# Patient Record
Sex: MALE | Race: WHITE | Employment: OTHER | URBAN - METROPOLITAN AREA
[De-identification: names, ages, dates, MRNs, and addresses within clinical notes are randomized per-mention and may not be internally consistent; named-entity substitution may affect disease eponyms.]

---

## 2019-08-07 ENCOUNTER — HOSPITAL ENCOUNTER (OUTPATIENT)
Dept: MRI IMAGING | Age: 64
Discharge: HOME | End: 2019-08-07
Attending: GENERAL PRACTICE
Payer: MEDICARE

## 2019-08-07 DIAGNOSIS — M47.26 OTHER SPONDYLOSIS WITH RADICULOPATHY, LUMBAR REGION: ICD-10-CM

## 2019-08-07 DIAGNOSIS — M41.26 IDIOPATHIC SCOLIOSIS OF LUMBAR SPINE: ICD-10-CM

## 2019-08-07 DIAGNOSIS — M99.63 OSSEOUS AND SUBLUXATION STENOSIS OF INTERVERTEBRAL FORAMINA OF LUMBAR REGION: ICD-10-CM

## 2019-08-07 PROCEDURE — 72148 MRI LUMBAR SPINE W/O DYE: CPT

## 2019-08-29 ENCOUNTER — HOSPITAL ENCOUNTER (OUTPATIENT)
Dept: PREADMISSION TESTING | Age: 64
Discharge: HOME | End: 2019-08-29
Payer: MEDICARE

## 2019-08-29 ENCOUNTER — ANESTHESIA EVENT (OUTPATIENT)
Dept: SURGERY | Age: 64
End: 2019-08-29
Payer: MEDICARE

## 2019-08-29 VITALS
TEMPERATURE: 97.9 F | BODY MASS INDEX: 34.52 KG/M2 | DIASTOLIC BLOOD PRESSURE: 80 MMHG | HEIGHT: 71 IN | SYSTOLIC BLOOD PRESSURE: 132 MMHG | HEART RATE: 54 BPM | RESPIRATION RATE: 18 BRPM | WEIGHT: 246.6 LBS

## 2019-08-29 LAB
ABO + RH BLD: NORMAL
ANION GAP SERPL CALC-SCNC: 7 MMOL/L (ref 5–15)
APPEARANCE UR: CLEAR
ATRIAL RATE: 52 BPM
BACTERIA URNS QL MICRO: NEGATIVE /HPF
BILIRUB UR QL: NEGATIVE
BLOOD GROUP ANTIBODIES SERPL: NORMAL
BUN SERPL-MCNC: 7 MG/DL (ref 6–20)
BUN/CREAT SERPL: 10 (ref 12–20)
CALCIUM SERPL-MCNC: 8.8 MG/DL (ref 8.5–10.1)
CALCULATED P AXIS, ECG09: 5 DEGREES
CALCULATED R AXIS, ECG10: -28 DEGREES
CALCULATED T AXIS, ECG11: 18 DEGREES
CHLORIDE SERPL-SCNC: 100 MMOL/L (ref 97–108)
CO2 SERPL-SCNC: 31 MMOL/L (ref 21–32)
COLOR UR: NORMAL
CREAT SERPL-MCNC: 0.7 MG/DL (ref 0.7–1.3)
DIAGNOSIS, 93000: NORMAL
EPITH CASTS URNS QL MICRO: NORMAL /LPF
ERYTHROCYTE [DISTWIDTH] IN BLOOD BY AUTOMATED COUNT: 14.1 % (ref 11.5–14.5)
EST. AVERAGE GLUCOSE BLD GHB EST-MCNC: 123 MG/DL
GLUCOSE SERPL-MCNC: 100 MG/DL (ref 65–100)
GLUCOSE UR STRIP.AUTO-MCNC: NEGATIVE MG/DL
HBA1C MFR BLD: 5.9 % (ref 4.2–6.3)
HCT VFR BLD AUTO: 44.7 % (ref 36.6–50.3)
HGB BLD-MCNC: 14.5 G/DL (ref 12.1–17)
HGB UR QL STRIP: NEGATIVE
HYALINE CASTS URNS QL MICRO: NORMAL /LPF (ref 0–5)
INR PPP: 1.1 (ref 0.9–1.1)
KETONES UR QL STRIP.AUTO: NEGATIVE MG/DL
LEUKOCYTE ESTERASE UR QL STRIP.AUTO: NEGATIVE
MCH RBC QN AUTO: 31.7 PG (ref 26–34)
MCHC RBC AUTO-ENTMCNC: 32.4 G/DL (ref 30–36.5)
MCV RBC AUTO: 97.8 FL (ref 80–99)
NITRITE UR QL STRIP.AUTO: NEGATIVE
NRBC # BLD: 0 K/UL (ref 0–0.01)
NRBC BLD-RTO: 0 PER 100 WBC
P-R INTERVAL, ECG05: 156 MS
PH UR STRIP: 7 [PH] (ref 5–8)
PLATELET # BLD AUTO: 153 K/UL (ref 150–400)
PMV BLD AUTO: 10.8 FL (ref 8.9–12.9)
POTASSIUM SERPL-SCNC: 4.4 MMOL/L (ref 3.5–5.1)
PROT UR STRIP-MCNC: NEGATIVE MG/DL
PROTHROMBIN TIME: 11.2 SEC (ref 9–11.1)
Q-T INTERVAL, ECG07: 460 MS
QRS DURATION, ECG06: 96 MS
QTC CALCULATION (BEZET), ECG08: 427 MS
RBC # BLD AUTO: 4.57 M/UL (ref 4.1–5.7)
RBC #/AREA URNS HPF: NORMAL /HPF (ref 0–5)
SODIUM SERPL-SCNC: 138 MMOL/L (ref 136–145)
SP GR UR REFRACTOMETRY: 1.01 (ref 1–1.03)
SPECIMEN EXP DATE BLD: NORMAL
UA: UC IF INDICATED,UAUC: NORMAL
UROBILINOGEN UR QL STRIP.AUTO: 0.2 EU/DL (ref 0.2–1)
VENTRICULAR RATE, ECG03: 52 BPM
WBC # BLD AUTO: 6 K/UL (ref 4.1–11.1)
WBC URNS QL MICRO: NORMAL /HPF (ref 0–4)

## 2019-08-29 PROCEDURE — 81001 URINALYSIS AUTO W/SCOPE: CPT

## 2019-08-29 PROCEDURE — 36415 COLL VENOUS BLD VENIPUNCTURE: CPT

## 2019-08-29 PROCEDURE — 93005 ELECTROCARDIOGRAM TRACING: CPT

## 2019-08-29 PROCEDURE — 80048 BASIC METABOLIC PNL TOTAL CA: CPT

## 2019-08-29 PROCEDURE — 85027 COMPLETE CBC AUTOMATED: CPT

## 2019-08-29 PROCEDURE — 83036 HEMOGLOBIN GLYCOSYLATED A1C: CPT

## 2019-08-29 PROCEDURE — 86900 BLOOD TYPING SEROLOGIC ABO: CPT

## 2019-08-29 PROCEDURE — 85610 PROTHROMBIN TIME: CPT

## 2019-08-29 RX ORDER — CETIRIZINE HCL 10 MG
10 TABLET ORAL DAILY
COMMUNITY
End: 2020-05-14

## 2019-08-29 RX ORDER — ROSUVASTATIN CALCIUM 40 MG/1
40 TABLET, COATED ORAL
COMMUNITY

## 2019-08-29 NOTE — PERIOP NOTES
DO NOT EAT OR DRINK ANYTHING AFTER MIDNIGHT, except as instructed THE NIGHT BEFORE SURGERY. PT GIVEN OPPORTUNITY TO ASK ADDITIONAL QUESTIONS. PRE-OPERATIVE INSTRUCTIONS REVIEWED WITH PATIENT. PATIENT GIVEN 2-BOTTLES OF CHG SOAP. INSTRUCTIONS REVIEWED ON USE OF CHG SOAP. PATIENT GIVEN SSI INFECTION FAQ SHEET. MRSA / MSSA TREATMENT INSTRUCTION SHEET GIVEN WITH AN EXPLANATION TO PATIENT THAT THEY WILL BE NOTIFIED IF TREATMENT INSTRUCTIONS NEED TO BE INITIATED. PATIENT WAS GIVEN THE OPPORTUNITY TO ASK QUESTIONS ON THE INFORMATION PROVIDED. [facility] CALLED TO OBTAIN MOST RECENT CARDIAC NOTES.

## 2019-08-29 NOTE — H&P
Sammie Salas  Location: [place] [address] [address]  Patient #: [id number]  : 1955   / Language: English / Race: White  Male      History of Present Illness  The patient is a 75year old male who presents with chronic lumbar back pain. Symptoms include pain and decreased range of motion. Symptoms are located in the low back and on the right side more than the left. The pain radiates to the right lower leg. The patient describes the pain as sharp. Onset was gradual. The symptoms occur constantly. The patient describes symptoms as severe. The patient was previously evaluated by a primary physician. Past evaluation has included MRI of the lumbar spine. Problem List/Past Medical   Essential Hypertension    Patient was referred to their primary care physician for Blood Pressure screening.    REVIEW OF SYSTEMS: Systems were reviewed by the provider.    FOLLOW-UP AFTER SURGERY (V67.00)    Foreign body in forearm (913.6)    Dietary counseling (V65.3)    RC TEAR - NON-TRAUMA (727.61)    Weight above 97th percentile (V49.89  Z78.9)    Closed chip fracture of left triquetrum with routine healing, subsequent encounter (V54.12  S62.112D)    HAND, ARTHRITIS (715.94)    Ganglion cyst (727.43)    Wrist pain, left (719.43  M25.532)      Allergies   Erythromycins    Allergies Reconciled    Percodan *ANALGESICS - OPIOID*   (Marked as Inactive)  Pollens   (Marked as Inactive)  Percocet *ANALGESICS - OPIOID*   (Marked as Inactive)  No Known Drug Allergies  [06/10/2013]: (Marked as Inactive)    Family History   Hypertension    Hypercholesterolemia    Arthritis      Social History   Exercise   : Does other exercise. Caffeine use   : Drinks caffeinated beverages 0 times a day. Sun Exposure  : rarely  HIV risk factors  : no  Alcohol use   : Drinks wine 2 times per week having 1-2 drinks per occasion, never having more than 5 drinks per occasion.   Tobacco / smoke exposure : No  Tobacco use  2: Former smoker: 0.5 packs per day, started smoking in 1974 at age 22, quit smoking in 2000 at age 50 (13 pack years), smokes 0 cigar(s) per week, uses 0 can(s) of smokeless tobacco per week. 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 It is worse at L4-5 due to right-sided disc protrusion with inferior migration. Lipomatosis creates severe stenosis at L5-S1. Other Problems   Skin Cancer    Gout    Hypercholesterolemia    Heart disease    Post-op pain (338.18)    Unspecified Diagnosis      Vitals   Weight: 246 lb   Height: 71 in   Body Surface Area: 2.3 m²   Body Mass Index: 34.31 kg/m²      Physical Exam   Neurologic  Sensory  Light Touch - Intact - Globally. Overall Assessment of Muscle Strength and Tone reveals  Lower Extremities - Right Iliopsoas - 5/5. Left Iliopsoas - 5/5. Right Tibialis Anterior - 3/5. Left Tibialis Anterior - 5/5. Right Gastroc-Soleus - 5/5. Left Gastroc-Soleus - 5/5. Right EHL - 5/5. Left EHL - 5/5. General Assessment of Reflexes  Right Ankle - Clonus is not present. Left Ankle - Clonus is not present. Reflexes (Dermatomes)  2/2 Normal - Left Achilles (L5-S2), Left Knee (L2-4), Right Achilles (L5-S2) and Right Knee (L2-4). Musculoskeletal  Global Assessment  Examination of related systems reveals - well-developed, well-nourished, in no acute distress, alert and oriented x 3. Gait and Station - normal gait and station and normal posture. Right Lower Extremity - normal strength and tone, normal range of motion without pain and no instability, subluxation or laxity.  Left Lower Extremity - normal strength and tone, normal range of motion without pain and no instability, subluxation or laxity. Spine/Ribs/Pelvis  Cervical Spine - Examination of the cervical spine reveals - no tenderness to palpation, no pain, no swelling, edema or erythema, normal cervical spine movements and normal sensation. Thoracic (Dorsal) Spine - Examination of the thoracic spine reveals - no tenderness over thoracic vertebrae, no pain, normal sensation and normal thoracic spine movements. Lumbosacral Spine - Examination of the lumbosacral spine reveals - no known fractures or deformities. Inspection and Palpation - Tenderness - moderate. Assessment of pain reveals the following findings - The pain is characterized as - moderate. Location - pain refers to hip on affected side and pain refers to lower back bilaterally. ROJM - Trunk Extension - 15 degrees. Lumbar Spine Flexion - . Lumbosacral Spine - Functional Testing - Babinski Test negative, Prone Knee Bending Test negative, Slump Test negative, Straight Leg Raising Test negative. Assessment & Plan   Lumbar stenosis with neurogenic claudication (724.03  M48.062)  Impression: Chronic through the years. Recently he is developed a right-sided foot drop that has come on acutely. He has on his scan lumbar stenosis due to congenital stenosis as well as facet hypertrophy from L2-S1. At L4-5 he has a right-sided disc protrusion with inferior migration causing impingement of the exiting L5 nerve root. He is a candidate for a lumbar laminectomy from L2-S1 with a discectomy at L4-5. Fortunately he does not need a fusion. The risks and benefits were discussed at length with the patient and the patient has elected to proceed. Indications for surgery include failed conservative treatment. Alternative treatments, risks and the perioperative course were discussed with the patient. All questions were answered. The risks and benefits of the procedure were explained.  Benefits include definitive diagnosis, relief of pain, elimination of deformity and improved function. Risks of surgery including bleeding, infection, weakness, numbness, CSF leak, failure to improve symptoms, exacerbation of medical co-morbidities and even death were discussed with the patient. Current Plans  X-RAY EXAM OF LUMBAR SPINE, 4 OR MORE VIEWS (51625) (AP, Lateral, Flexion and Extension views were taken today using Digital Radiography.)  Started Norco 5-325MG, 1-2 Tablet every 4-6 hours as needed for pain, #50, 08/14/2019, No Refill. Disc degeneration of lumbar region (722.52  M51.36)  Foot drop, right (736.79  M21.371)  Treatment options were discussed with the patient in full.(V65.49)  Current Plans  Pt Education - How to access health information online: discussed with patient and provided information. Pt Education - Educational materials were provided.: discussed with patient and provided information. Presurgical planning was preformed with the patient today  Surgery to be scheduled    Date of Surgery Update:  Mare Tran was seen and examined. History and physical has been reviewed. The patient has been examined.  There have been no significant clinical changes since the completion of the originally dated History and Physical.    Signed By: Lucy Peguero MD     August 30, 2019 7:19 AM             Signed by Lucy Peguero MD

## 2019-08-30 ENCOUNTER — HOSPITAL ENCOUNTER (OUTPATIENT)
Age: 64
Setting detail: OBSERVATION
Discharge: HOME HEALTH | End: 2019-09-04
Attending: ORTHOPAEDIC SURGERY | Admitting: ORTHOPAEDIC SURGERY
Payer: MEDICARE

## 2019-08-30 ENCOUNTER — APPOINTMENT (OUTPATIENT)
Dept: GENERAL RADIOLOGY | Age: 64
End: 2019-08-30
Attending: ORTHOPAEDIC SURGERY
Payer: MEDICARE

## 2019-08-30 ENCOUNTER — ANESTHESIA (OUTPATIENT)
Dept: SURGERY | Age: 64
End: 2019-08-30
Payer: MEDICARE

## 2019-08-30 DIAGNOSIS — M48.061 SPINAL STENOSIS OF LUMBAR REGION, UNSPECIFIED WHETHER NEUROGENIC CLAUDICATION PRESENT: Primary | ICD-10-CM

## 2019-08-30 LAB
BACTERIA SPEC CULT: NORMAL
BACTERIA SPEC CULT: NORMAL
SERVICE CMNT-IMP: NORMAL

## 2019-08-30 PROCEDURE — 77030012406 HC DRN WND PENRS BARD -A: Performed by: ORTHOPAEDIC SURGERY

## 2019-08-30 PROCEDURE — 76010000171 HC OR TIME 2 TO 2.5 HR INTENSV-TIER 1: Performed by: ORTHOPAEDIC SURGERY

## 2019-08-30 PROCEDURE — 77030029099 HC BN WAX SSPC -A: Performed by: ORTHOPAEDIC SURGERY

## 2019-08-30 PROCEDURE — 51798 US URINE CAPACITY MEASURE: CPT

## 2019-08-30 PROCEDURE — 74011000272 HC RX REV CODE- 272: Performed by: ORTHOPAEDIC SURGERY

## 2019-08-30 PROCEDURE — 74011000250 HC RX REV CODE- 250: Performed by: ORTHOPAEDIC SURGERY

## 2019-08-30 PROCEDURE — 76060000035 HC ANESTHESIA 2 TO 2.5 HR: Performed by: ORTHOPAEDIC SURGERY

## 2019-08-30 PROCEDURE — 74011000250 HC RX REV CODE- 250: Performed by: NURSE ANESTHETIST, CERTIFIED REGISTERED

## 2019-08-30 PROCEDURE — 74011250637 HC RX REV CODE- 250/637

## 2019-08-30 PROCEDURE — 99218 HC RM OBSERVATION: CPT

## 2019-08-30 PROCEDURE — 74011250636 HC RX REV CODE- 250/636: Performed by: NURSE ANESTHETIST, CERTIFIED REGISTERED

## 2019-08-30 PROCEDURE — 77030037713 HC CLOSR DEV INCIS ZIP STRY -B: Performed by: ORTHOPAEDIC SURGERY

## 2019-08-30 PROCEDURE — 77030013079 HC BLNKT BAIR HGGR 3M -A: Performed by: NURSE ANESTHETIST, CERTIFIED REGISTERED

## 2019-08-30 PROCEDURE — 77030008684 HC TU ET CUF COVD -B: Performed by: NURSE ANESTHETIST, CERTIFIED REGISTERED

## 2019-08-30 PROCEDURE — 77010033678 HC OXYGEN DAILY

## 2019-08-30 PROCEDURE — 74011250636 HC RX REV CODE- 250/636: Performed by: PHYSICIAN ASSISTANT

## 2019-08-30 PROCEDURE — 77030020782 HC GWN BAIR PAWS FLX 3M -B

## 2019-08-30 PROCEDURE — 77030031139 HC SUT VCRL2 J&J -A: Performed by: ORTHOPAEDIC SURGERY

## 2019-08-30 PROCEDURE — V2790 AMNIOTIC MEMBRANE: HCPCS | Performed by: ORTHOPAEDIC SURGERY

## 2019-08-30 PROCEDURE — 74011250636 HC RX REV CODE- 250/636: Performed by: ANESTHESIOLOGY

## 2019-08-30 PROCEDURE — 76210000063 HC OR PH I REC FIRST 0.5 HR: Performed by: ORTHOPAEDIC SURGERY

## 2019-08-30 PROCEDURE — 77030026438 HC STYL ET INTUB CARD -A: Performed by: NURSE ANESTHETIST, CERTIFIED REGISTERED

## 2019-08-30 PROCEDURE — 77030040361 HC SLV COMPR DVT MDII -B: Performed by: ORTHOPAEDIC SURGERY

## 2019-08-30 PROCEDURE — 77030038600 HC TU BPLR IRR DISP STRY -B: Performed by: ORTHOPAEDIC SURGERY

## 2019-08-30 PROCEDURE — 74011000250 HC RX REV CODE- 250: Performed by: PHYSICIAN ASSISTANT

## 2019-08-30 PROCEDURE — 94760 N-INVAS EAR/PLS OXIMETRY 1: CPT

## 2019-08-30 PROCEDURE — 74011250637 HC RX REV CODE- 250/637: Performed by: ANESTHESIOLOGY

## 2019-08-30 PROCEDURE — 74011250637 HC RX REV CODE- 250/637: Performed by: PHYSICIAN ASSISTANT

## 2019-08-30 PROCEDURE — 77030012893

## 2019-08-30 PROCEDURE — 77030014647 HC SEAL FBRN TISSL BAXT -D: Performed by: ORTHOPAEDIC SURGERY

## 2019-08-30 PROCEDURE — 77030019908 HC STETH ESOPH SIMS -A: Performed by: NURSE ANESTHETIST, CERTIFIED REGISTERED

## 2019-08-30 DEVICE — IMPLANT THN HYDRPHLC AMNIO MEMEBRANE 4 X 4 CM: Type: IMPLANTABLE DEVICE | Site: SPINE LUMBAR | Status: FUNCTIONAL

## 2019-08-30 RX ORDER — ONDANSETRON 2 MG/ML
4 INJECTION INTRAMUSCULAR; INTRAVENOUS AS NEEDED
Status: DISCONTINUED | OUTPATIENT
Start: 2019-08-30 | End: 2019-08-30 | Stop reason: HOSPADM

## 2019-08-30 RX ORDER — SODIUM CHLORIDE 0.9 % (FLUSH) 0.9 %
5-40 SYRINGE (ML) INJECTION AS NEEDED
Status: DISCONTINUED | OUTPATIENT
Start: 2019-08-30 | End: 2019-09-04 | Stop reason: HOSPADM

## 2019-08-30 RX ORDER — ACETAMINOPHEN 500 MG
1000 TABLET ORAL EVERY 6 HOURS
Status: DISCONTINUED | OUTPATIENT
Start: 2019-08-30 | End: 2019-09-04 | Stop reason: HOSPADM

## 2019-08-30 RX ORDER — SUCCINYLCHOLINE CHLORIDE 20 MG/ML
INJECTION INTRAMUSCULAR; INTRAVENOUS AS NEEDED
Status: DISCONTINUED | OUTPATIENT
Start: 2019-08-30 | End: 2019-08-30 | Stop reason: HOSPADM

## 2019-08-30 RX ORDER — OXYCODONE HYDROCHLORIDE 5 MG/1
5 TABLET ORAL
Status: DISCONTINUED | OUTPATIENT
Start: 2019-08-30 | End: 2019-09-04 | Stop reason: HOSPADM

## 2019-08-30 RX ORDER — SODIUM CHLORIDE 0.9 % (FLUSH) 0.9 %
5-40 SYRINGE (ML) INJECTION EVERY 8 HOURS
Status: DISCONTINUED | OUTPATIENT
Start: 2019-08-30 | End: 2019-08-30 | Stop reason: HOSPADM

## 2019-08-30 RX ORDER — SODIUM CHLORIDE 9 MG/ML
125 INJECTION, SOLUTION INTRAVENOUS CONTINUOUS
Status: DISPENSED | OUTPATIENT
Start: 2019-08-30 | End: 2019-08-31

## 2019-08-30 RX ORDER — FACIAL-BODY WIPES
10 EACH TOPICAL DAILY PRN
Status: DISCONTINUED | OUTPATIENT
Start: 2019-09-01 | End: 2019-09-04 | Stop reason: HOSPADM

## 2019-08-30 RX ORDER — SODIUM CHLORIDE 0.9 % (FLUSH) 0.9 %
5-40 SYRINGE (ML) INJECTION AS NEEDED
Status: DISCONTINUED | OUTPATIENT
Start: 2019-08-30 | End: 2019-08-30 | Stop reason: HOSPADM

## 2019-08-30 RX ORDER — CYCLOBENZAPRINE HCL 10 MG
TABLET ORAL
Status: COMPLETED
Start: 2019-08-30 | End: 2019-08-30

## 2019-08-30 RX ORDER — OXYCODONE AND ACETAMINOPHEN 5; 325 MG/1; MG/1
1 TABLET ORAL AS NEEDED
Status: DISCONTINUED | OUTPATIENT
Start: 2019-08-30 | End: 2019-08-30 | Stop reason: HOSPADM

## 2019-08-30 RX ORDER — OMEPRAZOLE 10 MG/1
10 CAPSULE, DELAYED RELEASE ORAL
Status: DISCONTINUED | OUTPATIENT
Start: 2019-08-30 | End: 2019-08-30 | Stop reason: CLARIF

## 2019-08-30 RX ORDER — PHENYLEPHRINE HCL IN 0.9% NACL 0.4MG/10ML
SYRINGE (ML) INTRAVENOUS AS NEEDED
Status: DISCONTINUED | OUTPATIENT
Start: 2019-08-30 | End: 2019-08-30 | Stop reason: HOSPADM

## 2019-08-30 RX ORDER — CETIRIZINE HCL 10 MG
10 TABLET ORAL DAILY
Status: DISCONTINUED | OUTPATIENT
Start: 2019-08-31 | End: 2019-09-04 | Stop reason: HOSPADM

## 2019-08-30 RX ORDER — HYDROMORPHONE HYDROCHLORIDE 1 MG/ML
0.2 INJECTION, SOLUTION INTRAMUSCULAR; INTRAVENOUS; SUBCUTANEOUS
Status: DISCONTINUED | OUTPATIENT
Start: 2019-08-30 | End: 2019-08-30 | Stop reason: HOSPADM

## 2019-08-30 RX ORDER — SODIUM CHLORIDE 0.9 % (FLUSH) 0.9 %
5-40 SYRINGE (ML) INJECTION EVERY 8 HOURS
Status: DISCONTINUED | OUTPATIENT
Start: 2019-08-30 | End: 2019-09-04 | Stop reason: HOSPADM

## 2019-08-30 RX ORDER — ASPIRIN 81 MG/1
81 TABLET ORAL DAILY
Status: DISCONTINUED | OUTPATIENT
Start: 2019-08-31 | End: 2019-09-04 | Stop reason: HOSPADM

## 2019-08-30 RX ORDER — FENTANYL CITRATE 50 UG/ML
INJECTION, SOLUTION INTRAMUSCULAR; INTRAVENOUS AS NEEDED
Status: DISCONTINUED | OUTPATIENT
Start: 2019-08-30 | End: 2019-08-30 | Stop reason: HOSPADM

## 2019-08-30 RX ORDER — OXYCODONE HYDROCHLORIDE 5 MG/1
5 TABLET ORAL
Qty: 60 TAB | Refills: 0 | Status: SHIPPED | OUTPATIENT
Start: 2019-08-30 | End: 2019-09-13

## 2019-08-30 RX ORDER — DEXAMETHASONE SODIUM PHOSPHATE 4 MG/ML
INJECTION, SOLUTION INTRA-ARTICULAR; INTRALESIONAL; INTRAMUSCULAR; INTRAVENOUS; SOFT TISSUE AS NEEDED
Status: DISCONTINUED | OUTPATIENT
Start: 2019-08-30 | End: 2019-08-30 | Stop reason: HOSPADM

## 2019-08-30 RX ORDER — FENTANYL CITRATE 50 UG/ML
25 INJECTION, SOLUTION INTRAMUSCULAR; INTRAVENOUS
Status: COMPLETED | OUTPATIENT
Start: 2019-08-30 | End: 2019-08-30

## 2019-08-30 RX ORDER — SODIUM CHLORIDE, SODIUM LACTATE, POTASSIUM CHLORIDE, CALCIUM CHLORIDE 600; 310; 30; 20 MG/100ML; MG/100ML; MG/100ML; MG/100ML
125 INJECTION, SOLUTION INTRAVENOUS CONTINUOUS
Status: DISCONTINUED | OUTPATIENT
Start: 2019-08-30 | End: 2019-08-30 | Stop reason: HOSPADM

## 2019-08-30 RX ORDER — ACETAMINOPHEN 325 MG/1
650 TABLET ORAL ONCE
Status: COMPLETED | OUTPATIENT
Start: 2019-08-30 | End: 2019-08-30

## 2019-08-30 RX ORDER — CEFAZOLIN SODIUM/WATER 2 G/20 ML
2 SYRINGE (ML) INTRAVENOUS ONCE
Status: COMPLETED | OUTPATIENT
Start: 2019-08-30 | End: 2019-08-30

## 2019-08-30 RX ORDER — ONDANSETRON 2 MG/ML
INJECTION INTRAMUSCULAR; INTRAVENOUS AS NEEDED
Status: DISCONTINUED | OUTPATIENT
Start: 2019-08-30 | End: 2019-08-30 | Stop reason: HOSPADM

## 2019-08-30 RX ORDER — DIPHENHYDRAMINE HYDROCHLORIDE 50 MG/ML
12.5 INJECTION, SOLUTION INTRAMUSCULAR; INTRAVENOUS AS NEEDED
Status: DISCONTINUED | OUTPATIENT
Start: 2019-08-30 | End: 2019-08-30 | Stop reason: HOSPADM

## 2019-08-30 RX ORDER — SODIUM CHLORIDE 9 MG/ML
25 INJECTION, SOLUTION INTRAVENOUS CONTINUOUS
Status: DISCONTINUED | OUTPATIENT
Start: 2019-08-30 | End: 2019-08-30 | Stop reason: HOSPADM

## 2019-08-30 RX ORDER — ATENOLOL 25 MG/1
25 TABLET ORAL DAILY
Status: DISCONTINUED | OUTPATIENT
Start: 2019-08-31 | End: 2019-09-04 | Stop reason: HOSPADM

## 2019-08-30 RX ORDER — KETOROLAC TROMETHAMINE 30 MG/ML
15 INJECTION, SOLUTION INTRAMUSCULAR; INTRAVENOUS EVERY 6 HOURS
Status: COMPLETED | OUTPATIENT
Start: 2019-08-30 | End: 2019-08-31

## 2019-08-30 RX ORDER — POLYETHYLENE GLYCOL 3350 17 G/17G
17 POWDER, FOR SOLUTION ORAL DAILY
Status: DISCONTINUED | OUTPATIENT
Start: 2019-08-31 | End: 2019-09-04 | Stop reason: HOSPADM

## 2019-08-30 RX ORDER — NALOXONE HYDROCHLORIDE 0.4 MG/ML
0.4 INJECTION, SOLUTION INTRAMUSCULAR; INTRAVENOUS; SUBCUTANEOUS AS NEEDED
Status: DISCONTINUED | OUTPATIENT
Start: 2019-08-30 | End: 2019-09-04 | Stop reason: HOSPADM

## 2019-08-30 RX ORDER — EPHEDRINE SULFATE/0.9% NACL/PF 50 MG/5 ML
SYRINGE (ML) INTRAVENOUS AS NEEDED
Status: DISCONTINUED | OUTPATIENT
Start: 2019-08-30 | End: 2019-08-30 | Stop reason: HOSPADM

## 2019-08-30 RX ORDER — CYCLOBENZAPRINE HCL 10 MG
10 TABLET ORAL
Status: DISCONTINUED | OUTPATIENT
Start: 2019-08-30 | End: 2019-09-04 | Stop reason: HOSPADM

## 2019-08-30 RX ORDER — CEFAZOLIN SODIUM/WATER 2 G/20 ML
2 SYRINGE (ML) INTRAVENOUS EVERY 8 HOURS
Status: COMPLETED | OUTPATIENT
Start: 2019-08-30 | End: 2019-08-31

## 2019-08-30 RX ORDER — ROSUVASTATIN CALCIUM 10 MG/1
40 TABLET, COATED ORAL
Status: DISCONTINUED | OUTPATIENT
Start: 2019-08-30 | End: 2019-09-04 | Stop reason: HOSPADM

## 2019-08-30 RX ORDER — PANTOPRAZOLE SODIUM 40 MG/1
40 TABLET, DELAYED RELEASE ORAL
Status: DISCONTINUED | OUTPATIENT
Start: 2019-08-30 | End: 2019-09-04 | Stop reason: HOSPADM

## 2019-08-30 RX ORDER — MIDAZOLAM HYDROCHLORIDE 1 MG/ML
INJECTION, SOLUTION INTRAMUSCULAR; INTRAVENOUS AS NEEDED
Status: DISCONTINUED | OUTPATIENT
Start: 2019-08-30 | End: 2019-08-30 | Stop reason: HOSPADM

## 2019-08-30 RX ORDER — OXYCODONE HYDROCHLORIDE 5 MG/1
10 TABLET ORAL
Status: DISCONTINUED | OUTPATIENT
Start: 2019-08-30 | End: 2019-09-02 | Stop reason: SDUPTHER

## 2019-08-30 RX ORDER — NEOSTIGMINE METHYLSULFATE 1 MG/ML
INJECTION INTRAVENOUS AS NEEDED
Status: DISCONTINUED | OUTPATIENT
Start: 2019-08-30 | End: 2019-08-30 | Stop reason: HOSPADM

## 2019-08-30 RX ORDER — DIPHENHYDRAMINE HYDROCHLORIDE 50 MG/ML
12.5 INJECTION, SOLUTION INTRAMUSCULAR; INTRAVENOUS
Status: ACTIVE | OUTPATIENT
Start: 2019-08-30 | End: 2019-08-31

## 2019-08-30 RX ORDER — LIDOCAINE HYDROCHLORIDE 20 MG/ML
INJECTION, SOLUTION EPIDURAL; INFILTRATION; INTRACAUDAL; PERINEURAL AS NEEDED
Status: DISCONTINUED | OUTPATIENT
Start: 2019-08-30 | End: 2019-08-30 | Stop reason: HOSPADM

## 2019-08-30 RX ORDER — LISINOPRIL 10 MG/1
10 TABLET ORAL EVERY EVENING
Status: DISCONTINUED | OUTPATIENT
Start: 2019-08-30 | End: 2019-09-04 | Stop reason: HOSPADM

## 2019-08-30 RX ORDER — TESTOSTERONE CYPIONATE 200 MG/ML
35 INJECTION INTRAMUSCULAR
Status: DISCONTINUED | OUTPATIENT
Start: 2019-09-03 | End: 2019-09-04 | Stop reason: HOSPADM

## 2019-08-30 RX ORDER — LIDOCAINE HYDROCHLORIDE 10 MG/ML
0.1 INJECTION, SOLUTION EPIDURAL; INFILTRATION; INTRACAUDAL; PERINEURAL AS NEEDED
Status: DISCONTINUED | OUTPATIENT
Start: 2019-08-30 | End: 2019-08-30 | Stop reason: HOSPADM

## 2019-08-30 RX ORDER — HYDROMORPHONE HYDROCHLORIDE 2 MG/ML
INJECTION, SOLUTION INTRAMUSCULAR; INTRAVENOUS; SUBCUTANEOUS AS NEEDED
Status: DISCONTINUED | OUTPATIENT
Start: 2019-08-30 | End: 2019-08-30 | Stop reason: HOSPADM

## 2019-08-30 RX ORDER — FENTANYL CITRATE 50 UG/ML
50 INJECTION, SOLUTION INTRAMUSCULAR; INTRAVENOUS AS NEEDED
Status: DISCONTINUED | OUTPATIENT
Start: 2019-08-30 | End: 2019-08-30 | Stop reason: HOSPADM

## 2019-08-30 RX ORDER — KETAMINE HYDROCHLORIDE 10 MG/ML
INJECTION, SOLUTION INTRAMUSCULAR; INTRAVENOUS AS NEEDED
Status: DISCONTINUED | OUTPATIENT
Start: 2019-08-30 | End: 2019-08-30 | Stop reason: HOSPADM

## 2019-08-30 RX ORDER — ONDANSETRON 2 MG/ML
4 INJECTION INTRAMUSCULAR; INTRAVENOUS
Status: ACTIVE | OUTPATIENT
Start: 2019-08-30 | End: 2019-08-31

## 2019-08-30 RX ORDER — MORPHINE SULFATE 10 MG/ML
2 INJECTION, SOLUTION INTRAMUSCULAR; INTRAVENOUS
Status: DISCONTINUED | OUTPATIENT
Start: 2019-08-30 | End: 2019-08-30 | Stop reason: HOSPADM

## 2019-08-30 RX ORDER — MIDAZOLAM HYDROCHLORIDE 1 MG/ML
1 INJECTION, SOLUTION INTRAMUSCULAR; INTRAVENOUS AS NEEDED
Status: DISCONTINUED | OUTPATIENT
Start: 2019-08-30 | End: 2019-08-30 | Stop reason: HOSPADM

## 2019-08-30 RX ORDER — PROPOFOL 10 MG/ML
INJECTION, EMULSION INTRAVENOUS AS NEEDED
Status: DISCONTINUED | OUTPATIENT
Start: 2019-08-30 | End: 2019-08-30 | Stop reason: HOSPADM

## 2019-08-30 RX ORDER — AMOXICILLIN 250 MG
1 CAPSULE ORAL 2 TIMES DAILY
Status: DISCONTINUED | OUTPATIENT
Start: 2019-08-30 | End: 2019-09-04 | Stop reason: HOSPADM

## 2019-08-30 RX ORDER — GLYCOPYRROLATE 0.2 MG/ML
INJECTION INTRAMUSCULAR; INTRAVENOUS AS NEEDED
Status: DISCONTINUED | OUTPATIENT
Start: 2019-08-30 | End: 2019-08-30 | Stop reason: HOSPADM

## 2019-08-30 RX ORDER — ALLOPURINOL 300 MG/1
450 TABLET ORAL DAILY
Status: DISCONTINUED | OUTPATIENT
Start: 2019-08-31 | End: 2019-09-04 | Stop reason: HOSPADM

## 2019-08-30 RX ORDER — MIDAZOLAM HYDROCHLORIDE 1 MG/ML
0.5 INJECTION, SOLUTION INTRAMUSCULAR; INTRAVENOUS
Status: DISCONTINUED | OUTPATIENT
Start: 2019-08-30 | End: 2019-08-30 | Stop reason: HOSPADM

## 2019-08-30 RX ORDER — ROCURONIUM BROMIDE 10 MG/ML
INJECTION, SOLUTION INTRAVENOUS AS NEEDED
Status: DISCONTINUED | OUTPATIENT
Start: 2019-08-30 | End: 2019-08-30 | Stop reason: HOSPADM

## 2019-08-30 RX ORDER — MORPHINE SULFATE 2 MG/ML
2 INJECTION, SOLUTION INTRAMUSCULAR; INTRAVENOUS
Status: ACTIVE | OUTPATIENT
Start: 2019-08-30 | End: 2019-08-31

## 2019-08-30 RX ADMIN — LIDOCAINE HYDROCHLORIDE 80 MG: 20 INJECTION, SOLUTION EPIDURAL; INFILTRATION; INTRACAUDAL; PERINEURAL at 09:34

## 2019-08-30 RX ADMIN — MIDAZOLAM 2 MG: 1 INJECTION INTRAMUSCULAR; INTRAVENOUS at 09:29

## 2019-08-30 RX ADMIN — Medication 2 G: at 17:57

## 2019-08-30 RX ADMIN — FENTANYL CITRATE 25 MCG: 50 INJECTION INTRAMUSCULAR; INTRAVENOUS at 13:50

## 2019-08-30 RX ADMIN — GLYCOPYRROLATE 0.4 MG: 0.2 INJECTION, SOLUTION INTRAMUSCULAR; INTRAVENOUS at 10:55

## 2019-08-30 RX ADMIN — GLYCOPYRROLATE 0.2 MG: 0.2 INJECTION, SOLUTION INTRAMUSCULAR; INTRAVENOUS at 09:49

## 2019-08-30 RX ADMIN — NEOSTIGMINE METHYLSULFATE 3 MG: 1 INJECTION, SOLUTION INTRAMUSCULAR; INTRAVENOUS; SUBCUTANEOUS at 10:55

## 2019-08-30 RX ADMIN — Medication 10 MG: at 10:22

## 2019-08-30 RX ADMIN — ROCURONIUM BROMIDE 5 MG: 10 SOLUTION INTRAVENOUS at 09:34

## 2019-08-30 RX ADMIN — Medication 80 MCG: at 09:34

## 2019-08-30 RX ADMIN — SODIUM CHLORIDE, POTASSIUM CHLORIDE, SODIUM LACTATE AND CALCIUM CHLORIDE: 600; 310; 30; 20 INJECTION, SOLUTION INTRAVENOUS at 09:24

## 2019-08-30 RX ADMIN — OXYCODONE HYDROCHLORIDE 10 MG: 5 TABLET ORAL at 20:21

## 2019-08-30 RX ADMIN — FENTANYL CITRATE 25 MCG: 50 INJECTION INTRAMUSCULAR; INTRAVENOUS at 15:00

## 2019-08-30 RX ADMIN — HYDROMORPHONE HYDROCHLORIDE 0.2 MG: 2 INJECTION, SOLUTION INTRAMUSCULAR; INTRAVENOUS; SUBCUTANEOUS at 11:25

## 2019-08-30 RX ADMIN — ACETAMINOPHEN 650 MG: 325 TABLET, FILM COATED ORAL at 09:05

## 2019-08-30 RX ADMIN — ROCURONIUM BROMIDE 30 MG: 10 SOLUTION INTRAVENOUS at 09:48

## 2019-08-30 RX ADMIN — KETAMINE HYDROCHLORIDE 30 MG: 10 INJECTION, SOLUTION INTRAMUSCULAR; INTRAVENOUS at 09:34

## 2019-08-30 RX ADMIN — Medication 80 MCG: at 10:07

## 2019-08-30 RX ADMIN — OXYCODONE HYDROCHLORIDE 10 MG: 5 TABLET ORAL at 16:56

## 2019-08-30 RX ADMIN — PROPOFOL 150 MG: 10 INJECTION, EMULSION INTRAVENOUS at 09:34

## 2019-08-30 RX ADMIN — OXYCODONE HYDROCHLORIDE 10 MG: 5 TABLET ORAL at 23:17

## 2019-08-30 RX ADMIN — SUCCINYLCHOLINE CHLORIDE 140 MG: 20 INJECTION, SOLUTION INTRAMUSCULAR; INTRAVENOUS at 09:34

## 2019-08-30 RX ADMIN — Medication 2 G: at 09:40

## 2019-08-30 RX ADMIN — SODIUM CHLORIDE, POTASSIUM CHLORIDE, SODIUM LACTATE AND CALCIUM CHLORIDE: 600; 310; 30; 20 INJECTION, SOLUTION INTRAVENOUS at 11:20

## 2019-08-30 RX ADMIN — Medication 10 MG: at 10:34

## 2019-08-30 RX ADMIN — DEXAMETHASONE SODIUM PHOSPHATE 8 MG: 4 INJECTION, SOLUTION INTRAMUSCULAR; INTRAVENOUS at 09:41

## 2019-08-30 RX ADMIN — KETOROLAC TROMETHAMINE 15 MG: 30 INJECTION, SOLUTION INTRAMUSCULAR at 23:16

## 2019-08-30 RX ADMIN — KETOROLAC TROMETHAMINE 15 MG: 30 INJECTION, SOLUTION INTRAMUSCULAR at 17:57

## 2019-08-30 RX ADMIN — HYDROMORPHONE HYDROCHLORIDE 0.5 MG: 2 INJECTION, SOLUTION INTRAMUSCULAR; INTRAVENOUS; SUBCUTANEOUS at 10:55

## 2019-08-30 RX ADMIN — CYCLOBENZAPRINE HYDROCHLORIDE 10 MG: 10 TABLET, FILM COATED ORAL at 13:53

## 2019-08-30 RX ADMIN — HYDROMORPHONE HYDROCHLORIDE 0.5 MG: 2 INJECTION, SOLUTION INTRAMUSCULAR; INTRAVENOUS; SUBCUTANEOUS at 11:02

## 2019-08-30 RX ADMIN — Medication 120 MCG: at 10:16

## 2019-08-30 RX ADMIN — Medication 80 MCG: at 09:48

## 2019-08-30 RX ADMIN — FENTANYL CITRATE 25 MCG: 50 INJECTION INTRAMUSCULAR; INTRAVENOUS at 14:43

## 2019-08-30 RX ADMIN — ROSUVASTATIN CALCIUM 40 MG: 10 TABLET, COATED ORAL at 22:40

## 2019-08-30 RX ADMIN — Medication 10 ML: at 23:16

## 2019-08-30 RX ADMIN — HYDROMORPHONE HYDROCHLORIDE 0.8 MG: 2 INJECTION, SOLUTION INTRAMUSCULAR; INTRAVENOUS; SUBCUTANEOUS at 11:40

## 2019-08-30 RX ADMIN — PROPOFOL 50 MG: 10 INJECTION, EMULSION INTRAVENOUS at 11:02

## 2019-08-30 RX ADMIN — ONDANSETRON 4 MG: 2 INJECTION INTRAMUSCULAR; INTRAVENOUS at 13:42

## 2019-08-30 RX ADMIN — FENTANYL CITRATE 50 MCG: 50 INJECTION, SOLUTION INTRAMUSCULAR; INTRAVENOUS at 09:16

## 2019-08-30 RX ADMIN — ACETAMINOPHEN 1000 MG: 500 TABLET ORAL at 23:17

## 2019-08-30 RX ADMIN — ACETAMINOPHEN 1000 MG: 500 TABLET ORAL at 17:57

## 2019-08-30 RX ADMIN — FENTANYL CITRATE 100 MCG: 50 INJECTION, SOLUTION INTRAMUSCULAR; INTRAVENOUS at 09:34

## 2019-08-30 RX ADMIN — SENNOSIDES, DOCUSATE SODIUM 1 TABLET: 50; 8.6 TABLET, FILM COATED ORAL at 17:57

## 2019-08-30 RX ADMIN — FENTANYL CITRATE 25 MCG: 50 INJECTION INTRAMUSCULAR; INTRAVENOUS at 13:42

## 2019-08-30 RX ADMIN — SODIUM CHLORIDE 125 ML/HR: 900 INJECTION, SOLUTION INTRAVENOUS at 12:00

## 2019-08-30 RX ADMIN — ONDANSETRON HYDROCHLORIDE 4 MG: 2 INJECTION, SOLUTION INTRAMUSCULAR; INTRAVENOUS at 11:01

## 2019-08-30 NOTE — ROUTINE PROCESS
TRANSFER - IN REPORT:    Verbal report received Nba(name) on Jo Suresh  being received from PACU(unit) for routine progression of care      Report consisted of patients Situation, Background, Assessment and   Recommendations(SBAR). Information from the following report(s) SBAR was reviewed with the receiving nurse. Opportunity for questions and clarification was provided. Assessment completed upon patients arrival to unit and care assumed.

## 2019-08-30 NOTE — BRIEF OP NOTE
BRIEF OPERATIVE NOTE    Date of Procedure: 8/30/2019   Preoperative Diagnosis: STENOSIS, FOOT DROP  Postoperative Diagnosis: STENOSIS, FOOT DROP    Procedure(s):  L2-S1 LUMBAR LAMINECTOMY, L4-5 DISCECTOMY  Surgeon(s) and Role: Sonal Glass MD - Primary         Surgical Assistant: Raudel Castillo PA-C    Surgical Staff:  Circ-1: Kelle Esquivel  Physician Assistant: LUKE Mcdaniel  Scrub Tech-1: [facility]  Surg Asst-1: Yaya Mini  Event Time In Time Out   Incision Start 2566    Incision Close 1129      Anesthesia: General   Estimated Blood Loss: 100cc  Specimens: * No specimens in log *   Findings: Lumbar stenosis   Complications: None  Implants:   Implant Name Type Inv.  Item Serial No.  Lot No. LRB No. Used Action   MEMBRANE AMNIOTIC WND 4X4CM [employer]  MEMBRANE AMNIOTIC WND 4X4CM -- [employer] [id number] [employer] NA N/A 1 Implanted   MEMBRANE AMNIOTIC WND 4X4CM -- [employer] - [id number]  MEMBRANE AMNIOTIC WND 4X4CM -- [employer] [id number] [employer] NA N/A 1 Implanted

## 2019-08-30 NOTE — ANESTHESIA PREPROCEDURE EVALUATION
Relevant Problems   No relevant active problems       Anesthetic History   No history of anesthetic complications            Review of Systems / Medical History  Patient summary reviewed, nursing notes reviewed and pertinent labs reviewed    Pulmonary  Within defined limits                 Neuro/Psych   Within defined limits           Cardiovascular  Within defined limits  Hypertension          CAD and cardiac stents    Exercise tolerance: >4 METS     GI/Hepatic/Renal  Within defined limits              Endo/Other  Within defined limits           Other Findings   Comments: hypogonadism           Physical Exam    Airway  Mallampati: III  TM Distance: > 6 cm  Neck ROM: normal range of motion   Mouth opening: Diminished (comment)     Cardiovascular  Regular rate and rhythm,  S1 and S2 normal,  no murmur, click, rub, or gallop             Dental    Dentition: Edentulous, Full lower dentures and Full upper dentures     Pulmonary  Breath sounds clear to auscultation               Abdominal  GI exam deferred       Other Findings            Anesthetic Plan    ASA: 3  Anesthesia type: general          Induction: Intravenous  Anesthetic plan and risks discussed with: Patient

## 2019-08-30 NOTE — PERIOP NOTES
Patient: Ingris Powers MRN: [id number]  SSN: [id number]   YOB: 1955  Age: 75 y.o. Sex: male     Patient is status post Procedure(s):  L2-S1 LUMBAR LAMINECTOMY, L4-5 DISCECTOMY. Surgeon(s) and Role: Sonal Glass MD - Primary    Local/Dose/Irrigation:                    Peripheral IV 08/30/19 Right Hand (Active)          Hemovac Posterior;Right Back (Active)      Airway - Endotracheal Tube 08/30/19 (Active)                   Dressing/Packing:  Wound Back-Dressing Type: ABD pad; Adhesive wound dressing (Mastisol)(Zip-16) (08/30/19 1053)    Splint/Cast:  ]    Other:

## 2019-08-30 NOTE — ANESTHESIA POSTPROCEDURE EVALUATION
Procedure(s):  L2-S1 LUMBAR LAMINECTOMY, L4-5 DISCECTOMY. general    Anesthesia Post Evaluation        Patient location during evaluation: PACU  Patient participation: complete - patient participated  Level of consciousness: awake and alert  Pain management: adequate  Airway patency: patent  Anesthetic complications: no  Cardiovascular status: acceptable  Respiratory status: acceptable  Hydration status: acceptable  Comments: I have seen and evaluated the patient and is ready for discharge. Sherman Moreno MD    Post anesthesia nausea and vomiting:  none      Vitals Value Taken Time   /44 8/30/2019  3:00 PM   Temp 37 °C (98.6 °F) 8/30/2019 12:00 PM   Pulse 53 8/30/2019  3:14 PM   Resp 12 8/30/2019  3:14 PM   SpO2 94 % 8/30/2019  3:14 PM   Vitals shown include unvalidated device data.

## 2019-08-30 NOTE — ROUTINE PROCESS
Primary Nurse Kwadwo Healy RN and Kezia Richey RN performed a dual skin assessment on this patient No impairment noted  Suleman score is 21

## 2019-08-31 LAB
ANION GAP SERPL CALC-SCNC: 7 MMOL/L (ref 5–15)
BUN SERPL-MCNC: 13 MG/DL (ref 6–20)
BUN/CREAT SERPL: 13 (ref 12–20)
CALCIUM SERPL-MCNC: 8 MG/DL (ref 8.5–10.1)
CHLORIDE SERPL-SCNC: 101 MMOL/L (ref 97–108)
CO2 SERPL-SCNC: 26 MMOL/L (ref 21–32)
CREAT SERPL-MCNC: 1.03 MG/DL (ref 0.7–1.3)
GLUCOSE SERPL-MCNC: 156 MG/DL (ref 65–100)
HGB BLD-MCNC: 11.2 G/DL (ref 12.1–17)
POTASSIUM SERPL-SCNC: 4.5 MMOL/L (ref 3.5–5.1)
SODIUM SERPL-SCNC: 134 MMOL/L (ref 136–145)

## 2019-08-31 PROCEDURE — 74011250637 HC RX REV CODE- 250/637: Performed by: PHYSICIAN ASSISTANT

## 2019-08-31 PROCEDURE — 80048 BASIC METABOLIC PNL TOTAL CA: CPT

## 2019-08-31 PROCEDURE — 97161 PT EVAL LOW COMPLEX 20 MIN: CPT

## 2019-08-31 PROCEDURE — 97535 SELF CARE MNGMENT TRAINING: CPT

## 2019-08-31 PROCEDURE — 74011250637 HC RX REV CODE- 250/637: Performed by: ORTHOPAEDIC SURGERY

## 2019-08-31 PROCEDURE — 97110 THERAPEUTIC EXERCISES: CPT

## 2019-08-31 PROCEDURE — 97165 OT EVAL LOW COMPLEX 30 MIN: CPT

## 2019-08-31 PROCEDURE — 99218 HC RM OBSERVATION: CPT

## 2019-08-31 PROCEDURE — 74011250636 HC RX REV CODE- 250/636: Performed by: PHYSICIAN ASSISTANT

## 2019-08-31 PROCEDURE — 97116 GAIT TRAINING THERAPY: CPT

## 2019-08-31 PROCEDURE — 85018 HEMOGLOBIN: CPT

## 2019-08-31 PROCEDURE — 36415 COLL VENOUS BLD VENIPUNCTURE: CPT

## 2019-08-31 RX ORDER — CALCIUM CARBONATE 200(500)MG
200 TABLET,CHEWABLE ORAL
Status: DISCONTINUED | OUTPATIENT
Start: 2019-08-31 | End: 2019-09-04 | Stop reason: HOSPADM

## 2019-08-31 RX ORDER — MAG HYDROX/ALUMINUM HYD/SIMETH 200-200-20
30 SUSPENSION, ORAL (FINAL DOSE FORM) ORAL
Status: DISCONTINUED | OUTPATIENT
Start: 2019-08-31 | End: 2019-09-04 | Stop reason: HOSPADM

## 2019-08-31 RX ORDER — CALCIUM CARBONATE 200(500)MG
200 TABLET,CHEWABLE ORAL
Status: DISCONTINUED | OUTPATIENT
Start: 2019-08-31 | End: 2019-08-31

## 2019-08-31 RX ADMIN — ALUMINUM HYDROXIDE, MAGNESIUM HYDROXIDE, AND SIMETHICONE 30 ML: 200; 200; 20 SUSPENSION ORAL at 18:02

## 2019-08-31 RX ADMIN — ALLOPURINOL 450 MG: 300 TABLET ORAL at 08:32

## 2019-08-31 RX ADMIN — CALCIUM CARBONATE (ANTACID) CHEW TAB 500 MG 200 MG: 500 CHEW TAB at 15:58

## 2019-08-31 RX ADMIN — KETOROLAC TROMETHAMINE 15 MG: 30 INJECTION, SOLUTION INTRAMUSCULAR at 05:28

## 2019-08-31 RX ADMIN — POLYETHYLENE GLYCOL 3350 17 G: 17 POWDER, FOR SOLUTION ORAL at 08:32

## 2019-08-31 RX ADMIN — SENNOSIDES, DOCUSATE SODIUM 1 TABLET: 50; 8.6 TABLET, FILM COATED ORAL at 18:02

## 2019-08-31 RX ADMIN — ASPIRIN 81 MG: 81 TABLET, COATED ORAL at 08:32

## 2019-08-31 RX ADMIN — Medication 10 ML: at 05:29

## 2019-08-31 RX ADMIN — CETIRIZINE HYDROCHLORIDE 10 MG: 10 TABLET, FILM COATED ORAL at 08:33

## 2019-08-31 RX ADMIN — SENNOSIDES, DOCUSATE SODIUM 1 TABLET: 50; 8.6 TABLET, FILM COATED ORAL at 08:33

## 2019-08-31 RX ADMIN — ROSUVASTATIN CALCIUM 40 MG: 10 TABLET, COATED ORAL at 21:13

## 2019-08-31 RX ADMIN — OXYCODONE HYDROCHLORIDE 5 MG: 5 TABLET ORAL at 08:33

## 2019-08-31 RX ADMIN — ACETAMINOPHEN 1000 MG: 500 TABLET ORAL at 18:02

## 2019-08-31 RX ADMIN — ACETAMINOPHEN 1000 MG: 500 TABLET ORAL at 11:31

## 2019-08-31 RX ADMIN — ACETAMINOPHEN 1000 MG: 500 TABLET ORAL at 05:28

## 2019-08-31 RX ADMIN — CYCLOBENZAPRINE HYDROCHLORIDE 10 MG: 10 TABLET, FILM COATED ORAL at 21:13

## 2019-08-31 RX ADMIN — KETOROLAC TROMETHAMINE 15 MG: 30 INJECTION, SOLUTION INTRAMUSCULAR at 11:31

## 2019-08-31 RX ADMIN — SODIUM CHLORIDE 125 ML/HR: 900 INJECTION, SOLUTION INTRAVENOUS at 00:17

## 2019-08-31 RX ADMIN — Medication 5 ML: at 14:00

## 2019-08-31 RX ADMIN — Medication 10 ML: at 21:13

## 2019-08-31 RX ADMIN — OXYCODONE HYDROCHLORIDE 10 MG: 5 TABLET ORAL at 15:58

## 2019-08-31 RX ADMIN — Medication 2 G: at 02:04

## 2019-08-31 RX ADMIN — CYCLOBENZAPRINE HYDROCHLORIDE 10 MG: 10 TABLET, FILM COATED ORAL at 02:35

## 2019-08-31 RX ADMIN — OXYCODONE HYDROCHLORIDE 10 MG: 5 TABLET ORAL at 11:31

## 2019-08-31 NOTE — PROGRESS NOTES
Patient received post PT eval. Recommend he purchase hip kit and bottom ilana or toilet tongs. He has knowledge of how to use kit from previous posterior hip surgeries. Will be purchasing online. Will follow up to review  AE training for [facility] ADLS and increase safety. Full note to follow.

## 2019-08-31 NOTE — PROGRESS NOTES
Problem: Mobility Impaired (Adult and Pediatric)  Goal: *Acute Goals and Plan of Care (Insert Text)  Description  FUNCTIONAL STATUS PRIOR TO ADMISSION: Patient was independent and active without use of DME.    HOME SUPPORT PRIOR TO ADMISSION: The patient lived with family but did not require assist.    Physical Therapy Goals  Initiated 8/31/2019    1. Patient will move from supine to sit and sit to supine , scoot up and down and roll side to side in bed with independence within 4 days. 2. Patient will perform sit to stand with independence within 4 days. 3. Patient will ambulate with independence for 150 feet with the least restrictive device within 4 days. 4. Patient will ascend/descend 4 stairs with 1 handrail(s) with modified independence within 4 days. 5. Patient will verbalize and demonstrate understanding of spinal precautions (No bending, lifting greater than 5 lbs, or twisting; log-roll technique; frequent repositioning as instructed) within 4 days. Outcome: Progressing Towards Goal   PHYSICAL THERAPY EVALUATION  Patient: Jo Suresh (1 y.o. male)  Date: 8/31/2019  Primary Diagnosis: Lumbar stenosis [M48.061]  Spinal stenosis, lumbar [M48.061]  Lumbar stenosis [M48.061]  Procedure(s) (LRB):  L2-S1 LUMBAR LAMINECTOMY, L4-5 DISCECTOMY (N/A) 1 Day Post-Op   Precautions:   Fall, Back      ASSESSMENT  Based on the objective data described below, the patient presents with decreased RLE sensation, strength, ROM, impaired balance, gait mechanics and high fall risk following L2-S1 laminectomy and L4-5 discectomy POD#1. Pt educated on back precautions and log roll with good adherence throughout session. Most limited by weakness and proprioception deficits in RLE requiring A x 2(per pt request) for safety with gait while utilizing RW. Pt displays good UE and LLE strength to compensate. Left up in chair following brief gait training into hallway.  Educated on R ankle and foot exercises to complete throughout the day. No cleared for discharge. Current Level of Function Impacting Discharge (mobility/balance): coordination/sensation to R calf and foot    Functional Outcome Measure: The patient scored 10 on the Tinetti outcome measure which is indicative of high fall risk. Other factors to consider for discharge: steps to enter home     Patient will benefit from skilled therapy intervention to address the above noted impairments. PLAN :  Recommendations and Planned Interventions: bed mobility training, transfer training, gait training, therapeutic exercises, neuromuscular re-education, patient and family training/education and therapeutic activities      Frequency/Duration: Patient will be followed by physical therapy:  twice daily to address goals. Recommendation for discharge: (in order for the patient to meet his/her long term goals)  Physical therapy at least 2 days/week in the home     This discharge recommendation:  Has been made in collaboration with the attending provider and/or case management    Equipment recommendations for successful discharge (if) home: TBD may require RW pending progress with therapy. Has not been ordered         SUBJECTIVE:   Patient stated I think you are right my foot is just still asleep.     OBJECTIVE DATA SUMMARY:   HISTORY:    Past Medical History:   Diagnosis Date    Arthritis     OSTEO    Asthma     AS A YOUNGER ADULT    CAD (coronary artery disease)     stent X1    Cancer ([facility] [address]     BCC, SCC    Chronic pain     GERD (gastroesophageal reflux disease)     HTN (hypertension)     Hyperlipidemia     Kidney stone 1993    Liver disease 03/2018    FATTY LIVER    Sleep apnea     WAS TOLD, BUT NEVER TESTED     Past Surgical History:   Procedure Laterality Date    CABG, ARTERY-VEIN, THREE  03/2018    CARDIAC SURG PROCEDURE UNLIST  1998 & 2018    CARDIAC CATH    HX COLONOSCOPY      HX ORTHOPAEDIC Left 2005    HIP REPLACEMENT    HX ORTHOPAEDIC Right 2009    HIP REPLACEMENT    NEUROLOGICAL PROCEDURE UNLISTED  1999    L1 or L3 LAMINECTOMY       Personal factors and/or comorbidities impacting plan of care: arthritis, cancer, CAD, HTN, bilateral hip replacements    Home Situation  Home Environment: Private residence  # Steps to Enter: 3  Rails to Enter: Yes  One/Two Story Residence: One story  Living Alone: No  Support Systems: Friends \ neighbors  Patient Expects to be Discharged to[de-identified] Private residence  Current DME Used/Available at Home: Cane, straight, Grab bars  Tub or Shower Type: Tub/Shower combination    EXAMINATION/PRESENTATION/DECISION MAKING:   Critical Behavior:  Neurologic State: Alert  Orientation Level: Oriented X4  Cognition: Appropriate decision making     Hearing:     Skin:    Edema:   Range Of Motion:  AROM: Generally decreased, functional                       Strength:    Strength: Generally decreased, functional(RLE)                    Tone & Sensation:   Tone: Normal              Sensation: Impaired(RLE)               Coordination:  Coordination: Generally decreased, functional  Vision:      Functional Mobility:  Bed Mobility:  Rolling: Minimum assistance  Supine to Sit: Minimum assistance        Transfers:  Sit to Stand: Minimum assistance  Stand to Sit: Contact guard assistance                       Balance:   Sitting: Intact  Standing: Impaired  Standing - Static: Good;Constant support  Standing - Dynamic : Fair  Ambulation/Gait Training:  Distance (ft): 40 Feet (ft)  Assistive Device: Gait belt;Walker, rolling  Ambulation - Level of Assistance: Contact guard assistance;Assist x2        Gait Abnormalities: Antalgic;Decreased step clearance; Step to gait(absent DF on the R)        Base of Support: Widened  Stance: Right decreased  Speed/Alethea: Pace decreased (<100 feet/min); Slow  Step Length: Right shortened;Left shortened  Swing Pattern: Right asymmetrical                  Stairs:               Therapeutic Exercises:       Functional Measure:  Tinetti test:    Sitting Balance: 1  Arises: 0  Attempts to Rise: 0  Immediate Standing Balance: 1  Standing Balance: 1  Nudged: 1  Eyes Closed: 0  Turn 360 Degrees - Continuous/Discontinuous: 0  Turn 360 Degrees - Steady/Unsteady: 0  Sitting Down: 1  Balance Score: 5 Balance total score  Indication of Gait: 1  R Step Length/Height: 1  L Step Length/Height: 0  R Foot Clearance: 1  L Foot Clearance: 1  Step Symmetry: 0  Step Continuity: 0  Path: 1  Trunk: 0  Walking Time: 0  Gait Score: 5 Gait total score  Total Score: 10/28 Overall total score         Tinetti Tool Score Risk of Falls  <19 = High Fall Risk  19-24 = Moderate Fall Risk  25-28 = Low Fall Risk  Tinetti ME. Performance-Oriented Assessment of Mobility Problems in Elderly Patients. [facility] 66; [id number]. (Scoring Description: PT Bulletin Feb. 10, 1993)    Older adults: Shayy Gibson et al, 2009; n = 1000 [facility] elderly evaluated with ABC, CARITO, ADL, and IADL)  · Mean CARITO score for males aged 69-68 years = 26.21(3.40)  · Mean CARITO score for females age 69-68 years = 25.16(4.30)  · Mean CARITO score for males over 80 years = 23.29(6.02)  · Mean CARITO score for females over 80 years = 17.20(8.32)            Physical Therapy Evaluation Charge Determination   History Examination Presentation Decision-Making   HIGH Complexity :3+ comorbidities / personal factors will impact the outcome/ POC  MEDIUM Complexity : 3 Standardized tests and measures addressing body structure, function, activity limitation and / or participation in recreation  LOW Complexity : Stable, uncomplicated  Other outcome measures Tinetti  LOW       Based on the above components, the patient evaluation is determined to be of the following complexity level: LOW     Pain Rating:      Activity Tolerance:   Good  Please refer to the flowsheet for vital signs taken during this treatment.     After treatment patient left in no apparent distress:   Sitting in chair, Call bell within reach and Caregiver / family present    COMMUNICATION/EDUCATION:   The patients plan of care was discussed with: Occupational Therapist and Registered Nurse. Fall prevention education was provided and the patient/caregiver indicated understanding., Patient/family have participated as able in goal setting and plan of care. and Patient/family agree to work toward stated goals and plan of care.     Thank you for this referral.  Kylah Moralez, PT   Time Calculation: 40 mins

## 2019-08-31 NOTE — PROGRESS NOTES
OBI Plan:    1) Disposition Home  2) At [address] [place] [address] received referral  3) F/U with Orthopedic surgeon      Reason for Admission:  lumbar spine surgery on 8/30/2019                      RRAT Score:   7                  [facility] for utilizing home [facility]                     Current Advanced Directive/Advance Care Plan: None on file                         Transition of Care Plan:                    Mr. Mary Kay Venegas is lumbar spine surgery on 8/30/2019. Therapy to mobilize today. Patient plans to return home at discharge. Home health therapy recommended and freedom of choice provided. Referral placed to [facility] per patient request.    Care Management Interventions  PCP Verified by CM:  Yes  Transition of Care Consult (CM Consult): Home Health(At [address] [place] [address])  [address] No  Reason Outside [place]: Patient already serviced by other home care/hospice agency  Discharge [address]: No  Physical Therapy Consult: Yes  Occupational Therapy Consult: Yes  Speech Therapy Consult: No  Current Support Network: Own Home  Plan discussed with Pt/Family/Caregiver: Yes  Freedom of Choice Offered: Yes  Discharge Location  Discharge Placement: Home with home health     Sheryl Peck 56

## 2019-08-31 NOTE — PROGRESS NOTES
Problem: Self Care Deficits Care Plan (Adult)  Goal: *Acute Goals and Plan of Care (Insert Text)  Description  FUNCTIONAL STATUS PRIOR TO ADMISSION: Patient was independent and active without use of DME.    HOME SUPPORT: The patient lived with family but did not require assist.    Occupational Therapy Goals  Initiated 8/31/2019    1. Patient will perform lower body dressing with modified independence using AE PRN within 4 days. 2.  Patient will perform toileting with modified independence using most appropriate DME within 4 days. 3.  Patient will perform bathing at modified independence within 4 days. 4.  Patient will don/doff back brace at supervision/set-up within 4 days. 5.  Patient will verbalize/demonstrate 3/3 back precautions during ADL tasks without cues within 4 days. Outcome: Progressing Towards Goal     OCCUPATIONAL THERAPY EVALUATION  Patient: Jame Crocker (38 y.o. male)  Date: 8/31/2019  Primary Diagnosis: Lumbar stenosis [M48.061]  Spinal stenosis, lumbar [M48.061]  Lumbar stenosis [M48.061]  Procedure(s) (LRB):  L2-S1 LUMBAR LAMINECTOMY, L4-5 DISCECTOMY (N/A) 1 Day Post-Op   Precautions: Fall, Back    ASSESSMENT  Based on the objective data described below, the patient presents with decreased ADL and mobility. Needs CGA-Min A and has new R foot drop, Able to shift up on heel but not bring up toes    Current Level of Function Impacting Discharge (ADLs/self-care): need Mod A due to back precautions and limited reach for bowel hygiene. Introduced to concept of toilet tongs and also using hip kit (which he has done in the past). Plans to purchase    Functional Outcome Measure: The patient scored 60/100 on the Barthel Index outcome measure which is indicative of 40% impairment with ADLS and mobility. Other factors to consider for discharge: supportive family     Patient will benefit from skilled therapy intervention to address the above noted impairments.        PLAN :  Recommendations and Planned Interventions: self care training, functional mobility training, therapeutic exercise, balance training, therapeutic activities, endurance activities, neuromuscular re-education, patient education, home safety training and family training/education    Frequency/Duration: Patient will be followed by occupational therapy 5 times a week to address goals. Recommendation for discharge: (in order for the patient to meet his/her long term goals)  Occupational therapy at least 2 days/week in the home AND ensure assist and/or supervision for safety with family     This discharge recommendation:  A follow-up discussion with the attending provider and/or case management is planned    Equipment recommendations for successful discharge (if) home: hip kit and toilet tongs, patient will purchase        SUBJECTIVE:   Patient stated I can't tailor sit because my hips and knees.     OBJECTIVE DATA SUMMARY:   HISTORY:   Past Medical History:   Diagnosis Date    Arthritis     OSTEO    Asthma     AS A YOUNGER ADULT    CAD (coronary artery disease)     stent X1    Cancer (HCC)     BCC, SCC    Chronic pain     GERD (gastroesophageal reflux disease)     HTN (hypertension)     Hyperlipidemia     Kidney stone 1993    Liver disease 03/2018    FATTY LIVER    Sleep apnea     WAS TOLD, BUT NEVER TESTED     Past Surgical History:   Procedure Laterality Date    CABG, ARTERY-VEIN, THREE  03/2018    CARDIAC SURG PROCEDURE UNLIST  1998 & 2018    CARDIAC CATH    HX COLONOSCOPY      HX ORTHOPAEDIC Left 2005    HIP REPLACEMENT    HX ORTHOPAEDIC Right 2009    HIP REPLACEMENT    NEUROLOGICAL PROCEDURE UNLISTED  1999    L1 or L3 LAMINECTOMY       Expanded or extensive additional review of patient history:     Home Situation  Home Environment: Private residence  # Steps to Enter: 3  Rails to Enter: Yes  One/Two Story Residence: One story  Living Alone: No  Support Systems: Friends \ neighbors  Patient Expects to be Discharged [facility] [employer] residence  Current DME Used/Available at Home: Cane, straight, Grab bars  Tub or Shower Type: Tub/Shower combination    Hand dominance: Right    EXAMINATION OF PERFORMANCE DEFICITS:  Cognitive/Behavioral Status:  Neurologic State: Alert  Orientation Level: Oriented X4  Cognition: Appropriate decision making             Skin: surgical incision    Edema: none    Hearing:   intact    Vision/Perceptual:                                 intact    Range of Motion:    AROM: Generally decreased, functional                         Strength:    Strength: Generally decreased, functional(RLE)                Coordination:  Coordination: Generally decreased, functional  Fine Motor Skills-Upper: Left Intact; Right Intact    Gross Motor Skills-Upper: Left Intact; Right Intact    Tone & Sensation:    Tone: Normal  Sensation: Impaired(RLE)                      Balance:  Sitting: Intact  Standing: Impaired  Standing - Static: Good;Constant support  Standing - Dynamic : Fair    Functional Mobility and Transfers for ADLs:  Bed Mobility:  Rolling: Minimum assistance  Supine to Sit: Minimum assistance    Transfers:  Sit to Stand: Minimum assistance  Stand to Sit: Contact guard assistance    ADL Assessment:  Feeding: Independent    Oral Facial Hygiene/Grooming: Independent    Bathing: Moderate assistance    Upper Body Dressing: Minimum assistance(brace)    Lower Body Dressing: Moderate assistance    Toileting: Moderate assistance(bowel hygiene)                ADL Intervention and task modifications:  Patient instructed and demonstrated 3/3 back precautions with verbal cues. Lower Body Dressing Assistance  Socks: Total assistance (dependent)(can not tailor sit)       Patient instructed and indicated understanding the benefits of maintaining activity tolerance, functional mobility, and independence with self care tasks during acute stay  to ensure safe return home and to baseline.  Encouraged patient to increase frequency and duration OOB, not sitting longer than 30-45 mins without marching/walking with staff, be out of bed for all meals, perform daily ADLs (as approved by RN/MD regarding bathing etc), and performing functional mobility to/from bathroom. Patient instruction and indicated understanding on body mechanics, ergonomics and gravitational force on the spine during different body positions to plan activities in prep for return home to complete basic ADLs, instrumental ADLs and back to work safely. Patient unable to perform hip ER stretch due to history of orthopedic replacements/issues of hips and knees. Bathing: Patient instructed and indicated understanding when bathing to not submerge wound in water, stand to shower or sponge bathe, cover wound with plastic and tape to ensure no water reaches bandage/wound without cues. Dressing brace: Patient instructed and demonstrated to don/doff velcro on brace using dominant side, keeping non-dominant side intact. Patient instructed and demonstrated in meantime of being able to stand with back against wall to don/doff brace, to don/doff seated using lap and bed/chair surface to support brace while manipulating. Dressing lower body: Patient instructed to don brace first and on the benefits to remain seated to don all clothing to increase independence with precautions and pain management. Patient instructed to use hip kit for LB dressing due to inability to tailor sit. Has awareness of these tools from previous orthopedic surgeries  Toileting: Patient instructed on the benefits of using flushable wet wipes and toilet tongs if decreased reach or pain for krystian care. Also, the benefits of a reacher to aid in clothing management. Introduced to concept of toilet tongs due to limited reach to allow for thorough bowel hygiene without twisting or bending.     Patient instruction and indicated understanding to not strain i.e. holding breath to bear down during a bowel movement, lifting/activity, and sexual activity. Home safety: Patient instructed and indicated understanding on home modifications and safety [raise height of ADL objects (i.e. clothing, sink items, fridge items, items to mouth when grooming), appropriate height of chair surfaces, recliner safety, change of floor surfaces, clear pathways] to increase independence and fall prevention. Standing: Patient instructed and indicated understanding to walk up to sink/counter top/surfaces, step into walker, square off while using objects, slide objects along surfaces, to increase adherence to back precautions and fall prevention. Patient instructed to increase amount of time standing in order to increase independence and tolerance with ADLs. During prolonged standing, can open cabinet door or place foot on stool to decrease spinal pressure/increase pain. Tub transfer: Patient instructed and indicated understanding regarding when it is safe to begin transfer into tub (complete stairs with PT, advance exercises with PT high enough to clear tub height, and while clothes donned practice with another person present). Functional Measure:  Barthel Index:    Bathin  Bladder: 5  Bowels: 10  Groomin  Dressin  Feeding: 10  Mobility: 5  Stairs: 5  Toilet Use: 5  Transfer (Bed to Chair and Back): 10  Total: 60/100        The Barthel ADL Index: Guidelines  1. The index should be used as a record of what a patient does, not as a record of what a patient could do. 2. The main aim is to establish degree of independence from any help, physical or verbal, however minor and for whatever reason. 3. The need for supervision renders the patient not independent. 4. A patient's performance should be established using the best available evidence. Asking the patient, friends/relatives and nurses are the usual sources, but direct observation and common sense are also important. However direct testing is not needed.   5. Usually the patient's performance over the preceding 24-48 hours is important, but occasionally longer periods will be relevant. 6. Middle categories imply that the patient supplies over 50 per cent of the effort. 7. Use of aids to be independent is allowed. Curtis Leal., Barthel, D.W. (8607). Functional evaluation: the Barthel Index. [address] [facility] (14)2. BRYSON Knight, Arnulfo Ribera.[facility], [address] [place] [address] Ave (1999). Measuring the change indisability after inpatient rehabilitation; comparison of the responsiveness of the Barthel Index and Functional [place] Measure. Journal of Neurology, Neurosurgery, and Psychiatry, 66(4), 033-664. Marquise Long, N.J.A, SATHYA Rojas, & Karen Ponce M.A. (2004.) Assessment of post-stroke quality of life in cost-effectiveness studies: The usefulness of the Barthel Index and the EuroQoL-5D. Quality of Life Research, 15, 781-66        Occupational Therapy Evaluation Charge Determination   History Examination Decision-Making   LOW Complexity : Brief history review  LOW Complexity : 1-3 performance deficits relating to physical, cognitive , or psychosocial skils that result in activity limitations and / or participation restrictions  LOW Complexity : No comorbidities that affect functional and no verbal or physical assistance needed to complete eval tasks       Based on the above components, the patient evaluation is determined to be of the following complexity level: LOW   Pain Rating:  Rated discomfort but no number    Activity Tolerance:   Fair  Please refer to the flowsheet for vital signs taken during this treatment. After treatment patient left in no apparent distress:    Sitting in chair and Caregiver / family present, call bell within reach      COMMUNICATION/EDUCATION:   The patients plan of care was discussed with: Physical Therapist and Registered Nurse.     Home safety education was provided and the patient/caregiver indicated understanding., Patient/family have participated as able in goal setting and plan of care. and Patient/family agree to work toward stated goals and plan of care. This patients plan of care is appropriate for delegation to ABDIFATAH.     Thank you for this referral.  Justino Engle  Time Calculation: 19 mins

## 2019-08-31 NOTE — PROGRESS NOTES
Bedside shift change report given to SANTO Mon (oncoming nurse) by Hunter Gonzalez RN (offgoing nurse). Report included the following information SBAR.

## 2019-08-31 NOTE — PROGRESS NOTES
Resting comfortably. GEN:  NAD.  AOx3   ABD:  S/NT/ND   Back:  Dressing C/D/I , grossly neurovascularly intact, Calf nttp (Bilat), 1+ dp/pt pulses, foot perfused    Patient Vitals for the past 24 hrs:   Temp Pulse Resp BP SpO2   08/31/19 0755 97.3 °F (36.3 °C) (!) 44 16 123/72 99 %   08/31/19 0423 97.5 °F (36.4 °C) (!) 49 16 120/67 98 %   08/31/19 0010 98.1 °F (36.7 °C) (!) 52 17 94/55 96 %   08/30/19 2147     95 %   08/30/19 2033 97.6 °F (36.4 °C) (!) 57 14 113/64 95 %   08/30/19 1859 97.2 °F (36.2 °C) (!) 56 14 110/55 95 %   08/30/19 1751 98.2 °F (36.8 °C) (!) 54 14 116/54 95 %   08/30/19 1656 98.4 °F (36.9 °C) (!) 56 14 94/44 94 %   08/30/19 1607     92 %   08/30/19 1549 98 °F (36.7 °C) (!) 54 14 114/56 91 %   08/30/19 1510  61      08/30/19 1505  63      08/30/19 1500  (!) 50 13 115/44 96 %   08/30/19 1440  (!) 56 12  95 %   08/30/19 1435  65      08/30/19 1430  (!) 53 13 134/57 95 %   08/30/19 1420  (!) 58 13 135/56 95 %   08/30/19 1405  60 16 140/57 95 %   08/30/19 1400  (!) 53 17 138/54 95 %   08/30/19 1355  61      08/30/19 1345  60 16 140/66 95 %   08/30/19 1330  (!) 56 16 130/59 93 %   08/30/19 1315  63  116/57 96 %   08/30/19 1300  (!) 54 11 126/58 93 %   08/30/19 1245  (!) 58 15 126/56 94 %   08/30/19 1230  (!) 52 12 117/53 92 %   08/30/19 1215  (!) 57 13 102/51 92 %   08/30/19 1200 98.6 °F (37 °C) (!) 57 12 112/52 97 %   08/30/19 1150  62 13 133/66 95 %   08/30/19 1145  65 13 117/59 91 %   08/30/19 1140  70 17 122/55 93 %   08/30/19 1139 97.9 °F (36.6 °C) 71 15 130/56 93 %   08/30/19 1138    130/56        Current Facility-Administered Medications   Medication Dose Route Frequency    allopurinol (ZYLOPRIM) tablet 450 mg  450 mg Oral DAILY    aspirin delayed-release tablet 81 mg  81 mg Oral DAILY    atenolol (TENORMIN) tablet 25 mg  25 mg Oral DAILY    cetirizine (ZYRTEC) tablet 10 mg  10 mg Oral DAILY    lisinopril (PRINIVIL, ZESTRIL) tablet 10 mg 10 mg Oral QPM    rosuvastatin (CRESTOR) tablet 40 mg  40 mg Oral QHS    [START ON 9/3/2019] testosterone cypionate (DEPOTESTOTERONE CYPIONATE) injection 36 mg  36 mg IntraMUSCular Q7D    0.9% sodium chloride infusion  125 mL/hr IntraVENous CONTINUOUS    sodium chloride (NS) flush 5-40 mL  5-40 mL IntraVENous Q8H    sodium chloride (NS) flush 5-40 mL  5-40 mL IntraVENous PRN    naloxone (NARCAN) injection 0.4 mg  0.4 mg IntraVENous PRN    senna-docusate (PERICOLACE) 8.6-50 mg per tablet 1 Tab  1 Tab Oral BID    polyethylene glycol (MIRALAX) packet 17 g  17 g Oral DAILY    [START ON 9/1/2019] bisacodyl (DULCOLAX) suppository 10 mg  10 mg Rectal DAILY PRN    acetaminophen (TYLENOL) tablet 1,000 mg  1,000 mg Oral Q6H    oxyCODONE IR (ROXICODONE) tablet 5 mg  5 mg Oral Q3H PRN    oxyCODONE IR (ROXICODONE) tablet 10 mg  10 mg Oral Q3H PRN    morphine injection 2 mg  2 mg IntraVENous Q3H PRN    ondansetron (ZOFRAN) injection 4 mg  4 mg IntraVENous Q4H PRN    ketorolac (TORADOL) injection 15 mg  15 mg IntraVENous Q6H    diphenhydrAMINE (BENADRYL) injection 12.5 mg  12.5 mg IntraVENous Q6H PRN    phenol throat spray (CHLORASEPTIC) 1 Spray  1 Spray Oral PRN    benzocaine-menthol (CEPACOL) lozenge 1 Lozenge  1 Lozenge Oral PRN    cyclobenzaprine (FLEXERIL) tablet 10 mg  10 mg Oral BID PRN    pantoprazole (PROTONIX) tablet 40 mg  40 mg Oral DAILY PRN       Lab Results   Component Value Date/Time    HGB 14.5 08/29/2019 12:10 PM    INR 1.1 08/29/2019 12:10 PM       Lab Results   Component Value Date/Time    Sodium 134 (L) 08/31/2019 02:15 AM    Potassium 4.5 08/31/2019 02:15 AM    Chloride 101 08/31/2019 02:15 AM    CO2 26 08/31/2019 02:15 AM    BUN 13 08/31/2019 02:15 AM    Creatinine 1.03 08/31/2019 02:15 AM    Calcium 8.0 (L) 08/31/2019 02:15 AM    Magnesium 1.6 11/05/2014 05:09 PM            75 y.o. male s/p lumbar spine surgery on 8/30/2019  . Doing well.      PT/OT  Pain control  SCD's    If drain present, can dc when <80/shift  DC planning- possibly today

## 2019-08-31 NOTE — OP NOTES
[address]   OPERATIVE REPORT    Name:  Radha Castillo  MR#:  [id number]  :  1955  ACCOUNT #:  [de-identified]  DATE OF SERVICE:  2019      PREOPERATIVE DIAGNOSIS:  Lumbar stenosis, L2-L3, L3-L4, L4-L5, L5-S1. POSTOPERATIVE DIAGNOSIS:  Lumbar stenosis, L2-L3, L3-L4, L4-L5, L5-S1. PROCEDURES PERFORMED:  Revision laminectomy, L2-L3, L3-L4, and L4-L5 with discectomy at L4-L5. SURGEON:  Ken Kevin MD    ASSISTANT:  Chiara Trujillo PA-C    ANESTHESIA:  General.    COMPLICATIONS:  None. SPECIMENS REMOVED:  None. IMPLANTS:  None. ESTIMATED BLOOD LOSS:  Minimal.    DRAINS:  One Mini Hemovac. INDICATIONS:  This is a 68-year-old man with neurogenic claudication, lower back pain, and bilateral leg pain with footdrop. He has failed conservative treatment. He understood the risks and benefits and elected to proceed with surgical intervention. PROCEDURE:  The patient was identified, brought to the operative suite, and underwent general anesthesia without difficulty. Ancef 2 g was given to the patient preoperatively. The patient was placed in the prone position on the Terence frame with all bony prominences well padded. Back was prepped and draped sterilely and the time-out was reconfirmed. We did a midline incision opening his previous surgical incision, we could expose his spinous processes. We exposed the inferior half of L2, all of L3, L4, and L5 left-sided via the previous L4-L5 laminotomy and down, we exposed the superior portion of the sacrum. Confirmed this on fluoroscopy. Then, we did a laminectomy with removal of the spinous process at L5, L4, L3, and inferior half of L2. Significant lipomatosis, especially at the L4-L5 and L5-S1 region causing severe central stenosis. The fatty tissue was removed without difficulty. Decompression of the central canal, we did bilateral wide decompression.   At which time, we continued with decompression bilaterally with facet hypertrophy. Partial facet resection for decompression at the exiting nerve roots, we undercut the facets as well as the superior articular process at each level for decompression and did this on L2-L3 down all the way to L5-S1 bilaterally, careful to take down the previous scar tissue and dissection at L4-L5. Disk herniation noted at L4-L5 on the left-hand side. At which time, this was removed en bloc. Similarly at L4-L5 on the right, no free fragment was noted, no impingement was noted, and after decompression bilaterally, we were able to pass the Nasir Gallo ball probe bilaterally into the foramens at L2-L3, L3-L4, and L5-S1. At which time, wounds were thoroughly irrigated, FloSeal for hemostasis. Mini Hemovac was placed. Interrupted 0 Vicryl in the fascial layer, 2-0 Vicryl in the subcutaneous layer, and 3-0 Vicryl in the skin. The physician assistant was present during the entire operative procedure and assisted in all critical elements of the surgery. No surgical assist or resident was available.      MD BART [email or web address]  D:  08/30/2019 11:23  T:  08/30/2019 16:55  JOB #:  [id number]

## 2019-09-01 LAB — HGB BLD-MCNC: 13.5 G/DL (ref 12.1–17)

## 2019-09-01 PROCEDURE — 99218 HC RM OBSERVATION: CPT

## 2019-09-01 PROCEDURE — 97535 SELF CARE MNGMENT TRAINING: CPT

## 2019-09-01 PROCEDURE — 85018 HEMOGLOBIN: CPT

## 2019-09-01 PROCEDURE — 36415 COLL VENOUS BLD VENIPUNCTURE: CPT

## 2019-09-01 PROCEDURE — 74011250637 HC RX REV CODE- 250/637: Performed by: PHYSICIAN ASSISTANT

## 2019-09-01 PROCEDURE — 74011250637 HC RX REV CODE- 250/637: Performed by: ORTHOPAEDIC SURGERY

## 2019-09-01 PROCEDURE — 97116 GAIT TRAINING THERAPY: CPT

## 2019-09-01 RX ADMIN — ACETAMINOPHEN 1000 MG: 500 TABLET ORAL at 05:54

## 2019-09-01 RX ADMIN — CALCIUM CARBONATE (ANTACID) CHEW TAB 500 MG 200 MG: 500 CHEW TAB at 16:03

## 2019-09-01 RX ADMIN — Medication 10 ML: at 05:54

## 2019-09-01 RX ADMIN — ACETAMINOPHEN 1000 MG: 500 TABLET ORAL at 19:19

## 2019-09-01 RX ADMIN — OXYCODONE HYDROCHLORIDE 10 MG: 5 TABLET ORAL at 19:19

## 2019-09-01 RX ADMIN — SENNOSIDES, DOCUSATE SODIUM 1 TABLET: 50; 8.6 TABLET, FILM COATED ORAL at 09:02

## 2019-09-01 RX ADMIN — ACETAMINOPHEN 1000 MG: 500 TABLET ORAL at 00:15

## 2019-09-01 RX ADMIN — Medication 10 ML: at 21:18

## 2019-09-01 RX ADMIN — CALCIUM CARBONATE (ANTACID) CHEW TAB 500 MG 200 MG: 500 CHEW TAB at 09:02

## 2019-09-01 RX ADMIN — ALLOPURINOL 450 MG: 300 TABLET ORAL at 09:03

## 2019-09-01 RX ADMIN — OXYCODONE HYDROCHLORIDE 10 MG: 5 TABLET ORAL at 16:03

## 2019-09-01 RX ADMIN — ASPIRIN 81 MG: 81 TABLET, COATED ORAL at 09:02

## 2019-09-01 RX ADMIN — CALCIUM CARBONATE (ANTACID) CHEW TAB 500 MG 200 MG: 500 CHEW TAB at 12:02

## 2019-09-01 RX ADMIN — ROSUVASTATIN CALCIUM 40 MG: 10 TABLET, COATED ORAL at 21:18

## 2019-09-01 RX ADMIN — CYCLOBENZAPRINE HYDROCHLORIDE 10 MG: 10 TABLET, FILM COATED ORAL at 20:39

## 2019-09-01 RX ADMIN — CETIRIZINE HYDROCHLORIDE 10 MG: 10 TABLET, FILM COATED ORAL at 09:02

## 2019-09-01 RX ADMIN — ACETAMINOPHEN 1000 MG: 500 TABLET ORAL at 12:03

## 2019-09-01 RX ADMIN — OXYCODONE HYDROCHLORIDE 10 MG: 5 TABLET ORAL at 09:03

## 2019-09-01 RX ADMIN — POLYETHYLENE GLYCOL 3350 17 G: 17 POWDER, FOR SOLUTION ORAL at 09:02

## 2019-09-01 RX ADMIN — ALUMINUM HYDROXIDE, MAGNESIUM HYDROXIDE, AND SIMETHICONE 30 ML: 200; 200; 20 SUSPENSION ORAL at 23:34

## 2019-09-01 RX ADMIN — SENNOSIDES, DOCUSATE SODIUM 1 TABLET: 50; 8.6 TABLET, FILM COATED ORAL at 19:19

## 2019-09-01 RX ADMIN — OXYCODONE HYDROCHLORIDE 10 MG: 5 TABLET ORAL at 12:03

## 2019-09-01 RX ADMIN — Medication 5 ML: at 14:00

## 2019-09-01 NOTE — PROGRESS NOTES
Problem: Self Care Deficits Care Plan (Adult)  Goal: *Acute Goals and Plan of Care (Insert Text)  Description  FUNCTIONAL STATUS PRIOR TO ADMISSION: Patient was independent and active without use of DME.    HOME SUPPORT: The patient lived with family but did not require assist.    Occupational Therapy Goals  Initiated 8/31/2019    1. Patient will perform lower body dressing with modified independence using AE PRN within 4 days. 2.  Patient will perform toileting with modified independence using most appropriate DME within 4 days. 3.  Patient will perform bathing at modified independence within 4 days. 4.  Patient will don/doff back brace at supervision/set-up within 4 days. 5.  Patient will verbalize/demonstrate 3/3 back precautions during ADL tasks without cues within 4 days. Outcome: Progressing Towards Goal   OCCUPATIONAL THERAPY TREATMENT/DISCHARGE  Patient: Ingris Powers (34 y.o. male)  Date: 9/1/2019  Diagnosis: Lumbar stenosis [M48.061]  Spinal stenosis, lumbar [M48.061]  Lumbar stenosis [M48.061] <principal problem not specified>  Procedure(s) (LRB):  L2-S1 LUMBAR LAMINECTOMY, L4-5 DISCECTOMY (N/A) 2 Days Post-Op  Precautions: Fall, Back  Chart, occupational therapy assessment, plan of care, and goals were reviewed. ASSESSMENT  Based on the objective data described below, pt progressing well. Verbalized 3/3 back precautions. Overall, CGA to supervision level with bed mobility, bathroom mobility, and toilet transfer. Pt still c/o of R foot not working as well, but demonstrating safe ambulation with RW and accessing bathroom. Daughter present in the room. She reported ordering all AE for LB self-care. Pt demonstrated and verbalized understanding with donning shoes with reacher and LH shoe horn. Overall, feel pt is safe for discharge from OT standpoint. Pt to continue PT to address stairs and activity tolerance.     Current Level of Function (ADLs/self-care): CGA to mod I     Other factors to consider for discharge: none noted         PLAN :  Rationale for discharge: Goals achieved  Recommend with staff: OOB for meals and ambulate to bathroom with assistance  Recommendation for discharge: (in order for the patient to meet his/her long term goals)  Occupational therapy at least 2 days/week in the home     This discharge recommendation:  A follow-up discussion with the attending provider and/or case management is planned    Equipment recommendations for successful discharge: RW       SUBJECTIVE:   Patient stated It feels different.     OBJECTIVE DATA SUMMARY:   Cognitive/Behavioral Status:  Neurologic State: Alert  Orientation Level: Oriented X4  Cognition: Appropriate decision making        Safety/Judgement: Awareness of environment    Functional Mobility and Transfers for ADLs:  Bed Mobility:  Rolling: Contact guard assistance  Supine to Sit: Contact guard assistance    Transfers:     Functional Transfers  Bathroom Mobility: Contact guard assistance  Toilet Transfer : Contact guard assistance  Bed to Chair: Contact guard assistance    Balance:  Sitting: Intact  Standing: Intact; With support  Standing - Static: Good    ADL Intervention:                           Lower Body Dressing Assistance  Shoes with Cloth Laces: Minimum assistance  Position Performed: Seated edge of bed  Adaptive Equipment Used: Reacher;Long handled shoe horn    Toileting  Toileting Assistance: Supervision  Bladder Hygiene: Supervision  Adaptive Equipment: Walker;Grab bars    Cognitive Retraining  Safety/Judgement: Awareness of environment    Therapeutic Exercises:       Pain:  soreness    Activity Tolerance:   Good  Please refer to the flowsheet for vital signs taken during this treatment.     After treatment patient left in no apparent distress:   Sitting in chair and Caregiver / family present    COMMUNICATION/COLLABORATION:   The patients plan of care was discussed with: Physical Therapy Assistant and Registered Nurse    Effie Graham, OTR/L  Time Calculation: 25 mins

## 2019-09-01 NOTE — PROGRESS NOTES
Problem: Mobility Impaired (Adult and Pediatric)  Goal: *Acute Goals and Plan of Care (Insert Text)  Description  FUNCTIONAL STATUS PRIOR TO ADMISSION: Patient was independent and active without use of DME.    HOME SUPPORT PRIOR TO ADMISSION: The patient lived with family but did not require assist.    Physical Therapy Goals  Initiated 8/31/2019    1. Patient will move from supine to sit and sit to supine , scoot up and down and roll side to side in bed with independence within 4 days. 2. Patient will perform sit to stand with independence within 4 days. 3. Patient will ambulate with independence for 150 feet with the least restrictive device within 4 days. 4. Patient will ascend/descend 4 stairs with 1 handrail(s) with modified independence within 4 days. 5. Patient will verbalize and demonstrate understanding of spinal precautions (No bending, lifting greater than 5 lbs, or twisting; log-roll technique; frequent repositioning as instructed) within 4 days. Outcome: Progressing Towards Goal    PHYSICAL THERAPY TREATMENT  Patient: Jean Paul Borges (10 y.o. male)  Date: 9/1/2019  Diagnosis: Lumbar stenosis [M48.061]  Spinal stenosis, lumbar [M48.061]  Lumbar stenosis [M48.061] <principal problem not specified>  Procedure(s) (LRB):  L2-S1 LUMBAR LAMINECTOMY, L4-5 DISCECTOMY (N/A) 2 Days Post-Op  Precautions: Fall, Back No bending, no lifting greater than 5 lbs, no twisting, log-roll technique, repositioning every 20-30 min except when sleeping, brace when OOB (if ordered)  Chart, physical therapy assessment, plan of care and goals were reviewed. ASSESSMENT  Based on the objective data described below, pt presents with decrease gait tolerance with right foot drop. Pt reports slightly better today. Pt is ambulating with rolling walker but requesting assistance of two to mobilize. Current Level of Function Impacting Discharge (mobility/balance):  CGA for bed mobility, CGAx2 for gait with rolling walker    Other factors to consider for discharge: right foot drop back precautions. PLAN :  Patient continues to benefit from skilled intervention to address the above impairments. Continue treatment per established plan of care. to address goals. Recommendation for discharge: (in order for the patient to meet his/her long term goals)  Physical therapy at least 2 days/week in the home AND ensure assist and/or supervision for safety      This discharge recommendation:      Equipment recommendations for successful discharge (if) home: rolling walker-will need to be ordered        SUBJECTIVE:   Patient stated It is a little better today .     OBJECTIVE DATA SUMMARY:   Critical Behavior:  Neurologic State: Alert  Orientation Level: Oriented X4  Cognition: Appropriate decision making  Safety/Judgement: Awareness of environment    Spinal diagnosis intervention:  The patient stated 3/3 back precautions when prompted. Reviewed all 3 back precautions, log roll technique, and sitting for 30 minutes at a time. Functional Mobility Training:    Bed Mobility:  Log Rolling: Contact guard assistance  Supine to Sit: Contact guard assistance              Transfers:              Bed to Chair: Contact guard assistance                    Balance:  Sitting: Intact  Standing: Intact; With support  Standing - Static: Good  Ambulation/Gait Training:  Distance (ft): 40 Feet (ft)  Assistive Device: Gait belt;Walker, rolling  Ambulation - Level of Assistance: Contact guard assistance;Assist x2(per pt request )        Gait Abnormalities: Antalgic;Decreased step clearance; Foot drop        Base of Support: Widened     Speed/Alethea: Pace decreased (<100 feet/min)  Step Length: Left shortened;Right shortened                    Stairs: Therapeutic Exercises:     Pain Rating:  Back pain did not rank    Activity Tolerance:   Limited   Please refer to the flowsheet for vital signs taken during this treatment.     After treatment patient left in no apparent distress:   Sitting in chair, Call bell within reach and Caregiver / family present    COMMUNICATION/COLLABORATION:   The patients plan of care was discussed with: Registered Nurse    Colton Delacruz PTA   Time Calculation: 17 mins

## 2019-09-01 NOTE — PROGRESS NOTES
Resting comfortably. GEN:  NAD.  AOx3   ABD:  S/NT/ND   Back:  Dressing C/D/I , foot drop on right, Calf nttp (Bilat), 1+ dp/pt pulses, foot perfused    Patient Vitals for the past 24 hrs:   Temp Pulse Resp BP SpO2   09/01/19 0802 97.7 °F (36.5 °C) (!) 50 16 112/69 98 %   09/01/19 0351 98 °F (36.7 °C) (!) 49 16 121/66 95 %   08/31/19 2106 97.6 °F (36.4 °C) (!) 50 16 114/47 95 %   08/31/19 1427 97.6 °F (36.4 °C) (!) 49 16 101/47 95 %       Current Facility-Administered Medications   Medication Dose Route Frequency    calcium carbonate (TUMS) chewable tablet 200 mg [elemental]  200 mg Oral TID WITH MEALS    alum-mag hydroxide-simeth (MYLANTA) oral suspension 30 mL  30 mL Oral Q4H PRN    allopurinol (ZYLOPRIM) tablet 450 mg  450 mg Oral DAILY    aspirin delayed-release tablet 81 mg  81 mg Oral DAILY    atenolol (TENORMIN) tablet 25 mg  25 mg Oral DAILY    cetirizine (ZYRTEC) tablet 10 mg  10 mg Oral DAILY    lisinopril (PRINIVIL, ZESTRIL) tablet 10 mg  10 mg Oral QPM    rosuvastatin (CRESTOR) tablet 40 mg  40 mg Oral QHS    [START ON 9/3/2019] testosterone cypionate (DEPOTESTOTERONE CYPIONATE) injection 36 mg  36 mg IntraMUSCular Q7D    sodium chloride (NS) flush 5-40 mL  5-40 mL IntraVENous Q8H    sodium chloride (NS) flush 5-40 mL  5-40 mL IntraVENous PRN    naloxone (NARCAN) injection 0.4 mg  0.4 mg IntraVENous PRN    senna-docusate (PERICOLACE) 8.6-50 mg per tablet 1 Tab  1 Tab Oral BID    polyethylene glycol (MIRALAX) packet 17 g  17 g Oral DAILY    bisacodyl (DULCOLAX) suppository 10 mg  10 mg Rectal DAILY PRN    acetaminophen (TYLENOL) tablet 1,000 mg  1,000 mg Oral Q6H    oxyCODONE IR (ROXICODONE) tablet 5 mg  5 mg Oral Q3H PRN    oxyCODONE IR (ROXICODONE) tablet 10 mg  10 mg Oral Q3H PRN    phenol throat spray (CHLORASEPTIC) 1 Spray  1 Spray Oral PRN    benzocaine-menthol (CEPACOL) lozenge 1 Lozenge  1 Lozenge Oral PRN    cyclobenzaprine (FLEXERIL) tablet 10 mg  10 mg Oral BID PRN    pantoprazole (PROTONIX) tablet 40 mg  40 mg Oral DAILY PRN       Lab Results   Component Value Date/Time    HGB 13.5 09/01/2019 05:58 AM    INR 1.1 08/29/2019 12:10 PM       Lab Results   Component Value Date/Time    Sodium 134 (L) 08/31/2019 02:15 AM    Potassium 4.5 08/31/2019 02:15 AM    Chloride 101 08/31/2019 02:15 AM    CO2 26 08/31/2019 02:15 AM    BUN 13 08/31/2019 02:15 AM    Creatinine 1.03 08/31/2019 02:15 AM    Calcium 8.0 (L) 08/31/2019 02:15 AM    Magnesium 1.6 11/05/2014 05:09 PM            75 y.o. male s/p lumbar spine surgery on 8/30/2019  .  Weakness right ankle dorsiflexion    PT/OT  Pain control  SCD's    If drain present, can dc when <80/shift  DC planning- slow with PT

## 2019-09-02 PROCEDURE — 74011250637 HC RX REV CODE- 250/637: Performed by: ORTHOPAEDIC SURGERY

## 2019-09-02 PROCEDURE — 97116 GAIT TRAINING THERAPY: CPT

## 2019-09-02 PROCEDURE — 74011250637 HC RX REV CODE- 250/637: Performed by: PHYSICIAN ASSISTANT

## 2019-09-02 PROCEDURE — 99218 HC RM OBSERVATION: CPT

## 2019-09-02 RX ORDER — OXYCODONE HYDROCHLORIDE 5 MG/1
10 TABLET ORAL
Status: DISCONTINUED | OUTPATIENT
Start: 2019-09-02 | End: 2019-09-04 | Stop reason: HOSPADM

## 2019-09-02 RX ADMIN — ROSUVASTATIN CALCIUM 40 MG: 10 TABLET, COATED ORAL at 21:22

## 2019-09-02 RX ADMIN — CALCIUM CARBONATE (ANTACID) CHEW TAB 500 MG 200 MG: 500 CHEW TAB at 08:38

## 2019-09-02 RX ADMIN — ACETAMINOPHEN 1000 MG: 500 TABLET ORAL at 19:52

## 2019-09-02 RX ADMIN — CYCLOBENZAPRINE HYDROCHLORIDE 10 MG: 10 TABLET, FILM COATED ORAL at 21:22

## 2019-09-02 RX ADMIN — OXYCODONE HYDROCHLORIDE 5 MG: 5 TABLET ORAL at 07:14

## 2019-09-02 RX ADMIN — ASPIRIN 81 MG: 81 TABLET, COATED ORAL at 08:38

## 2019-09-02 RX ADMIN — CALCIUM CARBONATE (ANTACID) CHEW TAB 500 MG 200 MG: 500 CHEW TAB at 16:48

## 2019-09-02 RX ADMIN — SENNOSIDES, DOCUSATE SODIUM 1 TABLET: 50; 8.6 TABLET, FILM COATED ORAL at 08:38

## 2019-09-02 RX ADMIN — ATENOLOL 25 MG: 25 TABLET ORAL at 08:38

## 2019-09-02 RX ADMIN — ALLOPURINOL 450 MG: 300 TABLET ORAL at 08:38

## 2019-09-02 RX ADMIN — Medication 5 ML: at 14:00

## 2019-09-02 RX ADMIN — ACETAMINOPHEN 1000 MG: 500 TABLET ORAL at 13:45

## 2019-09-02 RX ADMIN — OXYCODONE HYDROCHLORIDE 10 MG: 5 TABLET ORAL at 16:48

## 2019-09-02 RX ADMIN — OXYCODONE HYDROCHLORIDE 10 MG: 5 TABLET ORAL at 23:06

## 2019-09-02 RX ADMIN — POLYETHYLENE GLYCOL 3350 17 G: 17 POWDER, FOR SOLUTION ORAL at 08:38

## 2019-09-02 RX ADMIN — CETIRIZINE HYDROCHLORIDE 10 MG: 10 TABLET, FILM COATED ORAL at 08:38

## 2019-09-02 RX ADMIN — ACETAMINOPHEN 1000 MG: 500 TABLET ORAL at 07:13

## 2019-09-02 RX ADMIN — ACETAMINOPHEN 1000 MG: 500 TABLET ORAL at 03:11

## 2019-09-02 RX ADMIN — OXYCODONE HYDROCHLORIDE 10 MG: 5 TABLET ORAL at 13:45

## 2019-09-02 RX ADMIN — OXYCODONE HYDROCHLORIDE 10 MG: 5 TABLET ORAL at 10:06

## 2019-09-02 RX ADMIN — CALCIUM CARBONATE (ANTACID) CHEW TAB 500 MG 200 MG: 500 CHEW TAB at 13:45

## 2019-09-02 RX ADMIN — Medication 10 ML: at 23:06

## 2019-09-02 RX ADMIN — Medication 10 ML: at 07:14

## 2019-09-02 RX ADMIN — OXYCODONE HYDROCHLORIDE 10 MG: 5 TABLET ORAL at 19:52

## 2019-09-02 RX ADMIN — SENNOSIDES, DOCUSATE SODIUM 1 TABLET: 50; 8.6 TABLET, FILM COATED ORAL at 16:49

## 2019-09-02 RX ADMIN — OXYCODONE HYDROCHLORIDE 5 MG: 5 TABLET ORAL at 03:11

## 2019-09-02 NOTE — PROGRESS NOTES
Ortho Daily Progress Note    9/2/2019  9:40 AM    POD:  3 Days Post-Op  S/P:  Procedure(s):  L2-S1 LUMBAR LAMINECTOMY, L4-5 DISCECTOMY    Resting comfortably  Afebrile/VSS  Doing well without complaints of nausea  Pain well controlled  Calves soft/NTTP Bilaterally  Lab Results   Component Value Date/Time    HGB 13.5 09/01/2019 05:58 AM    INR 1.1 08/29/2019 12:10 PM       Dressings clean and dry  Moving LE well  Able to move right lower extremity, diminished right ankle dorsiflexion which is not new as reported by patient    PLAN:  Tolerating regular diet  SCDs  PT/OT  Pain Control  Plan to D/C to home tomorrow if able to have BM  If drain present, can d/c when <80/shift      LUKE Lebron

## 2019-09-02 NOTE — PROGRESS NOTES
Bedside and Verbal shift change report given to SANTO Alvarez (oncoming nurse) by Viki Remy RN  (offgoing nurse). Report included the following information SBAR, Kardex, ED Summary, [facility] and Recent Results.

## 2019-09-02 NOTE — PROGRESS NOTES
Problem: Mobility Impaired (Adult and Pediatric)  Goal: *Acute Goals and Plan of Care (Insert Text)  Description  FUNCTIONAL STATUS PRIOR TO ADMISSION: Patient was independent and active without use of DME.    HOME SUPPORT PRIOR TO ADMISSION: The patient lived with family but did not require assist.    Physical Therapy Goals  Initiated 8/31/2019    1. Patient will move from supine to sit and sit to supine , scoot up and down and roll side to side in bed with independence within 4 days. 2. Patient will perform sit to stand with independence within 4 days. 3. Patient will ambulate with independence for 150 feet with the least restrictive device within 4 days. 4. Patient will ascend/descend 4 stairs with 1 handrail(s) with modified independence within 4 days. 5. Patient will verbalize and demonstrate understanding of spinal precautions (No bending, lifting greater than 5 lbs, or twisting; log-roll technique; frequent repositioning as instructed) within 4 days. Outcome: Progressing Towards Goal    PHYSICAL THERAPY TREATMENT  Patient: Micheal Villasenor (33 y.o. male)  Date: 9/2/2019  Diagnosis: Lumbar stenosis [M48.061]  Spinal stenosis, lumbar [M48.061]  Lumbar stenosis [M48.061] <principal problem not specified>  Procedure(s) (LRB):  L2-S1 LUMBAR LAMINECTOMY, L4-5 DISCECTOMY (N/A) 3 Days Post-Op  Precautions: Fall, Back No bending, no lifting greater than 5 lbs, no twisting, log-roll technique, repositioning every 20-30 min except when sleeping, brace when OOB (if ordered)  Chart, physical therapy assessment, plan of care and goals were reviewed. ASSESSMENT  Based on the objective data described below, pt demos improvement in mobility and distance with increased weight bearing through legs and compliance with precautions with cues. Pt demos increased pain with movement but states that its tolerable. Pt ambualtes 100ft but at very slow pace, overpressure on Rw.  Will follow, progress to stairs to DC home..    Current Level of Function Impacting Discharge (mobility/balance): CGA and increased cues for walking    Other factors to consider for discharge:          PLAN :  Patient continues to benefit from skilled intervention to address the above impairments. Continue treatment per established plan of care. to address goals. Recommendation for discharge: (in order for the patient to meet his/her long term goals)  Physical therapy at least 2 days/week in the home AND ensure assist and/or supervision for safety with wife     This discharge recommendation:  Has been made in collaboration with the attending provider and/or case management    Equipment recommendations for successful discharge (if) home: brace/splint and rolling walker       SUBJECTIVE:   Patient stated it hurts, but ill manage.     OBJECTIVE DATA SUMMARY:   Critical Behavior:  Neurologic State: Appropriate for age, Alert  Orientation Level: Oriented X4  Cognition: Appropriate for age attention/concentration, Follows commands  Safety/Judgement: Awareness of environment    Spinal diagnosis intervention:  The patient stated 3/3 back precautions when prompted. Reviewed all 3 back precautions, log roll technique, and sitting for 30 minutes at a time. The patient required minimal cues to maintain back precautions during functional activity. Functional Mobility Training:    Bed Mobility:  Log Rolling: Stand-by assistance  Supine to Sit: Stand-by assistance     Scooting: Stand-by assistance        Transfers:  Sit to Stand: Stand-by assistance;Contact guard assistance  Stand to Sit: Stand-by assistance;Contact guard assistance        Bed to Chair: Stand-by assistance;Contact guard assistance                    Balance:  Sitting: Intact  Standing: Intact; With support  Standing - Static: Good;Constant support  Standing - Dynamic : Good;Constant support  Ambulation/Gait Training:  Distance (ft): 100 Feet (ft)  Assistive Device: Gait belt;Walker, rolling  Ambulation - Level of Assistance: Contact guard assistance;Assist x2        Gait Abnormalities: Antalgic;Decreased step clearance; Foot drop        Base of Support: Widened  Stance: Right decreased  Speed/Alethea: Pace decreased (<100 feet/min); Shuffled  Step Length: Left shortened;Right shortened  Swing Pattern: Left symmetrical;Right symmetrical      Pt requires cues for proper RW height, pushing RW consistently instead of overpressure onloading and then picking RW up to advance, able to perform reciprocal constant gait with cues. Pain Ratin-5/10 with walking    Activity Tolerance:   Fair, SpO2 stable on RA and requires rest breaks  Please refer to the flowsheet for vital signs taken during this treatment.     After treatment patient left in no apparent distress:   Sitting in chair, Call bell within reach and Caregiver / family present    COMMUNICATION/COLLABORATION:   The patients plan of care was discussed with: Registered Nurse    Emanuel Sen, PT   Time Calculation: 15 mins

## 2019-09-03 PROCEDURE — 94760 N-INVAS EAR/PLS OXIMETRY 1: CPT

## 2019-09-03 PROCEDURE — 96376 TX/PRO/DX INJ SAME DRUG ADON: CPT

## 2019-09-03 PROCEDURE — 99218 HC RM OBSERVATION: CPT

## 2019-09-03 PROCEDURE — 74011250637 HC RX REV CODE- 250/637: Performed by: ORTHOPAEDIC SURGERY

## 2019-09-03 PROCEDURE — 96374 THER/PROPH/DIAG INJ IV PUSH: CPT

## 2019-09-03 PROCEDURE — 74011250637 HC RX REV CODE- 250/637: Performed by: PHYSICIAN ASSISTANT

## 2019-09-03 PROCEDURE — 97116 GAIT TRAINING THERAPY: CPT

## 2019-09-03 PROCEDURE — 74011250636 HC RX REV CODE- 250/636: Performed by: PHYSICIAN ASSISTANT

## 2019-09-03 RX ORDER — DEXAMETHASONE SODIUM PHOSPHATE 4 MG/ML
10 INJECTION, SOLUTION INTRA-ARTICULAR; INTRALESIONAL; INTRAMUSCULAR; INTRAVENOUS; SOFT TISSUE EVERY 6 HOURS
Status: DISCONTINUED | OUTPATIENT
Start: 2019-09-03 | End: 2019-09-04 | Stop reason: HOSPADM

## 2019-09-03 RX ADMIN — ACETAMINOPHEN 1000 MG: 500 TABLET ORAL at 12:09

## 2019-09-03 RX ADMIN — CALCIUM CARBONATE (ANTACID) CHEW TAB 500 MG 200 MG: 500 CHEW TAB at 09:07

## 2019-09-03 RX ADMIN — CYCLOBENZAPRINE HYDROCHLORIDE 10 MG: 10 TABLET, FILM COATED ORAL at 21:30

## 2019-09-03 RX ADMIN — POLYETHYLENE GLYCOL 3350 17 G: 17 POWDER, FOR SOLUTION ORAL at 09:06

## 2019-09-03 RX ADMIN — Medication 10 ML: at 15:17

## 2019-09-03 RX ADMIN — ACETAMINOPHEN 1000 MG: 500 TABLET ORAL at 18:56

## 2019-09-03 RX ADMIN — OXYCODONE HYDROCHLORIDE 10 MG: 5 TABLET ORAL at 07:15

## 2019-09-03 RX ADMIN — ALUMINUM HYDROXIDE, MAGNESIUM HYDROXIDE, AND SIMETHICONE 30 ML: 200; 200; 20 SUSPENSION ORAL at 15:16

## 2019-09-03 RX ADMIN — Medication 10 ML: at 07:14

## 2019-09-03 RX ADMIN — ATENOLOL 25 MG: 25 TABLET ORAL at 09:07

## 2019-09-03 RX ADMIN — Medication 10 ML: at 21:28

## 2019-09-03 RX ADMIN — CALCIUM CARBONATE (ANTACID) CHEW TAB 500 MG 200 MG: 500 CHEW TAB at 15:17

## 2019-09-03 RX ADMIN — DEXAMETHASONE SODIUM PHOSPHATE 10 MG: 4 INJECTION, SOLUTION INTRAMUSCULAR; INTRAVENOUS at 09:36

## 2019-09-03 RX ADMIN — LISINOPRIL 10 MG: 10 TABLET ORAL at 18:55

## 2019-09-03 RX ADMIN — ROSUVASTATIN CALCIUM 40 MG: 10 TABLET, COATED ORAL at 21:27

## 2019-09-03 RX ADMIN — CETIRIZINE HYDROCHLORIDE 10 MG: 10 TABLET, FILM COATED ORAL at 09:07

## 2019-09-03 RX ADMIN — ACETAMINOPHEN 1000 MG: 500 TABLET ORAL at 07:09

## 2019-09-03 RX ADMIN — ASPIRIN 81 MG: 81 TABLET, COATED ORAL at 09:07

## 2019-09-03 RX ADMIN — DEXAMETHASONE SODIUM PHOSPHATE 10 MG: 4 INJECTION, SOLUTION INTRAMUSCULAR; INTRAVENOUS at 15:17

## 2019-09-03 RX ADMIN — SENNOSIDES, DOCUSATE SODIUM 1 TABLET: 50; 8.6 TABLET, FILM COATED ORAL at 18:56

## 2019-09-03 RX ADMIN — ALLOPURINOL 450 MG: 300 TABLET ORAL at 09:06

## 2019-09-03 RX ADMIN — DEXAMETHASONE SODIUM PHOSPHATE 10 MG: 4 INJECTION, SOLUTION INTRAMUSCULAR; INTRAVENOUS at 21:27

## 2019-09-03 RX ADMIN — SENNOSIDES, DOCUSATE SODIUM 1 TABLET: 50; 8.6 TABLET, FILM COATED ORAL at 09:07

## 2019-09-03 NOTE — PROGRESS NOTES
Problem: Mobility Impaired (Adult and Pediatric)  Goal: *Acute Goals and Plan of Care (Insert Text)  Description  FUNCTIONAL STATUS PRIOR TO ADMISSION: Patient was independent and active without use of DME.    HOME SUPPORT PRIOR TO ADMISSION: The patient lived with family but did not require assist.    Physical Therapy Goals  Initiated 8/31/2019    1. Patient will move from supine to sit and sit to supine , scoot up and down and roll side to side in bed with independence within 4 days. 2. Patient will perform sit to stand with independence within 4 days. 3. Patient will ambulate with independence for 150 feet with the least restrictive device within 4 days. 4. Patient will ascend/descend 4 stairs with 1 handrail(s) with modified independence within 4 days. 5. Patient will verbalize and demonstrate understanding of spinal precautions (No bending, lifting greater than 5 lbs, or twisting; log-roll technique; frequent repositioning as instructed) within 4 days. Outcome: Progressing Towards Goal   PHYSICAL THERAPY TREATMENT  Patient: Brisa Malik (84 y.o. male)  Date: 9/3/2019  Diagnosis: Lumbar stenosis [M48.061]  Spinal stenosis, lumbar [M48.061]  Lumbar stenosis [M48.061] <principal problem not specified>  Procedure(s) (LRB):  L2-S1 LUMBAR LAMINECTOMY, L4-5 DISCECTOMY (N/A) 4 Days Post-Op  Precautions: Fall, Back, No bending, no lifting greater than 5 lbs, no twisting, log-roll technique, repositioning every 20-30 min except when sleeping    Chart, physical therapy assessment, plan of care and goals were reviewed. ASSESSMENT  Based on the objective data described below, pt presents with newly acquired back precautions, decreased strength, chronic right foot drop, and minimally impaired functional mobility below his baseline level. Pt recalls 3 of 3 back precautions and aware of using log roll technique for bed mobility.   Pt tolerated ambulation with a rolling walker and cleared on 3 steps with left rail and minimal assistance due to some weakness of LLE. Pt made aware that he should have assistance on stairs for safety at this time. He may benefit from use of an AFO pending progress. Pt reports he has had right foot drop since 2018 but has not heard of an AFO. Pt is cleared for discharge home from a PT standpoint. Current Level of Function Impacting Discharge (mobility/balance):supervision bed mobility and transfers, ambulation with rolling walker with CGA, stairs with minimal assistance    Other factors to consider for discharge: right foot drop, back precautions          PLAN :  Patient continues to benefit from skilled intervention to address the above impairments. Continue treatment per established plan of care. to address goals. Recommendation for discharge: (in order for the patient to meet his/her long term goals)  Physical therapy at least 2 days/week in the home AND ensure assist and/or supervision for safety with stairs. This discharge recommendation:  Has been made in collaboration with the attending provider and/or case management    Equipment recommendations for successful discharge (if) home: rolling walker and reacher        SUBJECTIVE:   Patient stated It(right foot drop) started in 2018.     OBJECTIVE DATA SUMMARY:   Critical Behavior:  Neurologic State: Alert  Orientation Level: Oriented X4  Cognition: Appropriate decision making  Safety/Judgement: Awareness of environment  Functional Mobility Training:  Bed Mobility:  Rolling: Supervision  Supine to Sit: Supervision;Assist x1;Adaptive equipment; Additional time     Scooting: Independent        Transfers:  Sit to Stand: Supervision  Stand to Sit: Supervision                             Balance:  Sitting: Intact  Standing: Intact; With support  Standing - Static: Good  Standing - Dynamic : Good  Ambulation/Gait Training:  Distance (ft): 100 Feet (ft)  Assistive Device: Gait belt;Walker, rolling  Ambulation - Level of Assistance: Contact guard assistance        Gait Abnormalities: Antalgic;Decreased step clearance; Foot drop; Steppage gait        Base of Support: Widened  Stance: Right decreased  Speed/Alethea: Slow  Step Length: Right shortened;Left shortened             Stairs:  Number of Stairs Trained: 3  Stairs - Level of Assistance: Minimum assistance   Rail Use: Left         Activity Tolerance:   Good  Please refer to the flowsheet for vital signs taken during this treatment.     After treatment patient left in no apparent distress:   Sitting in chair and Call bell within reach    COMMUNICATION/COLLABORATION:   The patients plan of care was discussed with: Registered Nurse    Jaylin Salmeron   Time Calculation: 17 mins

## 2019-09-03 NOTE — PROGRESS NOTES
Orthopedic Spine Progress Note  Post Op day: 4 Days Post-Op    September 3, 2019 8:02 AM   Admit Date: 2019  Procedure: Procedure(s):  L2-S1 LUMBAR LAMINECTOMY, L4-5 DISCECTOMY    Subjective:     Kelly Vines appears well. He has been progressing with physical therapy. He continues to have right foot drop. He feels to symptoms have worsened. Tolerating diet  No N/V  Voiding    Pain Control:   Pain Assessment  Pain Scale 1: Numeric (0 - 10)  Pain Intensity 1: 4  Pain Onset 1: postop  Pain Location 1: Back  Pain Orientation 1: Lower  Pain Description 1: Aching  Pain Intervention(s) 1: Medication (see MAR)    Objective:          Physical Exam:  General:  Alert and oriented. No acute distress. Heart:  Respirations unlabored. Abdomen:   Extremities: Soft, non-tender. No evidence of cyanosis. Pulses palpable in both upper and lower extremities. Neurologic:  Musculoskeletal:  No new motor deficits. Neurovascular exam within normal limits. Sensation stable. Motor: unchanged C5-T1 and L2-S1. Ant's sign negative in bilateral lower extremities. Calves soft, nontender upon palpation and with passive twitch. Moves both upper and lower extremities. Incision: clean, dry, and intact. No significant erythema or swelling. No active drainage noted. Vital Signs:    Blood pressure 118/68, pulse (!) 58, temperature 98.6 °F (37 °C), resp. rate 16, height 5' 11\" (1.803 m), weight 111.6 kg (246 lb), SpO2 91 %.   Temp (24hrs), Av.3 °F (36.8 °C), Min:97.8 °F (36.6 °C), Max:98.7 °F (37.1 °C)      LAB:    Recent Labs     19  0558   HGB 13.5     Lab Results   Component Value Date/Time    Sodium 134 (L) 2019 02:15 AM    Potassium 4.5 2019 02:15 AM    Chloride 101 2019 02:15 AM    CO2 26 2019 02:15 AM    Glucose 156 (H) 2019 02:15 AM    BUN 13 2019 02:15 AM    Creatinine 1.03 2019 02:15 AM    Calcium 8.0 (L) 2019 02:15 AM       Intake/Output:No intake/output data recorded. 09/01 1901 - 09/03 0700  In: 240 [P.O.:240]  Out: 350 [Urine:350]    PT/OT:   Gait:  Gait  Base of Support: Widened  Speed/Alethea: Pace decreased (<100 feet/min), Shuffled  Step Length: Left shortened, Right shortened  Swing Pattern: Left symmetrical, Right symmetrical  Stance: Right decreased  Gait Abnormalities: Antalgic, Decreased step clearance, Foot drop  Ambulation - Level of Assistance: Contact guard assistance, Assist x2  Distance (ft): 100 Feet (ft)  Assistive Device: Gait belt, Walker, rolling                 Assessment:   Patient is 4 Days Post-Op s/p Procedure(s):  L2-S1 LUMBAR LAMINECTOMY, L4-5 DISCECTOMY    Plan:     1. Continue PT/OT  2. Continue established methods of pain control  3. VTE Prophylaxes - TEDS & SCDs   4. Encouraged use of ICS  5. Add decadron   6.   Discharge to home with home health pending    Signed By: Kasey Jacobs PA-C

## 2019-09-03 NOTE — PROGRESS NOTES
Physical Therapy  Came to see pt however he declined BID session due to just getting \"settled\" following earlier session and wanting to rest at this time, pt made aware that a rolling walker has been ordered and will be delivered to the hospital, will follow up tomorrow  Jaylin Hudson PT

## 2019-09-03 NOTE — PROGRESS NOTES
Spiritual Care Partner Volunteer visited patient in room 548/01 on 9.03.19. Documented by: : Rev. Mandie Ko.  Ashley Altman; [facility], to contact [phone] [address] call: [phone]

## 2019-09-03 NOTE — PROGRESS NOTES
CM noted patient will have At [address] [place] [address] for home health, and received observation letter. CM available if any other needs arise.     Junior Diego [facility]

## 2019-09-04 VITALS
HEIGHT: 71 IN | WEIGHT: 246 LBS | HEART RATE: 60 BPM | RESPIRATION RATE: 16 BRPM | SYSTOLIC BLOOD PRESSURE: 120 MMHG | DIASTOLIC BLOOD PRESSURE: 65 MMHG | TEMPERATURE: 97.7 F | OXYGEN SATURATION: 94 % | BODY MASS INDEX: 34.44 KG/M2

## 2019-09-04 PROCEDURE — 99218 HC RM OBSERVATION: CPT

## 2019-09-04 PROCEDURE — 74011250636 HC RX REV CODE- 250/636: Performed by: PHYSICIAN ASSISTANT

## 2019-09-04 PROCEDURE — 97116 GAIT TRAINING THERAPY: CPT

## 2019-09-04 PROCEDURE — 96376 TX/PRO/DX INJ SAME DRUG ADON: CPT

## 2019-09-04 PROCEDURE — 74011250637 HC RX REV CODE- 250/637: Performed by: PHYSICIAN ASSISTANT

## 2019-09-04 PROCEDURE — 97530 THERAPEUTIC ACTIVITIES: CPT

## 2019-09-04 RX ORDER — CYCLOBENZAPRINE HCL 10 MG
10 TABLET ORAL
Qty: 60 TAB | Refills: 0 | Status: SHIPPED | OUTPATIENT
Start: 2019-09-04 | End: 2020-05-07

## 2019-09-04 RX ADMIN — DEXAMETHASONE SODIUM PHOSPHATE 10 MG: 4 INJECTION, SOLUTION INTRAMUSCULAR; INTRAVENOUS at 02:56

## 2019-09-04 RX ADMIN — CETIRIZINE HYDROCHLORIDE 10 MG: 10 TABLET, FILM COATED ORAL at 10:15

## 2019-09-04 RX ADMIN — ATENOLOL 25 MG: 25 TABLET ORAL at 10:15

## 2019-09-04 RX ADMIN — ASPIRIN 81 MG: 81 TABLET, COATED ORAL at 10:15

## 2019-09-04 RX ADMIN — Medication 10 ML: at 07:03

## 2019-09-04 RX ADMIN — DEXAMETHASONE SODIUM PHOSPHATE 10 MG: 4 INJECTION, SOLUTION INTRAMUSCULAR; INTRAVENOUS at 10:15

## 2019-09-04 RX ADMIN — POLYETHYLENE GLYCOL 3350 17 G: 17 POWDER, FOR SOLUTION ORAL at 10:15

## 2019-09-04 RX ADMIN — SENNOSIDES, DOCUSATE SODIUM 1 TABLET: 50; 8.6 TABLET, FILM COATED ORAL at 10:15

## 2019-09-04 RX ADMIN — ALLOPURINOL 450 MG: 300 TABLET ORAL at 10:15

## 2019-09-04 RX ADMIN — ACETAMINOPHEN 1000 MG: 500 TABLET ORAL at 10:15

## 2019-09-04 RX ADMIN — ACETAMINOPHEN 1000 MG: 500 TABLET ORAL at 02:57

## 2019-09-04 NOTE — PROGRESS NOTES
Problem: Falls - Risk of  Goal: *Absence of Falls  Description  Document Sonal Charlie Fall Risk and appropriate interventions in the flowsheet.   Outcome: Progressing Towards Goal  Note:   Fall Risk Interventions:  Mobility Interventions: Patient to call before getting OOB, OT consult for ADLs, PT Consult for assist device competence, PT Consult for mobility concerns    Medication Interventions: Patient to call before getting OOB, Teach patient to arise slowly    Elimination Interventions: Call light in reach, Patient to call for help with toileting needs, Stay With Me (per policy), Urinal in reach      Problem: Simple Spine Procedure:  Post Op Day 1/Day of Discharge  Goal: Activity/Safety  Outcome: Progressing Towards Goal  Goal: Nutrition/Diet  Outcome: Progressing Towards Goal  Goal: Discharge Planning  Outcome: Progressing Towards Goal  Goal: Medications  Outcome: Progressing Towards Goal  Goal: Respiratory  Outcome: Progressing Towards Goal  Goal: Treatments/Interventions/Procedures  Outcome: Progressing Towards Goal  Goal: Psychosocial  Outcome: Progressing Towards Goal

## 2019-09-04 NOTE — PROGRESS NOTES
Problem: Mobility Impaired (Adult and Pediatric)  Goal: *Acute Goals and Plan of Care (Insert Text)  Description  FUNCTIONAL STATUS PRIOR TO ADMISSION: Patient was independent and active without use of DME.    HOME SUPPORT PRIOR TO ADMISSION: The patient lived with family but did not require assist.    Physical Therapy Goals  Initiated 8/31/2019    1. Patient will move from supine to sit and sit to supine , scoot up and down and roll side to side in bed with independence within 4 days. 2. Patient will perform sit to stand with independence within 4 days. 3. Patient will ambulate with independence for 150 feet with the least restrictive device within 4 days. 4. Patient will ascend/descend 4 stairs with 1 handrail(s) with modified independence within 4 days. 5. Patient will verbalize and demonstrate understanding of spinal precautions (No bending, lifting greater than 5 lbs, or twisting; log-roll technique; frequent repositioning as instructed) within 4 days. Outcome: Progressing Towards Goal   PHYSICAL THERAPY TREATMENT  Patient: Luana Massey (19 y.o. male)  Date: 9/4/2019  Diagnosis: Lumbar stenosis [M48.061]  Spinal stenosis, lumbar [M48.061]  Lumbar stenosis [M48.061] <principal problem not specified>  Procedure(s) (LRB):  L2-S1 LUMBAR LAMINECTOMY, L4-5 DISCECTOMY (N/A) 5 Days Post-Op  Precautions: Fall, Back No bending, no lifting greater than 5 lbs, no twisting, log-roll technique, repositioning every 20-30 min except when sleeping  Chart, physical therapy assessment, plan of care and goals were reviewed. ASSESSMENT  Based on the objective data described below, pt presents with minimal back discomfort, 2.5/10, back precautions, modified independence with mobility and ambulation with rolling walker with a steppage gait due to right foot drop. Pt cleared on stairs using 1 hand rail and CGA. Pt recalls 3 of 3 back precautions and sitting restrictions.   These were reviewed and answered all questions. Pt is cleared for discharge from a PT standpoint. Current Level of Function Impacting Discharge (mobility/balance): bed mobility and sit <> stand transfers with modified independence, ambulation with rolling walker with modified independence, ascends/descends 3 steps with 1 hand rail with CGA    Other factors to consider for discharge: right foot drop, recommend trial with pre fabricated AFO with home health, pt may benefit from a custom AFO long term pending progress         PLAN :  Patient continues to benefit from skilled intervention to address the above impairments. Continue treatment per established plan of care. to address goals. Recommendation for discharge: (in order for the patient to meet his/her long term goals)  Physical therapy at least 2 days/week in the home AND ensure assist and/or supervision for safety with stairs     This discharge recommendation:  Has been made in collaboration with the attending provider and/or case management    Equipment recommendations for successful discharge (if) home: A rolling walker has been delivered for discharge. Pt reports he ordered a hip kit and bottom ilana. Recommended purchasing a bed rail for his bed at home as he continues to use bed rail for bed mobility. SUBJECTIVE:   Patient agreeable to participate with PT.     OBJECTIVE DATA SUMMARY:   Critical Behavior:  Neurologic State: Alert  Orientation Level: Oriented X4  Cognition: Follows commands, Appropriate decision making, Appropriate for age attention/concentration, Appropriate safety awareness  Safety/Judgement: Awareness of environment    Spinal diagnosis intervention:  The patient stated 3/3 back precautions when prompted. Reviewed all 3 back precautions, log roll technique, and sitting for 30 minutes at a time. Functional Mobility Training:    Bed Mobility:  Log Rolling: Modified independent  Supine to Sit: Modified independent; Adaptive equipment   uses bed rail for bed mobility, suggested that he could purchase a bed rail to use at home     Scooting: Independent        Transfers:  Sit to Stand: Modified independent  Stand to Sit: Modified independent                             Balance:  Sitting: Intact  Standing: Intact; With support  Standing - Static: Good  Standing - Dynamic : Good  Ambulation/Gait Training:  Distance (ft): 160 Feet (ft)  Assistive Device: Gait belt;Walker, rolling  Ambulation - Level of Assistance: Modified independent        Gait Abnormalities: Decreased step clearance; Foot drop; Steppage gait        Base of Support: Widened     Speed/Alethea: Slow  Step Length: Right shortened;Left shortened           Stairs:  Number of Stairs Trained: 3  Stairs - Level of Assistance: Contact guard assistance   Rail Use: Left     Therapeutic Exercises:   discussed importance of walking for exercise   Pain Rating:  Verbal: 2.5  Location: back, incisional     Activity Tolerance:   Good    After treatment patient left in no apparent distress:   Sitting in chair and Call bell within reach    COMMUNICATION/COLLABORATION:   The patients plan of care was discussed with: Registered Nurse    Jaylin Valdez   Time Calculation: 23 mins

## 2019-09-04 NOTE — DISCHARGE SUMMARY
[address] [place] [address]   [address]  [phone]     Discharge Summary       PATIENT ID: Radha Man  MRN: [id number]   YOB: 1955    DATE OF ADMISSION: 8/30/2019  7:31 AM    DATE OF DISCHARGE: 9/4/2019   PRIMARY CARE PROVIDER: Bright Edward MD     CONSULTATIONS: None    PROCEDURES/SURGERIES: Procedure(s):  L2-S1 LUMBAR LAMINECTOMY, L4-5 DISCECTOMY    History of Present Illness:  Radha Man is a 75 y.o. male with a history of neurogenic claudication, bilateral leg pain, and foot drop. 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 At L4-5 he has a right-sided disc protrusion with inferior migration causing impingement of the exiting L5 nerve root. After failing conservative therapy and a discussion of the risks, benefits, alternatives, perioperative course, and potential complications of surgery, he consented to undergo a Procedure(s):  L2-S1 LUMBAR LAMINECTOMY, L4-5 DISCECTOMY. Hospital Course:  Radha Man tolerated the procedure well. He was transferred  to the recovery room in stable condition. After a brief stay the patient was then transferred to the Spinal Surgery Unit at [address] [place] [address].  On postoperative day #1, the dressing was clean and dry, he was neurovascularly intact. The patient was afebrile and vital signs were stable. Calves were soft and non-tender bilaterally. The patient was tolerating a regular diet and making slow progress with physical therapy. His hemovac drain was removed on postoperative day #1. He was voiding without difficulty and had return of bowel function postoperatively.     Hemoglobin prior to discharge were   Lab Results   Component Value Date/Time    HGB 13.5 09/01/2019 05:58 AM        Radha Man made satisfactory progress with physical therapy and was discharged to Home in stable condition on postoperative day 5. He was provided with routine postoperative instructions and advised to follow up with Heena Chairez MD in 2 weeks following discharge from the hospital.        FOLLOW UP APPOINTMENTS:    Follow-up Information     Follow up With Specialties Details Why Contact Info    AT [address] [facility]    Mac Duran MD Internal Medicine   [facility]  [address]              DISCHARGE MEDICATIONS:  Discharge Medication List as of 9/4/2019 12:09 PM      START taking these medications    Details   cyclobenzaprine (FLEXERIL) 10 mg tablet Take 1 Tab by mouth three (3) times daily as needed for Muscle Spasm(s). , Print, Disp-60 Tab, R-0      oxyCODONE IR (ROXICODONE) 5 mg immediate release tablet Take 1 Tab by mouth every four (4) hours as needed for Pain for up to 14 days. Max Daily Amount: 30 mg., Print, Disp-60 Tab, R-0         CONTINUE these medications which have NOT CHANGED    Details   rosuvastatin (CRESTOR) 40 mg tablet Take 40 mg by mouth nightly., Historical Med      omeprazole (PRILOSEC) 10 mg capsule Take 10 mg by mouth daily as needed., Historical Med      atenolol (TENORMIN) 25 mg tablet Take 25 mg by mouth daily. Historical Med, 25 mg      allopurinol (ZYLOPRIM) 300 mg tablet Take 450 mg by mouth daily. , Historical Med      lisinopril (PRINIVIL, ZESTRIL) 10 mg tablet Take  by mouth every evening., Historical Med      cetirizine (ZYRTEC) 10 mg tablet Take 10 mg by mouth daily. , Historical Med      SAFETY-HIEN 3CC SYR 23GX1\" 3 mL 23 gauge x 1\" syrg INJECT 0.4ML INTRAMUSCULARLY ONCE EVERY 7 DAYS, Normal, Disp-100 Pen Needle, R-0      testosterone cypionate (DEPOTESTOTERONE CYPIONATE) 200 mg/mL injection 0.35 mL by IntraMUSCular route every seven (7) days. , Print, Disp-10 mL, R-1      Needle, Disp, 21 G 21 x 1 \" Ndle 1 mL by Does Not Apply route every seven (7) days.Normal, 1 mL, Disp-12 Each, R-6For use with testosterone      Syringe, Disposable, 1 mL Syrg 0.4 mL by Does Not Apply route every seven (7) days. Normal, 0.4 mL, Disp-10 Each, R-3      aspirin delayed-release 81 mg tablet Take  by mouth daily. Historical Med               CHRONIC MEDICAL DIAGNOSES:  Problem List as of 9/4/2019 Date Reviewed: 9/4/2019          Codes Class Noted - Resolved    Lumbar stenosis ICD-10-CM: M48.061  ICD-9-CM: 724.02  8/30/2019 - Present        Spinal stenosis, lumbar ICD-10-CM: M48.061  ICD-9-CM: 724.02  8/30/2019 - Present        Hypogonadism male ICD-10-CM: E29.1  ICD-9-CM: 257.2  1/20/2012 - Present              Signed:   Ruperto Garcia NP  9/4/2019  3:45 PM

## 2019-09-04 NOTE — DISCHARGE INSTRUCTIONS
[facility] Dr. Lj Barrett  [phone]    After [address]:  Discharge Instructions Lumbar Laminectomy Surgery     Patient Name: Jose Castillo    Date of procedure: 8/30/2019  Date of discharge:     Procedure: Procedure(s):  L2-S1 LUMBAR LAMINECTOMY, L4-5 DISCECTOMY  PCP: Charlee Milner MD    Follow up appointments  -Follow up with Dr. Lj Barrett in 2 weeks. Call [phone] to make an appointment as soon as you get home from the hospital.    [facility] _________________________   phone: ___________________  The agency will contact you to arrange dates/times for visits. Please call them if you do not hear from them within 24 hours after you are discharged  Physical therapy 3 times a week for 3 weeks  Nursing-initial assessment and as needed    When to call your Orthopaedic Surgeon:  -Signs of infection-if your incision is red; continues to have drainage; drainage has a foul odor or if you have a persistent fever over 101 degrees for 24 hours  -Nausea or vomiting, severe headache  -Loss of bowel or bladder function, inability to urinate  -Changes in sensation in your arms or legs (numbness, tingling, loss of color)  -Increased weakness-greater than before your surgery  -Severe pain or pain not relieved by medications  -Signs of a blood clot in your leg-calf pain, tenderness, redness, swelling of lower leg    When to call your Primary Care Physician:  -Concerns about medical conditions such as diabetes, high blood pressure, asthma, congestive heart failure  -Call if blood sugars are elevated, persistent headache or dizziness, coughing or congestion, constipation or diarrhea, burning with urination, abnormal heart rate    When to call 911 and go to the nearest emergency room:  -Acute onset of chest pain, shortness of breath, difficulty breathing    Activity  - You are going home a well person, be as active as possible. Your only exercise should be walking.   Start with short frequent walks and increase your walking distance each day.  -Limit the amount of time you sit to 20-30 minute intervals. Sitting for prolonged periods of time will be uncomfortable for you following surgery.  -Do NOT lift anything over 5 pounds  -Do NOT do any straining, twisting or bending  -When you are in bed, you may lay on your back or on either side. Do NOT lie on your stomach    Brace  -If you have a back brace, you should wear your brace at all times when you are out of bed. Do not wear the brace while in bed or showering.  -Remember to always wear a cotton t-shirt underneath your brace.  -Do not bend or twist when your brace is off    Diet  -Resume usual diet; drink plenty of fluids; eat foods high in fiber  -It is important to have regular bowel movements. Pain medications may cause constipation. You may want to take a stool softener (such as Senokot-S or Colace) to prevent constipation.  -If constipation occurs, take a laxative (such as Dulcolax tablets, Milk of Magnesia, or a suppository). Laxatives should only be used if the above preventable measures have failed and you still have not had a bowel movement after three days. Driving  -You may not drive or return to work until instructed by your physician. However, you may ride in the car for short periods of time. Incision Care  Your incision has been closed with absorbable sutures and the Zipline skin closure system. This will assist with healing. The Tiarra  is to remain on your incision for 2 weeks. A dry dressing (ABD and tape) will be placed over it and should be changed daily, for at least the first several days after your surgery. If you have no incisional drainage, you may leave the incision open to air if you wish, still leaving the Zipline in place. Please make sure to wash your hands prior to touching your dressing. You may take brief showers but do not run the water directly onto the wound.  After your shower, blot your incision dry with a soft towel and replace the dry dressing. Do not allow the tape to come in contact with the Zipline. Do not rub or apply any lotions or ointments to your incision site. Do not soak or scrub your wound. The Zipline dressing will be removed during your two week follow-up appointment. If you experience drainage leaking from underneath the Zipline or if it peels off before 2 weeks, please contact your orthopedic surgeons office. Showering  -You may shower in approximately 4 days after your surgery.    -Leave the dressing on during your shower. Do NOT allow the water to run directly onto your dressing. Once you get out of the shower, gently pat the dressing dry. -Reminder- your brace can be removed while showering. Remember to not bend or twist while your brace is off.    -Do not take a tub bath. Preventing blood clots  -You have been given T.E.D. stockings to wear. Continue to wear these for 7 days after your discharge. Put them on in the morning and take them off at night.    -They are used to increase your circulation and prevent blood clots from forming in your legs  -T. E.D. stockings can be machine washed, temperature not to exceed 160° F (71°C) and machine dried for 15 to 20 minutes, temperature not to exceed 250° F (121°C). Pain management  -Take pain medication as prescribed; decrease the amount you use as your pain lessens  -Do not wait until you are in extreme pain to take your medication.  -Avoid alcoholic beverages while taking pain medication    Pain Medication Safety  DO:  -Read the Medication Guide   -Take your medicine exactly as prescribed   -Store your medicine away from children and in a safe place   -Flush unused medicine down the toilet   -Call your healthcare provider for medical advice about side effects. You may report side effects to FDA at 6-598-FDA-9806.   -Please be aware that many medications contain Tylenol.   We do not want you to over medicate so please read the information below as a guide. Do not take more than 4 Grams of Tylenol in a 24 hour period. (There are 1000 milligrams in one Gram)                                                                                                                                                                                                                           Percocet contains 325 mg of Tylenol per tablet (do not take more than 12 tablets in 24 hours)  Lortab contains 500 mg of Tylenol per tablet (do not take more than 8 tablets in 24 hours)  Norco contains 325 mg of Tylenol per tablet (do not take more than 12 tablets in 24 hours). DO NOT:  -Do not give your medicine to others   -Do not take medicine unless it was prescribed for you   -Do not stop taking your medicine without talking to your healthcare provider   -Do not break, chew, crush, dissolve, or inject your medicine. If you cannot  swallow your medicine whole, talk to your healthcare provider.  -Do not drink alcohol while taking this medicine  -Do not take anti-inflammatory medications or aspirin unless instructed by your physician.

## 2019-09-04 NOTE — PROGRESS NOTES
Patient's discharge instructions were reviewed, signed, copy given. All questions answered at this time. Dressing change demonstrated to  at bedside. IV removed. TEDs, dressings, and prescriptions given to patient. Patient getting dressed to be discharged.

## 2019-09-04 NOTE — PROGRESS NOTES
Orthopedic Spine Progress Note  Post Op day: 5 Days Post-Op    2019 8:06 AM   Admit Date: 2019  Procedure: Procedure(s):  L2-S1 LUMBAR LAMINECTOMY, L4-5 DISCECTOMY    Subjective:     Null Muse appears well. Pain seems to be managed. Tolerating diet  No N/V  Voiding    Pain Control:   Pain Assessment  Pain Scale 1: Numeric (0 - 10)  Pain Intensity 1: 0  Pain Onset 1: postop  Pain Location 1: Back  Pain Orientation 1: Lower  Pain Description 1: Aching  Pain Intervention(s) 1: Cold pack    Objective:          Physical Exam:  General:  Alert and oriented. No acute distress. Heart:  Respirations unlabored. Abdomen:   Extremities: Soft, non-tender. No evidence of cyanosis. Pulses palpable in both upper and lower extremities. Neurologic:  Musculoskeletal:  No new motor deficits. Neurovascular exam within normal limits. Sensation stable. Motor: unchanged C5-T1 and L2-S1. Ant's sign negative in bilateral lower extremities. Calves soft, nontender upon palpation and with passive twitch. Moves both upper and lower extremities. Incision: clean, dry, and intact. No significant erythema or swelling. No active drainage noted. Vital Signs:    Blood pressure 120/65, pulse 60, temperature 97.7 °F (36.5 °C), resp. rate 16, height 5' 11\" (1.803 m), weight 111.6 kg (246 lb), SpO2 94 %. Temp (24hrs), Av °F (36.7 °C), Min:97.5 °F (36.4 °C), Max:98.8 °F (37.1 °C)      LAB:    No results for input(s): HCT, HGB, PLT, HCTEXT, HGBEXT, PLTEXT in the last 72 hours. Lab Results   Component Value Date/Time    Sodium 134 (L) 2019 02:15 AM    Potassium 4.5 2019 02:15 AM    Chloride 101 2019 02:15 AM    CO2 26 2019 02:15 AM    Glucose 156 (H) 2019 02:15 AM    BUN 13 2019 02:15 AM    Creatinine 1.03 2019 02:15 AM    Calcium 8.0 (L) 2019 02:15 AM       Intake/Output:No intake/output data recorded.    190 -  0700  In: -   Out: 700 [Urine:700]    PT/OT:   Gait:  Gait  Base of Support: Widened  Speed/Alethea: Slow  Step Length: Right shortened, Left shortened  Swing Pattern: Left symmetrical, Right symmetrical  Stance: Right decreased  Gait Abnormalities: Antalgic, Decreased step clearance, Foot drop, Steppage gait  Ambulation - Level of Assistance: Contact guard assistance  Distance (ft): 100 Feet (ft)  Assistive Device: Gait belt, Walker, rolling  Rail Use: Left   Stairs - Level of Assistance: Minimum assistance  Number of Stairs Trained: 3                 Assessment:   Patient is 5 Days Post-Op s/p Procedure(s):  L2-S1 LUMBAR LAMINECTOMY, L4-5 DISCECTOMY    Plan:     1. Continue PT/OT  2. Continue established methods of pain control  3. VTE Prophylaxes - TEDS & SCDs   4. Encouraged use of ICS  5.   Discharge to home with home health today pending PT clearance    Signed By: Kortney Parada PA-C

## 2019-12-01 ENCOUNTER — HOSPITAL ENCOUNTER (OUTPATIENT)
Dept: MRI IMAGING | Age: 64
Discharge: HOME | End: 2019-12-01
Attending: ORTHOPAEDIC SURGERY
Payer: MEDICARE

## 2019-12-01 DIAGNOSIS — M21.371 RIGHT FOOT DROP: ICD-10-CM

## 2019-12-01 DIAGNOSIS — Z98.890 S/P LUMBAR LAMINECTOMY: ICD-10-CM

## 2019-12-01 DIAGNOSIS — M54.50 LOW BACK PAIN: ICD-10-CM

## 2019-12-01 PROCEDURE — A9575 INJ GADOTERATE MEGLUMI 0.1ML: HCPCS | Performed by: ORTHOPAEDIC SURGERY

## 2019-12-01 PROCEDURE — 72158 MRI LUMBAR SPINE W/O & W/DYE: CPT

## 2019-12-01 PROCEDURE — 74011250636 HC RX REV CODE- 250/636: Performed by: ORTHOPAEDIC SURGERY

## 2019-12-01 RX ORDER — GADOTERATE MEGLUMINE 376.9 MG/ML
20 INJECTION INTRAVENOUS
Status: COMPLETED | OUTPATIENT
Start: 2019-12-01 | End: 2019-12-01

## 2019-12-01 RX ADMIN — GADOTERATE MEGLUMINE 20 ML: 376.9 INJECTION INTRAVENOUS at 12:00

## 2020-01-03 ENCOUNTER — HOSPITAL ENCOUNTER (OUTPATIENT)
Dept: INTERVENTIONAL RADIOLOGY/VASCULAR | Age: 65
Discharge: HOME | End: 2020-01-03
Attending: RADIOLOGY | Admitting: RADIOLOGY
Payer: MEDICARE

## 2020-01-03 VITALS
SYSTOLIC BLOOD PRESSURE: 134 MMHG | OXYGEN SATURATION: 96 % | DIASTOLIC BLOOD PRESSURE: 81 MMHG | HEART RATE: 61 BPM | TEMPERATURE: 97.8 F

## 2020-01-03 DIAGNOSIS — Z98.890 S/P LUMBAR LAMINECTOMY: ICD-10-CM

## 2020-01-03 DIAGNOSIS — M21.371 RIGHT FOOT DROP: ICD-10-CM

## 2020-01-03 DIAGNOSIS — M54.50 LOW BACK PAIN: ICD-10-CM

## 2020-01-03 PROCEDURE — 74011250636 HC RX REV CODE- 250/636: Performed by: RADIOLOGY

## 2020-01-03 PROCEDURE — 64483 NJX AA&/STRD TFRM EPI L/S 1: CPT

## 2020-01-03 PROCEDURE — 74011636320 HC RX REV CODE- 636/320: Performed by: RADIOLOGY

## 2020-01-03 PROCEDURE — 74011000250 HC RX REV CODE- 250: Performed by: RADIOLOGY

## 2020-01-03 RX ORDER — LIDOCAINE HYDROCHLORIDE 10 MG/ML
10 INJECTION, SOLUTION EPIDURAL; INFILTRATION; INTRACAUDAL; PERINEURAL ONCE
Status: COMPLETED | OUTPATIENT
Start: 2020-01-03 | End: 2020-01-03

## 2020-01-03 RX ORDER — DEXAMETHASONE SODIUM PHOSPHATE 10 MG/ML
10 INJECTION INTRAMUSCULAR; INTRAVENOUS ONCE
Status: COMPLETED | OUTPATIENT
Start: 2020-01-03 | End: 2020-01-03

## 2020-01-03 RX ORDER — HYDROCODONE BITARTRATE AND ACETAMINOPHEN 5; 325 MG/1; MG/1
1 TABLET ORAL
COMMUNITY
End: 2020-05-07

## 2020-01-03 RX ADMIN — IOHEXOL 10 ML: 180 INJECTION INTRAVENOUS at 09:12

## 2020-01-03 RX ADMIN — DEXAMETHASONE SODIUM PHOSPHATE 10 MG: 10 INJECTION INTRAMUSCULAR; INTRAVENOUS at 09:12

## 2020-01-03 RX ADMIN — LIDOCAINE HYDROCHLORIDE 10 ML: 10 INJECTION, SOLUTION EPIDURAL; INFILTRATION; INTRACAUDAL; PERINEURAL at 09:12

## 2020-01-03 NOTE — PROGRESS NOTES
0815 am- Patient ambulated with cane to [facility] for steroid injection. Dr. Pate Coad in to talk with patient about procedure. Consent signed.

## 2020-01-25 ENCOUNTER — APPOINTMENT (OUTPATIENT)
Dept: VASCULAR SURGERY | Age: 65
End: 2020-01-25
Attending: EMERGENCY MEDICINE
Payer: MEDICARE

## 2020-01-25 ENCOUNTER — HOSPITAL ENCOUNTER (EMERGENCY)
Age: 65
Discharge: HOME | End: 2020-01-26
Attending: EMERGENCY MEDICINE | Admitting: EMERGENCY MEDICINE
Payer: MEDICARE

## 2020-01-25 ENCOUNTER — APPOINTMENT (OUTPATIENT)
Dept: GENERAL RADIOLOGY | Age: 65
End: 2020-01-25
Attending: EMERGENCY MEDICINE
Payer: MEDICARE

## 2020-01-25 DIAGNOSIS — L03.115 CELLULITIS OF RIGHT LOWER EXTREMITY: Primary | ICD-10-CM

## 2020-01-25 LAB
ALBUMIN SERPL-MCNC: 3.6 G/DL (ref 3.5–5)
ALBUMIN/GLOB SERPL: 1.1 {RATIO} (ref 1.1–2.2)
ALP SERPL-CCNC: 73 U/L (ref 45–117)
ALT SERPL-CCNC: 36 U/L (ref 12–78)
ANION GAP SERPL CALC-SCNC: 7 MMOL/L (ref 5–15)
AST SERPL-CCNC: 35 U/L (ref 15–37)
BASOPHILS # BLD: 0.1 K/UL (ref 0–0.1)
BASOPHILS NFR BLD: 1 % (ref 0–1)
BILIRUB SERPL-MCNC: 0.4 MG/DL (ref 0.2–1)
BUN SERPL-MCNC: 5 MG/DL (ref 6–20)
BUN/CREAT SERPL: 7 (ref 12–20)
CALCIUM SERPL-MCNC: 9 MG/DL (ref 8.5–10.1)
CHLORIDE SERPL-SCNC: 102 MMOL/L (ref 97–108)
CO2 SERPL-SCNC: 26 MMOL/L (ref 21–32)
COMMENT, HOLDF: NORMAL
CREAT SERPL-MCNC: 0.76 MG/DL (ref 0.7–1.3)
CRP SERPL-MCNC: 0.82 MG/DL (ref 0–0.6)
DIFFERENTIAL METHOD BLD: ABNORMAL
EOSINOPHIL # BLD: 0.4 K/UL (ref 0–0.4)
EOSINOPHIL NFR BLD: 4 % (ref 0–7)
ERYTHROCYTE [DISTWIDTH] IN BLOOD BY AUTOMATED COUNT: 14.9 % (ref 11.5–14.5)
ERYTHROCYTE [SEDIMENTATION RATE] IN BLOOD: 7 MM/HR (ref 0–20)
ETHANOL SERPL-MCNC: 296 MG/DL
GLOBULIN SER CALC-MCNC: 3.2 G/DL (ref 2–4)
GLUCOSE SERPL-MCNC: 133 MG/DL (ref 65–100)
HCT VFR BLD AUTO: 49.3 % (ref 36.6–50.3)
HGB BLD-MCNC: 16.2 G/DL (ref 12.1–17)
IMM GRANULOCYTES # BLD AUTO: 0 K/UL (ref 0–0.04)
IMM GRANULOCYTES NFR BLD AUTO: 0 % (ref 0–0.5)
LYMPHOCYTES # BLD: 4.4 K/UL (ref 0.8–3.5)
LYMPHOCYTES NFR BLD: 49 % (ref 12–49)
MCH RBC QN AUTO: 30.6 PG (ref 26–34)
MCHC RBC AUTO-ENTMCNC: 32.9 G/DL (ref 30–36.5)
MCV RBC AUTO: 93.2 FL (ref 80–99)
MONOCYTES # BLD: 0.5 K/UL (ref 0–1)
MONOCYTES NFR BLD: 6 % (ref 5–13)
NEUTS SEG # BLD: 3.6 K/UL (ref 1.8–8)
NEUTS SEG NFR BLD: 40 % (ref 32–75)
NRBC # BLD: 0 K/UL (ref 0–0.01)
NRBC BLD-RTO: 0 PER 100 WBC
PLATELET # BLD AUTO: 197 K/UL (ref 150–400)
PMV BLD AUTO: 10.5 FL (ref 8.9–12.9)
POTASSIUM SERPL-SCNC: 3.8 MMOL/L (ref 3.5–5.1)
PROT SERPL-MCNC: 6.8 G/DL (ref 6.4–8.2)
RBC # BLD AUTO: 5.29 M/UL (ref 4.1–5.7)
SAMPLES BEING HELD,HOLD: NORMAL
SODIUM SERPL-SCNC: 135 MMOL/L (ref 136–145)
TROPONIN I SERPL-MCNC: <0.05 NG/ML
WBC # BLD AUTO: 9.1 K/UL (ref 4.1–11.1)

## 2020-01-25 PROCEDURE — 85025 COMPLETE CBC W/AUTO DIFF WBC: CPT

## 2020-01-25 PROCEDURE — 36415 COLL VENOUS BLD VENIPUNCTURE: CPT

## 2020-01-25 PROCEDURE — 83605 ASSAY OF LACTIC ACID: CPT

## 2020-01-25 PROCEDURE — 99283 EMERGENCY DEPT VISIT LOW MDM: CPT

## 2020-01-25 PROCEDURE — 86140 C-REACTIVE PROTEIN: CPT

## 2020-01-25 PROCEDURE — 84484 ASSAY OF TROPONIN QUANT: CPT

## 2020-01-25 PROCEDURE — 85652 RBC SED RATE AUTOMATED: CPT

## 2020-01-25 PROCEDURE — 87040 BLOOD CULTURE FOR BACTERIA: CPT

## 2020-01-25 PROCEDURE — 73610 X-RAY EXAM OF ANKLE: CPT

## 2020-01-25 PROCEDURE — 93971 EXTREMITY STUDY: CPT

## 2020-01-25 PROCEDURE — 80053 COMPREHEN METABOLIC PANEL: CPT

## 2020-01-25 PROCEDURE — 80307 DRUG TEST PRSMV CHEM ANLYZR: CPT

## 2020-01-26 VITALS
RESPIRATION RATE: 16 BRPM | BODY MASS INDEX: 34.3 KG/M2 | HEIGHT: 71 IN | TEMPERATURE: 98.2 F | OXYGEN SATURATION: 98 % | HEART RATE: 62 BPM | SYSTOLIC BLOOD PRESSURE: 126 MMHG | WEIGHT: 245 LBS | DIASTOLIC BLOOD PRESSURE: 52 MMHG

## 2020-01-26 LAB
AMPHET UR QL SCN: NEGATIVE
BARBITURATES UR QL SCN: NEGATIVE
BENZODIAZ UR QL: NEGATIVE
CANNABINOIDS UR QL SCN: NEGATIVE
COCAINE UR QL SCN: NEGATIVE
DRUG SCRN COMMENT,DRGCM: ABNORMAL
LACTATE SERPL-SCNC: 2.4 MMOL/L (ref 0.4–2)
METHADONE UR QL: NEGATIVE
OPIATES UR QL: POSITIVE
PCP UR QL: NEGATIVE

## 2020-01-26 NOTE — DISCHARGE INSTRUCTIONS

## 2020-01-26 NOTE — ED NOTES
MD reviewed discharge instructions and options with patient and patient verbalized understanding. RN reviewed discharge instructions using teachback method. Pt ambulated to exit without difficulty and in no signs of acute distress escorted by family, and they  will drive home. No complaints or needs expressed at this time. Patient was counseled on medications prescribed at discharge. VSS, verbalized relief from most intense pain. Patient to call PCP in the morning for appointment.

## 2020-01-26 NOTE — ED TRIAGE NOTES
Patient arrives via EMS from home with c/o redness and swelling to RIGHT leg/calf. Patient went to [address] [place] [address] ER Monday, was discharged with oral antibiotics, has worsened since and spread almost to knee and down to foot. Denies fever/chills, compliant with antibiotics.  Wound started one month ago when door hit back of leg, clear drainage to wound

## 2020-01-26 NOTE — ED PROVIDER NOTES
Please note that this dictation was completed with [employer], the computer voice recognition software.  Quite often unanticipated grammatical, syntax, homophones, and other interpretive errors are inadvertently transcribed by the computer software.  Please disregard these errors.  Please excuse any errors that have escaped final proofreading. 76-year-old morbidly obese white male past medical history osteoarthritis, asthma as a young adult, coronary artery disease with 1 stent, basal cell carcinoma squamous cell carcinoma chronic pain, GERD, hypertension, hyperlipidemia, Sidhu, and sleep apnea per the patient presents complaining of \"1 month ago some doors were stacked up and fell over and nicked my right heel. Did not think much of it had intermittent leg pain since then. Developed increased pain and swelling this past week went to an regular doctors elijah, alirio started on doxycycline but not sure its helping. Patient states today he decided to have a beer took a nap and got up and his leg was more swollen and more red than it had been previously. Patient is tearful because he is not sure he can take it much longer.      pt denies HA, vison changes, diff swallowing, CP, SOB, Abd pain, F/Ch, N/V, D/Cons or other current systemic complaints    Social/ PSH reviewed in EMR    EMR Chart Reviewed           Past Medical History:   Diagnosis Date    Arthritis     OSTEO    Asthma     AS A YOUNGER ADULT    CAD (coronary artery disease)     stent X1    Cancer ([facility] [address]     BCC, SCC    Chronic pain     GERD (gastroesophageal reflux disease)     HTN (hypertension)     Hyperlipidemia     Kidney stone 1993    Liver disease 03/2018    FATTY LIVER    Sleep apnea     WAS TOLD, BUT NEVER TESTED       Past Surgical History:   Procedure Laterality Date    CABG, ARTERY-VEIN, THREE  03/2018    CARDIAC SURG PROCEDURE UNLIST  1998 & 2018    CARDIAC CATH    HX COLONOSCOPY      HX ORTHOPAEDIC Left 2005    HIP REPLACEMENT  HX ORTHOPAEDIC Right 2009    HIP REPLACEMENT    IR INJ FORAMIN EPID LUMB ANES/STER SNGL  1/3/2020    NEUROLOGICAL PROCEDURE UNLISTED      L1 or L3 LAMINECTOMY         Family History:   Problem Relation Age of Onset    Cancer Brother         prostate     Heart Disease Brother         CABG x4     Heart Disease Father 45    High Cholesterol Father     Heart Disease Brother 48        CABG    Other Mother         DIVERTICULITIS    Heart Attack Son         AGE 44    No Known Problems Daughter     Anesth Problems Neg Hx        Social History     Socioeconomic History    Marital status: LEGALLY      Spouse name: Not on file    Number of children: Not on file    Years of education: Not on file    Highest education level: Not on file   Occupational History    Not on file   Social Needs    Financial resource strain: Not on file    Food insecurity:     Worry: Not on file     Inability: Not on file    Transportation needs:     Medical: Not on file     Non-medical: Not on file   Tobacco Use    Smoking status: Former Smoker     Packs/day: 0.50     Last attempt to quit: 1998     Years since quittin.2    Smokeless tobacco: Never Used   Substance and Sexual Activity    Alcohol use:  Yes     Alcohol/week: 3.0 - 4.0 standard drinks     Types: 3 - 4 Cans of beer per week     Comment: 2-3 daily    Drug use: Not Currently    Sexual activity: Not on file   Lifestyle    Physical activity:     Days per week: Not on file     Minutes per session: Not on file    Stress: Not on file   Relationships    Social connections:     Talks on phone: Not on file     Gets together: Not on file     Attends Anabaptist service: Not on file     Active member of club or organization: Not on file     Attends meetings of clubs or organizations: Not on file     Relationship status: Not on file    Intimate partner violence:     Fear of current or ex partner: Not on file     Emotionally abused: Not on file Physically abused: Not on file     Forced sexual activity: Not on file   Other Topics Concern    Not on file   Social History Narrative    Not on file         ALLERGIES: Erythromycin; Percocet [oxycodone-acetaminophen]; and Percodan [oxycodone hcl-oxycodone-asa]    Review of Systems   Constitutional: Positive for appetite change. Negative for activity change, chills and fever. HENT: Negative for trouble swallowing and voice change. Eyes: Negative for photophobia and visual disturbance. Respiratory: Negative for cough, chest tightness and shortness of breath. Cardiovascular: Positive for leg swelling. Negative for palpitations. Gastrointestinal: Negative for abdominal pain, constipation, diarrhea, nausea and vomiting. Genitourinary: Negative for dysuria. Skin: Positive for color change and rash. Neurological: Negative for dizziness, speech difficulty and headaches. All other systems reviewed and are negative. Vitals:    01/25/20 2040   BP: 121/69   Pulse: (!) 59   Resp: 18   Temp: 97.4 °F (36.3 °C)   SpO2: 98%   Weight: 111.1 kg (245 lb)   Height: 5' 11\" (1.803 m)            Physical Exam  Vitals signs and nursing note reviewed. Constitutional:       General: He is not in acute distress. Appearance: Normal appearance. He is well-developed. He is obese. He is not ill-appearing, toxic-appearing or diaphoretic. Comments: NAD, AxOx4, speaking in complete sentences  Tearful/ crying   HENT:      Head: Normocephalic and atraumatic. Right Ear: External ear normal.      Left Ear: External ear normal.      Nose: Nose normal.      Mouth/Throat:      Mouth: Mucous membranes are moist.      Pharynx: No oropharyngeal exudate. Eyes:      General:         Right eye: No discharge. Left eye: No discharge. Extraocular Movements: Extraocular movements intact. Conjunctiva/sclera: Conjunctivae normal.      Pupils: Pupils are equal, round, and reactive to light.    Neck: Musculoskeletal: Normal range of motion and neck supple. No neck rigidity. Cardiovascular:      Rate and Rhythm: Normal rate and regular rhythm. Pulses: Normal pulses. Heart sounds: Normal heart sounds. No murmur. No friction rub. No gallop. Pulmonary:      Effort: Pulmonary effort is normal. No respiratory distress. Breath sounds: Normal breath sounds. No wheezing or rales. Chest:      Chest wall: No tenderness. Abdominal:      General: Bowel sounds are normal. There is no distension. Palpations: Abdomen is soft. There is no mass. Tenderness: There is no tenderness. There is no guarding or rebound. Comments: nttp     Genitourinary:     Comments: Pt denies urinary/ Testicular/ scrotal or penile  complaints  Musculoskeletal: Normal range of motion. General: Swelling and signs of injury present. No tenderness or deformity. Right lower leg: Edema present. Left lower leg: No edema. Lymphadenopathy:      Cervical: No cervical adenopathy. Skin:     General: Skin is warm and dry. Capillary Refill: Capillary refill takes less than 2 seconds. Coloration: Skin is not jaundiced or pale. Findings: No bruising, erythema, lesion or rash. Neurological:      General: No focal deficit present. Mental Status: He is alert and oriented to person, place, and time. Cranial Nerves: No cranial nerve deficit. Sensory: No sensory deficit. Motor: No weakness. Coordination: Coordination normal.      Gait: Gait normal.      Deep Tendon Reflexes: Reflexes normal.          MDM  Number of Diagnoses or Management Options  Cellulitis of right lower extremity:          Procedures        11:50 PM  Pt told of elevated etoh/ negative US;     12:22 AM Pt told to stop abusing etoh/ 'had soakek mt R lower lerg/ foot in hot epsoms salt bath tonight'; told alvino use RICE/ they agree w/ lynette/  Valeria Gaffney  results have been reviewed with him.   He has been counseled regarding his diagnosis. He verbally conveys understanding and agreement of the signs, symptoms, diagnosis, treatment and prognosis and additionally agrees to Call/ Arrange follow up as recommended with Dr. Dmitriy Fernando MD in 24 - 48 hours. He also agrees with the care-plan and conveys that all of his questions have been answered. I have also put together some discharge instructions for him that include: 1) educational information regarding their diagnosis, 2) how to care for their diagnosis at home, as well a 3) list of reasons why they would want to return to the ED prior to their follow-up appointment, should their condition change or for concerns.

## 2020-01-30 LAB
BACTERIA SPEC CULT: NORMAL
SERVICE CMNT-IMP: NORMAL

## 2020-05-07 ENCOUNTER — HOSPITAL ENCOUNTER (OUTPATIENT)
Dept: WOUND CARE | Age: 65
Discharge: HOME | End: 2020-05-07
Payer: MEDICARE

## 2020-05-07 VITALS
TEMPERATURE: 97 F | HEART RATE: 50 BPM | SYSTOLIC BLOOD PRESSURE: 129 MMHG | RESPIRATION RATE: 16 BRPM | DIASTOLIC BLOOD PRESSURE: 58 MMHG

## 2020-05-07 PROBLEM — L97.312 NON-PRESSURE CHRONIC ULCER OF RIGHT ANKLE WITH FAT LAYER EXPOSED (HCC): Status: ACTIVE | Noted: 2020-05-07

## 2020-05-07 PROBLEM — I73.9 PAD (PERIPHERAL ARTERY DISEASE) (HCC): Status: ACTIVE | Noted: 2020-05-07

## 2020-05-07 PROBLEM — L97.912 NON-PRESSURE CHRONIC ULCER OF RIGHT LOWER LEG, WITH FAT LAYER EXPOSED (HCC): Status: ACTIVE | Noted: 2020-05-07

## 2020-05-07 PROCEDURE — 99213 OFFICE O/P EST LOW 20 MIN: CPT

## 2020-05-07 NOTE — DISCHARGE INSTRUCTIONS
3Discharge Instructions for  [place] [id number] [address], [address] [facility]  Telephone: [phone]    NAME:  Edith Willson  YOB: 1955  MEDICAL RECORD NUMBER:  [id number]  DATE:  5/7/2020      WOUND CARE ORDERS:  Right lower leg and ankle wounds - Cleanse with saline, right anterior lower apply xeroform, cover with ABD or exudry, secure with roll gauze, change every other day. Right lateral ankle cleanse with saline, apply Santyl nickel thick, cover with NS moistened gauze, then dry gauze, secure with roll gauze, change daily. Return to clinic in one week for MD follow-up. TREATMENT ORDERS:    Elevate leg(s) above the level of the heart when sitting. Avoid prolonged standing in one place. Do no get dressing/wrap wet. Follow Diet as prescribed:   [x] Diet as tolerated: [] Calorie Diabetic Diet: [] No Added Salt:  [] Increase Protein: [] Other:               Return Appointment:  [x] Return Appointment: With DR Chay Gautam  in  [facility])  [] Nurse Visit :   [] Ordered tests:     [facility]  [address], [address]     [phone]    Appt  05/12/20  8:00 arrive 7:45     Electronically signed Mathew Verde RN on 5/7/2020 at 9:27 AM     [address] [place] [address] Road Information: Should you experience any significant changes in your wound(s) or have questions about your wound care, please contact the [address] [place] [address] at [address] [place] [address] 8:00 am - 4:30. If you need help with your wound outside these hours and cannot wait until we are again available, contact your PCP or go to the hospital emergency room. PLEASE NOTE: IF YOU ARE UNABLE TO OBTAIN WOUND SUPPLIES, CONTINUE TO USE THE SUPPLIES YOU HAVE AVAILABLE UNTIL YOU ARE ABLE TO REACH US. IT IS MOST IMPORTANT TO KEEP THE WOUND COVERED AT ALL TIMES.      Physician Signature:_______________________    Date: ___________ Time:  ____________

## 2020-05-07 NOTE — WOUND CARE
05/07/20 0848 Wound Leg lower Right # 1 Date First Assessed/Time First Assessed: 05/07/20 0835   Present on Hospital Admission: Yes  Wound Approximate Age at First Assessment (Weeks): 4 weeks  Primary Wound Type: Vascular  Location: Leg lower  Wound Location Orientation: Right  Wound Descri. .. Dressing Status Removed Dressing Type Silver products;Foam;ABD pad;Gauze wrap (ant); Special tape (comment) Wound Length (cm) 19.3 cm Wound Width (cm) 10.4 cm Wound Depth (cm) 0.2 cm Wound Surface Area (cm^2) 200.72 cm^2 Wound Volume (cm^3) 40.14 cm^3 Condition of Base Eschar;Purple;Slough Condition of Edges Open Drainage Amount Moderate Drainage Color Serosanguinous Wound Odor None Cleansing and Cleansing Agents  Normal saline Wound Ankle Right;Posterior # 2 Date First Assessed/Time First Assessed: 05/07/20 0851   Present on Hospital Admission: Yes  Wound Approximate Age at First Assessment (Weeks): 17 weeks  Primary Wound Type: Vascular  Location: Ankle  Wound Location Orientation: Right;Posterior  Wound. .. Dressing Status Removed Dressing Type Packing;Gauze (Border foam) Wound Length (cm) 0.7 cm Wound Width (cm) 1.1 cm Wound Depth (cm) 0.1 cm Wound Surface Area (cm^2) 0.77 cm^2 Wound Volume (cm^3) 0.08 cm^3 Condition of Base Pink;Slough Condition of Edges Open Drainage Amount Moderate Drainage Color Serosanguinous Wound Odor None Cleansing and Cleansing Agents  Normal saline Visit Vitals /58 Pulse (!) 50 Temp 97 °F (36.1 °C) Resp 16 LLE Peripheral Vascular Capillary Refill: Less than/equal to 3 seconds (05/07/20 0831) Color: Appropriate for race (05/07/20 0831) Temperature: Cool (05/07/20 0831) Sensation: Present (05/07/20 0831) Pedal Pulse: Palpable (05/07/20 0831) Circumference of Calf (cm): 37.5 cm (05/07/20 0831) Location of Measurement (Calf): Mid  (05/07/20 0831) Circumference of Ankle (cm): 21 cm (05/07/20 0831) Location of Measurement (Ankle): Upper  (05/07/20 0831) RLE Peripheral Vascular Capillary Refill: Less than/equal to 3 seconds (05/07/20 0831) Color: Pink (05/07/20 0831) Temperature: Warm (05/07/20 0831) Sensation: Decreased (05/07/20 0831) Pedal Pulse: Doppler (05/07/20 0831) Circumference of Calf (cm): 37.5 cm (05/07/20 0831) Location of Measurement (Calf): Mid  (05/07/20 0831) Circumference of Ankle (cm): 21 cm (05/07/20 0831) Location of Measurement (Ankle): Upper  (05/07/20 0831)

## 2020-05-07 NOTE — PROGRESS NOTES
Addendum:  The patient reports chronic numbness of both feet. Weight in January 2020 245 pounds.     Lisandra Caraballo MD

## 2020-05-07 NOTE — H&P
[address]  HISTORY AND PHYSICAL    Name:  Merlinda Distad  MR#:  [id number]  :  1955  ACCOUNT #:  [de-identified]  ADMIT DATE:  2020      HISTORY OF PRESENT ILLNESS:  The patient is a 70-year-old man who is referred to the wound care center regarding nonhealing wound right lower leg and right posterior ankle. The patient reports that he had struck his right ankle about 5 months ago and developed a wound, which has been nonhealing. He more recently developed swelling and redness in the right lower leg approximately 6 weeks ago, which then was followed by blister formation and a wound formation. He eventually was hospitalized at [facility] and says he was in the hospital for about 18 days. He saw an Infectious Disease doctor while in the hospital.  He was given intravenous antibiotics and then eventually discharged recently on oral Keflex. He anticipates completing the oral Keflex in about 4 days. He has been getting pain medication prescribed by his primary physician who is Dr. Anita Ro. The patient has history of lumbar laminectomy and diskectomy on 2019. He reports related to his disk disease, he has right side footdrop. He reported history of neurogenic claudication. The patient reports that he walks with a walker because of pain in the right leg. He denies shortness of breath at rest or with exertion. He has no anginal chest pain. The patient has a history of coronary artery disease and has had angioplasty and stent. He has also had coronary artery bypass grafting. He does not have any history of diabetes. Past medical history does include asthma, skin cancer, gastroesophageal reflux disease, hypertension, hyperlipidemia, fatty liver disease, potential sleep apnea, but never tested. The patient was scheduled to have further back surgery within the month, but that has been canceled related to COVID-19.     SOCIAL HISTORY:  The patient smoked until 1998 when he quit. He does use alcohol. The patient had at the time of an ER visit for leg symptoms on the right in 01/2020, a venous duplex scan which did not find any evidence of deep venous thrombosis. Vein valves were not tested. The patient's medications do not include a diuretic. PHYSICAL EXAMINATION:  GENERAL:  The patient is an alert man in no acute distress. VITAL SIGNS:  Blood pressure 129/58, pulse 50, respirations 16, temperature 97 degrees. EXTREMITIES:  The patient's lower extremities were examined. On the left side, there is no edema. There were no wounds present. I did not palpate left dorsalis pedis or posterior tibial pulses. On the right lower extremity, I did not palpate dorsalis pedis or posterior tibial pulses. No Doppler signals heard in dorsalis pedis position. A very weak signal was heard in the right posterior tibial artery position. There is trace edema in the right lower leg. There is a wound on the posterior aspect of the right ankle overlying the Achilles. This wound is 0.7 x 1.1 x 0.1 cm in size and has fairly clean granulation on its surface. On the anterior aspect of the mid lower leg on the right, there is a cluster of ulcers with total dimension 19.3 x 10.4 x 0.2 cm. There are extensive areas of sloughed skin with some separation of those areas of sloughing at the periphery with granulation tissue seen. The infarcted skin/eschar is densely adherent in most places. There is minimal erythema in the surrounding viable skin. Calf is soft. ASSESSMENT AND PLAN:  The patient appears to have very significant peripheral artery disease. I have ordered bilateral ankle brachial index and consultation with the Vascular Surgery group. I explained to the patient that his significant peripheral artery disease may well be a factor in difficulty in healing his wound.   He will likely require arterial intervention to optimize arterial flow.    Wound management will consist of continuing Santyl dressings on the heel wound daily and using Xeroform on the larger wound and then dry gauze and roll gauze changed every other day. The patient will follow up in wound care center again in 1 week. The patient asked regarding continuing antibiotics. I would not recommend continuing antibiotics beyond treatment of the initial episode of cellulitis. FINAL DIAGNOSES:  Nonhealing wounds, right lower leg and right ankle over the Achilles, peripheral artery disease.         Sean Avina MD      [id number]  D:  05/07/2020 9:43  T:  05/07/2020 10:43  JOB #:  [id number]

## 2020-05-11 ENCOUNTER — HOSPITAL ENCOUNTER (EMERGENCY)
Age: 65
Discharge: HOME | End: 2020-05-11
Attending: EMERGENCY MEDICINE
Payer: MEDICARE

## 2020-05-11 VITALS
HEART RATE: 51 BPM | TEMPERATURE: 98.2 F | DIASTOLIC BLOOD PRESSURE: 57 MMHG | SYSTOLIC BLOOD PRESSURE: 108 MMHG | WEIGHT: 220 LBS | RESPIRATION RATE: 16 BRPM | HEIGHT: 71 IN | OXYGEN SATURATION: 99 % | BODY MASS INDEX: 30.8 KG/M2

## 2020-05-11 DIAGNOSIS — L03.115 CELLULITIS OF RIGHT LOWER LEG: Primary | ICD-10-CM

## 2020-05-11 DIAGNOSIS — N17.9 AKI (ACUTE KIDNEY INJURY) (HCC): ICD-10-CM

## 2020-05-11 LAB
ALBUMIN SERPL-MCNC: 3.5 G/DL (ref 3.5–5)
ALBUMIN/GLOB SERPL: 0.8 {RATIO} (ref 1.1–2.2)
ALP SERPL-CCNC: 226 U/L (ref 45–117)
ALT SERPL-CCNC: 34 U/L (ref 12–78)
ANION GAP SERPL CALC-SCNC: 7 MMOL/L (ref 5–15)
AST SERPL-CCNC: 38 U/L (ref 15–37)
BASOPHILS # BLD: 0.1 K/UL (ref 0–0.1)
BASOPHILS NFR BLD: 1 % (ref 0–1)
BILIRUB SERPL-MCNC: 0.9 MG/DL (ref 0.2–1)
BUN SERPL-MCNC: 24 MG/DL (ref 6–20)
BUN/CREAT SERPL: 15 (ref 12–20)
CALCIUM SERPL-MCNC: 9.6 MG/DL (ref 8.5–10.1)
CHLORIDE SERPL-SCNC: 98 MMOL/L (ref 97–108)
CO2 SERPL-SCNC: 28 MMOL/L (ref 21–32)
COMMENT, HOLDF: NORMAL
CREAT SERPL-MCNC: 1.6 MG/DL (ref 0.7–1.3)
DIFFERENTIAL METHOD BLD: ABNORMAL
EOSINOPHIL # BLD: 0.5 K/UL (ref 0–0.4)
EOSINOPHIL NFR BLD: 4 % (ref 0–7)
ERYTHROCYTE [DISTWIDTH] IN BLOOD BY AUTOMATED COUNT: 13.9 % (ref 11.5–14.5)
GLOBULIN SER CALC-MCNC: 4.2 G/DL (ref 2–4)
GLUCOSE SERPL-MCNC: 114 MG/DL (ref 65–100)
HCT VFR BLD AUTO: 38.3 % (ref 36.6–50.3)
HGB BLD-MCNC: 12.6 G/DL (ref 12.1–17)
IMM GRANULOCYTES # BLD AUTO: 0 K/UL (ref 0–0.04)
IMM GRANULOCYTES NFR BLD AUTO: 0 % (ref 0–0.5)
LACTATE SERPL-SCNC: 1.3 MMOL/L (ref 0.4–2)
LYMPHOCYTES # BLD: 2.6 K/UL (ref 0.8–3.5)
LYMPHOCYTES NFR BLD: 21 % (ref 12–49)
MCH RBC QN AUTO: 29.2 PG (ref 26–34)
MCHC RBC AUTO-ENTMCNC: 32.9 G/DL (ref 30–36.5)
MCV RBC AUTO: 88.7 FL (ref 80–99)
MONOCYTES # BLD: 0.8 K/UL (ref 0–1)
MONOCYTES NFR BLD: 7 % (ref 5–13)
NEUTS SEG # BLD: 8.3 K/UL (ref 1.8–8)
NEUTS SEG NFR BLD: 67 % (ref 32–75)
NRBC # BLD: 0 K/UL (ref 0–0.01)
NRBC BLD-RTO: 0 PER 100 WBC
PLATELET # BLD AUTO: 330 K/UL (ref 150–400)
PMV BLD AUTO: 10.7 FL (ref 8.9–12.9)
POTASSIUM SERPL-SCNC: 4.6 MMOL/L (ref 3.5–5.1)
PROT SERPL-MCNC: 7.7 G/DL (ref 6.4–8.2)
RBC # BLD AUTO: 4.32 M/UL (ref 4.1–5.7)
SAMPLES BEING HELD,HOLD: NORMAL
SODIUM SERPL-SCNC: 133 MMOL/L (ref 136–145)
WBC # BLD AUTO: 12.3 K/UL (ref 4.1–11.1)

## 2020-05-11 PROCEDURE — 74011250636 HC RX REV CODE- 250/636: Performed by: PHYSICIAN ASSISTANT

## 2020-05-11 PROCEDURE — 36415 COLL VENOUS BLD VENIPUNCTURE: CPT

## 2020-05-11 PROCEDURE — 96361 HYDRATE IV INFUSION ADD-ON: CPT

## 2020-05-11 PROCEDURE — 80053 COMPREHEN METABOLIC PANEL: CPT

## 2020-05-11 PROCEDURE — 87040 BLOOD CULTURE FOR BACTERIA: CPT

## 2020-05-11 PROCEDURE — 96365 THER/PROPH/DIAG IV INF INIT: CPT

## 2020-05-11 PROCEDURE — 83605 ASSAY OF LACTIC ACID: CPT

## 2020-05-11 PROCEDURE — 85025 COMPLETE CBC W/AUTO DIFF WBC: CPT

## 2020-05-11 PROCEDURE — 74011000258 HC RX REV CODE- 258: Performed by: PHYSICIAN ASSISTANT

## 2020-05-11 PROCEDURE — 99285 EMERGENCY DEPT VISIT HI MDM: CPT

## 2020-05-11 RX ORDER — DOXYCYCLINE HYCLATE 100 MG
100 TABLET ORAL 2 TIMES DAILY
Qty: 20 TAB | Refills: 0 | Status: SHIPPED | OUTPATIENT
Start: 2020-05-11 | End: 2020-05-21

## 2020-05-11 RX ORDER — OXYCODONE AND ACETAMINOPHEN 5; 325 MG/1; MG/1
1 TABLET ORAL
Qty: 20 TAB | Refills: 0 | Status: SHIPPED | OUTPATIENT
Start: 2020-05-11 | End: 2020-05-16

## 2020-05-11 RX ADMIN — SODIUM CHLORIDE 500 ML: 900 INJECTION, SOLUTION INTRAVENOUS at 17:26

## 2020-05-11 RX ADMIN — SODIUM CHLORIDE 500 ML: 900 INJECTION, SOLUTION INTRAVENOUS at 16:07

## 2020-05-11 RX ADMIN — CEFTRIAXONE 1 G: 1 INJECTION, POWDER, FOR SOLUTION INTRAMUSCULAR; INTRAVENOUS at 17:26

## 2020-05-11 NOTE — ED PROVIDER NOTES
EMERGENCY DEPARTMENT HISTORY AND PHYSICAL EXAM      Date: 5/11/2020  Patient Name: Edwin Arora    History of Presenting Illness     Chief Complaint   Patient presents with    Leg Injury     Wound to right leg, green drainage reported. sent by home health nurse. YAHAIRA from [facility] after 18 day stay       History Provided By: Patient    HPI: Edwin Arora, 77 y.o. male history of CAD, HTN and others presents via EMS to the ED with cc of weeks to months of 6 out of 10 constant, aching right lower leg infection with pain for which he was hospitalized at [facility] for an 18-day stay in April. He is currently taking Keflex and is followed by the wound center by Dr. Jhony Yu. He attended the wound clinic last Thursday and is expected to follow-up this week. The wound specialist is concerned that his peripheral vascular disease is interfering with wound healing and expects to refer the patient to vascular surgery. He does receive home health and tells me that is attended by a nurse that was not familiar with his case. It was during his home health appointment that the nurse became concerned and encouraged the patient to go to the emergency department for further evaluation. There has been no fever. He is compliant with his Keflex. He did have an appointment with vascular surgery tomorrow, however because he was coming to the emergency department his housemate called and canceled his appointment. He tells me he has been taking Percocet for pain which is effective, however he is concerned that he only has a couple of tablets left and is unsure when he will be able to follow-up for more pain medication. There are no other complaints, changes, or physical findings at this time. PCP: Garrett Eisenberg MD    Current Outpatient Medications   Medication Sig Dispense Refill    doxycycline (VIBRA-TABS) 100 mg tablet Take 1 Tab by mouth two (2) times a day for 10 days.  20 Tab 0    oxyCODONE-acetaminophen (Percocet) 5-325 mg per tablet Take 1 Tab by mouth every four (4) hours as needed for Pain for up to 5 days. Max Daily Amount: 6 Tabs. 20 Tab 0    rosuvastatin (CRESTOR) 40 mg tablet Take 40 mg by mouth nightly.  cetirizine (ZYRTEC) 10 mg tablet Take 10 mg by mouth daily.  SAFETY-HIEN 3CC SYR 23GX1\" 3 mL 23 gauge x 1\" syrg INJECT 0.4ML INTRAMUSCULARLY ONCE EVERY 7 DAYS 100 Pen Needle 0    testosterone cypionate (DEPOTESTOTERONE CYPIONATE) 200 mg/mL injection 0.35 mL by IntraMUSCular route every seven (7) days. (Patient taking differently: 35 mg by IntraMUSCular route every seven (7) days. PT LAST TOOK THIS MEDICATION ON Tuesday, 8/27/19.) 10 mL 1    Needle, Disp, 21 G 21 x 1 \" Ndle 1 mL by Does Not Apply route every seven (7) days. 12 Each 6    Syringe, Disposable, 1 mL Syrg 0.4 mL by Does Not Apply route every seven (7) days. 10 Each 3    aspirin delayed-release 81 mg tablet Take  by mouth daily.  atenolol (TENORMIN) 25 mg tablet Take 25 mg by mouth daily.  allopurinol (ZYLOPRIM) 300 mg tablet Take 450 mg by mouth daily.  lisinopril (PRINIVIL, ZESTRIL) 10 mg tablet Take  by mouth every evening.        Past History     Past Medical History:  Past Medical History:   Diagnosis Date    Arthritis     OSTEO    Asthma     AS A YOUNGER ADULT    CAD (coronary artery disease)     stent X1    Cancer ([facility] [address]     BCC, SCC    Chronic pain     GERD (gastroesophageal reflux disease)     HTN (hypertension)     Hyperlipidemia     Kidney stone 1993    Liver disease 03/2018    FATTY LIVER    Sleep apnea     WAS TOLD, BUT NEVER TESTED       Past Surgical History:  Past Surgical History:   Procedure Laterality Date    CABG, ARTERY-VEIN, THREE  03/2018    CARDIAC SURG PROCEDURE UNLIST  1998 & 2018    CARDIAC CATH    HX COLONOSCOPY      HX ORTHOPAEDIC Left 2005    HIP REPLACEMENT    HX ORTHOPAEDIC Right 2009    HIP REPLACEMENT    IR INJ FORAMIN EPID LUMB ANES/STER SNGL  1/3/2020    NEUROLOGICAL PROCEDURE UNLISTED      L1 or L3 LAMINECTOMY       Family History:  Family History   Problem Relation Age of Onset    Cancer Brother         prostate     Heart Disease Brother         CABG x4     Heart Disease Father 45    High Cholesterol Father     Heart Disease Brother 48        CABG    Other Mother         DIVERTICULITIS    Heart Attack Son         AGE 44    No Known Problems Daughter     Anesth Problems Neg Hx        Social History:  Social History     Tobacco Use    Smoking status: Former Smoker     Packs/day: 0.50     Last attempt to quit: 1998     Years since quittin.5    Smokeless tobacco: Never Used   Substance Use Topics    Alcohol use: Yes     Alcohol/week: 3.0 - 4.0 standard drinks     Types: 3 - 4 Cans of beer per week     Comment: 2-3 daily    Drug use: Not Currently       Allergies: Allergies   Allergen Reactions    Erythromycin Diarrhea    Percocet [Oxycodone-Acetaminophen] Itching    Percodan [Oxycodone Hcl-Oxycodone-Asa] Itching     Review of Systems   Review of Systems   Constitutional: Negative for fatigue and fever. HENT: Negative for congestion, ear pain and rhinorrhea. Eyes: Negative for pain and redness. Respiratory: Negative for cough and wheezing. Cardiovascular: Negative for chest pain and palpitations. Gastrointestinal: Negative for abdominal pain, nausea and vomiting. Genitourinary: Negative for dysuria, frequency and urgency. Musculoskeletal: Negative for back pain, neck pain and neck stiffness. Skin: Positive for wound (Right lower leg). Negative for rash. Neurological: Negative for weakness, light-headedness, numbness and headaches. Physical Exam   Physical Exam  Vitals signs and nursing note reviewed. Constitutional:       General: He is not in acute distress. Appearance: He is well-developed. He is not toxic-appearing. HENT:      Head: Normocephalic and atraumatic.  No right periorbital erythema or left periorbital erythema. Jaw: No trismus. Right Ear: External ear normal.      Left Ear: External ear normal.      Nose: Nose normal.      Mouth/Throat:      Pharynx: Uvula midline. Eyes:      General: No scleral icterus. Conjunctiva/sclera: Conjunctivae normal.      Pupils: Pupils are equal, round, and reactive to light. Neck:      Musculoskeletal: Full passive range of motion without pain and normal range of motion. Cardiovascular:      Rate and Rhythm: Normal rate and regular rhythm. Heart sounds: Normal heart sounds. Pulmonary:      Effort: Pulmonary effort is normal. No tachypnea, accessory muscle usage or respiratory distress. Breath sounds: Normal breath sounds. No decreased breath sounds or wheezing. Abdominal:      Palpations: Abdomen is soft. Abdomen is not rigid. Tenderness: There is no abdominal tenderness. There is no guarding. Musculoskeletal: Normal range of motion. Legs:       Comments: RIGHT LOWER LEG:  Large area of ulceration with some granulation tissue of the lateral aspect of the right leg there is a large eschar. There is some surrounding erythema which is not circumferential.  There is no significant swelling of the lower extremity. Skin:     Findings: No rash. Neurological:      Mental Status: He is alert and oriented to person, place, and time. He is not disoriented. GCS: GCS eye subscore is 4. GCS verbal subscore is 5. GCS motor subscore is 6. Cranial Nerves: No cranial nerve deficit. Sensory: No sensory deficit.    Psychiatric:         Speech: Speech normal.       Diagnostic Study Results     Labs -     Recent Results (from the past 12 hour(s))   CBC WITH AUTOMATED DIFF    Collection Time: 05/11/20  2:49 PM   Result Value Ref Range    WBC 12.3 (H) 4.1 - 11.1 K/uL    RBC 4.32 4.10 - 5.70 M/uL    HGB 12.6 12.1 - 17.0 g/dL    HCT 38.3 36.6 - 50.3 %    MCV 88.7 80.0 - 99.0 FL    MCH 29.2 26.0 - 34.0 PG    MCHC 32.9 30.0 - 36.5 g/dL    RDW 13.9 11.5 - 14.5 %    PLATELET 220 339 - 155 K/uL    MPV 10.7 8.9 - 12.9 FL    NRBC 0.0 0  WBC    ABSOLUTE NRBC 0.00 0.00 - 0.01 K/uL    NEUTROPHILS 67 32 - 75 %    LYMPHOCYTES 21 12 - 49 %    MONOCYTES 7 5 - 13 %    EOSINOPHILS 4 0 - 7 %    BASOPHILS 1 0 - 1 %    IMMATURE GRANULOCYTES 0 0.0 - 0.5 %    ABS. NEUTROPHILS 8.3 (H) 1.8 - 8.0 K/UL    ABS. LYMPHOCYTES 2.6 0.8 - 3.5 K/UL    ABS. MONOCYTES 0.8 0.0 - 1.0 K/UL    ABS. EOSINOPHILS 0.5 (H) 0.0 - 0.4 K/UL    ABS. BASOPHILS 0.1 0.0 - 0.1 K/UL    ABS. IMM. GRANS. 0.0 0.00 - 0.04 K/UL    DF AUTOMATED     METABOLIC PANEL, COMPREHENSIVE    Collection Time: 05/11/20  2:49 PM   Result Value Ref Range    Sodium 133 (L) 136 - 145 mmol/L    Potassium 4.6 3.5 - 5.1 mmol/L    Chloride 98 97 - 108 mmol/L    CO2 28 21 - 32 mmol/L    Anion gap 7 5 - 15 mmol/L    Glucose 114 (H) 65 - 100 mg/dL    BUN 24 (H) 6 - 20 MG/DL    Creatinine 1.60 (H) 0.70 - 1.30 MG/DL    BUN/Creatinine ratio 15 12 - 20      GFR est AA 53 (L) >60 ml/min/1.73m2    GFR est non-AA 43 (L) >60 ml/min/1.73m2    Calcium 9.6 8.5 - 10.1 MG/DL    Bilirubin, total 0.9 0.2 - 1.0 MG/DL    ALT (SGPT) 34 12 - 78 U/L    AST (SGOT) 38 (H) 15 - 37 U/L    Alk. phosphatase 226 (H) 45 - 117 U/L    Protein, total 7.7 6.4 - 8.2 g/dL    Albumin 3.5 3.5 - 5.0 g/dL    Globulin 4.2 (H) 2.0 - 4.0 g/dL    A-G Ratio 0.8 (L) 1.1 - 2.2     LACTIC ACID    Collection Time: 05/11/20  4:01 PM   Result Value Ref Range    Lactic acid 1.3 0.4 - 2.0 MMOL/L   SAMPLES BEING HELD    Collection Time: 05/11/20  4:01 PM   Result Value Ref Range    SAMPLES BEING HELD  1 SET BLDCS     COMMENT        Add-on orders for these samples will be processed based on acceptable specimen integrity and analyte stability, which may vary by analyte.        Radiologic Studies -   No orders to display     CT Results  (Last 48 hours)    None        CXR Results  (Last 48 hours)    None        Medical Decision Making   I am the first provider for this patient. I reviewed the vital signs, available nursing notes, past medical history, past surgical history, family history and social history. Vital Signs-Reviewed the patient's vital signs. Patient Vitals for the past 12 hrs:   Temp Pulse Resp BP SpO2   05/11/20 1900    108/57 99 %   05/11/20 1830    117/63 98 %   05/11/20 1800    114/56 97 %   05/11/20 1700    109/59 98 %   05/11/20 1340    97/53    05/11/20 1338 98.2 °F (36.8 °C) (!) 51 16 91/51 100 %       Pulse Oximetry Analysis - 100% on RA    Records Reviewed: Nursing Notes, Old Medical Records, Previous Radiology Studies and Previous Laboratory Studies    Provider Notes (Medical Decision Making):   DDx: Cellulitis, peripheral vascular disease, nonhealing wound    5:10 PM  Case discussed with Dr. Chuy Canales. Patient is afebrile and in no distress. There is a slight elevation of his WBC. There is also evidence of an RICHARD with a creatinine of 1.6. We will give 1 L of normal saline in the emergency department. Regarding the chronic nonhealing wound of the right lower extremity and cellulitis of the right lower extremity the wound is well dressed with a large eschar. The redness is limited to the wound and is not circumferential.  There is no significant lower extremity edema. He is followed by the wound clinic is expected to be seen this week. Patient tells me that it was a new wound care nurse who is not familiar with his case who referred him to the emergency department. I am not certain that his presentation reflects an acute worsening of this chronic problem. We will give him a dose of IV antibiotics now and augment his oral treatment by adding doxycycline to the Keflex. He is instructed to call the wound clinic tomorrow to schedule a follow-up appointment. Return precautions specifically for fever, worsening and spreading redness, worsening pain or any problems. ED Course:   Initial assessment performed.  The patients presenting problems have been discussed, and they are in agreement with the care plan formulated and outlined with them. I have encouraged them to ask questions as they arise throughout their visit. Disposition:  Discharge    PLAN:  1. Discharge Medication List as of 5/11/2020  6:59 PM      START taking these medications    Details   doxycycline (VIBRA-TABS) 100 mg tablet Take 1 Tab by mouth two (2) times a day for 10 days. , Normal, Disp-20 Tab, R-0      oxyCODONE-acetaminophen (Percocet) 5-325 mg per tablet Take 1 Tab by mouth every four (4) hours as needed for Pain for up to 5 days. Max Daily Amount: 6 Tabs., Normal, Disp-20 Tab, R-0         CONTINUE these medications which have NOT CHANGED    Details   rosuvastatin (CRESTOR) 40 mg tablet Take 40 mg by mouth nightly., Historical Med      aspirin delayed-release 81 mg tablet Take  by mouth daily. Historical Med      atenolol (TENORMIN) 25 mg tablet Take 25 mg by mouth daily. Historical Med, 25 mg      allopurinol (ZYLOPRIM) 300 mg tablet Take 450 mg by mouth daily. , Historical Med      lisinopril (PRINIVIL, ZESTRIL) 10 mg tablet Take  by mouth every evening., Historical Med      cetirizine (ZYRTEC) 10 mg tablet Take 10 mg by mouth daily. , Historical Med      SAFETY-HIEN 3CC SYR 23GX1\" 3 mL 23 gauge x 1\" syrg INJECT 0.4ML INTRAMUSCULARLY ONCE EVERY 7 DAYS, Normal, Disp-100 Pen Needle, R-0      testosterone cypionate (DEPOTESTOTERONE CYPIONATE) 200 mg/mL injection 0.35 mL by IntraMUSCular route every seven (7) days. , Print, Disp-10 mL, R-1      Needle, Disp, 21 G 21 x 1 \" Ndle 1 mL by Does Not Apply route every seven (7) days. Normal, 1 mL, Disp-12 Each, R-6For use with testosterone      Syringe, Disposable, 1 mL Syrg 0.4 mL by Does Not Apply route every seven (7) days. Normal, 0.4 mL, Disp-10 Each, R-3           2.    Follow-up Information     Follow up With Specialties Details Why [address]  Schedule an appointment as soon as possible for a visit WOUND CLINIC: call to schedule follow up this week [address] Jason Duran MD Internal Medicine Schedule an appointment as soon as possible for a visit PRIMARY CARE: regarding your elevated creatinine [id number]  [place] [address]  [phone]          Return to ED if worse     Diagnosis     Clinical Impression:   1. Cellulitis of right lower leg    2.  RICHARD (acute kidney injury) ([facility] [address]

## 2020-05-11 NOTE — ED NOTES
I have reviewed discharge instructions with the patient. The patient verbalized understanding.  Pt wheeled to lobby, no distress noted, no needs at time

## 2020-05-14 ENCOUNTER — HOSPITAL ENCOUNTER (OUTPATIENT)
Dept: WOUND CARE | Age: 65
Discharge: HOME | End: 2020-05-14
Payer: MEDICARE

## 2020-05-14 VITALS
HEART RATE: 55 BPM | SYSTOLIC BLOOD PRESSURE: 128 MMHG | TEMPERATURE: 97.3 F | RESPIRATION RATE: 16 BRPM | DIASTOLIC BLOOD PRESSURE: 56 MMHG

## 2020-05-14 PROCEDURE — 99213 OFFICE O/P EST LOW 20 MIN: CPT

## 2020-05-14 NOTE — PROGRESS NOTES
HISTORY OF PRESENT ILLNESS:  The patient is a 69-year-old man who is referred to the wound care center regarding nonhealing wound right lower leg and right posterior ankle. The patient was first seen at the [address] [place] [address] on 5/7/2020. The patient reports that he had struck his right ankle about 5 months ago and developed a wound, which has been nonhealing. He more recently developed swelling and redness in the right lower leg approximately 6 weeks ago, which then was followed by blister formation and a wound formation. He eventually was hospitalized at [facility] and says he was in the hospital for about 18 days. He saw an Infectious Disease doctor while in the hospital.  He was given intravenous antibiotics and then eventually discharged recently on oral Keflex. He anticipates completing the oral Keflex in about 4 days. He has been getting pain medication prescribed by his primary physician who is Dr. Alex Gutierrez.     The patient has history of lumbar laminectomy and diskectomy on 09/04/2019. He reports related to his disk disease, he has right side footdrop. He reported history of neurogenic claudication.     The patient reports that he walks with a walker because of pain in the right leg. He denies shortness of breath at rest or with exertion. He has no anginal chest pain.     The patient has a history of coronary artery disease and has had angioplasty and stent. He has also had coronary artery bypass grafting. He does not have any history of diabetes. Past medical history does include asthma, skin cancer, gastroesophageal reflux disease, hypertension, hyperlipidemia, fatty liver disease, potential sleep apnea, but never tested.     The patient was scheduled to have further back surgery within the month, but that has been canceled related to COVID-19.     The patient went to the ER on 5/11/2020 and Doxycycline was added .     SOCIAL HISTORY:  The patient smoked until 1998 when he quit. He does use alcohol.     The patient had at the time of an ER visit for leg symptoms on the right in 01/2020, a venous duplex scan which did not find any evidence of deep venous thrombosis. Vein valves were not tested.     The patient's medications do not include a diuretic.     PHYSICAL EXAMINATION:  GENERAL:  The patient is an alert man in no acute distress. EXTREMITIES:  The patient's lower extremities were examined. On the left side, there is no edema. There were no wounds present. I did not palpate left dorsalis pedis or posterior tibial pulses.     On the right lower extremity, I did not palpate dorsalis pedis or posterior tibial pulses. Popliteal pulse is not palpable. No Doppler signals heard in dorsalis pedis position. A very weak signal was heard in the right posterior tibial artery position. There is trace edema in the right lower leg. There is a wound on the posterior aspect of the right ankle overlying the Achilles. This wound is 0.7 x 1.3 x 0.1 cm in size and has granulation and small eschar on its surface.     On the anterior aspect of the mid lower leg on the right, there is a cluster of ulcers with total dimension 19 x 10.1 x 0.2 cm. There are extensive areas of sloughed skin with some separation of those areas of sloughing at the periphery with granulation tissue seen. The infarcted skin/eschar is densely adherent in most places. There is minimal erythema in the surrounding viable skin. Calf is soft.     ASSESSMENT AND PLAN:  The patient appears to have very significant peripheral artery disease. He missed an appointment with [facility]. The patient is to go today to be seen by Dr Elvia Villegas.     I explained to the patient that his significant peripheral artery disease may well be a factor in difficulty in healing his wound.   He will likely require arterial intervention to optimize arterial flow.     Wound management will consist of continuing Santyl dressings on the heel wound daily and using Xeroform on the larger wound and then dry gauze and roll gauze changed every other day.   He has no insurance for [facility] on the larger wound.     The patient will follow up in wound care center again in 1 week.        FINAL DIAGNOSES:  Nonhealing wounds, right lower leg and right ankle over the Achilles, peripheral artery disease.           Eliza Holguin MD

## 2020-05-14 NOTE — WOUND CARE
05/14/20 4806 Wound Leg lower Right # 1 Date First Assessed/Time First Assessed: 05/07/20 0835   Present on Hospital Admission: Yes  Wound Approximate Age at First Assessment (Weeks): 4 weeks  Primary Wound Type: Vascular  Location: Leg lower  Wound Location Orientation: Right  Wound Descri. .. Dressing Status Removed Dressing Type Xeroform;Gauze;Gauze wrap (ant); Special tape (comment) Wound Length (cm) 19 cm Wound Width (cm) 10.1 cm Wound Depth (cm) 0.2 cm Wound Surface Area (cm^2) 191.9 cm^2 Wound Volume (cm^3) 38.38 cm^3 Change in Wound Size % 4.39 Condition of Base Eschar;Slough Condition of Edges Open Drainage Amount Moderate Drainage Color Serosanguinous Wound Odor None Chana-wound Assessment Blanchable erythema Cleansing and Cleansing Agents  Normal saline Wound Ankle Right;Posterior # 2 Date First Assessed/Time First Assessed: 05/07/20 0851   Present on Hospital Admission: Yes  Wound Approximate Age at First Assessment (Weeks): 17 weeks  Primary Wound Type: Vascular  Location: Ankle  Wound Location Orientation: Right;Posterior  Wound. .. Dressing Status Removed Dressing Type Xeroform;Gauze;Gauze wrap (ant); Special tape (comment) Wound Length (cm) 0.7 cm Wound Width (cm) 1.3 cm Wound Depth (cm) 0.1 cm Wound Surface Area (cm^2) 0.91 cm^2 Wound Volume (cm^3) 0.09 cm^3 Change in Wound Size % -18.18 Condition of Base [place];Slough Drainage Amount Moderate Drainage Color Serous Wound Odor None Chana-wound Assessment Intact Cleansing and Cleansing Agents  Normal saline Visit Vitals /56 Pulse (!) 55 Comment: manual  
Temp 97.3 °F (36.3 °C) Resp 16 LLE Peripheral Vascular Capillary Refill: Less than/equal to 3 seconds (05/14/20 0949) Color: Appropriate for race (05/14/20 0949) Temperature: Cool (05/14/20 0949) Sensation: Present (05/14/20 0949) Pedal Pulse: Palpable (05/14/20 0949) Circumference of Calf (cm): 37 cm (05/14/20 0949) Location of Measurement (Calf): Mid  (05/14/20 0949) Circumference of Ankle (cm): 20.5 cm (05/14/20 0949) Location of Measurement (Ankle): Upper  (05/14/20 0949) RLE Peripheral Vascular Capillary Refill: Less than/equal to 3 seconds (05/14/20 0949) Color: Pink (05/14/20 0949) Temperature: Warm (05/14/20 0949) Sensation: Decreased (05/14/20 0949) Pedal Pulse: Doppler (05/14/20 0949) Circumference of Calf (cm): 37.2 cm (05/14/20 0949) Location of Measurement (Calf): Mid  (05/14/20 0949) Circumference of Ankle (cm): 21.4 cm (05/14/20 0949) Location of Measurement (Ankle): Upper  (05/14/20 0949)

## 2020-05-14 NOTE — DISCHARGE INSTRUCTIONS
Discharge Instructions for  [facility]  [address], [address] [facility]  Telephone: [phone]    NAME:  Kathy June  YOB: 1955  MEDICAL RECORD NUMBER:  [id number]  DATE:  5/14/2020      WOUND CARE ORDERS:  Right lower leg and heel - apply exudry, secure with roll gauze and tape on dressing only. Patient is to go to [facility] immediately for consult with Dr. Ethan Mack. Follow-up in Clinic in one week with Dr. Breanne Sarmiento. Home Health to continue with Santyl to right heel, xeroform to right lower leg, cover with NS gauze, ABDs and secure with roll and tape on dressing only. Change dressing daily. TREATMENT ORDERS:    Elevate leg(s) above the level of the heart when sitting. Avoid prolonged standing in one place. Do no get dressing/wrap wet. Follow Diet as prescribed:   [x] Diet as tolerated: [] Calorie Diabetic Diet: [x] No Added Salt:  [x] Increase Protein: [] Other:               Return Appointment:  [] Return Appointment: With DR Babin Living  in  [address] [place] [address])  [] Nurse Visit :   [] Ordered tests:      Electronically signed John Rodríguez RN on 5/14/2020 at 10:16 AM     [address] [facility] Information: Should you experience any significant changes in your wound(s) or have questions about your wound care, please contact the [address] [place] [address] at [address] [place] [address] Street 8:00 am - 4:30. If you need help with your wound outside these hours and cannot wait until we are again available, contact your PCP or go to the hospital emergency room. PLEASE NOTE: IF YOU ARE UNABLE TO OBTAIN WOUND SUPPLIES, CONTINUE TO USE THE SUPPLIES YOU HAVE AVAILABLE UNTIL YOU ARE ABLE TO REACH US. IT IS MOST IMPORTANT TO KEEP THE WOUND COVERED AT ALL TIMES.      Physician Signature:_______________________    Date: ___________ Time:  ____________

## 2020-05-17 LAB
BACTERIA SPEC CULT: NORMAL
SERVICE CMNT-IMP: NORMAL

## 2020-05-21 ENCOUNTER — HOSPITAL ENCOUNTER (OUTPATIENT)
Dept: WOUND CARE | Age: 65
Discharge: HOME | End: 2020-05-21
Payer: MEDICARE

## 2020-05-21 VITALS
DIASTOLIC BLOOD PRESSURE: 53 MMHG | RESPIRATION RATE: 16 BRPM | HEART RATE: 51 BPM | SYSTOLIC BLOOD PRESSURE: 108 MMHG | TEMPERATURE: 97.3 F

## 2020-05-21 PROCEDURE — 99213 OFFICE O/P EST LOW 20 MIN: CPT

## 2020-05-21 NOTE — DISCHARGE INSTRUCTIONS
Discharge Instructions for  [facility], [address] [address]  Telephone: [phone]    NAME:  Sahwn Lobo  YOB: 1955  MEDICAL RECORD NUMBER:  [id number]  DATE:  5/21/2020      WOUND CARE ORDERS: Right lower leg - Xeroform to right lower leg wound, followed by gauze, Exudry, Roll gauze. Dressing to be changed daily, and as needed for compromise of dressing. Right Heel wounds - Santyl ointment to wounds, cover with Normal Saline moist gauze (sqeeze out excess saline), dry gauze, ABD pad and secure with roll gauze. Change dressing daily. Note to [facility] [place] [employer]- For patient's comfort, Patient may purchase Lidocaine gel (such as Alocaine 4% topical lidocaine gel), to be applied prior to wound care. Allow lidocaine to sit on wound approx 8-10 minutes, prior to cleansing wounds with Saline and gauze and applying dressings. Patient to follow up with [facility]/Dr. Yesica Mcconnell, regarding vascular procedure to improve blood flow. Wound care follow up with Cr Kumar in 2 weeks. TREATMENT ORDERS:   Avoid prolonged standing in one place. Do no get dressing/wrap wet. Return Appointment:  [x] Return Appointment: With DR Nohemi Lima  in [facility])  [] Nurse Visit :   [] Ordered tests:      Electronically signed Dylan Goncalves RN on 5/21/2020 at 10:48 AM     [address] Information: Should you experience any significant changes in your wound(s) or have questions about your wound care, please contact the [address] [place] [address] at [address] [place] [address] 8:00 am - 4:30. If you need help with your wound outside these hours and cannot wait until we are again available, contact your PCP or go to the hospital emergency room. PLEASE NOTE: IF YOU ARE UNABLE TO OBTAIN WOUND SUPPLIES, CONTINUE TO USE THE SUPPLIES YOU HAVE AVAILABLE UNTIL YOU ARE ABLE TO REACH US.  IT IS MOST IMPORTANT TO KEEP THE WOUND COVERED AT ALL TIMES.      Physician Signature:_______________________    Date: ___________ Time:  ____________

## 2020-05-21 NOTE — PROGRESS NOTES
HISTORY OF PRESENT ILLNESS:  The patient is a 57-year-old man who is referred to the wound care center regarding nonhealing wound right lower leg and right posterior ankle.  The patient was first seen at the [address] [place] [address] on 5/7/2020.     The patient reports that he had struck his right ankle about 5 months ago and developed a wound, which has been nonhealing.     He more recently developed swelling and redness in the right lower leg earlier this year, which then was followed by blister formation and a wound formation.  He eventually was hospitalized at [facility] and says he was in the hospital for about 18 days. Shabana Watson saw an Infectious Disease doctor while in the hospital. Shabana Walter was given intravenous antibiotics and then eventually discharged recently on oral Keflex.  He anticipates completing the oral Keflex in about 4 days. Shabana Walter has been getting pain medication prescribed by his primary physician who is Dr. Ivelisse Grey. The patient went to the ER on 5/11/2020 and Doxycycline was added . The patient reports chronic numbness of both feet.     Weight in January 2020 was 245 pounds. The patient had at the time of an ER visit for leg symptoms on the right in 01/2020, a venous duplex scan which did not find any evidence of deep venous thrombosis.  Vein valves were not tested.     The patient has history of lumbar laminectomy and diskectomy on 09/04/2019. Shabana Watson reports related to his disk disease, he has right side footdrop.  He reported history of neurogenic claudication. The patient was scheduled to have further back surgery within the month, but that has been canceled related to COVID-19.     The patient reports that he walks with a walker because of pain in the right leg.  He denies shortness of breath at rest or with exertion.  He has no anginal chest pain. The patient had angiography by Dr Kristen Maradiaga on 5/17/2020 with finding of severe multilevel arterial occlusive disease in legs. Plans are for arterial intervention and debridement of wounds right leg.     The patient has a history of coronary artery disease and has had angioplasty and stent. Salena Garcia has also had coronary artery bypass grafting.  He does not have any history of diabetes.  Past medical history does include asthma, skin cancer, gastroesophageal reflux disease, hypertension, hyperlipidemia, fatty liver disease, potential sleep apnea, but never tested.     SOCIAL HISTORY:  The patient smoked until 1998 when he quit. Salena Garcia does use alcohol.     The patient's medications do not include a diuretic.     PHYSICAL EXAMINATION:  GENERAL:  The patient is an alert man in no acute distress.     EXTREMITIES:  The patient's lower extremities were examined.  On the left side, there is no edema.  There were no wounds present.  I did not palpate left dorsalis pedis or posterior tibial pulses.     On the right lower extremity, I did not palpate dorsalis pedis or posterior tibial pulses. Popliteal pulse is not palpable.   No Doppler signals heard in dorsalis pedis position.  A very weak signal was heard in the right posterior tibial artery position.  There is trace edema in the right lower leg.  There is a wound on the posterior aspect of the right ankle overlying the Achilles.  This wound is 0.4 x 0.6 x 0.1 cm in size and has granulation and minimal eschar on its surface.     On the anterior aspect of the mid lower leg on the right, there is a cluster of ulcers with total dimension 17 x 10.2 x 0.2 cm. There are extensive areas of sloughed skin with some separation of those areas of sloughing at the periphery with granulation tissue seen.  The infarcted skin/eschar is densely adherent in most places. Desire Seller is minimal erythema in the surrounding viable skin.  Calf is soft.     ASSESSMENT AND PLAN:       I explained to the patient that his significant peripheral artery disease may well be a factor in difficulty in healing his wound.  I would be hopeful that improved arterial flow will increase likelihood of healing.     Wound management will consist of continuing Santyl dressings on the heel wound daily and using Xeroform on the larger wound and then dry gauze and roll gauze changed every other day. He has no insurance for [facility] on the larger wound. The patient has found that the topical lidocaine used in the [facility] helps for several hours with pain. He can use topical over the counter lidocaine gel at dressing changes, to be washed off prior to new dressing.     The patient will follow up in wound care center again in 2 weeks.     He will check with Dr Kasandra Brooke office about arterial intervention plans.        FINAL DIAGNOSES:  Nonhealing wounds, right lower leg and right ankle over the Achilles, peripheral artery disease.     L97.912,  C83.173, I73.9     Francisca Agee MD

## 2020-05-21 NOTE — WOUND CARE
05/21/20 1113 Wound Leg lower Right # 1 Date First Assessed/Time First Assessed: 05/07/20 0835   Present on Hospital Admission: Yes  Wound Approximate Age at First Assessment (Weeks): 4 weeks  Primary Wound Type: Vascular  Location: Leg lower  Wound Location Orientation: Right  Wound Descri. .. Dressing Type Applied Xeroform; Absorptive;Gauze wrap (ant); Special tape (comment) Wound Ankle Right;Posterior # 2 Date First Assessed/Time First Assessed: 05/07/20 0851   Present on Hospital Admission: Yes  Wound Approximate Age at First Assessment (Weeks): 17 weeks  Primary Wound Type: Vascular  Location: Ankle  Wound Location Orientation: Right;Posterior  Wound. .. Dressing Type Applied Moist to dry;Gauze;Gauze wrap (ant) (Santyl nickel thick, saline moist)

## 2020-05-22 ENCOUNTER — HOSPITAL ENCOUNTER (OUTPATIENT)
Dept: PREADMISSION TESTING | Age: 65
Discharge: HOME | End: 2020-05-22
Payer: MEDICARE

## 2020-05-22 VITALS
RESPIRATION RATE: 16 BRPM | SYSTOLIC BLOOD PRESSURE: 99 MMHG | TEMPERATURE: 98.3 F | HEIGHT: 71 IN | DIASTOLIC BLOOD PRESSURE: 43 MMHG | WEIGHT: 211.2 LBS | OXYGEN SATURATION: 98 % | BODY MASS INDEX: 29.57 KG/M2 | HEART RATE: 48 BPM

## 2020-05-22 LAB
ABO + RH BLD: NORMAL
ALBUMIN SERPL-MCNC: 3.4 G/DL (ref 3.5–5)
ALBUMIN/GLOB SERPL: 0.9 {RATIO} (ref 1.1–2.2)
ALP SERPL-CCNC: 201 U/L (ref 45–117)
ALT SERPL-CCNC: 25 U/L (ref 12–78)
ANION GAP SERPL CALC-SCNC: 7 MMOL/L (ref 5–15)
APPEARANCE UR: CLEAR
APTT PPP: 29.1 SEC (ref 22.1–32)
AST SERPL-CCNC: 23 U/L (ref 15–37)
BACTERIA URNS QL MICRO: NEGATIVE /HPF
BILIRUB SERPL-MCNC: 0.7 MG/DL (ref 0.2–1)
BILIRUB UR QL: NEGATIVE
BLOOD GROUP ANTIBODIES SERPL: NORMAL
BUN SERPL-MCNC: 15 MG/DL (ref 6–20)
BUN/CREAT SERPL: 15 (ref 12–20)
CALCIUM SERPL-MCNC: 9.5 MG/DL (ref 8.5–10.1)
CHLORIDE SERPL-SCNC: 103 MMOL/L (ref 97–108)
CO2 SERPL-SCNC: 25 MMOL/L (ref 21–32)
COLOR UR: NORMAL
CREAT SERPL-MCNC: 0.98 MG/DL (ref 0.7–1.3)
EPITH CASTS URNS QL MICRO: NORMAL /LPF
ERYTHROCYTE [DISTWIDTH] IN BLOOD BY AUTOMATED COUNT: 14.3 % (ref 11.5–14.5)
GLOBULIN SER CALC-MCNC: 3.6 G/DL (ref 2–4)
GLUCOSE SERPL-MCNC: 116 MG/DL (ref 65–100)
GLUCOSE UR STRIP.AUTO-MCNC: NEGATIVE MG/DL
HCT VFR BLD AUTO: 35 % (ref 36.6–50.3)
HGB BLD-MCNC: 11.5 G/DL (ref 12.1–17)
HGB UR QL STRIP: NEGATIVE
HYALINE CASTS URNS QL MICRO: NORMAL /LPF (ref 0–5)
INR PPP: 1.2 (ref 0.9–1.1)
KETONES UR QL STRIP.AUTO: NEGATIVE MG/DL
LEUKOCYTE ESTERASE UR QL STRIP.AUTO: NEGATIVE
MCH RBC QN AUTO: 28.8 PG (ref 26–34)
MCHC RBC AUTO-ENTMCNC: 32.9 G/DL (ref 30–36.5)
MCV RBC AUTO: 87.7 FL (ref 80–99)
NITRITE UR QL STRIP.AUTO: NEGATIVE
NRBC # BLD: 0 K/UL (ref 0–0.01)
NRBC BLD-RTO: 0 PER 100 WBC
PH UR STRIP: 6.5 [PH] (ref 5–8)
PLATELET # BLD AUTO: 217 K/UL (ref 150–400)
PMV BLD AUTO: 11.2 FL (ref 8.9–12.9)
POTASSIUM SERPL-SCNC: 4.1 MMOL/L (ref 3.5–5.1)
PROT SERPL-MCNC: 7 G/DL (ref 6.4–8.2)
PROT UR STRIP-MCNC: NEGATIVE MG/DL
PROTHROMBIN TIME: 11.9 SEC (ref 9–11.1)
RBC # BLD AUTO: 3.99 M/UL (ref 4.1–5.7)
RBC #/AREA URNS HPF: NORMAL /HPF (ref 0–5)
SODIUM SERPL-SCNC: 135 MMOL/L (ref 136–145)
SP GR UR REFRACTOMETRY: 1.02 (ref 1–1.03)
SPECIMEN EXP DATE BLD: NORMAL
THERAPEUTIC RANGE,PTTT: NORMAL SECS (ref 58–77)
UA: UC IF INDICATED,UAUC: NORMAL
UROBILINOGEN UR QL STRIP.AUTO: 1 EU/DL (ref 0.2–1)
WBC # BLD AUTO: 8.1 K/UL (ref 4.1–11.1)
WBC URNS QL MICRO: NORMAL /HPF (ref 0–4)

## 2020-05-22 PROCEDURE — 85027 COMPLETE CBC AUTOMATED: CPT

## 2020-05-22 PROCEDURE — 86900 BLOOD TYPING SEROLOGIC ABO: CPT

## 2020-05-22 PROCEDURE — 81001 URINALYSIS AUTO W/SCOPE: CPT

## 2020-05-22 PROCEDURE — 85730 THROMBOPLASTIN TIME PARTIAL: CPT

## 2020-05-22 PROCEDURE — 80053 COMPREHEN METABOLIC PANEL: CPT

## 2020-05-22 PROCEDURE — 85610 PROTHROMBIN TIME: CPT

## 2020-05-22 PROCEDURE — 36415 COLL VENOUS BLD VENIPUNCTURE: CPT

## 2020-05-22 NOTE — PERIOP NOTES
Incentive Spirometer        Using the incentive spirometer helps expand the small air sacs of your lungs, helps you breathe deeply, and helps improve your lung function. Use your incentive spirometer twice a day (10 breaths each time) prior to surgery. How to Use Your Incentive Spirometer:  1. Hold the incentive spirometer in an upright position. 2. Breathe out as usual.   3. Place the mouthpiece in your mouth and seal your lips tightly around it. 4. Take a deep breath. Breathe in slowly and as deeply as possible. Keep the blue flow rate guide between the arrows. 5. Hold your breath as long as possible. Then exhale slowly and allow the piston to fall to the bottom of the column. 6. Rest for a few seconds and repeat steps one through five at least 10 times. PAT Tidal Volume__2400________________  x_2_______________  Date_05/22/20______________________    Glendell Hidalgo THE INCENTIVE SPIROMETER WITH YOU TO THE HOSPITAL ON THE DAY OF YOUR SURGERY. Opportunity given to ask and answer questions as well as to observe return demonstration.     Patient signature_____________________________    Witness____________________________

## 2020-05-22 NOTE — PERIOP NOTES
[facility]  Preoperative Instructions        Surgery Date 05/29/20          Time of Arrival 0900 am Contact # [phone]  Covid Test Scheduled for 05/25/20 Monday at 9 am     1. On the day of your surgery, please report to the Surgical Services Registration Desk and sign in at your designated time. The Surgery Center is located to the right of the Emergency Room. 2. You must have someone with you to drive you home. You should not drive a car for 24 hours following surgery. Please make arrangements for a friend or family member to stay with you for the first 24 hours after your surgery. 3. Do not have anything to eat or drink (including water, gum, mints, coffee, juice) after midnight ?? .? This may not apply to medications prescribed by your physician. ?(Please note below the special instructions with medications to take the morning of your procedure.)    4. We recommend you do not drink any alcoholic beverages for 24 hours before and after your surgery. 5. Contact your surgeons office for instructions on the following medications: non-steroidal anti-inflammatory drugs (i.e. Advil, Aleve), vitamins, and supplements. (Some surgeons will want you to stop these medications prior to surgery and others may allow you to take them)  **If you are currently taking Plavix, Coumadin, Aspirin and/or other blood-thinning agents, contact your surgeon for instructions. ** Your surgeon will partner with the physician prescribing these medications to determine if it is safe to stop or if you need to continue taking. Please do not stop taking these medications without instructions from your surgeon    6. Wear comfortable clothes. Wear glasses instead of contacts. Do not bring any money or jewelry. Please bring picture ID, insurance card, and any prearranged co-payment or hospital payment. Do not wear make-up, particularly mascara the morning of your surgery.   Do not wear nail polish, particularly if you are having foot /hand surgery. Wear your hair loose or down, no ponytails, buns, juan pins or clips. All body piercings must be removed. Please shower with antibacterial soap for three consecutive days before and on the morning of surgery, but do not apply any lotions, powders or deodorants after the shower on the day of surgery. Please use a fresh towels after each shower. Please sleep in clean clothes and change bed linens the night before surgery. Please do not shave for 48 hours prior to surgery. Shaving of the face is acceptable. 7. You should understand that if you do not follow these instructions your surgery may be cancelled. If your physical condition changes (I.e. fever, cold or flu) please contact your surgeon as soon as possible. 8. It is important that you be on time. If a situation occurs where you may be late, please call [phone] (OR Holding Area). 9. If you have any questions and or problems, please call [phone] (Pre-admission Testing). 10. Your surgery time may be subject to change. You will receive a phone call the evening prior if your time changes. 11.  If having outpatient surgery, you must have someone to drive you here, stay with you during the duration of your stay, and to drive you home at time of discharge. 12.   In an effort to improve the efficiency, privacy, and safety for all of our Pre-op patients visitors are not allowed in the Holding area. Once you arrive and are registered your family/visitors will be asked to remain in your vehicle. The Pre-op staff will call you when they are ready for you to enter the building and will explain to you and your family/visitors that the Pre-op phase is beginning. At this time, if your procedure is outpatient, your family member will be asked to stay in their vehicle until the surgery is complete and you are ready for discharge.  If you are going to be admitted after your surgery, once you are called to come inside the building, your family will be able to leave the parking lot. At this time the staff will also ask for your designated spokesperson information in the event that the physician or staff need to provide an update or obtain any pertinent information. The designated spokesperson will be notified if the physician needs to speak to family during the pre-operative phase.and/or in the post op phase. Special Instructions: ASPIRIN DO NOT STOP PRIOR TO SURGERY    TAKE ALL MEDICATIONS DAY OF SURGERY EXCEPT:NONE      I understand a pre-operative phone call will be made to verify my surgery time. In the event that I am not available, I give permission for a message to be left on my answering service and/or with another person?   yes         ___________________      __________   _________    (Signature of Patient)             (Witness)                (Date and Time)

## 2020-05-25 ENCOUNTER — HOSPITAL ENCOUNTER (OUTPATIENT)
Dept: PREADMISSION TESTING | Age: 65
Discharge: HOME | End: 2020-05-25
Payer: MEDICARE

## 2020-05-25 PROCEDURE — 87635 SARS-COV-2 COVID-19 AMP PRB: CPT

## 2020-05-26 LAB
SARS-COV-2, COV2NT: NOT DETECTED
SPECIMEN SOURCE, FCOV2M: NORMAL

## 2020-05-29 ENCOUNTER — ANESTHESIA (OUTPATIENT)
Dept: SURGERY | Age: 65
DRG: 253 | End: 2020-05-29
Payer: MEDICARE

## 2020-05-29 ENCOUNTER — APPOINTMENT (OUTPATIENT)
Dept: GENERAL RADIOLOGY | Age: 65
DRG: 253 | End: 2020-05-29
Attending: SURGERY
Payer: MEDICARE

## 2020-05-29 ENCOUNTER — HOSPITAL ENCOUNTER (INPATIENT)
Age: 65
LOS: 3 days | Discharge: HOME HEALTH | DRG: 253 | End: 2020-06-01
Attending: SURGERY | Admitting: SURGERY
Payer: MEDICARE

## 2020-05-29 ENCOUNTER — ANESTHESIA EVENT (OUTPATIENT)
Dept: SURGERY | Age: 65
DRG: 253 | End: 2020-05-29
Payer: MEDICARE

## 2020-05-29 DIAGNOSIS — I70.229 CRITICAL LOWER LIMB ISCHEMIA (HCC): Primary | ICD-10-CM

## 2020-05-29 PROCEDURE — 74011636320 HC RX REV CODE- 636/320: Performed by: SURGERY

## 2020-05-29 PROCEDURE — 77030026438 HC STYL ET INTUB CARD -A: Performed by: ANESTHESIOLOGY

## 2020-05-29 PROCEDURE — 88311 DECALCIFY TISSUE: CPT

## 2020-05-29 PROCEDURE — 74011000250 HC RX REV CODE- 250: Performed by: NURSE ANESTHETIST, CERTIFIED REGISTERED

## 2020-05-29 PROCEDURE — 74011250637 HC RX REV CODE- 250/637: Performed by: HOSPITALIST

## 2020-05-29 PROCEDURE — 77030004561 HC CATH ANGI DX COBRA ANGI -B: Performed by: SURGERY

## 2020-05-29 PROCEDURE — 047K3ZZ DILATION OF RIGHT FEMORAL ARTERY, PERCUTANEOUS APPROACH: ICD-10-PCS | Performed by: SURGERY

## 2020-05-29 PROCEDURE — C1769 GUIDE WIRE: HCPCS | Performed by: SURGERY

## 2020-05-29 PROCEDURE — 77030031139 HC SUT VCRL2 J&J -A: Performed by: SURGERY

## 2020-05-29 PROCEDURE — 74011000250 HC RX REV CODE- 250: Performed by: SURGERY

## 2020-05-29 PROCEDURE — C1725 CATH, TRANSLUMIN NON-LASER: HCPCS | Performed by: SURGERY

## 2020-05-29 PROCEDURE — C1768 GRAFT, VASCULAR: HCPCS | Performed by: SURGERY

## 2020-05-29 PROCEDURE — 77010033678 HC OXYGEN DAILY

## 2020-05-29 PROCEDURE — 77030018673: Performed by: SURGERY

## 2020-05-29 PROCEDURE — 74011250636 HC RX REV CODE- 250/636: Performed by: ANESTHESIOLOGY

## 2020-05-29 PROCEDURE — 77030040922 HC BLNKT HYPOTHRM STRY -A

## 2020-05-29 PROCEDURE — 77030002986 HC SUT PROL J&J -A: Performed by: SURGERY

## 2020-05-29 PROCEDURE — 76000 FLUOROSCOPY <1 HR PHYS/QHP: CPT

## 2020-05-29 PROCEDURE — 76010000171 HC OR TIME 2 TO 2.5 HR INTENSV-TIER 1: Performed by: SURGERY

## 2020-05-29 PROCEDURE — 0HBMXZZ EXCISION OF RIGHT FOOT SKIN, EXTERNAL APPROACH: ICD-10-PCS | Performed by: SURGERY

## 2020-05-29 PROCEDURE — 77030002987 HC SUT PROL J&J -B: Performed by: SURGERY

## 2020-05-29 PROCEDURE — 77030013058 HC DEV INFL ANGIO BSC -B: Performed by: SURGERY

## 2020-05-29 PROCEDURE — 04CK0ZZ EXTIRPATION OF MATTER FROM RIGHT FEMORAL ARTERY, OPEN APPROACH: ICD-10-PCS | Performed by: SURGERY

## 2020-05-29 PROCEDURE — 51798 US URINE CAPACITY MEASURE: CPT

## 2020-05-29 PROCEDURE — 73552 X-RAY EXAM OF FEMUR 2/>: CPT

## 2020-05-29 PROCEDURE — C1887 CATHETER, GUIDING: HCPCS | Performed by: SURGERY

## 2020-05-29 PROCEDURE — 76060000035 HC ANESTHESIA 2 TO 2.5 HR: Performed by: SURGERY

## 2020-05-29 PROCEDURE — 04UK0KZ SUPPLEMENT RIGHT FEMORAL ARTERY WITH NONAUTOLOGOUS TISSUE SUBSTITUTE, OPEN APPROACH: ICD-10-PCS | Performed by: SURGERY

## 2020-05-29 PROCEDURE — 74011250636 HC RX REV CODE- 250/636: Performed by: SURGERY

## 2020-05-29 PROCEDURE — C1894 INTRO/SHEATH, NON-LASER: HCPCS | Performed by: SURGERY

## 2020-05-29 PROCEDURE — 77030013079 HC BLNKT BAIR HGGR 3M -A: Performed by: ANESTHESIOLOGY

## 2020-05-29 PROCEDURE — 77030008463 HC STPLR SKN PROX J&J -B: Performed by: SURGERY

## 2020-05-29 PROCEDURE — 77030038692 HC WND DEB SYS IRMX -B: Performed by: SURGERY

## 2020-05-29 PROCEDURE — 74011250636 HC RX REV CODE- 250/636: Performed by: NURSE ANESTHETIST, CERTIFIED REGISTERED

## 2020-05-29 PROCEDURE — 74011250636 HC RX REV CODE- 250/636

## 2020-05-29 PROCEDURE — B41F1ZZ FLUOROSCOPY OF RIGHT LOWER EXTREMITY ARTERIES USING LOW OSMOLAR CONTRAST: ICD-10-PCS | Performed by: SURGERY

## 2020-05-29 PROCEDURE — 77030011640 HC PAD GRND REM COVD -A: Performed by: SURGERY

## 2020-05-29 PROCEDURE — 77030019908 HC STETH ESOPH SIMS -A: Performed by: ANESTHESIOLOGY

## 2020-05-29 PROCEDURE — 77030014008 HC SPNG HEMSTAT J&J -C: Performed by: SURGERY

## 2020-05-29 PROCEDURE — 76210000017 HC OR PH I REC 1.5 TO 2 HR: Performed by: SURGERY

## 2020-05-29 PROCEDURE — 74011250637 HC RX REV CODE- 250/637: Performed by: SURGERY

## 2020-05-29 PROCEDURE — 77030020256 HC SOL INJ NACL 0.9%  500ML: Performed by: SURGERY

## 2020-05-29 PROCEDURE — 65270000029 HC RM PRIVATE

## 2020-05-29 PROCEDURE — 77030002916 HC SUT ETHLN J&J -A: Performed by: SURGERY

## 2020-05-29 PROCEDURE — 77030002996 HC SUT SLK J&J -A: Performed by: SURGERY

## 2020-05-29 PROCEDURE — 88304 TISSUE EXAM BY PATHOLOGIST: CPT

## 2020-05-29 DEVICE — IMPLANTABLE DEVICE: Type: IMPLANTABLE DEVICE | Site: GROIN | Status: FUNCTIONAL

## 2020-05-29 RX ORDER — ROSUVASTATIN CALCIUM 10 MG/1
40 TABLET, COATED ORAL
Status: DISCONTINUED | OUTPATIENT
Start: 2020-05-29 | End: 2020-06-01 | Stop reason: HOSPADM

## 2020-05-29 RX ORDER — KETOROLAC TROMETHAMINE 30 MG/ML
INJECTION, SOLUTION INTRAMUSCULAR; INTRAVENOUS
Status: COMPLETED
Start: 2020-05-29 | End: 2020-05-29

## 2020-05-29 RX ORDER — CLOPIDOGREL BISULFATE 75 MG/1
75 TABLET ORAL DAILY
Status: DISCONTINUED | OUTPATIENT
Start: 2020-05-29 | End: 2020-06-01 | Stop reason: HOSPADM

## 2020-05-29 RX ORDER — SUCCINYLCHOLINE CHLORIDE 20 MG/ML
INJECTION INTRAMUSCULAR; INTRAVENOUS AS NEEDED
Status: DISCONTINUED | OUTPATIENT
Start: 2020-05-29 | End: 2020-05-29 | Stop reason: HOSPADM

## 2020-05-29 RX ORDER — ROCURONIUM BROMIDE 10 MG/ML
INJECTION, SOLUTION INTRAVENOUS AS NEEDED
Status: DISCONTINUED | OUTPATIENT
Start: 2020-05-29 | End: 2020-05-29 | Stop reason: HOSPADM

## 2020-05-29 RX ORDER — SODIUM CHLORIDE 0.9 % (FLUSH) 0.9 %
5-40 SYRINGE (ML) INJECTION EVERY 8 HOURS
Status: DISCONTINUED | OUTPATIENT
Start: 2020-05-29 | End: 2020-05-29 | Stop reason: HOSPADM

## 2020-05-29 RX ORDER — MORPHINE SULFATE 2 MG/ML
2 INJECTION, SOLUTION INTRAMUSCULAR; INTRAVENOUS
Status: DISCONTINUED | OUTPATIENT
Start: 2020-05-29 | End: 2020-06-01 | Stop reason: HOSPADM

## 2020-05-29 RX ORDER — HYDROMORPHONE HYDROCHLORIDE 1 MG/ML
.2-.5 INJECTION, SOLUTION INTRAMUSCULAR; INTRAVENOUS; SUBCUTANEOUS
Status: DISCONTINUED | OUTPATIENT
Start: 2020-05-29 | End: 2020-05-29 | Stop reason: HOSPADM

## 2020-05-29 RX ORDER — MIDAZOLAM HYDROCHLORIDE 1 MG/ML
0.5 INJECTION, SOLUTION INTRAMUSCULAR; INTRAVENOUS
Status: DISCONTINUED | OUTPATIENT
Start: 2020-05-29 | End: 2020-05-29 | Stop reason: HOSPADM

## 2020-05-29 RX ORDER — DOCUSATE SODIUM 100 MG/1
100 CAPSULE, LIQUID FILLED ORAL 2 TIMES DAILY
Status: DISCONTINUED | OUTPATIENT
Start: 2020-05-29 | End: 2020-06-01 | Stop reason: HOSPADM

## 2020-05-29 RX ORDER — DOCUSATE SODIUM 100 MG/1
100 CAPSULE, LIQUID FILLED ORAL 2 TIMES DAILY
COMMUNITY
End: 2020-12-21

## 2020-05-29 RX ORDER — FENTANYL CITRATE 50 UG/ML
25 INJECTION, SOLUTION INTRAMUSCULAR; INTRAVENOUS
Status: DISCONTINUED | OUTPATIENT
Start: 2020-05-29 | End: 2020-05-29 | Stop reason: HOSPADM

## 2020-05-29 RX ORDER — PANTOPRAZOLE SODIUM 40 MG/1
40 TABLET, DELAYED RELEASE ORAL
Status: DISCONTINUED | OUTPATIENT
Start: 2020-05-30 | End: 2020-06-01 | Stop reason: HOSPADM

## 2020-05-29 RX ORDER — OMEPRAZOLE 40 MG/1
40 CAPSULE, DELAYED RELEASE ORAL DAILY
COMMUNITY

## 2020-05-29 RX ORDER — PROPOFOL 10 MG/ML
INJECTION, EMULSION INTRAVENOUS AS NEEDED
Status: DISCONTINUED | OUTPATIENT
Start: 2020-05-29 | End: 2020-05-29 | Stop reason: HOSPADM

## 2020-05-29 RX ORDER — MORPHINE SULFATE 10 MG/ML
2 INJECTION, SOLUTION INTRAMUSCULAR; INTRAVENOUS
Status: DISCONTINUED | OUTPATIENT
Start: 2020-05-29 | End: 2020-05-29 | Stop reason: HOSPADM

## 2020-05-29 RX ORDER — MIDAZOLAM HYDROCHLORIDE 1 MG/ML
INJECTION, SOLUTION INTRAMUSCULAR; INTRAVENOUS AS NEEDED
Status: DISCONTINUED | OUTPATIENT
Start: 2020-05-29 | End: 2020-05-29 | Stop reason: HOSPADM

## 2020-05-29 RX ORDER — KETOROLAC TROMETHAMINE 30 MG/ML
30 INJECTION, SOLUTION INTRAMUSCULAR; INTRAVENOUS
Status: COMPLETED | OUTPATIENT
Start: 2020-05-29 | End: 2020-05-29

## 2020-05-29 RX ORDER — FENTANYL CITRATE 50 UG/ML
INJECTION, SOLUTION INTRAMUSCULAR; INTRAVENOUS AS NEEDED
Status: DISCONTINUED | OUTPATIENT
Start: 2020-05-29 | End: 2020-05-29 | Stop reason: HOSPADM

## 2020-05-29 RX ORDER — ALLOPURINOL 300 MG/1
450 TABLET ORAL DAILY
Status: DISCONTINUED | OUTPATIENT
Start: 2020-05-30 | End: 2020-06-01 | Stop reason: HOSPADM

## 2020-05-29 RX ORDER — MORPHINE SULFATE 15 MG/1
15 TABLET, FILM COATED, EXTENDED RELEASE ORAL ONCE
Status: COMPLETED | OUTPATIENT
Start: 2020-05-30 | End: 2020-05-29

## 2020-05-29 RX ORDER — ASPIRIN 81 MG/1
81 TABLET ORAL DAILY
Status: DISCONTINUED | OUTPATIENT
Start: 2020-05-30 | End: 2020-06-01 | Stop reason: HOSPADM

## 2020-05-29 RX ORDER — FENTANYL CITRATE 50 UG/ML
50 INJECTION, SOLUTION INTRAMUSCULAR; INTRAVENOUS AS NEEDED
Status: DISCONTINUED | OUTPATIENT
Start: 2020-05-29 | End: 2020-05-29 | Stop reason: HOSPADM

## 2020-05-29 RX ORDER — GLYCOPYRROLATE 0.2 MG/ML
INJECTION INTRAMUSCULAR; INTRAVENOUS AS NEEDED
Status: DISCONTINUED | OUTPATIENT
Start: 2020-05-29 | End: 2020-05-29 | Stop reason: HOSPADM

## 2020-05-29 RX ORDER — NEOSTIGMINE METHYLSULFATE 1 MG/ML
INJECTION, SOLUTION INTRAVENOUS AS NEEDED
Status: DISCONTINUED | OUTPATIENT
Start: 2020-05-29 | End: 2020-05-29 | Stop reason: HOSPADM

## 2020-05-29 RX ORDER — OXYCODONE AND ACETAMINOPHEN 5; 325 MG/1; MG/1
1 TABLET ORAL
Status: DISCONTINUED | OUTPATIENT
Start: 2020-05-29 | End: 2020-06-01 | Stop reason: HOSPADM

## 2020-05-29 RX ORDER — ATENOLOL 25 MG/1
25 TABLET ORAL DAILY
Status: DISCONTINUED | OUTPATIENT
Start: 2020-05-30 | End: 2020-06-01 | Stop reason: HOSPADM

## 2020-05-29 RX ORDER — SODIUM CHLORIDE 0.9 % (FLUSH) 0.9 %
5-40 SYRINGE (ML) INJECTION AS NEEDED
Status: DISCONTINUED | OUTPATIENT
Start: 2020-05-29 | End: 2020-05-29 | Stop reason: HOSPADM

## 2020-05-29 RX ORDER — LISINOPRIL 5 MG/1
5 TABLET ORAL EVERY EVENING
Status: DISCONTINUED | OUTPATIENT
Start: 2020-05-29 | End: 2020-06-01 | Stop reason: HOSPADM

## 2020-05-29 RX ORDER — ONDANSETRON 2 MG/ML
4 INJECTION INTRAMUSCULAR; INTRAVENOUS AS NEEDED
Status: DISCONTINUED | OUTPATIENT
Start: 2020-05-29 | End: 2020-05-29 | Stop reason: HOSPADM

## 2020-05-29 RX ORDER — LIDOCAINE HYDROCHLORIDE 10 MG/ML
0.1 INJECTION, SOLUTION EPIDURAL; INFILTRATION; INTRACAUDAL; PERINEURAL AS NEEDED
Status: DISCONTINUED | OUTPATIENT
Start: 2020-05-29 | End: 2020-05-29 | Stop reason: HOSPADM

## 2020-05-29 RX ORDER — DIPHENHYDRAMINE HYDROCHLORIDE 50 MG/ML
12.5 INJECTION, SOLUTION INTRAMUSCULAR; INTRAVENOUS AS NEEDED
Status: DISCONTINUED | OUTPATIENT
Start: 2020-05-29 | End: 2020-05-29 | Stop reason: HOSPADM

## 2020-05-29 RX ORDER — SODIUM CHLORIDE 9 MG/ML
75 INJECTION, SOLUTION INTRAVENOUS CONTINUOUS
Status: DISCONTINUED | OUTPATIENT
Start: 2020-05-29 | End: 2020-06-01 | Stop reason: HOSPADM

## 2020-05-29 RX ORDER — HEPARIN SODIUM 1000 [USP'U]/ML
INJECTION, SOLUTION INTRAVENOUS; SUBCUTANEOUS AS NEEDED
Status: DISCONTINUED | OUTPATIENT
Start: 2020-05-29 | End: 2020-05-29 | Stop reason: HOSPADM

## 2020-05-29 RX ORDER — LIDOCAINE HYDROCHLORIDE 20 MG/ML
INJECTION, SOLUTION EPIDURAL; INFILTRATION; INTRACAUDAL; PERINEURAL AS NEEDED
Status: DISCONTINUED | OUTPATIENT
Start: 2020-05-29 | End: 2020-05-29 | Stop reason: HOSPADM

## 2020-05-29 RX ORDER — KETOROLAC TROMETHAMINE 30 MG/ML
15 INJECTION, SOLUTION INTRAMUSCULAR; INTRAVENOUS
Status: DISCONTINUED | OUTPATIENT
Start: 2020-05-29 | End: 2020-06-01 | Stop reason: HOSPADM

## 2020-05-29 RX ORDER — SODIUM CHLORIDE, SODIUM LACTATE, POTASSIUM CHLORIDE, CALCIUM CHLORIDE 600; 310; 30; 20 MG/100ML; MG/100ML; MG/100ML; MG/100ML
25 INJECTION, SOLUTION INTRAVENOUS CONTINUOUS
Status: DISCONTINUED | OUTPATIENT
Start: 2020-05-29 | End: 2020-05-29 | Stop reason: HOSPADM

## 2020-05-29 RX ORDER — ONDANSETRON 2 MG/ML
INJECTION INTRAMUSCULAR; INTRAVENOUS AS NEEDED
Status: DISCONTINUED | OUTPATIENT
Start: 2020-05-29 | End: 2020-05-29 | Stop reason: HOSPADM

## 2020-05-29 RX ORDER — OXYCODONE AND ACETAMINOPHEN 5; 325 MG/1; MG/1
1 TABLET ORAL
COMMUNITY
End: 2020-10-29

## 2020-05-29 RX ADMIN — PROPOFOL 150 MG: 10 INJECTION, EMULSION INTRAVENOUS at 12:36

## 2020-05-29 RX ADMIN — MORPHINE SULFATE 15 MG: 15 TABLET, FILM COATED, EXTENDED RELEASE ORAL at 23:52

## 2020-05-29 RX ADMIN — WATER 2 G: 1 INJECTION INTRAMUSCULAR; INTRAVENOUS; SUBCUTANEOUS at 20:57

## 2020-05-29 RX ADMIN — HYDROMORPHONE HYDROCHLORIDE 0.3 MG: 1 INJECTION, SOLUTION INTRAMUSCULAR; INTRAVENOUS; SUBCUTANEOUS at 15:40

## 2020-05-29 RX ADMIN — FENTANYL CITRATE 50 MCG: 50 INJECTION INTRAMUSCULAR; INTRAVENOUS at 12:31

## 2020-05-29 RX ADMIN — SODIUM CHLORIDE 75 ML/HR: 900 INJECTION, SOLUTION INTRAVENOUS at 17:51

## 2020-05-29 RX ADMIN — KETOROLAC TROMETHAMINE 30 MG: 30 INJECTION, SOLUTION INTRAMUSCULAR; INTRAVENOUS at 15:38

## 2020-05-29 RX ADMIN — GLYCOPYRROLATE 0.4 MG: 0.2 INJECTION, SOLUTION INTRAMUSCULAR; INTRAVENOUS at 14:43

## 2020-05-29 RX ADMIN — MIDAZOLAM HYDROCHLORIDE 1 MG: 1 INJECTION, SOLUTION INTRAMUSCULAR; INTRAVENOUS at 12:31

## 2020-05-29 RX ADMIN — ROCURONIUM BROMIDE 20 MG: 10 INJECTION INTRAVENOUS at 12:46

## 2020-05-29 RX ADMIN — ROSUVASTATIN CALCIUM 40 MG: 10 TABLET, COATED ORAL at 21:01

## 2020-05-29 RX ADMIN — FENTANYL CITRATE 25 MCG: 0.05 INJECTION, SOLUTION INTRAMUSCULAR; INTRAVENOUS at 15:10

## 2020-05-29 RX ADMIN — PROPOFOL 50 MG: 10 INJECTION, EMULSION INTRAVENOUS at 14:37

## 2020-05-29 RX ADMIN — FENTANYL CITRATE 25 MCG: 0.05 INJECTION, SOLUTION INTRAMUSCULAR; INTRAVENOUS at 15:15

## 2020-05-29 RX ADMIN — ONDANSETRON HYDROCHLORIDE 4 MG: 2 INJECTION, SOLUTION INTRAMUSCULAR; INTRAVENOUS at 14:00

## 2020-05-29 RX ADMIN — Medication 3 AMPULE: at 10:06

## 2020-05-29 RX ADMIN — HYDROMORPHONE HYDROCHLORIDE 0.5 MG: 1 INJECTION, SOLUTION INTRAMUSCULAR; INTRAVENOUS; SUBCUTANEOUS at 15:59

## 2020-05-29 RX ADMIN — FENTANYL CITRATE 50 MCG: 50 INJECTION INTRAMUSCULAR; INTRAVENOUS at 12:36

## 2020-05-29 RX ADMIN — FENTANYL CITRATE 50 MCG: 50 INJECTION INTRAMUSCULAR; INTRAVENOUS at 14:37

## 2020-05-29 RX ADMIN — LIDOCAINE HYDROCHLORIDE 80 MG: 20 INJECTION, SOLUTION EPIDURAL; INFILTRATION; INTRACAUDAL; PERINEURAL at 12:36

## 2020-05-29 RX ADMIN — HEPARIN SODIUM 5000 UNITS: 1000 INJECTION, SOLUTION INTRAVENOUS; SUBCUTANEOUS at 13:19

## 2020-05-29 RX ADMIN — SUCCINYLCHOLINE CHLORIDE 140 MG: 20 INJECTION, SOLUTION INTRAMUSCULAR; INTRAVENOUS at 12:36

## 2020-05-29 RX ADMIN — PHENYLEPHRINE HYDROCHLORIDE 40 MCG/MIN: 10 INJECTION INTRAVENOUS at 12:45

## 2020-05-29 RX ADMIN — WATER 2 G: 1 INJECTION INTRAMUSCULAR; INTRAVENOUS; SUBCUTANEOUS at 12:50

## 2020-05-29 RX ADMIN — ROCURONIUM BROMIDE 10 MG: 10 INJECTION INTRAVENOUS at 12:36

## 2020-05-29 RX ADMIN — Medication 10 ML: at 15:30

## 2020-05-29 RX ADMIN — FENTANYL CITRATE 50 MCG: 50 INJECTION INTRAMUSCULAR; INTRAVENOUS at 13:08

## 2020-05-29 RX ADMIN — FENTANYL CITRATE 50 MCG: 50 INJECTION INTRAMUSCULAR; INTRAVENOUS at 14:02

## 2020-05-29 RX ADMIN — Medication 3 MG: at 14:43

## 2020-05-29 RX ADMIN — OXYCODONE HYDROCHLORIDE AND ACETAMINOPHEN 1 TABLET: 5; 325 TABLET ORAL at 19:24

## 2020-05-29 RX ADMIN — HYDROMORPHONE HYDROCHLORIDE 0.2 MG: 1 INJECTION, SOLUTION INTRAMUSCULAR; INTRAVENOUS; SUBCUTANEOUS at 15:26

## 2020-05-29 RX ADMIN — FENTANYL CITRATE 25 MCG: 0.05 INJECTION, SOLUTION INTRAMUSCULAR; INTRAVENOUS at 15:23

## 2020-05-29 RX ADMIN — SODIUM CHLORIDE, SODIUM LACTATE, POTASSIUM CHLORIDE, AND CALCIUM CHLORIDE 25 ML/HR: 600; 310; 30; 20 INJECTION, SOLUTION INTRAVENOUS at 10:06

## 2020-05-29 RX ADMIN — KETOROLAC TROMETHAMINE 30 MG: 30 INJECTION, SOLUTION INTRAMUSCULAR at 15:38

## 2020-05-29 NOTE — ROUTINE PROCESS
Patient: Ana Hall MRN: [id number]  SSN: [id number]   YOB: 1955  Age: 77 y.o. Sex: male     Patient is status post Procedure(s):  RIGHT FEMORAL ENDARTERECTOMY, RIGHT LEG ANGIOGRAM  RIGHT LEG DEBRIDEMENT. Surgeon(s) and Role:     * Lonnie Dodson MD - Primary    Local/Dose/Irrigation:  Hep Saline and Irrisept                  Peripheral IV 05/29/20 Right Forearm (Active)   Site Assessment Clean, dry, & intact 5/29/2020 10:00 AM   Phlebitis Assessment 0 5/29/2020 10:00 AM   Infiltration Assessment 0 5/29/2020 10:00 AM   Dressing Status Clean, dry, & intact 5/29/2020 10:00 AM   Dressing Type Transparent;Tape 5/29/2020 10:00 AM   Hub Color/Line Status Pink; Infusing 5/29/2020 10:00 AM       Peripheral IV 05/29/20 Left Wrist (Active)                           Dressing/Packing:  Wound Groin Right-Dressing Type: Sutures; Staples;4 x 4 (05/29/20 1434)  Wound Leg Right-Dressing Type: 4 x 4;Other (Comment); Oil emulsion(kerlix) (05/29/20 1434)    Splint/Cast:  ]

## 2020-05-29 NOTE — ANESTHESIA PREPROCEDURE EVALUATION
Relevant Problems   No relevant active problems       Anesthetic History   No history of anesthetic complications            Review of Systems / Medical History  Patient summary reviewed, nursing notes reviewed and pertinent labs reviewed    Pulmonary        Sleep apnea  Smoker  Asthma     Comments: Former Smoker   Neuro/Psych             Comments: Spinal stenosis, lumbar Cardiovascular    Hypertension          CAD, PAD, cardiac stents and hyperlipidemia    Exercise tolerance: >4 METS  Comments: Coronary stent x 1     GI/Hepatic/Renal     GERD      Liver disease    Comments: FATTY LIVER Endo/Other        Obesity, arthritis and cancer     Other Findings   Comments: Hypogonadism  Hx Basal Cell Ca  Hx Squamous Cell Ca  RLE gangrene           Physical Exam    Airway  Mallampati: III  TM Distance: > 6 cm  Neck ROM: normal range of motion   Mouth opening: Diminished (comment)     Cardiovascular  Regular rate and rhythm,  S1 and S2 normal,  no murmur, click, rub, or gallop             Dental    Dentition: Edentulous, Full lower dentures and Full upper dentures     Pulmonary  Breath sounds clear to auscultation               Abdominal  GI exam deferred       Other Findings            Anesthetic Plan    ASA: 3  Anesthesia type: general    Monitoring Plan: BIS      Induction: Intravenous  Anesthetic plan and risks discussed with: Patient

## 2020-05-29 NOTE — BRIEF OP NOTE
Brief Postoperative Note    Patient: Edith Willson  YOB: 1955  MRN: [id number]    Date of Procedure: 5/29/2020     Pre-Op Diagnosis: RLE CLI w/tissue loss    Post-Op Diagnosis: same      Procedure(s): RIGHT FEMORAL ENDARTERECTOMY W/BOVINE PATCH,                        RIGHT LEG ANGIOGRAM/SFA ANGIOPLASTY                        RIGHT LEG EXCISIONAL DEBRIDEMENT    Surgeon(s):  Lise Hernández MD    Anesthesia: General     Estimated Blood Loss (mL): 804YJ    Complications: none    Specimens:   ID Type Source Tests Collected by Time Destination   1 : Right femoral endarterectomy Preservative Femoral, right  Lise Hernández MD 5/29/2020 1337 Pathology        Implants:   Implant Name Type Inv.  Item Serial No.  Lot No. LRB No. Used Action   Vascu-Guard Peripheral Vascular Patch   N/A  [id number] Right 1 Implanted       Findings: Completely occluded R CFA, unable to cross R popliteal     Electronically Signed by Kalie Johnston MD on 5/29/2020 at 3:26 PM

## 2020-05-29 NOTE — PERIOP NOTES
Handoff Report from Operating Room to PACU    Report received from TRAY Botello and MADDY Mosley regarding Ioana Murrieta. Surgeon(s):  Poppy Carrion MD  And Procedure(s) (LRB):  RIGHT FEMORAL ENDARTERECTOMY, RIGHT LEG ANGIOGRAM (Right)  RIGHT LEG DEBRIDEMENT (Right)  confirmed   with allergies and dressings discussed. Anesthesia type, drugs, patient history, complications, estimated blood loss, vital signs, intake and output, and last pain medication, lines, reversal medications and temperature were reviewed. TRANSFER - OUT REPORT:    Verbal report given to Elizabeth Gutierrez RN (name) on Ioana Murrieta  being transferred to Gen/Surg(unit) for routine post - op       Report consisted of patients Situation, Background, Assessment and   Recommendations(SBAR). Information from the following report(s) SBAR, Kardex, OR Summary, Procedure Summary, Intake/Output, MAR, Recent Results and Cardiac Rhythm SB was reviewed with the receiving nurse. Lines:   Peripheral IV 05/29/20 Left Wrist (Active)   Site Assessment Clean, dry, & intact 5/29/2020  5:54 PM   Phlebitis Assessment 0 5/29/2020  5:54 PM   Infiltration Assessment 0 5/29/2020  5:54 PM   Dressing Status Clean, dry, & intact 5/29/2020  5:54 PM   Dressing Type Tape;Transparent 5/29/2020  4:45 PM   Hub Color/Line Status Green;Capped 5/29/2020  4:45 PM        Opportunity for questions and clarification was provided. Patient transported with:   O2 @ 2 liters  Registered Nurse, patient belongings including cell phone cord, glasses, and dentures.

## 2020-05-29 NOTE — PERIOP NOTES
Patient stated he was tested for COVID on Wednesday. Patients results show COVID not detected.   Patient denies any signs or symptoms and has quarantined at home since his test.

## 2020-05-30 LAB
ANION GAP SERPL CALC-SCNC: 3 MMOL/L (ref 5–15)
BUN SERPL-MCNC: 14 MG/DL (ref 6–20)
BUN/CREAT SERPL: 13 (ref 12–20)
CALCIUM SERPL-MCNC: 8.4 MG/DL (ref 8.5–10.1)
CHLORIDE SERPL-SCNC: 104 MMOL/L (ref 97–108)
CO2 SERPL-SCNC: 29 MMOL/L (ref 21–32)
CREAT SERPL-MCNC: 1.08 MG/DL (ref 0.7–1.3)
ERYTHROCYTE [DISTWIDTH] IN BLOOD BY AUTOMATED COUNT: 14.7 % (ref 11.5–14.5)
GLUCOSE SERPL-MCNC: 99 MG/DL (ref 65–100)
HCT VFR BLD AUTO: 30.1 % (ref 36.6–50.3)
HGB BLD-MCNC: 9.9 G/DL (ref 12.1–17)
MCH RBC QN AUTO: 28.9 PG (ref 26–34)
MCHC RBC AUTO-ENTMCNC: 32.9 G/DL (ref 30–36.5)
MCV RBC AUTO: 87.8 FL (ref 80–99)
NRBC # BLD: 0 K/UL (ref 0–0.01)
NRBC BLD-RTO: 0 PER 100 WBC
PLATELET # BLD AUTO: 183 K/UL (ref 150–400)
PMV BLD AUTO: 11.2 FL (ref 8.9–12.9)
POTASSIUM SERPL-SCNC: 4.1 MMOL/L (ref 3.5–5.1)
RBC # BLD AUTO: 3.43 M/UL (ref 4.1–5.7)
SODIUM SERPL-SCNC: 136 MMOL/L (ref 136–145)
WBC # BLD AUTO: 7.8 K/UL (ref 4.1–11.1)

## 2020-05-30 PROCEDURE — 77030019905 HC CATH URETH INTMIT MDII -A

## 2020-05-30 PROCEDURE — 85027 COMPLETE CBC AUTOMATED: CPT

## 2020-05-30 PROCEDURE — 94760 N-INVAS EAR/PLS OXIMETRY 1: CPT

## 2020-05-30 PROCEDURE — 65270000029 HC RM PRIVATE

## 2020-05-30 PROCEDURE — 74011000250 HC RX REV CODE- 250: Performed by: SURGERY

## 2020-05-30 PROCEDURE — 80048 BASIC METABOLIC PNL TOTAL CA: CPT

## 2020-05-30 PROCEDURE — 74011250636 HC RX REV CODE- 250/636: Performed by: SURGERY

## 2020-05-30 PROCEDURE — 74011250637 HC RX REV CODE- 250/637: Performed by: SURGERY

## 2020-05-30 PROCEDURE — 36415 COLL VENOUS BLD VENIPUNCTURE: CPT

## 2020-05-30 RX ADMIN — DOCUSATE SODIUM 100 MG: 100 CAPSULE, LIQUID FILLED ORAL at 08:33

## 2020-05-30 RX ADMIN — KETOROLAC TROMETHAMINE 15 MG: 30 INJECTION, SOLUTION INTRAMUSCULAR at 10:18

## 2020-05-30 RX ADMIN — SODIUM CHLORIDE 75 ML/HR: 900 INJECTION, SOLUTION INTRAVENOUS at 04:46

## 2020-05-30 RX ADMIN — WATER 2 G: 1 INJECTION INTRAMUSCULAR; INTRAVENOUS; SUBCUTANEOUS at 04:50

## 2020-05-30 RX ADMIN — ASPIRIN 81 MG: 81 TABLET ORAL at 08:33

## 2020-05-30 RX ADMIN — OXYCODONE HYDROCHLORIDE AND ACETAMINOPHEN 1 TABLET: 5; 325 TABLET ORAL at 14:42

## 2020-05-30 RX ADMIN — ALLOPURINOL 450 MG: 300 TABLET ORAL at 08:33

## 2020-05-30 RX ADMIN — CLOPIDOGREL BISULFATE 75 MG: 75 TABLET ORAL at 08:32

## 2020-05-30 RX ADMIN — DOCUSATE SODIUM 100 MG: 100 CAPSULE, LIQUID FILLED ORAL at 17:11

## 2020-05-30 RX ADMIN — ROSUVASTATIN CALCIUM 40 MG: 10 TABLET, COATED ORAL at 21:18

## 2020-05-30 RX ADMIN — OXYCODONE HYDROCHLORIDE AND ACETAMINOPHEN 1 TABLET: 5; 325 TABLET ORAL at 19:22

## 2020-05-30 RX ADMIN — KETOROLAC TROMETHAMINE 15 MG: 30 INJECTION, SOLUTION INTRAMUSCULAR at 03:19

## 2020-05-30 RX ADMIN — PANTOPRAZOLE SODIUM 40 MG: 40 TABLET, DELAYED RELEASE ORAL at 06:40

## 2020-05-30 RX ADMIN — KETOROLAC TROMETHAMINE 15 MG: 30 INJECTION, SOLUTION INTRAMUSCULAR at 21:31

## 2020-05-30 NOTE — OP NOTES
[address]  OPERATIVE REPORT    Name:  Henna Hammonds  MR#:  [id number]  :  1955  ACCOUNT #:  [de-identified]  DATE OF SERVICE:  2020    PREOPERATIVE DIAGNOSES:  Chronic limb ischemia with tissue loss on the right. POSTOPERATIVE DIAGNOSES:  Chronic limb ischemia with tissue loss on the right. PROCEDURE PERFORMED:  1. Right common femoral artery endarterectomy with bovine patch angioplasty. 2.  On table right leg arteriogram using C-arm fluoroscopy. 3.  Balloon angioplasty of the right superficial femoral artery (6 x 100). 4.  Excisional debridement, right lower leg wounds. SURGEON:  Zee Mancera MD    ANESTHESIA:  General.. SPECIMENS REMOVED:  plaque    IMPLANTS:  As above. ESTIMATED BLOOD LOSS:  100 mL. INDICATIONS:  The patient is a 47-year-old gentleman with chronic limb ischemia on the right and extensive nonhealing wounds on the anterior aspect of his right leg. He was found on angiography to have an occluded right common femoral artery multiple sequential lesions in the right superficial femoral artery and a short popliteal occlusion on the right. He is admitted for hybrid revascularization of his right leg as well as wound debridement. PROCEDURE:  After obtaining informed consent, the patient was placed supine on the operating table. After adequate induction of general anesthesia, site and patient confirmation, and administration of prophylactic antibiotics, the right leg was prepped and draped in sterile fashion. A vertical incision was made in the right groin, distal external iliac, common femoral, profunda femoral, superficial femoral arteries were all dissected free and controlled. The patient was systemically heparinized and a longitudinal arteriotomy was made and very dense occluding calcific plaque was encountered.   Endarterectomy was begun on the distal common femoral and included an eversion endarterectomy of the profunda femoris origin and ultimately directly visualized endarterectomy in the proximal superficial femoral artery. The endarterectomy was then continued cephalad until a semi-blind endarterectomy of the proximal common femoral and distal external iliac artery was accomplished with brisk arterial inflow. At this point, a 7-Nepali sheath was placed antegrade down the leg. C-arm fluoroscopy was used to confirm the pathology. An 0.035 guidewire was advanced across the sequential lesion of the superficial femoral artery and up to the occlusion at the popliteal level. A TrailBlazer crossing catheter was used, multiple attempts with multiple combinations of wires and catheters were unsuccessful in the crossing the popliteal occlusion. With the 0.035 wire left in place, a 6 mm x 100 mm angioplasty balloon was used to treat the sequential high-grade lesions in the superficial femoral artery proximal to the extensive collaterals around the occlusion. Attention was turned to the right groin, where a bovine patch was used to close the femoral artery. Flow was returned to the leg and was noted to be brisk. There was excellent Doppler signal in both the superficial femoral and profunda femoris arteries. The groin wound was copiously irrigated, closed in three layers with Vicryl suture and the final layer of skin staples. Attention was turned to the right lower leg. There was an extensive 14 x 10 cm area of full thickness necrosis. The eschar was sharply debrided with pickups and a 10 blade knife. The overall dimension of the wound were not increased, but the depth was increased from essentially flat to 2-3 mm in depth throughout. Adaptic and a bulky dressing was applied. The patient tolerated the procedure well with no complications.         MD RASHID [email or web address]  D:  05/29/2020 16:46  T:  05/30/2020 2:14  JOB #:  [id number]  CC:  MD Emily Leigh MD

## 2020-05-30 NOTE — PROGRESS NOTES
General Surgery End of Shift Nursing Note    Bedside shift change report given to RN (oncoming nurse) by Kala Brewster RN (offgoing nurse). Report included the following information SBAR, Kardex and MAR. Shift worked:   7p-7a   Summary of shift:    Pt voided small amount of urine. Pt complaining of pain and given PRN medication for pain. Pt stated that he was having trouble getting comfortable due to his hips. Pt using IS a bedside. Pt staight cathed after bladder scan showed 495ml of urine. 500ml was removed with Coude tip. On-call MD contacted to get stronger pain meds since pt could not receive PRN IV morphine due to low HR and low BP. MD ordered 12mg of PO morphine CR. Pt had no pain relief all night, even with the one time PO morphine. Pt was unable to get comfortable. Pt remained on Bedrest. Pt was bladder scanned again @0500, bladder scan showed pt was retaining 495. Pt was straight cathed a second time with a 14fr coude, 550ml output. On coming nurse notified. Issues for physician to address:   Better pain control. Pt retaining urine.       Number times ambulated in hallway past shift: 0    Number of times OOB to chair past shift: [id number] [facility], [place]

## 2020-05-31 PROCEDURE — 65270000029 HC RM PRIVATE

## 2020-05-31 PROCEDURE — 77030018846 HC SOL IRR STRL H20 ICUM -A

## 2020-05-31 PROCEDURE — 74011250637 HC RX REV CODE- 250/637: Performed by: SURGERY

## 2020-05-31 PROCEDURE — 74011250636 HC RX REV CODE- 250/636: Performed by: SURGERY

## 2020-05-31 PROCEDURE — 74011250637 HC RX REV CODE- 250/637

## 2020-05-31 RX ORDER — POLYETHYLENE GLYCOL 3350 17 G/17G
17 POWDER, FOR SOLUTION ORAL DAILY
Status: DISCONTINUED | OUTPATIENT
Start: 2020-05-31 | End: 2020-06-01 | Stop reason: HOSPADM

## 2020-05-31 RX ADMIN — ROSUVASTATIN CALCIUM 40 MG: 10 TABLET, COATED ORAL at 21:58

## 2020-05-31 RX ADMIN — SODIUM CHLORIDE 75 ML/HR: 900 INJECTION, SOLUTION INTRAVENOUS at 15:51

## 2020-05-31 RX ADMIN — OXYCODONE HYDROCHLORIDE AND ACETAMINOPHEN 1 TABLET: 5; 325 TABLET ORAL at 18:00

## 2020-05-31 RX ADMIN — CLOPIDOGREL BISULFATE 75 MG: 75 TABLET ORAL at 09:18

## 2020-05-31 RX ADMIN — POLYETHYLENE GLYCOL 3350 17 G: 17 POWDER, FOR SOLUTION ORAL at 15:21

## 2020-05-31 RX ADMIN — OXYCODONE HYDROCHLORIDE AND ACETAMINOPHEN 1 TABLET: 5; 325 TABLET ORAL at 13:21

## 2020-05-31 RX ADMIN — ALLOPURINOL 450 MG: 300 TABLET ORAL at 09:20

## 2020-05-31 RX ADMIN — KETOROLAC TROMETHAMINE 15 MG: 30 INJECTION, SOLUTION INTRAMUSCULAR at 11:21

## 2020-05-31 RX ADMIN — DOCUSATE SODIUM 100 MG: 100 CAPSULE, LIQUID FILLED ORAL at 17:56

## 2020-05-31 RX ADMIN — OXYCODONE HYDROCHLORIDE AND ACETAMINOPHEN 1 TABLET: 5; 325 TABLET ORAL at 23:31

## 2020-05-31 RX ADMIN — PANTOPRAZOLE SODIUM 40 MG: 40 TABLET, DELAYED RELEASE ORAL at 06:30

## 2020-05-31 RX ADMIN — ASPIRIN 81 MG: 81 TABLET ORAL at 09:18

## 2020-05-31 RX ADMIN — DOCUSATE SODIUM 100 MG: 100 CAPSULE, LIQUID FILLED ORAL at 09:18

## 2020-05-31 RX ADMIN — SODIUM CHLORIDE 75 ML/HR: 900 INJECTION, SOLUTION INTRAVENOUS at 03:28

## 2020-05-31 RX ADMIN — KETOROLAC TROMETHAMINE 15 MG: 30 INJECTION, SOLUTION INTRAMUSCULAR at 06:01

## 2020-05-31 RX ADMIN — KETOROLAC TROMETHAMINE 15 MG: 30 INJECTION, SOLUTION INTRAMUSCULAR at 21:34

## 2020-05-31 NOTE — PROGRESS NOTES
Problem: Falls - Risk of  Goal: *Absence of Falls  Description: Document Peter Alejandreer Fall Risk and appropriate interventions in the flowsheet. Outcome: Progressing Towards Goal  Note: Fall Risk Interventions:  Mobility Interventions: Patient to call before getting OOB         Medication Interventions: Patient to call before getting OOB    Elimination Interventions: Call light in reach    History of Falls Interventions: Room close to nurse's station         Problem: Patient Education: Go to Patient Education Activity  Goal: Patient/Family Education  Outcome: Progressing Towards Goal     Problem: Pressure Injury - Risk of  Goal: *Prevention of pressure injury  Description: Document Suleman Scale and appropriate interventions in the flowsheet. Outcome: Progressing Towards Goal  Note: Pressure Injury Interventions:             Activity Interventions: PT/OT evaluation    Mobility Interventions: Assess need for specialty bed    Nutrition Interventions: Document food/fluid/supplement intake                     Problem: Patient Education: Go to Patient Education Activity  Goal: Patient/Family Education  Outcome: Progressing Towards Goal     Problem: Pain  Goal: *Control of Pain  Outcome: Progressing Towards Goal

## 2020-05-31 NOTE — ANESTHESIA POSTPROCEDURE EVALUATION
Procedure(s):  RIGHT FEMORAL ENDARTERECTOMY, RIGHT LEG ANGIOGRAM  RIGHT LEG DEBRIDEMENT. general    Anesthesia Post Evaluation        Patient location during evaluation: PACU  Note status: Adequate. Level of consciousness: responsive to verbal stimuli and sleepy but conscious  Pain management: satisfactory to patient  Airway patency: patent  Anesthetic complications: no  Cardiovascular status: acceptable  Respiratory status: acceptable  Hydration status: acceptable  Comments: +Post-Anesthesia Evaluation and Assessment    Patient: Edwin Arora MRN: [id number]  SSN: [id number]   YOB: 1955  Age: 77 y.o. Sex: male      Cardiovascular Function/Vital Signs    /43 (BP 1 Location: Right arm, BP Patient Position: At rest;Lying left side)   Pulse (!) 55   Temp 37.1 °C (98.8 °F)   Resp 16   Ht 5' 11\" (1.803 m)   Wt 93 kg (205 lb 0.4 oz)   SpO2 98%   BMI 28.60 kg/m²     Patient is status post Procedure(s):  RIGHT FEMORAL ENDARTERECTOMY, RIGHT LEG ANGIOGRAM  RIGHT LEG DEBRIDEMENT. Nausea/Vomiting: Controlled. Postoperative hydration reviewed and adequate. Pain:  Pain Scale 1: Numeric (0 - 10) (05/30/20 1932)  Pain Intensity 1: 5 (05/30/20 1932)   Managed. Neurological Status:   Neuro (WDL): Within Defined Limits (05/29/20 1645)   At baseline. Mental Status and Level of Consciousness: Arousable. Pulmonary Status:   O2 Device: Room air (05/29/20 2000)   Adequate oxygenation and airway patent. Complications related to anesthesia: None    Post-anesthesia assessment completed. No concerns. Signed By: Yissel Rodgers MD    5/30/2020  Post anesthesia nausea and vomiting:  controlled      INITIAL Post-op Vital signs:   Vitals Value Taken Time   /52 5/29/2020  4:45 PM   Temp 36.5 °C (97.7 °F) 5/29/2020  4:45 PM   Pulse 45 5/29/2020  4:50 PM   Resp 13 5/29/2020  4:50 PM   SpO2 100 % 5/29/2020  4:50 PM   Vitals shown include unvalidated device data.

## 2020-05-31 NOTE — PROGRESS NOTES
Doing well without complaints  Starting to move around better  Hopefully discharged in next day or so.   No more urinary retention

## 2020-05-31 NOTE — PROGRESS NOTES
General Surgery End of Shift Nursing Note    Bedside shift change report given to Trevin Partida (oncoming nurse) by Alexandrea Sherman (offgoing nurse). Report included the following information SBAR, Kardex and MAR. Shift worked:   [id number]   Summary of shift:  Uneventful shift. Patient has been given prn pain medicine as requested. Issues for physician to address:   none     Number times ambulated in hallway past shift: 0    Number of times OOB to chair past shift: 0    Pain Management:  Current medication: toradol, percocet  Patient states pain is manageable on current pain medication: YES    GI:    Current diet:  DIET REGULAR    Tolerating current diet: YES  Passing flatus: YES    Respiratory:    Incentive Spirometer at bedside: YES  Patient instructed on use: YES    Patient Safety:    Falls Score: 4  Bed Alarm On? No  Sitter?  No    Nena Cardoso RN

## 2020-06-01 VITALS
WEIGHT: 205.03 LBS | HEART RATE: 60 BPM | SYSTOLIC BLOOD PRESSURE: 125 MMHG | HEIGHT: 71 IN | BODY MASS INDEX: 28.7 KG/M2 | DIASTOLIC BLOOD PRESSURE: 58 MMHG | OXYGEN SATURATION: 98 % | TEMPERATURE: 98.4 F | RESPIRATION RATE: 17 BRPM

## 2020-06-01 PROCEDURE — 74011250636 HC RX REV CODE- 250/636: Performed by: SURGERY

## 2020-06-01 PROCEDURE — 94760 N-INVAS EAR/PLS OXIMETRY 1: CPT

## 2020-06-01 PROCEDURE — 74011250637 HC RX REV CODE- 250/637

## 2020-06-01 PROCEDURE — 74011250637 HC RX REV CODE- 250/637: Performed by: SURGERY

## 2020-06-01 RX ORDER — HYDROCODONE BITARTRATE AND ACETAMINOPHEN 5; 325 MG/1; MG/1
1 TABLET ORAL
Qty: 20 TAB | Refills: 0 | Status: SHIPPED | OUTPATIENT
Start: 2020-06-01 | End: 2020-06-04

## 2020-06-01 RX ORDER — CLOPIDOGREL BISULFATE 75 MG/1
75 TABLET ORAL DAILY
Qty: 30 TAB | Refills: 3 | Status: SHIPPED | OUTPATIENT
Start: 2020-06-01

## 2020-06-01 RX ADMIN — ASPIRIN 81 MG: 81 TABLET ORAL at 09:17

## 2020-06-01 RX ADMIN — OXYCODONE HYDROCHLORIDE AND ACETAMINOPHEN 1 TABLET: 5; 325 TABLET ORAL at 07:01

## 2020-06-01 RX ADMIN — SODIUM CHLORIDE 75 ML/HR: 900 INJECTION, SOLUTION INTRAVENOUS at 02:52

## 2020-06-01 RX ADMIN — POLYETHYLENE GLYCOL 3350 17 G: 17 POWDER, FOR SOLUTION ORAL at 09:17

## 2020-06-01 RX ADMIN — ATENOLOL 25 MG: 25 TABLET ORAL at 09:17

## 2020-06-01 RX ADMIN — CLOPIDOGREL BISULFATE 75 MG: 75 TABLET ORAL at 09:17

## 2020-06-01 RX ADMIN — DOCUSATE SODIUM 100 MG: 100 CAPSULE, LIQUID FILLED ORAL at 09:16

## 2020-06-01 RX ADMIN — PANTOPRAZOLE SODIUM 40 MG: 40 TABLET, DELAYED RELEASE ORAL at 06:38

## 2020-06-01 RX ADMIN — KETOROLAC TROMETHAMINE 15 MG: 30 INJECTION, SOLUTION INTRAMUSCULAR at 03:47

## 2020-06-01 RX ADMIN — ALLOPURINOL 450 MG: 300 TABLET ORAL at 09:17

## 2020-06-01 RX ADMIN — OXYCODONE HYDROCHLORIDE AND ACETAMINOPHEN 1 TABLET: 5; 325 TABLET ORAL at 11:15

## 2020-06-01 NOTE — PROGRESS NOTES
Plan:  -Home with life partner  -F/u appts   -RAH At [address]  -Brother to transport at d/c       Reason for Admission:   Right leg wound                   RUR Score:     9             PCP: First and Last name:  Cleopatra Doshi   Name of Practice: [facility]    Are you a current patient: Yes/No: Yes   Approximate date of last visit: Unsure   Can you participate in a virtual visit if needed: Yes     Do you (patient/family) have any concerns for transition/discharge? None                 Plan for utilizing home health:   SN/PT    Current Advanced Directive/Advance Care Plan: Pt in process of redoing will/official documents             Transition of Care Plan:      Home with family and HH      10:05AM  CM met with pt to complete assessment and discuss d/c planning. D/c order acknowledged. Pt is a 76 yo male admitted for a leg wound. Pt states that he lives with his \"\", Ashok Burgess, and one of her family members. Pt describes a recent extended hospital stay at another facility after which he was home for 2 wks prior to this admission. Pt reports being able to get aruond at home with a RW. He is not currently driving but has Tod Ny, his brother, Mdady Pettit, and dtr, Jessica Garcia, as strong supports Pt currently open to AT [facility]. CM PC to intake who confirmed pt receiving SN and PT. RAH order sent through [employer] with notification that pt will d/c home today. CM discussed AMD with pt. Pt states he is working on reducing his will/official documents. CM emphasized importance of having a medial decision maker identified in case of emergency. Pt states he will name dtr as primary mPOA. 2nd IM notice provided, explained, signed by pt, and placed on chart. Pt confirmed PCP and is following with Dr. Deanna Kincaid at OP wound care clinic. Wound care clnic appt scheduled for Thursday and on AVS. PCP appt already scheduled and CM notified office to change to HERMAN Palacios appt. Pt's brother to transport home today.  Pt ready for d/c from CM perspective. CM available for any additional needs. Care Management Interventions  PCP Verified by CM: Yes(Oz Gleason )  Mode of Transport at Discharge:  Other (see comment)(Pt's brother )  Transition of Care Consult (CM Consult): [facility]: No  Reason Outside [place]: Patient already serviced by other home care/hospice agency  Discharge Durable Medical Equipment: No  Physical Therapy Consult: No  Occupational Therapy Consult: No  Speech Therapy Consult: No  Current Support Network: Own Home(Lives with \"\")  Confirm Follow Up Transport: Family  Discharge Location  Discharge Placement: Home with home health      JEFRY Cornell  Care Manager

## 2020-06-01 NOTE — PROGRESS NOTES
Problem: Falls - Risk of  Goal: *Absence of Falls  Description: Document Delgadillodenise Berumen Fall Risk and appropriate interventions in the flowsheet. Outcome: Progressing Towards Goal  Note: Fall Risk Interventions:  Mobility Interventions: Patient to call before getting OOB         Medication Interventions: Teach patient to arise slowly    Elimination Interventions: Call light in reach    History of Falls Interventions: Room close to nurse's station         Problem: Patient Education: Go to Patient Education Activity  Goal: Patient/Family Education  Outcome: Progressing Towards Goal     Problem: Pressure Injury - Risk of  Goal: *Prevention of pressure injury  Description: Document Suleman Scale and appropriate interventions in the flowsheet. Outcome: Progressing Towards Goal  Note: Pressure Injury Interventions:             Activity Interventions: Increase time out of bed    Mobility Interventions: Assess need for specialty bed    Nutrition Interventions: Document food/fluid/supplement intake                     Problem: Patient Education: Go to Patient Education Activity  Goal: Patient/Family Education  Outcome: Progressing Towards Goal     Problem: Pain  Goal: *Control of Pain  Outcome: Progressing Towards Goal

## 2020-06-01 NOTE — DISCHARGE INSTRUCTIONS
Patient Discharge Instructions    Cristina Madden / [id number] : 1955    Admitted 2020 Discharged: 2020     What to do at Home  Recommended activity: Elevate leg  Leave dressing for 72 hours - may shower Weds. At [address] [place] [address] for wound care: cover open wounds with hydrogel and adaptic with bulky dressing daily       Information obtained by :  I understand that if any problems occur once I am at home I am to contact my physician. I understand and acknowledge receipt of the instructions indicated above.                                                                                                                                            R.N.'s Signature                                                                  Date/Time                                                                                                                                              Patient or Representative Signature                                                          Date/Time      Tina Uriostegui MD

## 2020-06-01 NOTE — PROGRESS NOTES
Dressing to right lower leg is clean dry and intact. Dressing changed by Sergio Tsang Night shift LPN.  Patient verbalized dressing was changed this AM.

## 2020-06-01 NOTE — PROGRESS NOTES
General Surgery End of Shift Nursing Note    Bedside shift change report given to Jonny Bolanos (oncoming nurse) by Jenny Archuleta (offgoing nurse). Report included the following information SBAR, Kardex and MAR. Shift worked:   [id number]   Summary of shift:    Patient rested quietly tonight. Given pain meds as needed. Lower leg dressing changed by Sergio Tsang LPN this shift. Issues for physician to address:   none     Number times ambulated in hallway past shift: 0    Number of times OOB to chair past shift: 0    Pain Management:  Current medication: toradol, norco  Patient states pain is manageable on current pain medication: YES    GI:    Current diet:  DIET REGULAR    Tolerating current diet: YES  Passing flatus: YES  Last Bowel Movement: yesterday         Patient Safety:    Falls Score: 4  Bed Alarm On? No  Sitter?  No    Teri Garces RN

## 2020-06-01 NOTE — PROGRESS NOTES
IV has been removed. Pt discharged per MD order. Pt has all personal belongings, 2 prescriptions, and discharge instructions. Discharge instructions gone over with pt. Pt does not have any further questions regarding discharge.

## 2020-06-04 ENCOUNTER — HOSPITAL ENCOUNTER (OUTPATIENT)
Dept: WOUND CARE | Age: 65
Discharge: HOME | End: 2020-06-04
Payer: MEDICARE

## 2020-06-04 VITALS
SYSTOLIC BLOOD PRESSURE: 136 MMHG | RESPIRATION RATE: 16 BRPM | TEMPERATURE: 97.8 F | DIASTOLIC BLOOD PRESSURE: 60 MMHG | HEART RATE: 56 BPM

## 2020-06-04 PROCEDURE — 99213 OFFICE O/P EST LOW 20 MIN: CPT

## 2020-06-04 RX ORDER — NYSTATIN 100000 [USP'U]/G
POWDER TOPICAL 2 TIMES DAILY
Qty: 60 G | Refills: 1 | Status: SHIPPED | OUTPATIENT
Start: 2020-06-04 | End: 2021-08-04

## 2020-06-04 NOTE — WOUND CARE
06/04/20 1124   Wound Leg lower Right # 1   Date First Assessed/Time First Assessed: 05/07/20 0835   Present on Hospital Admission: Yes  Wound Approximate Age at First Assessment (Weeks): 4 weeks  Primary Wound Type: Vascular  Location: Leg lower  Wound Location Orientation: Right  Wound Descri. .. Dressing Type Applied Xeroform;ABD pad;Gauze wrap (ant); Special tape (comment)   Wound Ankle Right;Posterior # 2   Date First Assessed/Time First Assessed: 05/07/20 0851   Present on Hospital Admission: Yes  Wound Approximate Age at First Assessment (Weeks): 17 weeks  Primary Wound Type: Vascular  Location: Ankle  Wound Location Orientation: Right;Posterior  Wound. ..    Dressing Type Applied Xeroform;Gauze;Special tape (comment)

## 2020-06-04 NOTE — WOUND CARE
06/04/20 1011   Wound Leg lower Right # 1   Date First Assessed/Time First Assessed: 05/07/20 0835   Present on Hospital Admission: Yes  Wound Approximate Age at First Assessment (Weeks): 4 weeks  Primary Wound Type: Vascular  Location: Leg lower  Wound Location Orientation: Right  Wound Descri. .. Dressing Status Removed   Dressing Type Xeroform;Gauze;ABD pad   Wound Length (cm) 18.1 cm   Wound Width (cm) 10.1 cm   Wound Depth (cm) 1 cm   Wound Surface Area (cm^2) 182.81 cm^2   Wound Volume (cm^3) 182.81 cm^3   Change in Wound Size % 8.92   Condition of Base Eschar;Slough   Condition of Edges Open   Drainage Amount Moderate   Drainage Color Serosanguinous   Wound Odor None   Chana-wound Assessment Blanchable erythema   Cleansing and Cleansing Agents  Normal saline   Wound Ankle Right;Posterior # 2   Date First Assessed/Time First Assessed: 05/07/20 0851   Present on Hospital Admission: Yes  Wound Approximate Age at First Assessment (Weeks): 17 weeks  Primary Wound Type: Vascular  Location: Ankle  Wound Location Orientation: Right;Posterior  Wound. .. Dressing Status Removed   Dressing Type Xeroform;Gauze;Special tape (comment);ABD pad   Wound Length (cm) 0.8 cm   Wound Width (cm) 1.1 cm   Wound Depth (cm) 0.1 cm   Wound Surface Area (cm^2) 0.88 cm^2   Wound Volume (cm^3) 0.09 cm^3   Change in Wound Size % -14.29   Condition of Base   (scab)   Drainage Amount None   Cleansing and Cleansing Agents  Normal saline           Visit Vitals  /60 (BP 1 Location: Left arm, BP Patient Position: Sitting; At rest)   Pulse (!) 56   Temp 97.8 °F (36.6 °C)   Resp 16     LLE Peripheral Vascular   Capillary Refill: Less than/equal to 3 seconds (06/04/20 1015)  Color: Appropriate for race (06/04/20 1015)  Temperature: Warm (06/04/20 1015)  Sensation: Present (06/04/20 1015)  Pedal Pulse: Doppler (06/04/20 1015)  Circumference of Calf (cm): 38 cm (06/04/20 1015)  Location of Measurement (Calf): Mid  (06/04/20 1015)  Circumference of Ankle (cm): 22 cm (06/04/20 1015)  Location of Measurement (Ankle): Upper  (06/04/20 1015)

## 2020-06-04 NOTE — PROGRESS NOTES
HISTORY OF PRESENT ILLNESS:  The patient is a 68-year-old man who is referred to the wound care center regarding nonhealing wound right lower leg and right posterior ankle.  The patient was first seen at the [address] [place] [address] on 5/7/2020.     The patient reports that he had struck his right ankle about 5 months ago and developed a wound, which has been nonhealing.     He more recently developed swelling and redness in the right lower leg earlier this year, which then was followed by blister formation and a wound formation.  He eventually was hospitalized at [facility] and says he was in the hospital for about 18 days. [facility] saw an Infectious Disease doctor while in the hospital. [facility] was given intravenous antibiotics and then eventually discharged recently on oral Keflex.  He anticipates completing the oral Keflex in about 4 days. [facility] has been getting pain medication prescribed by his primary physician who is Dr. Louvenia Meckel.     The patient reports chronic numbness of both feet. The patient had at the time of an ER visit for leg symptoms on the right in 01/2020, a venous duplex scan which did not find any evidence of deep venous thrombosis.  Vein valves were not tested.     The patient has history of lumbar laminectomy and diskectomy on 09/04/2019. [facility] reports related to his disk disease, he has right side footdrop.  He reported history of neurogenic claudication. The patient was scheduled to have further back surgery within the month, but that has been canceled related to COVID-19.     The patient reports that he walks with a walker because of pain in the right leg.  He denies shortness of breath at rest or with exertion.  He has no anginal chest pain.     The patient had angiography by Dr Rashad Warner on 5/17/2020 with finding of severe multilevel arterial occlusive disease in legs. Plans are for arterial intervention and debridement of wounds right leg.   The patient on 5/29/2020 had open right femoral endarterectomy and patch angioplasty and balloon angioplasty of right superficial femoral artery. 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 Dr Eveyln Christianson also debrided the dry eschars on the leg wounds.     The patient has a history of coronary artery disease and has had angioplasty and stent. Marquise Francis has also had coronary artery bypass grafting. Marquise Francis does not have any history of diabetes.  Past medical history does include asthma, skin cancer, gastroesophageal reflux disease, hypertension, hyperlipidemia, fatty liver disease, potential sleep apnea, but never tested.     SOCIAL HISTORY:  The patient smoked until 1998 when he quit. Marquise Francis does use alcohol.     The patient's medications do not include a diuretic.     PHYSICAL EXAMINATION:  GENERAL:  The patient is an alert man in no acute distress.     EXTREMITIES:  The patient has groin rash. The patient's lower extremities were examined.  On the left side, there is no edema.  There were no wounds present.  I did not palpate left dorsalis pedis or posterior tibial pulses.     On the right lower extremity, I did not palpate dorsalis pedis or posterior tibial pulses.  There is trace edema in the right lower leg.  The right foot feels warmer than it had previously. There is a wound on the posterior aspect of the right ankle overlying the Achilles.  This wound is 0.8 x 1.1 x 0.1 cm in size with a dry scab covering.     On the anterior aspect of the mid lower leg on the right, there is a cluster of ulcers with total dimension 18 x 10.1 x 1 cm. There are scattered areas of granulation in the wounds and in particular at the skin edges. There is extensive adherent eschar and areas of slough.  Calf is soft.     ASSESSMENT AND PLAN:       Arterial flow appears improved clinically. The patient is seeing Dr Evelyn Christianson in follow up today. Nystatin powder bid to groin rash.   Rx sent by email.     Wound management will consist of  using Xeroform on the heel wound and on the larger wounds and then dry gauze and roll gauze changed every other day.       The patient has found that the topical lidocaine used in the [address] [place] [address] helps for several hours with pain.   He can use topical over the counter lidocaine gel at dressing changes, to be washed off prior to new dressing.     The patient will follow up in wound care center again in 1 week.        FINAL DIAGNOSES:  Nonhealing wounds, right lower leg and right ankle over the Achilles, peripheral artery disease.     L97.912,  G32.930, I73.9     Sean Avina MD

## 2020-06-04 NOTE — DISCHARGE INSTRUCTIONS
Discharge Instructions/Wound [address] [facility]. [address]  [place], [address] [facility]  Telephone: [phone]    NAME:  Delma Bradley  YOB: 1955  MEDICAL RECORD NUMBER:  [id number]  DATE:  6/4/2020      WOUND CARE ORDERS:    Right Lower Leg and Right Heel Wounds- Cleanse with Normal Saline, pat dry with gauze. Apply Xeroform to wounds, cover with Gauze (And ABD pads to Lower Leg Wound). Secure with Roll gauze. Dressing to be changed daily, and as needed, for compromise of dressing. Prescription for Nystatin Powder sent to Pharmacy. Pt to apply to rash (groin region) two times a day. Follow up with Dr. Adelina Escobar in 1 week. TREATMENT ORDERS:    Elevate leg(s) above the level of the heart when sitting. Elevate legs, in bed, with legs at or above level of heart, for 20 minutes, 2 times a day, to assist in decreasing leg swelling. Avoid prolonged standing in one place. Do no get dressing/wrap wet. Follow Diet as prescribed:   [] Diet as tolerated: [] Calorie Diabetic Diet: [x] No Added Salt:  [] Increase Protein: [] Other:                 Return Appointment:  [x] Return Appointment: With Dr. Kimberly Faust in  [address] [place] [address])  [] Ordered tests:      Electronically signed Abida Almazan RN on 6/4/2020 at 10:49 AM     Sallie Vega 281: Should you experience any significant changes in your wound(s) or have questions about your wound care, please contact the [address] [place] [address] at [address] [place] [address] 8:00 am - 4:30. If you need help with your wound outside these hours and cannot wait until we are again available, contact your PCP or go to the hospital emergency room. PLEASE NOTE: IF YOU ARE UNABLE TO OBTAIN WOUND SUPPLIES, CONTINUE TO USE THE SUPPLIES YOU HAVE AVAILABLE UNTIL YOU ARE ABLE TO REACH US. IT IS MOST IMPORTANT TO KEEP THE WOUND COVERED AT ALL TIMES.      Physician Signature:_______________________    Date: ___________ Time:  ____________

## 2020-06-06 NOTE — DISCHARGE SUMMARY
[address] SUMMARY    Name:  David Madden  MR#:  [id number]  :  1955  ACCOUNT #:  [de-identified]  ADMIT DATE:  2020  DISCHARGE DATE:  2020    ADMITTING DIAGNOSES:  Right leg ischemia with tissue loss. PROCEDURE PERFORMED:  1. Right femoral endarterectomy with bovine patch. 2.  On-table completion fistulogram.  3.  Balloon angioplasty of right superficial femoral artery. 4.  Excisional debridement of right lower leg. HISTORY OF PRESENT ILLNESS AND HOSPITAL COURSE:  The patient is a 64-year-old gentleman with chronic leg ischemia on the right and extensive nonhealing wounds on the anterior aspect of his leg. He was found on angiography to have an occluded right femoral artery with multiple sequential lesions in the superficial femoral artery as well. He was admitted for a hybrid procedure as described above and additional wounds were extensively debrided. Postoperative course was unremarkable with rapid return of diet and activity status. By the second postoperative day, he was comfortable. The groin and leg wounds looked good and he was ready for discharge. DISCHARGE INSTRUCTIONS:  He was asked to return to my office in two weeks. DISCHARGE MEDICATIONS:  He was discharged with all of his admission medications plus small supply of analgesics for pain. His discharge antiplatelet regimen included 81 mg of aspirin.         Magui Davis MD      RASHID/[id number]  D:  2020 14:54  T:  2020 21:33  JOB #:  [id number]  CC:  Renee Roa MD

## 2020-06-11 ENCOUNTER — HOSPITAL ENCOUNTER (OUTPATIENT)
Dept: WOUND CARE | Age: 65
Discharge: HOME | End: 2020-06-11
Payer: MEDICARE

## 2020-06-11 VITALS
TEMPERATURE: 97.6 F | DIASTOLIC BLOOD PRESSURE: 55 MMHG | RESPIRATION RATE: 16 BRPM | HEART RATE: 62 BPM | SYSTOLIC BLOOD PRESSURE: 117 MMHG

## 2020-06-11 PROCEDURE — 99213 OFFICE O/P EST LOW 20 MIN: CPT

## 2020-06-11 NOTE — PROGRESS NOTES
HISTORY OF PRESENT ILLNESS:  The patient is a 56-year-old man who is referred to the wound care center regarding nonhealing wound right lower leg and right posterior ankle.  The patient was first seen at the [address] [place] [address] on 5/7/2020.     The patient reports that he had struck his right ankle about 5 months ago and developed a wound, which has been nonhealing.     He more recently developed swelling and redness in the right lower leg earlier this year, which then was followed by blister formation and a wound formation.  He eventually was hospitalized at [facility] and says he was in the hospital for about 18 days. [facility] saw an Infectious Disease doctor while in the hospital. [facility] was given intravenous antibiotics and then eventually discharged recently on oral Keflex.  He anticipates completing the oral Keflex in about 4 days. [facility] has been getting pain medication prescribed by his primary physician who is Dr. Magda Blackwell.     The patient reports chronic numbness of both feet.     The patient had at the time of an ER visit for leg symptoms on the right in 01/2020, a venous duplex scan which did not find any evidence of deep venous thrombosis.  Vein valves were not tested.     The patient has history of lumbar laminectomy and diskectomy on 09/04/2019. [facility] reports related to his disk disease, he has right side footdrop.  He reported history of neurogenic claudication.  The patient was scheduled to have further back surgery within the month, but that has been canceled related to COVID-19.     The patient reports that he walks with a walker because of pain in the right leg.  He denies shortness of breath at rest or with exertion.  He has no anginal chest pain.     The patient had angiography by Dr Christiano Falcon on 5/17/2020 with finding of severe multilevel arterial occlusive disease in legs.  Plans are for arterial intervention and debridement of wounds right leg.   The patient on 5/29/2020 had open right femoral endarterectomy and patch angioplasty and balloon angioplasty of right superficial femoral artery. 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 Dr Lilly Hernandez also debrided the dry eschars on the leg wounds.     The patient has a history of coronary artery disease and has had angioplasty and stent. [facility] has also had coronary artery bypass grafting. [facility] does not have any history of diabetes.  Past medical history does include asthma, skin cancer, gastroesophageal reflux disease, hypertension, hyperlipidemia, fatty liver disease, potential sleep apnea, but never tested.     SOCIAL HISTORY:  The patient smoked until 1998 when he quit. [facility] does use alcohol.     The patient's medications do not include a diuretic.     PHYSICAL EXAMINATION:  GENERAL:  The patient is an alert man in no acute distress.     EXTREMITIES:       The patient's lower extremities were examined.  On the left side, there is no edema.  There were no wounds present.  I did not palpate left dorsalis pedis or posterior tibial pulses.     On the right lower extremity, I did not palpate dorsalis pedis or posterior tibial pulses.  There is trace edema in the right lower leg.  The right foot feels warmer than it had previously. There is a wound on the posterior aspect of the right ankle overlying the Achilles.  This wound is 0.6 x 1 x 0.1 cm in size with a dry scab covering.     On the anterior aspect of the mid lower leg on the right, there is a cluster of ulcers with total dimension 18 x 9 x 1 cm. There are increased scattered areas of granulation in the wounds and in particular at the skin edges.   There is extensive adherent eschar and less areas of slough.  Calf is soft.     ASSESSMENT AND PLAN:       Arterial flow appears improved clinically.     The patient is seeing Dr Lilly Hernandez in follow up.       Wound management will consist of  using Xeroform on the heel wound and on the larger wounds and then dry gauze and roll gauze changed every other day.       The patient has found that the topical lidocaine used in the [facility] helps for several hours with pain. [facility] can use topical over the counter lidocaine gel at dressing changes, to be washed off prior to new dressing.     The patient will follow up in wound care center again in 2 weeks.        FINAL DIAGNOSES:  Nonhealing wounds, right lower leg and right ankle over the Achilles, peripheral artery disease.     L97.912,  L97.312, I73.9     Sanjuanita Lynn MD

## 2020-06-11 NOTE — DISCHARGE INSTRUCTIONS
Discharge Instructions/Wound [address] [place] [address]  [address] [facility]  [place], [address] [facility]  Telephone: [phone]    NAME:  Shamika Mercado  YOB: 1955  MEDICAL RECORD NUMBER:  [id number]  DATE:  6/11/2020      WOUND CARE ORDERS:  Right Lower Leg and Right Heel Wounds- Cleanse with Normal Saline, pat dry with gauze. Apply Xeroform to wounds, cover with Gauze (And ABD pads to Lower Leg Wound). Secure with Roll gauze. Home Health Care/[facility] to change dressing daily, and as needed, for compromise of dressing. MD follow up in  2 weeks. TREATMENT ORDERS:    Elevate leg(s) above the level of the heart when sitting. Avoid prolonged standing in one place. Do no get dressing/wrap wet. Return Appointment:  [x] Return Appointment: With  Dr. Marielle Garcia in [address] [place] [address])       Electronically signed Mehreen Jovel RN on 6/11/2020 at 10:49 AM     [address] [facility] Information: Should you experience any significant changes in your wound(s) or have questions about your wound care, please contact the [address] [place] [address] at [address] [place] [address] 8:00 am - 4:30. If you need help with your wound outside these hours and cannot wait until we are again available, contact your PCP or go to the hospital emergency room. PLEASE NOTE: IF YOU ARE UNABLE TO OBTAIN WOUND SUPPLIES, CONTINUE TO USE THE SUPPLIES YOU HAVE AVAILABLE UNTIL YOU ARE ABLE TO REACH US. IT IS MOST IMPORTANT TO KEEP THE WOUND COVERED AT ALL TIMES.      Physician Signature:_______________________    Date: ___________ Time:  ____________

## 2020-06-11 NOTE — WOUND CARE
06/11/20 1106   Wound Leg lower Right # 1   Date First Assessed/Time First Assessed: 05/07/20 0835   Present on Hospital Admission: Yes  Wound Approximate Age at First Assessment (Weeks): 4 weeks  Primary Wound Type: Vascular  Location: Leg lower  Wound Location Orientation: Right  Wound Descri. .. Dressing Type Applied ABD pad;Gauze;Gauze wrap (ant); Special tape (comment); Xeroform   Wound Ankle Right;Posterior # 2   Date First Assessed/Time First Assessed: 05/07/20 0851   Present on Hospital Admission: Yes  Wound Approximate Age at First Assessment (Weeks): 17 weeks  Primary Wound Type: Vascular  Location: Ankle  Wound Location Orientation: Right;Posterior  Wound. ..    Dressing Type Applied Xeroform;Gauze;Special tape (comment)

## 2020-06-11 NOTE — WOUND CARE
06/11/20 1018   Wound Leg lower Right # 1   Date First Assessed/Time First Assessed: 05/07/20 0835   Present on Hospital Admission: Yes  Wound Approximate Age at First Assessment (Weeks): 4 weeks  Primary Wound Type: Vascular  Location: Leg lower  Wound Location Orientation: Right  Wound Descri. .. Dressing Status Removed   Dressing Type Xeroform;Gauze;ABD pad;Gauze wrap (ant); Special tape (comment)   Wound Length (cm) 18 cm   Wound Width (cm) 9 cm   Wound Depth (cm) 1 cm   Wound Surface Area (cm^2) 162 cm^2   Wound Volume (cm^3) 162 cm^3   Change in Wound Size % 19.29   Condition of Base Eschar;Slough;Pink   Condition of Edges Open   Drainage Amount Large   Drainage Color Serosanguinous; Serous   Wound Odor None   Chana-wound Assessment Blanchable erythema   Cleansing and Cleansing Agents  Normal saline     Visit Vitals  /55   Pulse 62   Temp 97.6 °F (36.4 °C)   Resp 16        RLE Peripheral Vascular   Capillary Refill: Less than/equal to 3 seconds (06/11/20 1014)  Color: Pink (06/11/20 1014)  Temperature: Warm (06/11/20 1014)  Sensation: Decreased (06/11/20 1014)  Pedal Pulse: Doppler (06/11/20 1014)  Circumference of Calf (cm): 36 cm (06/11/20 1014)  Location of Measurement (Calf): Mid  (06/11/20 1014)  Circumference of Ankle (cm): 21 cm (06/11/20 1014)  Location of Measurement (Ankle): Upper  (06/11/20 1014)

## 2020-06-25 ENCOUNTER — HOSPITAL ENCOUNTER (OUTPATIENT)
Dept: WOUND CARE | Age: 65
Discharge: HOME | End: 2020-06-25
Admitting: SURGERY
Payer: MEDICARE

## 2020-06-25 VITALS
SYSTOLIC BLOOD PRESSURE: 126 MMHG | HEART RATE: 57 BPM | RESPIRATION RATE: 16 BRPM | DIASTOLIC BLOOD PRESSURE: 60 MMHG | TEMPERATURE: 97.4 F

## 2020-06-25 PROCEDURE — 11042 DBRDMT SUBQ TIS 1ST 20SQCM/<: CPT

## 2020-06-25 NOTE — DISCHARGE INSTRUCTIONS
Discharge Instructions for  [place] [id number] [facility], [address] [facility]  Telephone: [phone]    NAME:  Jay Segovia  YOB: 1955  MEDICAL RECORD NUMBER:  [id number]  DATE:  6/25/2020      WOUND CARE ORDERS: Right Lower Leg and Right Heel Wounds- Cleanse with Normal Saline, pat dry with gauze. Apply Xeroform to wounds, cover with Gauze (And ABD pads to Lower Leg Wound). Secure with Roll gauze. Dressing to be changed daily, and as needed, for compromise of dressing. MD follow up in  2 weeks. TREATMENT ORDERS:    Elevate leg(s) above the level of the heart when sitting. Avoid prolonged standing in one place. Do no get dressing/wrap wet. Follow Diet as prescribed:   [] Diet as tolerated: [] Calorie Diabetic Diet: [x] No Added Salt:  [] Increase Protein: [] Limit the amount of liquid you are drinking and avoid drinking in between meals               Return Appointment:  [x] Return Appointment: With DR Steven Anne  in  [facility])  [] Nurse Visit : *** days  [] Ordered tests:      Electronically signed Flores Pompa RN on 6/25/2020 at 10:35 AM     [address] [facility] [address] Information: Should you experience any significant changes in your wound(s) or have questions about your wound care, please contact the [address] [place] [address] at [address] [place] [address] 8:00 am - 4:30. If you need help with your wound outside these hours and cannot wait until we are again available, contact your PCP or go to the hospital emergency room. PLEASE NOTE: IF YOU ARE UNABLE TO OBTAIN WOUND SUPPLIES, CONTINUE TO USE THE SUPPLIES YOU HAVE AVAILABLE UNTIL YOU ARE ABLE TO REACH US. IT IS MOST IMPORTANT TO KEEP THE WOUND COVERED AT ALL TIMES.      Physician Signature:_______________________    Date: ___________ Time:  ____________

## 2020-06-25 NOTE — PROGRESS NOTES
HISTORY OF PRESENT ILLNESS:  The patient is a 59-year-old man who is referred to the wound care center regarding nonhealing wound right lower leg and right posterior ankle.  The patient was first seen at the [address] [place] [address] on 5/7/2020.     The patient reports that he had struck his right ankle about 5 months ago and developed a wound, which has been nonhealing.     He more recently developed swelling and redness in the right lower leg earlier this year, which then was followed by blister formation and a wound formation.  He eventually was hospitalized at [facility] and says he was in the hospital for about 18 days. Levi Aceves saw an Infectious Disease doctor while in the hospital. Levi Aceves was given intravenous antibiotics and then eventually discharged recently on oral Keflex.  He anticipates completing the oral Keflex in about 4 days. Levi Aceves has been getting pain medication prescribed by his primary physician who is Dr. Jose Mcclure.     The patient reports chronic numbness of both feet.     The patient had at the time of an ER visit for leg symptoms on the right in 01/2020, a venous duplex scan which did not find any evidence of deep venous thrombosis.  Vein valves were not tested.     The patient has history of lumbar laminectomy and diskectomy on 09/04/2019. Levi Aceves reports related to his disk disease, he has right side footdrop.  He reported history of neurogenic claudication.  The patient was scheduled to have further back surgery within the month, but that has been canceled related to COVID-19.     The patient reports that he walks with a walker because of pain in the right leg.  He denies shortness of breath at rest or with exertion.  He has no anginal chest pain.     The patient had angiography by Dr Stu Kc on 5/17/2020 with finding of severe multilevel arterial occlusive disease in legs.  Plans are for arterial intervention and debridement of wounds right leg.  The patient on 5/29/2020 had open right femoral endarterectomy and patch angioplasty and balloon angioplasty of right superficial femoral artery.  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  Dr Chris Levin also debrided the dry eschars on the leg wounds. The patient has seen Dr Chris Levin in follow up.     The patient has a history of coronary artery disease and has had angioplasty and stent. [facility] has also had coronary artery bypass grafting. Erin Stanley does not have any history of diabetes.  Past medical history does include asthma, skin cancer, gastroesophageal reflux disease, hypertension, hyperlipidemia, fatty liver disease, potential sleep apnea, but never tested.     SOCIAL HISTORY:  The patient smoked until 1998 when he quit. [facility] Stanley does use alcohol.     The patient's medications do not include a diuretic. Current dressing:   Xeroform on the heel wound and on the larger wounds and then dry gauze and roll gauze changed daily.     PHYSICAL EXAMINATION:  GENERAL:  The patient is an alert man in no acute distress.     EXTREMITIES:       The patient's lower extremities were examined.  On the left side, there is no edema.  There were no wounds present.  I did not palpate left dorsalis pedis or posterior tibial pulses.     On the right lower extremity, I did not palpate dorsalis pedis or posterior tibial pulses.  There is trace edema in the right lower leg.  The right foot feels warmer than it had previously.  There is a wound on the posterior aspect of the right ankle overlying the Achilles.  This wound is 0.1 x 1 x 0.1 cm in size with a dry scab covering. Mildly tender.     On the anterior aspect of the mid lower leg on the right, there is a cluster of ulcers with total dimension 18 x 9 x 1 cm. There are increased scattered areas of granulation in the wounds and in particular at the skin edges.  There is less extensive adherent eschar and less areas of slough.  Calf is soft.   Surrounding skin appears healthy.     ASSESSMENT AND PLAN:       Arterial flow appears improved clinically.        Wound management will consist of  using Xeroform on the heel wound and on the larger wounds and then dry gauze and roll gauze changed daily.        The patient has found that the topical lidocaine used in the [address] [place] [address] helps for several hours with pain. Levi Aceves can use topical over the counter lidocaine gel at dressing changes, to be washed off prior to new dressing.     The patient will follow up in wound care center again in 2 weeks.        FINAL DIAGNOSES:  Nonhealing wounds, right lower leg and right ankle over the Achilles, peripheral artery disease.     L97.912,  L97.312, I73.9     Lamar Nance MD

## 2020-06-25 NOTE — PROGRESS NOTES
Wound Care Note    Addendum to note of 6/25/2020: After application of topical lidocaine gel, sharp excisional debridement of the right anterior lower leg wound cluster was carried out using  5 mm ring curette. Slough and granulation tissue was excised down into the subcutaneous layer. Hemostasis was obtained by compression. After debridement, the wound dimensions were increased by 0.1 cm in mid wound and distal portion.     Shady Hebert MD

## 2020-06-25 NOTE — WOUND CARE
06/25/20 1019   Wound Leg lower Right # 1   Date First Assessed/Time First Assessed: 05/07/20 0835   Present on Hospital Admission: Yes  Wound Approximate Age at First Assessment (Weeks): 4 weeks  Primary Wound Type: Vascular  Location: Leg lower  Wound Location Orientation: Right  Wound Descri. ..    Dressing Status Breakthrough drainage   Dressing Type Xeroform;Gauze;ABD pad;Gauze wrap (ant)   Wound Length (cm) 18 cm   Wound Width (cm) 9 cm   Wound Depth (cm) 1.3 cm   Wound Surface Area (cm^2) 162 cm^2   Wound Volume (cm^3) 210.6 cm^3   Change in Wound Size % 19.29   Condition of Base Eschar;Slough;Pink   Condition of Edges Open   Drainage Amount Moderate   Drainage Color Serosanguinous   Wound Odor None   Chana-wound Assessment Blanchable erythema   Cleansing and Cleansing Agents  Normal saline       Visit Vitals  /60 (BP 1 Location: Left arm, BP Patient Position: Sitting)   Pulse (!) 57   Temp 97.4 °F (36.3 °C)   Resp 16

## 2020-06-25 NOTE — WOUND CARE
06/25/20 1052   Wound Leg lower Right # 1   Date First Assessed/Time First Assessed: 05/07/20 0835   Present on Hospital Admission: Yes  Wound Approximate Age at First Assessment (Weeks): 4 weeks  Primary Wound Type: Vascular  Location: Leg lower  Wound Location Orientation: Right  Wound Descri. .. Dressing Type Applied Xeroform;Gauze;ABD pad;Gauze wrap (ant)       Discharge Condition: Stable     Pain: 0    Ambulatory Status: Walker     Discharge Destination: Home     Transportation: Car    Accompanied by: Self     Discharge instructions reviewed with Patient and copy or written instructions have been provided. All questions/concerns have been addressed at this time.

## 2020-07-06 ENCOUNTER — TELEPHONE (OUTPATIENT)
Dept: WOUND CARE | Age: 65
End: 2020-07-06

## 2020-07-06 NOTE — TELEPHONE ENCOUNTER
Home health nurse called to report odor during dressing change, no other symptom of infection and wanted to know if she needed to culture. DR Rafael Person in clinic and aware. Patient to see Dr Tanvir Hilario this Thursday. DR Rafael Person do not want to wound culture at this time and [facility] nurse is aware.

## 2020-07-09 ENCOUNTER — HOSPITAL ENCOUNTER (OUTPATIENT)
Dept: WOUND CARE | Age: 65
Discharge: HOME | End: 2020-07-09
Admitting: SURGERY
Payer: MEDICARE

## 2020-07-09 ENCOUNTER — TELEPHONE (OUTPATIENT)
Dept: WOUND CARE | Age: 65
End: 2020-07-09

## 2020-07-09 VITALS
SYSTOLIC BLOOD PRESSURE: 140 MMHG | HEART RATE: 59 BPM | TEMPERATURE: 97.9 F | RESPIRATION RATE: 16 BRPM | DIASTOLIC BLOOD PRESSURE: 62 MMHG

## 2020-07-09 PROCEDURE — 11042 DBRDMT SUBQ TIS 1ST 20SQCM/<: CPT

## 2020-07-09 NOTE — DISCHARGE INSTRUCTIONS
Discharge Instructions/Wound [address], [address] [facility]  Telephone: [phone]    NAME:  Kena Smith  YOB: 1955  MEDICAL RECORD NUMBER:  [id number]  DATE:  7/9/2020      WOUND CARE ORDERS:Right Lower Leg Wound- Cleanse with Normal Saline, pat dry with gauze. Apply Xeroform, cover with Gauze & ABD pad, secure with Roll gauze. Dressing to be changed daily, and as needed, for compromise of dressing. MD follow up in 1 weeks. TREATMENT ORDERS:    Elevate leg(s) above the level of the heart when sitting. Avoid prolonged standing in one place. Do no get dressing/wrap wet. Follow Diet as prescribed:   [] Diet as tolerated: [] Calorie Diabetic Diet: [x] No Added Salt:  [] Increase Protein: [] Other:                 Return Appointment:  [x] Return Appointment: With Dr. Melanie Dowell)  [] Ordered tests:      Electronically signed Tabatha Nunez RN on 7/9/2020 at 10:11 AM     [facility]: Should you experience any significant changes in your wound(s) or have questions about your wound care, please contact the [address] [place] [address] at [address] [place] [address] 8:00 am - 4:30. If you need help with your wound outside these hours and cannot wait until we are again available, contact your PCP or go to the hospital emergency room. PLEASE NOTE: IF YOU ARE UNABLE TO OBTAIN WOUND SUPPLIES, CONTINUE TO USE THE SUPPLIES YOU HAVE AVAILABLE UNTIL YOU ARE ABLE TO REACH US. IT IS MOST IMPORTANT TO KEEP THE WOUND COVERED AT ALL TIMES.      Physician Signature:_______________________    Date: ___________ Time:  ____________

## 2020-07-09 NOTE — TELEPHONE ENCOUNTER
Received call from [address] [facility], Aide [place]ano, with [facility]. States that they are unable to continue daily wound care changes, and there has been no family members available to teach wound care/dressing changes. States they will need to reduce frequency of dressing changes from daily, to every other day. Dr. Nathaniel Luis notified. Per MD, dressing frequency can be changed to every other day, and as needed, for compromise of dressing.

## 2020-07-09 NOTE — PROGRESS NOTES
HISTORY OF PRESENT ILLNESS:  The patient is a 67-year-old man who is referred to the wound care center regarding nonhealing wound right lower leg and right posterior ankle.  The patient was first seen at the [address] [place] [address] on 5/7/2020.     The patient reports that he had struck his right ankle about 5 months ago and developed a wound, which has been nonhealing.     He more recently developed swelling and redness in the right lower leg earlier this year, which then was followed by blister formation and a wound formation.  He eventually was hospitalized at [facility] and says he was in the hospital for about 18 days. [facility] saw an Infectious Disease doctor while in the hospital. [facility] was given intravenous antibiotics and then eventually discharged recently on oral Keflex.  He anticipates completing the oral Keflex in about 4 days. [facility] has been getting pain medication prescribed by his primary physician who is Dr. Pau Ambriz.     The patient reports chronic numbness of both feet.     The patient had at the time of an ER visit for leg symptoms on the right in 01/2020, a venous duplex scan which did not find any evidence of deep venous thrombosis.  Vein valves were not tested.     The patient has history of lumbar laminectomy and diskectomy on 09/04/2019. [facility] reports related to his disk disease, he has right side footdrop.  He reported history of neurogenic claudication.  The patient was scheduled to have further back surgery within the month, but that has been canceled related to COVID-19.     The patient reports that he walks with a walker because of pain in the right leg.  He denies shortness of breath at rest or with exertion.  He has no anginal chest pain.     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  Plans are for arterial intervention and debridement of wounds right leg.  The patient on 5/29/2020 had open right femoral endarterectomy and patch angioplasty and balloon angioplasty of right superficial femoral artery.  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  Dr Tamara Aguirre also debrided the dry eschars on the leg wounds.     The patient has seen Dr Tamara Aguirre in follow up.     The patient has a history of coronary artery disease and has had angioplasty and stent. Giovanny Diallo has also had coronary artery bypass grafting. Giovanny Diallo does not have any history of diabetes.  Past medical history does include asthma, skin cancer, gastroesophageal reflux disease, hypertension, hyperlipidemia, fatty liver disease, potential sleep apnea, but never tested.     SOCIAL HISTORY:  The patient smoked until 1998 when he quit. Giovanny Diallo does use alcohol.     The patient's medications do not include a diuretic.     Current dressing:   Xeroform on the heel wound and on the larger wounds and then dry gauze and roll gauze changed daily.     PHYSICAL EXAMINATION:  GENERAL:  The patient is an alert man in no acute distress.     EXTREMITIES:       The patient's lower extremities were examined.  On the left side, there is no edema.  There were no wounds present.  I did not palpate left dorsalis pedis or posterior tibial pulses.     On the right lower extremity, I did not palpate dorsalis pedis or posterior tibial pulses.  There is trace edema in the right lower leg.  The right foot feels warmer than it had previously.  There is a wound on the posterior aspect of the right ankle overlying the Achilles.  This wound is 0.1 x 1 x 0.1 cm in size with a dry scab covering. Mildly tender.     On the anterior aspect of the mid lower leg on the right, there is a cluster of ulcers with total dimension 17.7 x 9.4 x 1 cm. There are increased scattered areas of granulation in the wounds in all areas. Eulah Monday is less extensive adherent eschar and less areas of slough.  Calf is soft. Surrounding skin appears healthy.       After application of topical lidocaine gel, sharp excisional debridement of the wound was carried out using scissors an d15 blade scalpel. Slough, eschar, and granulation tissue was excised down into the subcutaneous layer. One overhanging bridge of skin 0.3 cm in diameter and 0.8 cm long was excised. Silver nitrate and compression used for hemostasis. After debridement, the wound dimensions were deeper in multiple individual areras by 0.2 cm. About 12 cm sq were debrided.       ASSESSMENT AND PLAN:       Arterial flow appears improved clinically.        Wound management will consist of  using Xeroform on the heel wound and on the larger wounds and then dry gauze and roll gauze changed daily.        The patient has found that the topical lidocaine used in the [facility] helps for several hours with pain. Janeth Galeas can use topical over the counter lidocaine gel at dressing changes, to be washed off prior to new dressing.     The patient will follow up in wound care center again in 1 week.        FINAL DIAGNOSES:  Nonhealing wounds, right lower leg and right ankle over the Achilles, peripheral artery disease.     L97.912,  L97.312, I73.9     Caryn Hung MD

## 2020-07-16 ENCOUNTER — HOSPITAL ENCOUNTER (OUTPATIENT)
Dept: WOUND CARE | Age: 65
Discharge: HOME | End: 2020-07-16
Payer: MEDICARE

## 2020-07-16 VITALS
HEART RATE: 68 BPM | TEMPERATURE: 97.7 F | RESPIRATION RATE: 16 BRPM | SYSTOLIC BLOOD PRESSURE: 113 MMHG | DIASTOLIC BLOOD PRESSURE: 56 MMHG

## 2020-07-16 PROCEDURE — 11042 DBRDMT SUBQ TIS 1ST 20SQCM/<: CPT

## 2020-07-16 NOTE — PROGRESS NOTES
HISTORY OF PRESENT ILLNESS:  The patient is a 24-year-old man who is referred to the wound care center regarding nonhealing wound right lower leg and right posterior ankle.  The patient was first seen at the [address] [place] [address] on 5/7/2020.     The patient reports that he had struck his right ankle about 5 months ago and developed a wound, which has been nonhealing.     He more recently developed swelling and redness in the right lower leg earlier this year, which then was followed by blister formation and a wound formation.  He eventually was hospitalized at [facility] and says he was in the hospital for about 18 days. [facility] saw an Infectious Disease doctor while in the hospital. [facility] was given intravenous antibiotics and then eventually discharged recently on oral Keflex.  He anticipates completing the oral Keflex in about 4 days. [facility] has been getting pain medication prescribed by his primary physician who is Dr. Shannan Chaves.     The patient reports chronic numbness of both feet.     The patient had at the time of an ER visit for leg symptoms on the right in 01/2020, a venous duplex scan which did not find any evidence of deep venous thrombosis.  Vein valves were not tested.     The patient has history of lumbar laminectomy and diskectomy on 09/04/2019. [facility] reports related to his disk disease, he has right side footdrop.  He reported history of neurogenic claudication.  The patient was scheduled to have further back surgery within the month, but that has been canceled related to COVID-19.     The patient reports that he walks with a walker because of pain in the right leg.  He denies shortness of breath at rest or with exertion.  He has no anginal chest pain.     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    The patient on 5/29/2020 had open right femoral endarterectomy and patch angioplasty and balloon angioplasty of right superficial femoral artery.  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  Dr Carlos Enrique Gibson also debrided the dry eschars on the leg wounds.     The patient has seen Dr Carlos Enrique Gibson in follow up.     The patient has a history of coronary artery disease and has had angioplasty and stent. Helen Boogie has also had coronary artery bypass grafting. Helen Boogie does not have any history of diabetes.  Past medical history does include asthma, skin cancer, gastroesophageal reflux disease, hypertension, hyperlipidemia, fatty liver disease, potential sleep apnea, but never tested.     SOCIAL HISTORY:  The patient smoked until 1998 when he quit. Helen Boogie does use alcohol.     The patient's medications do not include a diuretic.     Current dressing:   Xeroform on the heel wound and on the larger wounds and then dry gauze and roll gauze changed daily.     PHYSICAL EXAMINATION:  GENERAL:  The patient is an alert man in no acute distress.     EXTREMITIES:       The patient's lower extremities were examined.  On the left side, there is no edema.  There were no wounds present.  I did not palpate left dorsalis pedis or posterior tibial pulses.     On the right lower extremity, I did not palpate dorsalis pedis or posterior tibial pulses.  There is trace edema in the right lower leg.  The right foot feels warmer than it had previously.  Posterior Achilles site fully healed.     On the anterior aspect of the mid lower leg on the right, there is a cluster of ulcers with total dimension 18 x 8.2 x 0.7 cm. There are increased  areas of granulation in the wounds in all areas.  There is no more eschar and less areas of slough.  Calf is soft.  Surrounding skin appears healthy.        After application of topical lidocaine gel, sharp excisional debridement of the wound was carried out using  5 mm ring curette. Slough and granulation tissue was excised down into the subcutaneous layer. Hemostasis was obtained by compression.   After debridement, the wound dimensions were increased in depth in area about 10 cm squared. .          ASSESSMENT AND PLAN:       Arterial flow appears improved clinically.        Wound management will consist of  using Xeroform on the heel wound and on the larger wounds and then dry gauze and roll gauze changed daily.        The patient has found that the topical lidocaine used in the [address] [place] [address] helps for several hours with pain. Maxwell Vasquez can use topical over the counter lidocaine gel at dressing changes, to be washed off prior to new dressing.     The patient will follow up in wound care center again in 2 weeks.        FINAL DIAGNOSES:  Nonhealing wounds, right lower leg and right ankle over the Achilles, peripheral artery disease.     L97.912, I73.9     Tari Prather MD

## 2020-07-16 NOTE — WOUND CARE
07/16/20 1100   Wound Leg lower Right # 1   Date First Assessed/Time First Assessed: 05/07/20 0835   Present on Hospital Admission: Yes  Wound Approximate Age at First Assessment (Weeks): 4 weeks  Primary Wound Type: Vascular  Location: Leg lower  Wound Location Orientation: Right  Wound Descri. ..    Dressing Type Applied Xeroform;Gauze;ABD pad;Gauze wrap (ant)   Wound Procedure Type Debridement- Surgical   Procedure Time Out 1100   Consent Obtained  Yes   Procedure Bleeding Minimal   Procedure Hemostasis  Pressure   Type of Tissue Removed  Devitalized   Procedure Instrument  Curette   Procedure Pain Scale Numeric 6/10   Debridement Procedure Performed by Dr. Madyson Kapoor   Post-Procedure Length (cm) 18 cm   Post-Procedure Width (cm) 8.2 cm   Post-Procedure Depth (cm) 0.8 cm   Post-Procedure Volume (cm^3) 118.08 cm^3   Post-Procedure Surface Area (cm^2) 147.6 cm^2   Post Procedure Pain Scale Numeric 2/10   Procedure Tolerated Fair

## 2020-07-16 NOTE — WOUND CARE
07/16/20 1010   Wound Leg lower Right # 1   Date First Assessed/Time First Assessed: 05/07/20 0835   Present on Hospital Admission: Yes  Wound Approximate Age at First Assessment (Weeks): 4 weeks  Primary Wound Type: Vascular  Location: Leg lower  Wound Location Orientation: Right  Wound Descri. .. Dressing Status Removed   Dressing Type Xeroform;Gauze;ABD pad;Gauze wrap (ant); Special tape (comment)   Wound Length (cm) 18 cm   Wound Width (cm) 8.2 cm   Wound Depth (cm) 0.7 cm   Wound Surface Area (cm^2) 147.6 cm^2   Wound Volume (cm^3) 103.32 cm^3   Change in Wound Size % 26.46   Condition of Base Pink;Slough   Condition of Edges Open   Drainage Amount Large   Drainage Color Serosanguinous   Wound Odor None   Chana-wound Assessment Blanchable erythema   Cleansing and Cleansing Agents  Normal saline       Visit Vitals  /56 (BP 1 Location: Left arm, BP Patient Position: Sitting; At rest)   Pulse 68   Temp 97.7 °F (36.5 °C)   Resp 16        RLE Peripheral Vascular   Capillary Refill: Less than/equal to 3 seconds (07/16/20 1017)  Color: Appropriate for race (07/16/20 1017)  Temperature: Warm (07/16/20 1017)  Sensation: Decreased (07/16/20 1017)  Pedal Pulse: Palpable (07/16/20 1017)  Circumference of Calf (cm): 38.8 cm (07/16/20 1017)  Location of Measurement (Calf): Mid  (07/16/20 1017)  Circumference of Ankle (cm): 22 cm (07/16/20 1017)  Location of Measurement (Ankle): Upper  (07/16/20 1017)

## 2020-07-16 NOTE — DISCHARGE INSTRUCTIONS
Discharge Instructions for  [facility]  [address] [facility], [address] [facility]  Telephone: [phone]    NAME:  Nicanor Colon  YOB: 1955  MEDICAL RECORD NUMBER:  [id number]  DATE:  7/16/2020      WOUND CARE ORDERS:  Right Lower Leg Wound- Cleanse with Normal Saline, pat dry with gauze. Apply Xeroform,  cover with Gauze & ABD pad. Secure with Roll gauze. Dressing to be changed every other day,  and as needed, for compromise of dressing. MD follow up in 2 weeks. TREATMENT ORDERS:    Elevate leg(s) above the level of the heart when sitting. Avoid prolonged standing in one place. Do no get dressing/wrap wet. Follow Diet as prescribed:   [] Diet as tolerated: [] Calorie Diabetic Diet: [x] No Added Salt:  [] Increase Protein: [] Limit the amount of liquid you are drinking and avoid drinking in between meals               Return Appointment:  [x] Return Appointment: With DR Thien Best  in 2 [facility])  [] Nurse Visit : *** days  [] Ordered tests:      Electronically signed Travon Kumra RN on 7/16/2020 at 11:03 AM     Sallie Vega 281: Should you experience any significant changes in your wound(s) or have questions about your wound care, please contact the [address] [place] [address] at [address] [place] [address] 8:00 am - 4:30. If you need help with your wound outside these hours and cannot wait until we are again available, contact your PCP or go to the hospital emergency room. PLEASE NOTE: IF YOU ARE UNABLE TO OBTAIN WOUND SUPPLIES, CONTINUE TO USE THE SUPPLIES YOU HAVE AVAILABLE UNTIL YOU ARE ABLE TO REACH US. IT IS MOST IMPORTANT TO KEEP THE WOUND COVERED AT ALL TIMES.      Physician Signature:_______________________    Date: ___________ Time:  ____________

## 2020-07-30 ENCOUNTER — HOSPITAL ENCOUNTER (OUTPATIENT)
Dept: WOUND CARE | Age: 65
Discharge: HOME | End: 2020-07-30
Admitting: SURGERY
Payer: MEDICARE

## 2020-07-30 VITALS
TEMPERATURE: 98.4 F | HEART RATE: 70 BPM | SYSTOLIC BLOOD PRESSURE: 113 MMHG | DIASTOLIC BLOOD PRESSURE: 60 MMHG | RESPIRATION RATE: 16 BRPM

## 2020-07-30 PROCEDURE — 11042 DBRDMT SUBQ TIS 1ST 20SQCM/<: CPT

## 2020-07-30 PROCEDURE — 11045 DBRDMT SUBQ TISS EACH ADDL: CPT

## 2020-07-30 NOTE — DISCHARGE INSTRUCTIONS
Discharge Instructions/Wound [address], [address] [facility]  Telephone: [phone]    NAME:  Elsa Moore  YOB: 1955  MEDICAL RECORD NUMBER:  [id number]  DATE:  7/30/2020      WOUND CARE ORDERS: Right Lower Leg Wound- Cleanse with Normal Saline and gauze. Cover wound with  Xeroform, followed by Gauze & ABD pad. Secure with Roll gauze. Dressing to be changed every other day, and as needed, for compromise of dressing. MD follow up with Dr. Erlinda Chacon 2 weeks. TREATMENT ORDERS:    Elevate leg(s) above the level of the heart when sitting. Avoid prolonged standing in one place. Do no get dressing/wrap wet. Follow Diet as prescribed:   [x] Diet as tolerated: [] Calorie Diabetic Diet: [x] No Added Salt:  [] Increase Protein: [] Other:                 Return Appointment:  [x] Return Appointment: With Dr. Letty Deleon in 2 [facility])  [] Ordered tests:      Electronically signed Lewis Shrestha RN on 7/30/2020 at 10:52 AM     Sallie Vega 281: Should you experience any significant changes in your wound(s) or have questions about your wound care, please contact the [address] [place] [address] at [address] [place] [address] 8:00 am - 4:30. If you need help with your wound outside these hours and cannot wait until we are again available, contact your PCP or go to the hospital emergency room. PLEASE NOTE: IF YOU ARE UNABLE TO OBTAIN WOUND SUPPLIES, CONTINUE TO USE THE SUPPLIES YOU HAVE AVAILABLE UNTIL YOU ARE ABLE TO REACH US. IT IS MOST IMPORTANT TO KEEP THE WOUND COVERED AT ALL TIMES.      Physician Signature:_______________________    Date: ___________ Time:  ____________

## 2020-07-30 NOTE — WOUND CARE
07/30/20 1041 Wound Leg lower Right # 1 Date First Assessed/Time First Assessed: 05/07/20 0835   Present on Hospital Admission: Yes  Wound Approximate Age at First Assessment (Weeks): 4 weeks  Primary Wound Type: Vascular  Location: Leg lower  Wound Location Orientation: Right  Wound Descri. .. Dressing Type Applied Xeroform;ABD pad;Gauze wrap (ant); Special tape (comment) Wound Procedure Type Debridement- Surgical  
Procedure Time Out 4138 Consent Obtained  Yes Procedure Bleeding Minimal  
Procedure Hemostasis  Pressure Type of Tissue Removed  Devitalized Procedure Instrument  Curette Procedure Pain Scale Numeric 2/10 Debridement Procedure Performed by Dr. Sly Phillips Post-Procedure Length (cm) 17.5 cm Post-Procedure Width (cm) 7.6 cm Post-Procedure Depth (cm) 0.7 cm Post-Procedure Volume (cm^3) 93.1 cm^3 Post-Procedure Surface Area (cm^2) 133 cm^2 Post Procedure Pain Scale Numeric 0/10

## 2020-07-30 NOTE — WOUND CARE
07/30/20 1022 Wound Leg lower Right # 1 Date First Assessed/Time First Assessed: 05/07/20 0835   Present on Hospital Admission: Yes  Wound Approximate Age at First Assessment (Weeks): 4 weeks  Primary Wound Type: Vascular  Location: Leg lower  Wound Location Orientation: Right  Wound Descri. .. Dressing Status Removed Dressing Type Xeroform;Gauze;ABD pad;Gauze wrap (ant); Special tape (comment) Wound Length (cm) 17.5 cm Wound Width (cm) 7.6 cm Wound Depth (cm) 0.6 cm Wound Surface Area (cm^2) 133 cm^2 Wound Volume (cm^3) 79.8 cm^3 Change in Wound Size % 33.74 Condition of Base Pink;Slough Condition of Edges Open Drainage Amount Large Drainage Color Serosanguinous Wound Odor None Chana-wound Assessment Blanchable erythema Cleansing and Cleansing Agents  Normal saline Visit Vitals /60 (BP 1 Location: Left arm, BP Patient Position: At rest) Pulse 70 Temp 98.4 °F (36.9 °C) Resp 16 RLE Peripheral Vascular Capillary Refill: Less than/equal to 3 seconds (07/30/20 1021) Color: Appropriate for race (07/30/20 1021) Temperature: Warm (07/30/20 1021) Sensation: Decreased (07/30/20 1021) Pedal Pulse: Palpable (07/30/20 1021) Circumference of Calf (cm): 39.5 cm (07/30/20 1021) Location of Measurement (Calf): Mid  (07/30/20 1021) Circumference of Ankle (cm): 24.5 cm (07/30/20 1021) Location of Measurement (Ankle): Upper  (07/30/20 1021)

## 2020-07-30 NOTE — PROGRESS NOTES
HISTORY OF PRESENT ILLNESS:  The patient is a 57-year-old man who is referred to the wound care center regarding nonhealing wound right lower leg and right posterior ankle.  The patient was first seen at the [address] [place] [address] on 5/7/2020.     The patient reports that he had struck his right ankle about 5 months ago and developed a wound, which has been nonhealing.     He more recently developed swelling and redness in the right lower leg earlier this year, which then was followed by blister formation and a wound formation.  He eventually was hospitalized at [facility] and says he was in the hospital for about 18 days. Ryley Simmons saw an Infectious Disease doctor while in the hospital. Ryley Simmons was given intravenous antibiotics and then eventually discharged recently on oral Keflex.  He anticipates completing the oral Keflex in about 4 days. Ryley Simmons has been getting pain medication prescribed by his primary physician who is Dr. Jose Mohr.     The patient reports chronic numbness of both feet.     The patient had at the time of an ER visit for leg symptoms on the right in 01/2020, a venous duplex scan which did not find any evidence of deep venous thrombosis.  Vein valves were not tested.     The patient has history of lumbar laminectomy and diskectomy on 09/04/2019. Ryley Simmons reports related to his disk disease, he has right side footdrop.  He reported history of neurogenic claudication.  The patient was scheduled to have further back surgery within the month, but that has been canceled related to COVID-19.     The patient reports that he walks with a walker because of pain in the right leg.  He denies shortness of breath at rest or with exertion.  He has no anginal chest pain.     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    The patient on 5/29/2020 had open right femoral endarterectomy and patch angioplasty and balloon angioplasty of right superficial femoral artery.  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  Dr [place] Quiroz also debrided the dry eschars on the leg wounds.     The patient has seen Dr [place] Quiroz in follow up.     The patient has a history of coronary artery disease and has had angioplasty and stent. Kitty Hickey has also had coronary artery bypass grafting. Kitty Hickey does not have any history of diabetes.  Past medical history does include asthma, skin cancer, gastroesophageal reflux disease, hypertension, hyperlipidemia, fatty liver disease, potential sleep apnea, but never tested.     SOCIAL HISTORY:  The patient smoked until 1998 when he quit. Kitty Hickey does use alcohol.     The patient's medications do not include a diuretic.     Current dressing:   Xeroform on the lower leg wound and then dry gauze and roll gauze changed daily.     PHYSICAL EXAMINATION:  GENERAL:  The patient is an alert man in no acute distress.     EXTREMITIES:       The patient's lower extremities were examined.  On the left side, there is no edema.  There were no wounds present.  I did not palpate left dorsalis pedis or posterior tibial pulses.     On the right lower extremity, I did not palpate dorsalis pedis or posterior tibial pulses.  There is trace edema in the right lower leg.  The right foot feels warmer than it had previously.  Posterior Achilles site fully healed.     On the anterior aspect of the mid lower leg on the right, there is a cluster of ulcers with total dimension 17.5 x 7.6 x 0.6 cm. There are increased  areas of granulation in the wounds in all areas.  There are extensive areas of slough.  Calf is soft.  Surrounding skin appears healthy.        After application of topical lidocaine gel, sharp excisional debridement of the wound was carried out using  7 mm ring curette. Slough and granulation tissue was excised down into the subcutaneous layer. Hemostasis was obtained by compression.   After debridement, the wound dimensions were increased in depth by 0.1 cm  in area over 70 cm squared. .           ASSESSMENT AND PLAN:       Arterial flow appears improved clinically.        Wound management will consist of  using Xeroform on the leg  wounds and then dry gauze and roll gauze changed daily.        The patient has found that the topical lidocaine used in the [facility] helps for several hours with pain. Ryley Simmons can use topical over the counter lidocaine gel at dressing changes, to be washed off prior to new dressing.     The patient will follow up in wound care center again in 2 weeks.        FINAL DIAGNOSES:  Nonhealing wounds, right lower leg and right ankle over the Achilles, peripheral artery disease.     L97.912, I73.9     Sarah Mitchell MD

## 2020-08-13 ENCOUNTER — HOSPITAL ENCOUNTER (OUTPATIENT)
Dept: WOUND CARE | Age: 65
Discharge: HOME | End: 2020-08-13
Admitting: SURGERY
Payer: MEDICARE

## 2020-08-13 VITALS
HEART RATE: 69 BPM | TEMPERATURE: 96.9 F | SYSTOLIC BLOOD PRESSURE: 116 MMHG | RESPIRATION RATE: 16 BRPM | DIASTOLIC BLOOD PRESSURE: 56 MMHG

## 2020-08-13 DIAGNOSIS — I73.9 PAD (PERIPHERAL ARTERY DISEASE) (HCC): ICD-10-CM

## 2020-08-13 DIAGNOSIS — L97.912 NON-PRESSURE CHRONIC ULCER OF RIGHT LOWER LEG, WITH FAT LAYER EXPOSED (HCC): ICD-10-CM

## 2020-08-13 PROCEDURE — 11042 DBRDMT SUBQ TIS 1ST 20SQCM/<: CPT

## 2020-08-13 PROCEDURE — 11042 DBRDMT SUBQ TIS 1ST 20SQCM/<: CPT | Performed by: SURGERY

## 2020-08-13 PROCEDURE — 11045 DBRDMT SUBQ TISS EACH ADDL: CPT | Performed by: SURGERY

## 2020-08-13 PROCEDURE — 11045 DBRDMT SUBQ TISS EACH ADDL: CPT

## 2020-08-13 NOTE — DISCHARGE INSTRUCTIONS
Discharge Instructions for  [facility]  [address] [facility], [address] [facility]  Telephone: [phone]    NAME:  Jesus Gaston  YOB: 1955  MEDICAL RECORD NUMBER:  [id number]  DATE:  8/13/2020      WOUND CARE ORDERS:  Right Lower Leg Wound- Cleanse with Normal Saline, pat dry with gauze. Apply Aquacel AG,  cover with Gauze & ABD pad. Secure with Roll gauze. Dressing to be changed 3 x week, and as needed, for compromise of dressing. MD follow up in 2 weeks. Home Health may recertify patient for continued wound care. TREATMENT ORDERS:    Elevate leg(s) above the level of the heart when sitting. Avoid prolonged standing in one place. Do no get dressing/wrap wet. Follow Diet as prescribed:   [x] Diet as tolerated: [] Calorie Diabetic Diet: [] No Added Salt:  [x] Increase Protein: [] Limit the amount of liquid you are drinking and avoid drinking in between meals               Return Appointment:  [x] Return Appointment: With DR Elliot Keita  in  2 [facility])  [] Nurse Visit : *** days  [] Ordered tests:      Electronically signed Belle Uriarte RN on 8/13/2020 at 11:00 AM     Sallie Vega 281: Should you experience any significant changes in your wound(s) or have questions about your wound care, please contact the [address] [place] [address] at [address] [place] [address] 8:00 am - 4:30. If you need help with your wound outside these hours and cannot wait until we are again available, contact your PCP or go to the hospital emergency room. PLEASE NOTE: IF YOU ARE UNABLE TO OBTAIN WOUND SUPPLIES, CONTINUE TO USE THE SUPPLIES YOU HAVE AVAILABLE UNTIL YOU ARE ABLE TO REACH US. IT IS MOST IMPORTANT TO KEEP THE WOUND COVERED AT ALL TIMES.      Physician Signature:_______________________    Date: ___________ Time:  ____________

## 2020-08-13 NOTE — WOUND CARE
08/13/20 1019 Wound Leg lower Right # 1 Date First Assessed/Time First Assessed: 05/07/20 0835   Present on Hospital Admission: Yes  Wound Approximate Age at First Assessment (Weeks): 4 weeks  Primary Wound Type: Vascular  Location: Leg lower  Wound Location Orientation: Right  Wound Descri. .. Dressing Status Removed Dressing Type Xeroform;Gauze;Gauze wrap (ant); Special tape (comment);ABD pad Wound Length (cm) 17 cm Wound Width (cm) 7.6 cm Wound Depth (cm) 0.4 cm Wound Surface Area (cm^2) 129.2 cm^2 Wound Volume (cm^3) 51.68 cm^3 Change in Wound Size % 35.63 Condition of Base Pink;Slough Condition of Edges Open Drainage Amount Moderate Drainage Color Serosanguinous Wound Odor None Chana-wound Assessment Blanchable erythema Cleansing and Cleansing Agents  Normal saline Visit Vitals /56 (BP 1 Location: Left arm, BP Patient Position: At rest;Sitting) Pulse 69 Temp 96.9 °F (36.1 °C) Resp 16 LLE Peripheral Vascular Capillary Refill: Less than/equal to 3 seconds (08/13/20 1019) Color: Appropriate for race (08/13/20 1019) Temperature: Warm (08/13/20 1019) Sensation: Present (08/13/20 1019) Pedal Pulse: Palpable (08/13/20 1019) Circumference of Calf (cm): 38 cm (08/13/20 1019) Location of Measurement (Calf): Mid  (08/13/20 1019) Circumference of Ankle (cm): 22 cm (08/13/20 1019) Location of Measurement (Ankle): Upper  (08/13/20 1019)

## 2020-08-13 NOTE — PROGRESS NOTES
HISTORY OF PRESENT ILLNESS:  The patient is a 49-year-old man who is referred to the wound care center regarding nonhealing wound right lower leg and right posterior ankle.  The patient was first seen at the [address] [place] [address] on 5/7/2020.     The patient reports that he had struck his right ankle about 5 months ago and developed a wound, which has been nonhealing.     He more recently developed swelling and redness in the right lower leg earlier this year, which then was followed by blister formation and a wound formation.  He eventually was hospitalized at [facility] and says he was in the hospital for about 18 days. [facility] saw an Infectious Disease doctor while in the hospital. [facility] was given intravenous antibiotics and then eventually discharged on oral Shan Scout has been getting pain medication prescribed by his primary physician who is Dr. Isabell Qiu.     The patient reports chronic numbness of both feet.     The patient had at the time of an ER visit for leg symptoms on the right in 01/2020, a venous duplex scan which did not find any evidence of deep venous thrombosis.  Vein valves were not tested.     The patient has history of lumbar laminectomy and diskectomy on 09/04/2019. [facility] reports related to his disk disease, he has right side footdrop.  He reported history of neurogenic claudication.  The patient was scheduled to have further back surgery within the month, but that has been canceled related to COVID-19.     The patient reports that he walks with a walker.  He denies shortness of breath at rest or with exertion.  He has no anginal chest pain.     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    The patient on 5/29/2020 had open right femoral endarterectomy and patch angioplasty and also balloon angioplasty of right superficial femoral artery.  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  Dr Lina Dorantes also debrided the dry eschars on the leg wounds.     The patient has seen Dr Lina Dorantes in follow up.     The patient has a history of coronary artery disease and has had angioplasty and stent. [facility] has also had coronary artery bypass grafting. [facility] does not have any history of diabetes.  Past medical history does include asthma, skin cancer, gastroesophageal reflux disease, hypertension, hyperlipidemia, fatty liver disease, potential sleep apnea, but never tested.     SOCIAL HISTORY:  The patient smoked until 1998 when he quit. [facility] does use alcohol.     The patient's medications do not include a diuretic.     Current dressing:   Xeroform on the lower leg wound and then dry gauze and roll gauze changed daily.     PHYSICAL EXAMINATION:  GENERAL:  The patient is an alert man in no acute distress.     EXTREMITIES:       The patient's lower extremities were examined.  On the left side, there is no edema.  There were no wounds present.  I did not palpate left dorsalis pedis or posterior tibial pulses.     On the right lower extremity, I did not palpate dorsalis pedis or posterior tibial pulses.  There is trace edema in the right lower leg.  The right foot feels warmer than it had previously.       On the anterior aspect of the mid lower leg on the right, there is a cluster of ulcers with total dimension 17 x 7.6 x 0.4 cm. There are increased  areas of granulation in the wounds in all areas.  Granulation covers about 80 % of the wound. There are limited areas of slough.  Calf is soft.  Surrounding skin appears healthy.        After application of topical lidocaine gel, sharp excisional debridement of the wound was carried out using  7 mm ring curette.  Slough and granulation tissue was excised down into the subcutaneous layer.  Hemostasis was obtained by compression.  After debridement, the wound dimensions were increased in depth by 0.1 cm  in area over 60 cm squared. .           ASSESSMENT AND PLAN:       Arterial flow appears improved clinically since vascula intervention.     Wound management will consist of  using Aquacell AG on the leg  wounds and then dry gauze and roll gauze changed 3 times per week.      The patient will follow up in wound care center again in 2 weeks.        FINAL DIAGNOSES:  Nonhealing wounds, right lower leg and right ankle over the Achilles, peripheral artery disease.     L97.912, I73.9     Caryn Hung MD

## 2020-08-13 NOTE — WOUND CARE
08/13/20 1124 Wound Leg lower Right # 1 Date First Assessed/Time First Assessed: 05/07/20 0835   Present on Hospital Admission: Yes  Wound Approximate Age at First Assessment (Weeks): 4 weeks  Primary Wound Type: Vascular  Location: Leg lower  Wound Location Orientation: Right  Wound Descri. .. Dressing Type Applied Silver products; Alginate;ABD pad;Gauze wrap (ant); Special tape (comment)

## 2020-08-27 ENCOUNTER — HOSPITAL ENCOUNTER (OUTPATIENT)
Dept: WOUND CARE | Age: 65
Discharge: HOME | End: 2020-08-27
Admitting: SURGERY
Payer: MEDICARE

## 2020-08-27 VITALS
RESPIRATION RATE: 16 BRPM | TEMPERATURE: 97.5 F | HEART RATE: 63 BPM | DIASTOLIC BLOOD PRESSURE: 60 MMHG | SYSTOLIC BLOOD PRESSURE: 110 MMHG

## 2020-08-27 DIAGNOSIS — L97.912 NON-PRESSURE CHRONIC ULCER OF RIGHT LOWER LEG, WITH FAT LAYER EXPOSED (HCC): ICD-10-CM

## 2020-08-27 DIAGNOSIS — I73.9 PAD (PERIPHERAL ARTERY DISEASE) (HCC): ICD-10-CM

## 2020-08-27 PROBLEM — L97.312 NON-PRESSURE CHRONIC ULCER OF RIGHT ANKLE WITH FAT LAYER EXPOSED (HCC): Status: RESOLVED | Noted: 2020-05-07 | Resolved: 2020-08-27

## 2020-08-27 PROCEDURE — 99213 OFFICE O/P EST LOW 20 MIN: CPT | Performed by: SURGERY

## 2020-08-27 PROCEDURE — 99213 OFFICE O/P EST LOW 20 MIN: CPT

## 2020-08-27 NOTE — PROGRESS NOTES
HISTORY OF PRESENT ILLNESS:  The patient is a 55-year-old man who is referred to the wound care center regarding nonhealing wound right lower leg and right posterior ankle.  The patient was first seen at the [address] [place] [address] on 5/7/2020.     The patient reports that he had struck his right ankle about 5 months ago and developed a wound, which has been nonhealing.     He more recently developed swelling and redness in the right lower leg earlier this year, which then was followed by blister formation and a wound formation.  He eventually was hospitalized at [facility] and says he was in the hospital for about 18 days. Margarette Calderon saw an Infectious Disease doctor while in the hospital. Margarette Calderon was given intravenous antibiotics and then eventually discharged on oral Vera Brought has been getting pain medication prescribed by his primary physician who is Dr. Radha Dos Santos.     The patient reports chronic numbness of both feet.     The patient had at the time of an ER visit for leg symptoms on the right in 01/2020, a venous duplex scan which did not find any evidence of deep venous thrombosis.  Vein valves were not tested.     The patient has history of lumbar laminectomy and diskectomy on 09/04/2019. [facility] Calder[facility] reports related to his disk disease, he has right side footdrop.  He reported history of neurogenic claudication.  The patient was scheduled to have further back surgery within the month, but that has been canceled related to COVID-19.     The patient reports that he walks with a walker.  He denies shortness of breath at rest or with exertion.  He has no anginal chest pain.     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    The patient on 5/29/2020 had open right femoral endarterectomy and patch angioplasty and also balloon angioplasty of right superficial femoral artery.  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  Dr Constanza Tsang also debrided the dry eschars on the leg wounds.     The patient has seen Dr Constanza Tsang in follow up.     The patient has a history of coronary artery disease and has had angioplasty and stent. [facility] has also had coronary artery bypass grafting. [facility] does not have any history of diabetes.  Past medical history does include asthma, skin cancer, gastroesophageal reflux disease, hypertension, hyperlipidemia, fatty liver disease, potential sleep apnea, but never tested.     SOCIAL HISTORY:  The patient smoked until 1998 when he quit. [facility] does use alcohol.     The patient's medications do not include a diuretic.     Prior dressing:   Xeroform on the lower leg wound and then dry gauze and roll gauze changed daily. Dressing started 8/13/2020:  Cathren Blight on the leg  wounds and then dry gauze and roll gauze changed 3 times per week. Patient had problems with adherence fo the dressing, went back to Xeroform after 1 week.     PHYSICAL EXAMINATION:  GENERAL:  The patient is an alert man in no acute distress.     EXTREMITIES:       The patient's lower extremities were examined.  On the left side, there is no edema.  There were no wounds present.  I did not palpate left dorsalis pedis or posterior tibial pulses.     On the right lower extremity, I did not palpate dorsalis pedis or posterior tibial pulses.  There is trace edema in the right lower leg.  The right foot feels warmer than it had previously.       On the anterior aspect of the mid lower leg on the right, there is a cluster of ulcers with total dimension 17 x 6.5 x 0.4 cm. There are increased  areas of granulation in the wounds in all areas.  Granulation covers about 90 % of the wound.   There are minimal areas of slough.  Calf is soft.  Surrounding skin appears healthy.           ASSESSMENT AND PLAN:       Arterial flow appears improved clinically since vascular intervention.     Wound management will consist again of  using Xeroform on the leg  wounds and then dry gauze and roll gauze changed 3 times per week or every other day.      The patient will follow up in wound care center again in 2 weeks.        FINAL DIAGNOSES:  Nonhealing wounds right lower leg, peripheral artery disease.     L97.912, I73.9     Inez Monsalve MD

## 2020-08-27 NOTE — WOUND CARE
08/27/20 1024   Wound Leg lower Right # 1   Date First Assessed/Time First Assessed: 05/07/20 0835   Present on Hospital Admission: Yes  Wound Approximate Age at First Assessment (Weeks): 4 weeks  Primary Wound Type: Vascular  Location: Leg lower  Wound Location Orientation: Right  Wound Descri. .. Dressing Status Removed   Dressing Type Xeroform;Gauze;Gauze wrap (ant); Solid hydrogel   Non-staged Wound Description Full thickness   Wound Length (cm) 17 cm   Wound Width (cm) 6.5 cm   Wound Depth (cm) 0.4 cm   Wound Surface Area (cm^2) 110.5 cm^2   Wound Volume (cm^3) 44.2 cm^3   Change in Wound Size % 44.95   Condition of Base Betances;Slough;Granulation   Condition of Edges Open   Drainage Amount Moderate   Drainage Color Serosanguinous   Wound Odor None   Chana-wound Assessment Blanchable erythema   Cleansing and Cleansing Agents  Normal saline     Visit Vitals  /60   Pulse 63   Temp 97.5 °F (36.4 °C)   Resp 16        RLE Peripheral Vascular   Capillary Refill: Less than/equal to 3 seconds (08/27/20 1023)  Color: Appropriate for race (08/27/20 1023)  Temperature: Warm (08/27/20 1023)  Sensation: Decreased (08/27/20 1023)  Pedal Pulse: Palpable (08/27/20 1023)  Circumference of Calf (cm): 38 cm (08/27/20 1023)  Location of Measurement (Calf): Mid  (08/27/20 1023)  Circumference of Ankle (cm): 22 cm (08/27/20 1023)  Location of Measurement (Ankle): Upper  (08/27/20 1023)

## 2020-08-27 NOTE — DISCHARGE INSTRUCTIONS
Discharge Instructions for  [facility]  [address], [address] [facility]  Telephone: [phone]    NAME:  Doris Kaminski  YOB: 1955  MEDICAL RECORD NUMBER:  [id number]  DATE:  8/27/2020      WOUND CARE ORDERS:  Right Lower Leg Wound- Cleanse with Normal Saline, pat dry with gauze. Apply Xeroform,  cover with Gauze & ABD pad. Secure with Roll gauze. Dressing to be changed 3 x week, and as needed, for compromise of dressing. MD follow up in 2 weeks. Home Health may recertify patient for continued wound care. TREATMENT ORDERS:    Elevate leg(s) above the level of the heart when sitting. Avoid prolonged standing in one place. Do no get dressing/wrap wet. Follow Diet as prescribed:   [x] Diet as tolerated: [] Calorie Diabetic Diet: [x] No Added Salt:  [x] Increase Protein: [] Limit the amount of liquid you are drinking and avoid drinking in between meals               Return Appointment:  [x] Return Appointment: With DR Nahid Frye  in  2 [facility])  [] Nurse Visit : *** days  [] Ordered tests:      Electronically signed Heidi Mahan RN on 8/27/2020 at 10:57 AM     Sallie Vega 281: Should you experience any significant changes in your wound(s) or have questions about your wound care, please contact the [address] [place] [address] at [address] [place] [address] 8:00 am - 4:30. If you need help with your wound outside these hours and cannot wait until we are again available, contact your PCP or go to the hospital emergency room. PLEASE NOTE: IF YOU ARE UNABLE TO OBTAIN WOUND SUPPLIES, CONTINUE TO USE THE SUPPLIES YOU HAVE AVAILABLE UNTIL YOU ARE ABLE TO REACH US. IT IS MOST IMPORTANT TO KEEP THE WOUND COVERED AT ALL TIMES.      Physician Signature:_______________________    Date: ___________ Time:  ____________

## 2020-09-10 ENCOUNTER — HOSPITAL ENCOUNTER (OUTPATIENT)
Dept: WOUND CARE | Age: 65
Discharge: HOME | End: 2020-09-10
Admitting: SURGERY
Payer: MEDICARE

## 2020-09-10 VITALS
HEART RATE: 58 BPM | TEMPERATURE: 96.9 F | RESPIRATION RATE: 16 BRPM | SYSTOLIC BLOOD PRESSURE: 118 MMHG | DIASTOLIC BLOOD PRESSURE: 56 MMHG

## 2020-09-10 DIAGNOSIS — L97.912 NON-PRESSURE CHRONIC ULCER OF RIGHT LOWER LEG, WITH FAT LAYER EXPOSED (HCC): ICD-10-CM

## 2020-09-10 PROCEDURE — 11045 DBRDMT SUBQ TISS EACH ADDL: CPT | Performed by: SURGERY

## 2020-09-10 PROCEDURE — 11042 DBRDMT SUBQ TIS 1ST 20SQCM/<: CPT

## 2020-09-10 PROCEDURE — 11042 DBRDMT SUBQ TIS 1ST 20SQCM/<: CPT | Performed by: SURGERY

## 2020-09-10 PROCEDURE — 11045 DBRDMT SUBQ TISS EACH ADDL: CPT

## 2020-09-10 NOTE — PROGRESS NOTES
HISTORY OF PRESENT ILLNESS:  The patient is a 75-year-old man who is referred to the wound care center regarding nonhealing wound right lower leg and right posterior ankle.  The patient was first seen at the [address] [place] [address] on 5/7/2020.     The patient reports that he had struck his right ankle about 5 months ago and developed a wound, which has been nonhealing.     He more recently developed swelling and redness in the right lower leg earlier this year, which then was followed by blister formation and a wound formation.  He eventually was hospitalized at [facility] and says he was in the hospital for about 18 days. [facility] saw an Infectious Disease doctor while in the hospital. [facility] was given intravenous antibiotics and then eventually discharged on oral Kathryn Montana has been getting pain medication prescribed by his primary physician who is Dr. Ben Maradiaga.     The patient reports chronic numbness of both feet.     The patient had at the time of an ER visit for leg symptoms on the right in 01/2020, a venous duplex scan which did not find any evidence of deep venous thrombosis.  Vein valves were not tested.     The patient has history of lumbar laminectomy and diskectomy on 09/04/2019. [facility] reports related to his disk disease, he has right side footdrop.  He reported history of neurogenic claudication.  The patient was scheduled to have further back surgery within the month, but that has been canceled related to COVID-19.     The patient reports that he walks with a walker.  He denies shortness of breath at rest or with exertion.  He has no anginal chest pain.     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    The patient on 5/29/2020 had open right femoral endarterectomy and patch angioplasty and also balloon angioplasty of right superficial femoral artery.  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  Dr Tomy Naik also debrided the dry eschars on the leg wounds.     The patient has seen Dr Tomy Naik in follow up.     The patient has a history of coronary artery disease and has had angioplasty and stent. [facility] has also had coronary artery bypass grafting. [facility] does not have any history of diabetes.  Past medical history does include asthma, skin cancer, gastroesophageal reflux disease, hypertension, hyperlipidemia, fatty liver disease, potential sleep apnea, but never tested.     SOCIAL HISTORY:  The patient smoked until 1998 when he quit. [facility] does use alcohol.     The patient's medications do not include a diuretic.     Prior dressing:   Xeroform on the lower leg wound and then dry gauze and roll gauze changed daily.     Dressing started 8/13/2020:  Emilia Saulo the leg  wounds and then dry gauze and roll gauze changed 3 times per week.     Patient had problems with adherence of the dressing, went back to Xeroform after 1 week.     PHYSICAL EXAMINATION:  GENERAL:  The patient is an alert man in no acute distress.     EXTREMITIES:       The patient's lower extremities were examined.  On the left side, there is no edema.  There were no wounds present.  I did not palpate left dorsalis pedis or posterior tibial pulses.     On the right lower extremity, I did not palpate dorsalis pedis or posterior tibial pulses.  There is trace edema in the right lower leg.  The right foot feels warm.      On the anterior aspect of the mid lower leg on the right, there is a cluster of ulcers with total dimension 16.6 x 7 x 0.3 cm. There are increased  areas of granulation in the wounds in all areas.  Granulation with slough on all surfaces.  Calf is soft.  Surrounding skin appears healthy.         After application of topical lidocaine gel, sharp excisional debridement of the wound was carried out using 7 mm ring curette. Slough and granulation tissue was excised down into the subcutaneous layer.   Hemostasis was obtained by compression and silver nitrate. After debridement, the wound dimensions were deeper by 0.1 cm.   Total surface area debrided 60 cm2.              ASSESSMENT AND PLAN:      Wound continues slow improvement.     Arterial flow appears improved clinically since vascular intervention.     Wound management will consist again of  using Xeroform on the leg  wounds and then dry gauze and roll gauze changed 3 times per week or every other day.      The patient will follow up in wound care center again in 2 weeks.        FINAL DIAGNOSES:  Nonhealing wounds right lower leg, peripheral artery disease.     L97.912, I73.9     Pedro Salinas MD

## 2020-09-10 NOTE — WOUND CARE
09/10/20 1046 Wound Leg lower Right # 1 Date First Assessed/Time First Assessed: 05/07/20 0835   Present on Hospital Admission: Yes  Wound Approximate Age at First Assessment (Weeks): 4 weeks  Primary Wound Type: Vascular  Location: Leg lower  Wound Location Orientation: Right  Wound Descri. .. Cleansing and Cleansing Agents  Normal saline Dressing Type Applied Xeroform;ABD pad;Gauze wrap (ant); Special tape (comment) Wound Procedure Type Debridement- Surgical  
Procedure Time Out 0296 Consent Obtained  Yes Procedure Bleeding Moderate Procedure Hemostasis  Silver Nitrate (Pressure, then AgNO3 applicator sticks.) Type of Tissue Removed  Devitalized Procedure Instrument  Curette Procedure Pain Scale Numeric 3/10 Debridement Procedure Performed by Dr. Maksim Osuna Post-Procedure Length (cm) 16.6 cm Post-Procedure Width (cm) 7 cm Post-Procedure Depth (cm) 0.4 cm Post-Procedure Volume (cm^3) 46.48 cm^3 Post-Procedure Surface Area (cm^2) 116.2 cm^2 Post Procedure Pain Scale Numeric 0/10 Procedure Tolerated Well

## 2020-09-10 NOTE — WOUND CARE
09/10/20 1008 Wound Leg lower Right # 1 Date First Assessed/Time First Assessed: 05/07/20 0835   Present on Hospital Admission: Yes  Wound Approximate Age at First Assessment (Weeks): 4 weeks  Primary Wound Type: Vascular  Location: Leg lower  Wound Location Orientation: Right  Wound Descri. .. Dressing Status Removed Dressing Type Xeroform;ABD pad;Gauze wrap (ant); Special tape (comment) Non-staged Wound Description Full thickness Wound Length (cm) 16.6 cm Wound Width (cm) 7 cm Wound Depth (cm) 0.3 cm Wound Surface Area (cm^2) 116.2 cm^2 Wound Volume (cm^3) 34.86 cm^3 Change in Wound Size % 42.11 Condition of Base Pink;Slough Condition of Edges Open Drainage Amount Large Drainage Color Serosanguinous Wound Odor None Chana-wound Assessment Blanchable erythema Cleansing and Cleansing Agents  Normal saline Visit Vitals /56 (BP 1 Location: Left arm, BP Patient Position: At rest;Sitting) Pulse (!) 58 Temp 96.9 °F (36.1 °C) Resp 16 LLE Peripheral Vascular Capillary Refill: Less than/equal to 3 seconds (09/10/20 1007) Color: Appropriate for race (09/10/20 1007) Temperature: Warm (09/10/20 1007) Sensation: Present (09/10/20 1007) Pedal Pulse: Palpable (09/10/20 1007) Circumference of Calf (cm): 36 cm (09/10/20 1007) Location of Measurement (Calf): Mid  (09/10/20 1007) Circumference of Ankle (cm): 22.5 cm (09/10/20 1007) Location of Measurement (Ankle): Upper  (09/10/20 1007)

## 2020-09-10 NOTE — DISCHARGE INSTRUCTIONS
Discharge Instructions for  [place] [id number] [address], 200 [address] [facility]  Telephone: [phone]    NAME:  Larry Hudson  YOB: 1955  MEDICAL RECORD NUMBER:  [id number]  DATE:  9/10/2020      WOUND CARE ORDERS: Right Lower Leg Wound- Cleanse with Normal Saline, pat dry with gauze. Apply Xeroform, cover with Gauze & ABD pad. Secure with Roll gauze. HHC/PCG to change dressing 3 x week, and as needed, for compromise of dressing. MD follow up in 2 weeks. TREATMENT ORDERS:    Elevate leg(s) when sitting. Avoid prolonged standing in one place. Do no get dressing/wrap wet. Return Appointment:  [x] Return Appointment: With DR Justino Hu in 2 week(s)  [] Nurse Visit : *** days  [] Ordered tests:      Electronically signed Duke Aguilar RN on 9/10/2020 at 10:49 AM     [address] [place] [address] Information: Should you experience any significant changes in your wound(s) or have questions about your wound care, please contact the [address] [place] [address] at [address] [place] [address] 8:00 am - 4:30. If you need help with your wound outside these hours and cannot wait until we are again available, contact your PCP or go to the hospital emergency room. PLEASE NOTE: IF YOU ARE UNABLE TO OBTAIN WOUND SUPPLIES, CONTINUE TO USE THE SUPPLIES YOU HAVE AVAILABLE UNTIL YOU ARE ABLE TO REACH US. IT IS MOST IMPORTANT TO KEEP THE WOUND COVERED AT ALL TIMES.      Physician Signature:_______________________    Date: ___________ Time:  ____________

## 2020-09-24 ENCOUNTER — HOSPITAL ENCOUNTER (OUTPATIENT)
Dept: WOUND CARE | Age: 65
Discharge: HOME | End: 2020-09-24
Admitting: SURGERY
Payer: MEDICARE

## 2020-09-24 VITALS
TEMPERATURE: 96.9 F | HEART RATE: 60 BPM | RESPIRATION RATE: 16 BRPM | DIASTOLIC BLOOD PRESSURE: 58 MMHG | SYSTOLIC BLOOD PRESSURE: 117 MMHG

## 2020-09-24 DIAGNOSIS — I73.9 PAD (PERIPHERAL ARTERY DISEASE) (HCC): ICD-10-CM

## 2020-09-24 DIAGNOSIS — L97.912 NON-PRESSURE CHRONIC ULCER OF RIGHT LOWER LEG, WITH FAT LAYER EXPOSED (HCC): ICD-10-CM

## 2020-09-24 PROCEDURE — 11042 DBRDMT SUBQ TIS 1ST 20SQCM/<: CPT | Performed by: SURGERY

## 2020-09-24 PROCEDURE — 11042 DBRDMT SUBQ TIS 1ST 20SQCM/<: CPT

## 2020-09-24 NOTE — DISCHARGE INSTRUCTIONS
Discharge Instructions/Wound [address] [place] [address], 200 [address] [facility]  Telephone: [phone]    NAME:  Michel Rahman  YOB: 1955  MEDICAL RECORD NUMBER:  [id number]  DATE:  9/24/2020      WOUND CARE ORDERS:  Right Lower Leg Wound- Cleanse with Normal Saline and gauze, pat dry with gauze. Apply Xeroform, cover with Gauze & ABD pad. Secure with Roll gauze. Dressing to be changed 3 x week, and as needed, for compromise of dressing. MD follow up in 2 weeks. TREATMENT ORDERS:    Elevate leg(s) above the level of the heart when sitting. Avoid prolonged standing in one place. Do no get dressing/wrap wet. Return Appointment:  [x] Return Appointment: With Dr. Bailey Montano in 2 [facility])  [] Ordered tests:      Electronically signed Abril Moran RN on 9/24/2020 at 11:24 AM     [place] 281: Should you experience any significant changes in your wound(s) or have questions about your wound care, please contact the [address] [place] [address] at [address] [place] [address] 8:00 am - 4:30. If you need help with your wound outside these hours and cannot wait until we are again available, contact your PCP or go to the hospital emergency room. PLEASE NOTE: IF YOU ARE UNABLE TO OBTAIN WOUND SUPPLIES, CONTINUE TO USE THE SUPPLIES YOU HAVE AVAILABLE UNTIL YOU ARE ABLE TO REACH US. IT IS MOST IMPORTANT TO KEEP THE WOUND COVERED AT ALL TIMES.      Physician Signature:_______________________    Date: ___________ Time:  ____________

## 2020-09-24 NOTE — WOUND CARE
09/24/20 1039 Wound Leg lower Right # 1 Date First Assessed/Time First Assessed: 05/07/20 0835   Present on Hospital Admission: Yes  Wound Approximate Age at First Assessment (Weeks): 4 weeks  Primary Wound Type: Vascular  Location: Leg lower  Wound Location Orientation: Right  Wound Descri. .. Dressing Status Removed Dressing Type Xeroform;ABD pad Non-staged Wound Description Full thickness Wound Length (cm) 15.6 cm Wound Width (cm) 5.5 cm Wound Depth (cm) 0.3 cm Wound Surface Area (cm^2) 85.8 cm^2 Wound Volume (cm^3) 25.74 cm^3 Change in Wound Size % 57.25 Condition of Base Pink;Slough Condition of Edges Open Drainage Amount Moderate Drainage Color Serosanguinous Wound Odor None Chana-wound Assessment Intact Cleansing and Cleansing Agents  Normal saline Visit Vitals BP (!) 117/58 (BP 1 Location: Left arm, BP Patient Position: At rest;Sitting) Pulse 60 Temp 96.9 °F (36.1 °C) Resp 16

## 2020-09-24 NOTE — WOUND CARE
09/24/20 1116 Wound Leg lower Right # 1 Date First Assessed/Time First Assessed: 05/07/20 0835   Present on Hospital Admission: Yes  Wound Approximate Age at First Assessment (Weeks): 4 weeks  Primary Wound Type: Vascular  Location: Leg lower  Wound Location Orientation: Right  Wound Descri. .. Dressing Type Applied Xeroform;ABD pad;Gauze wrap (ant); Special tape (comment) Wound Procedure Type Debridement- Surgical  
Procedure Time Out 2222 Consent Obtained  Yes Procedure Bleeding Moderate Procedure Hemostasis  Silver Nitrate Type of Tissue Removed  Devitalized Procedure Instrument  Curette Procedure Pain Scale Numeric 3/10 Debridement Procedure Performed by Dr. Sly Phillips Post-Procedure Length (cm) 15.6 cm Post-Procedure Width (cm) 535 cm Post-Procedure Depth (cm) 0.4 cm Post-Procedure Volume (cm^3) 3338.4 cm^3 Post-Procedure Surface Area (cm^2) 8346 cm^2 Post Procedure Pain Scale Numeric 0/10 Procedure Tolerated Well Detail Level: Generalized

## 2020-09-24 NOTE — PROGRESS NOTES
HISTORY OF PRESENT ILLNESS:  The patient is a 78-year-old man who is referred to the wound care center regarding nonhealing wound right lower leg and right posterior ankle.  The patient was first seen at the [address] [place] [address] on 5/7/2020.     The patient reports that he had struck his right ankle about 5 months ago and developed a wound, which has been nonhealing.     He more recently developed swelling and redness in the right lower leg earlier this year, which then was followed by blister formation and a wound formation.  He eventually was hospitalized at [facility] and says he was in the hospital for about 18 days. [facility] saw an Infectious Disease doctor while in the hospital. [facility] was given intravenous antibiotics and then eventually discharged on oral Adilene N[place] has been getting pain medication prescribed by his primary physician who is Dr. Marli Silva.     The patient reports chronic numbness of both feet.     The patient had at the time of an ER visit for leg symptoms on the right in 01/2020, a venous duplex scan which did not find any evidence of deep venous thrombosis.  Vein valves were not tested.     The patient has history of lumbar laminectomy and diskectomy on 09/04/2019. [facility] reports related to his disk disease, he has right side footdrop.  He reported history of neurogenic claudication.  The patient was scheduled to have further back surgery within the month, but that has been canceled related to COVID-19.     The patient reports that he walks with a walker.  He denies shortness of breath at rest or with exertion.  He has no anginal chest pain.     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    The patient on 5/29/2020 had open right femoral endarterectomy and patch angioplasty and also balloon angioplasty of right superficial femoral artery.  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  Dr Carlos Enrique Gibson also debrided the dry eschars on the leg wounds.     The patient has seen Dr Carlos Enrique Gibson in follow up.     The patient has a history of coronary artery disease and has had angioplasty and stent. [facility] has also had coronary artery bypass grafting. [facility] does not have any history of diabetes.  Past medical history does include asthma, skin cancer, gastroesophageal reflux disease, hypertension, hyperlipidemia, fatty liver disease, potential sleep apnea, but never tested.     SOCIAL HISTORY:  The patient smoked until 1998 when he quit. [facility] does use alcohol.     The patient's medications do not include a diuretic.     Prior dressing:   Xeroform on the lower leg wound and then dry gauze and roll gauze changed daily.     Dressing started 8/13/2020:  Aquacell AG on the leg  wounds and then dry gauze and roll gauze changed 3 times per week.     Patient had problems with adherence of the dressing, went back to Xeroform after 1 week.     PHYSICAL EXAMINATION:  GENERAL:  The patient is an alert man in no acute distress.     EXTREMITIES:       The patient's lower extremities were examined.  On the left side, there is no edema.  There were no wounds present.  I did not palpate left dorsalis pedis or posterior tibial pulses.     On the right lower extremity, I did not palpate dorsalis pedis or posterior tibial pulses.  There is trace edema in the right lower leg.  The right foot feels warm.      On the anterior aspect of the mid lower leg on the right, there is a cluster of ulcers with total dimension 15.6 x 5.5 x 0.3 cm. There are increased  areas of granulation in the wounds in all areas.  Granulation with slough on all surfaces.  Calf is soft.  Surrounding skin appears healthy.           After application of topical lidocaine gel, sharp excisional debridement of the wound was carried out using 7 mm ring curette.   Slough and granulation tissue was excised down into the subcutaneous layer along the entire rim of the wound.  Hemostasis was obtained by compression and silver nitrate. After debridement, the wound dimensions were deeper by 0.1 cm.   Total surface area debrided 20 cm2.                 ASSESSMENT AND PLAN:       Wound continues slow improvement.     Arterial flow appears improved clinically since vascular intervention.     Wound management will consist again of  using Xeroform on the leg  wounds and then dry gauze and roll gauze changed 3 times per week or every other day.      The patient will follow up in wound care center again in 2 weeks.        FINAL DIAGNOSES:  Nonhealing wounds right lower leg, peripheral artery disease.     L97.912, I73.9     Tari Prather MD

## 2020-10-08 ENCOUNTER — HOSPITAL ENCOUNTER (OUTPATIENT)
Dept: WOUND CARE | Age: 65
Discharge: HOME | End: 2020-10-08
Admitting: SURGERY
Payer: MEDICARE

## 2020-10-08 VITALS
HEART RATE: 60 BPM | DIASTOLIC BLOOD PRESSURE: 66 MMHG | TEMPERATURE: 97.1 F | RESPIRATION RATE: 16 BRPM | SYSTOLIC BLOOD PRESSURE: 147 MMHG

## 2020-10-08 DIAGNOSIS — L97.912 NON-PRESSURE CHRONIC ULCER OF RIGHT LOWER LEG, WITH FAT LAYER EXPOSED (HCC): ICD-10-CM

## 2020-10-08 DIAGNOSIS — I73.9 PAD (PERIPHERAL ARTERY DISEASE) (HCC): ICD-10-CM

## 2020-10-08 PROCEDURE — 99213 OFFICE O/P EST LOW 20 MIN: CPT | Performed by: SURGERY

## 2020-10-08 PROCEDURE — 99213 OFFICE O/P EST LOW 20 MIN: CPT

## 2020-10-08 NOTE — PROGRESS NOTES
HISTORY OF PRESENT ILLNESS:  The patient is a 47-year-old man who is referred to the wound care center regarding nonhealing wound right lower leg and right posterior ankle.  The patient was first seen at the [address] [place] [address] on 5/7/2020.     The patient reports that he had struck his right ankle about 5 months ago and developed a wound, which has been nonhealing.     He more recently developed swelling and redness in the right lower leg earlier this year, which then was followed by blister formation and a wound formation.  He eventually was hospitalized at [facility] and says he was in the hospital for about 18 days. Suzan Villafuerte saw an Infectious Disease doctor while in the hospital. Suzan Villafuerte was given intravenous antibiotics and then eventually discharged on oral [place] has been getting pain medication prescribed by his primary physician who is Dr. Christiana Miranda.     The patient reports chronic numbness of both feet.     The patient had at the time of an ER visit for leg symptoms on the right in 01/2020, a venous duplex scan which did not find any evidence of deep venous thrombosis.  Vein valves were not tested.     The patient has history of lumbar laminectomy and diskectomy on 09/04/2019. Suzan Villafuerte reports related to his disk disease, he has right side footdrop.  He reported history of neurogenic claudication.  The patient was scheduled to have further back surgery within the month, but that has been canceled related to COVID-19.     The patient reports that he walks with a walker.  He denies shortness of breath at rest or with exertion.  He has no anginal chest pain.     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    The patient on 5/29/2020 had open right femoral endarterectomy and patch angioplasty and also balloon angioplasty of right superficial femoral artery.  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  Dr Mariel Cagle also debrided the dry eschars on the leg wounds.     The patient has seen Dr Mariel Cagle in follow up.     The patient has a history of coronary artery disease and has had angioplasty and stent. David Navarrete has also had coronary artery bypass grafting. David Navarrete does not have any history of diabetes.  Past medical history does include asthma, skin cancer, gastroesophageal reflux disease, hypertension, hyperlipidemia, fatty liver disease, potential sleep apnea, but never tested.     SOCIAL HISTORY:  The patient smoked until 1998 when he quit. David Navarrete does use alcohol.     The patient's medications do not include a diuretic.     Prior dressing:   Xeroform on the lower leg wound and then dry gauze and roll gauze changed daily.     Dressing started 8/13/2020:  Aquacell AG on the leg  wounds and then dry gauze and roll gauze changed 3 times per week.     Patient had problems with adherence of the dressing, went back to Xeroform after 1 week.     PHYSICAL EXAMINATION:  GENERAL:  The patient is an alert man in no acute distress.     EXTREMITIES:       The patient's lower extremities were examined.  On the left side, there is no edema.  There were no wounds present.  I did not palpate left dorsalis pedis or posterior tibial pulses.     On the right lower extremity, I did not palpate dorsalis pedis or posterior tibial pulses.  There is trace edema in the right lower leg.  The right foot feels warm.      On the anterior aspect of the mid lower leg on the right, there is a cluster of ulcers with total dimension 15.6 x 6.6 x 0.3 cm.  There are increased  areas of granulation in the wounds in all areas.  Granulation with scarcely any slough.  Calf is soft.  Surrounding skin appears healthy.                 ASSESSMENT AND PLAN:       Wound continues slow improvement.     Arterial flow appears improved clinically since vascular intervention.     Dressing ordered:   Xeroform on the leg  wounds and then dry gauze and roll gauze changed 3 times per week or every other day.      The patient will follow up in wound care center again in 3 weeks.        FINAL DIAGNOSES:  Nonhealing wounds right lower leg, peripheral artery disease.     L97.912, I73.9     Claudell Moris, MD

## 2020-10-08 NOTE — PROGRESS NOTES
10/08/20 0945   Wound Leg lower Right # 1   Date First Assessed/Time First Assessed: 05/07/20 0835   Present on Hospital Admission: Yes  Wound Approximate Age at First Assessment (Weeks): 4 weeks  Primary Wound Type: Vascular  Location: Leg lower  Wound Location Orientation: Right  Wound Descri. ..    Dressing Status Removed   Dressing Type Xeroform;ABD pad;Gauze wrap (ant)   Wound Length (cm) 15.6 cm   Wound Width (cm) 6.6 cm   Wound Depth (cm) 0.3 cm   Wound Surface Area (cm^2) 102.96 cm^2   Wound Volume (cm^3) 30.89 cm^3   Change in Wound Size % 48.7   Condition of Base Pink;Slough   Condition of Edges Open     Visit Vitals  BP (!) 147/66   Pulse 60   Temp 97.1 °F (36.2 °C)   Resp 16

## 2020-10-08 NOTE — DISCHARGE INSTRUCTIONS
Discharge Instructions for  [place] [address] [facility], 200 [address] [facility]  Telephone: [phone]    NAME:  Mallory Martin  YOB: 1955  MEDICAL RECORD NUMBER:  [id number]  DATE:  10/8/2020  WOUND CARE ORDERS:  Right Lower Leg Wound- Cleanse with Normal Saline and gauze, pat dry with gauze. Apply Xeroform, cover with Gauze & ABD pad. Secure with Roll gauze. Dressing to be changed 3 x week, and as needed, for compromise of dressing. MD follow up in 3 weeks. TREATMENT ORDERS:    Elevate leg(s) above the level of the heart when sitting. Avoid prolonged standing in one place. Do no get dressing/wrap wet. Follow Diet as prescribed:   [x] Diet as tolerated: [] Calorie Diabetic Diet: [x] No Added Salt:  [] Increase Protein: [] Limit the amount of liquid you are drinking and avoid drinking in between meals           Return Appointment:  [x] Return Appointment: With DR Lavern Gupta  in  3 Week(s)  [] Nurse Visit : *** days  [] Ordered tests:    Electronically signed Renita Arana RN on 10/8/2020 at 9:47 AM     Sallie Vega 281: Should you experience any significant changes in your wound(s) or have questions about your wound care, please contact the [address] [place] [address] at [address] [place] [address] 8:00 am - 4:30. If you need help with your wound outside these hours and cannot wait until we are again available, contact your PCP or go to the hospital emergency room. PLEASE NOTE: IF YOU ARE UNABLE TO OBTAIN WOUND SUPPLIES, CONTINUE TO USE THE SUPPLIES YOU HAVE AVAILABLE UNTIL YOU ARE ABLE TO REACH US. IT IS MOST IMPORTANT TO KEEP THE WOUND COVERED AT ALL TIMES.      Physician Signature:_______________________    Date: ___________ Time:  ____________

## 2020-10-29 ENCOUNTER — HOSPITAL ENCOUNTER (OUTPATIENT)
Dept: WOUND CARE | Age: 65
Discharge: HOME | End: 2020-10-29
Admitting: SURGERY
Payer: MEDICARE

## 2020-10-29 DIAGNOSIS — L97.912 NON-PRESSURE CHRONIC ULCER OF RIGHT LOWER LEG, WITH FAT LAYER EXPOSED (HCC): ICD-10-CM

## 2020-10-29 DIAGNOSIS — I73.9 PAD (PERIPHERAL ARTERY DISEASE) (HCC): ICD-10-CM

## 2020-10-29 PROCEDURE — 99213 OFFICE O/P EST LOW 20 MIN: CPT

## 2020-10-29 PROCEDURE — 99213 OFFICE O/P EST LOW 20 MIN: CPT | Performed by: SURGERY

## 2020-10-29 NOTE — DISCHARGE INSTRUCTIONS
Discharge Instructions for  [place] [id number] [facility], 200 [address] [facility]  Telephone: [phone]    NAME:  Gregorio Zambrano  YOB: 1955  MEDICAL RECORD NUMBER:  [id number]  DATE:  10/29/2020     WOUND CARE ORDERS:Right Lower Leg Wound- Cleanse with Normal Saline and gauze, pat dry with gauze. Apply Xeroform, cover with Gauze & ABD pad. Secure with Roll gauze. Dressing to be changed 3 x week, and as needed, for compromise of dressing.  MD follow up in 4 weeks. TREATMENT ORDERS:    Elevate leg(s) above the level of the heart when sitting. Avoid prolonged standing in one place. Do no get dressing/wrap wet. Follow Diet as prescribed:   [] Diet as tolerated: [] Calorie Diabetic Diet: Low carb and no Sugar [x] No Added Salt  [] Increase Protein:          Return Appointment:  [x] Return Appointment: With DR Rach Mitchell  in 4  [facility])  [] Nurse Visit : *** days  [] Ordered tests:    Electronically signed Gwen Zabala RN on 10/29/2020 at 10:04 AM     [address] [facility] Information: Should you experience any significant changes in your wound(s) or have questions about your wound care, please contact the [address] [place] [address] at [address] [place] [address] 8:00 am - 4:30. If you need help with your wound outside these hours and cannot wait until we are again available, contact your PCP or go to the hospital emergency room. PLEASE NOTE: IF YOU ARE UNABLE TO OBTAIN WOUND SUPPLIES, CONTINUE TO USE THE SUPPLIES YOU HAVE AVAILABLE UNTIL YOU ARE ABLE TO REACH US. IT IS MOST IMPORTANT TO KEEP THE WOUND COVERED AT ALL TIMES.      Physician Signature:_______________________    Date: ___________ Time:  ____________

## 2020-10-29 NOTE — WOUND CARE
10/29/20 1004 Wound Leg lower Right # 1 Date First Assessed/Time First Assessed: 05/07/20 0835   Present on Hospital Admission: Yes  Wound Approximate Age at First Assessment (Weeks): 4 weeks  Primary Wound Type: Vascular  Location: Leg lower  Wound Location Orientation: Right  Wound Descri. .. Dressing Type Applied Xeroform;ABD pad;Gauze wrap (ant); Special tape (comment)

## 2020-10-29 NOTE — WOUND CARE
10/29/20 2700 Wound Leg lower Right # 1 Date First Assessed/Time First Assessed: 05/07/20 0835   Present on Hospital Admission: Yes  Wound Approximate Age at First Assessment (Weeks): 4 weeks  Primary Wound Type: Vascular  Location: Leg lower  Wound Location Orientation: Right  Wound Descri. .. Dressing Status Removed Dressing Type Xeroform;Gauze;Gauze wrap (ant); Special tape (comment);ABD pad Non-staged Wound Description Full thickness Wound Length (cm) 15.5 cm Wound Width (cm) 5.5 cm Wound Depth (cm) 0.3 cm Wound Surface Area (cm^2) 85.25 cm^2 Wound Volume (cm^3) 25.58 cm^3 Change in Wound Size % 57.53 Condition of Base Pink;Slough Condition of Edges Open Drainage Amount Moderate Drainage Color Serosanguinous Wound Odor None Chana-wound Assessment Intact Cleansing and Cleansing Agents  Normal saline There were no vitals taken for this visit. LLE Peripheral Vascular Capillary Refill: Less than/equal to 3 seconds (10/29/20 0950) Color: Appropriate for race (10/29/20 0950) Temperature: Warm (10/29/20 0950) Sensation: Present (10/29/20 0950) Pedal Pulse: Palpable (10/29/20 0950) Circumference of Calf (cm): 38 cm (10/29/20 0950) Location of Measurement (Calf): Mid  (10/29/20 0950) Circumference of Ankle (cm): 23 cm (10/29/20 0950) Location of Measurement (Ankle): Upper  (10/29/20 0950)

## 2020-10-29 NOTE — PROGRESS NOTES
HISTORY OF PRESENT ILLNESS:  The patient is a 40-year-old man who is referred to the wound care center regarding nonhealing wound right lower leg and right posterior ankle.  The patient was first seen at the [address] on 5/7/2020.     The patient reports that he had struck his right ankle about 5 months ago and developed a wound, which has been nonhealing.     He more recently developed swelling and redness in the right lower leg earlier this year, which then was followed by blister formation and a wound formation.  He eventually was hospitalized at [facility] and says he was in the hospital for about 18 days. Yann Guzman saw an Infectious Disease doctor while in the hospital. Yann Guzman was given intravenous antibiotics and then eventually discharged on oral [address] had been getting pain medication prescribed by his primary physician who is Dr. Fco Shelton. He now has minimal pain and has stopped all pain meds.     The patient reports chronic numbness of both feet.     The patient had at the time of an ER visit for leg symptoms on the right in 01/2020, a venous duplex scan which did not find any evidence of deep venous thrombosis.  Vein valves were not tested.     The patient has history of lumbar laminectomy and diskectomy on 09/04/2019. Yann Guzman reports related to his disk disease, he has right side footdrop.  He reported history of neurogenic claudication.  The patient was scheduled to have further back surgery within the month, but that has been canceled related to COVID-19.     The patient reports that he walks with a cane.  He denies shortness of breath at rest or with exertion.  He has no anginal chest pain.     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    The patient on 5/29/2020 had open right femoral endarterectomy and patch angioplasty and also balloon angioplasty of right superficial femoral artery. 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  Dr Viktoriya Fu also debrided the dry eschars on the leg wounds.     The patient has seen Dr Viktoriya Fu in follow up.     The patient has a history of coronary artery disease and has had angioplasty and stent. Paco Olivares has also had coronary artery bypass grafting. Paco Olivares does not have any history of diabetes.  Past medical history does include asthma, skin cancer, gastroesophageal reflux disease, hypertension, hyperlipidemia, fatty liver disease, potential sleep apnea, but never tested.     SOCIAL HISTORY:  The patient smoked until 1998 when he quit. Paco Olivares does use alcohol.     The patient's medications do not include a diuretic.     Prior dressing:   Xeroform on the lower leg wound and then dry gauze and roll gauze changed daily.     Dressing started 8/13/2020:  Aquacell AG on the leg  wounds and then dry gauze and roll gauze changed 3 times per week.     Patient had problems with adherence of the dressing, went back to Xeroform (3 times per week) after 1 week.     PHYSICAL EXAMINATION:  GENERAL:  The patient is an alert man in no acute distress.     EXTREMITIES:       The patient's lower extremities were examined.  On the left side, there is no edema.  There were no wounds present.  I did not palpate left dorsalis pedis or posterior tibial pulses.     On the right lower extremity, I did not palpate dorsalis pedis or posterior tibial pulses.  There is trace edema in the right lower leg.  The right foot feels warm.      On the anterior aspect of the mid lower leg on the right, there is a cluster of ulcers with total dimension 15.5 x 5.5 x 0.3 cm. Epithilialization from periphery continues. There isgranulation in the wounds in all areas.  Granulation with scarcely any slough.  Calf is soft.  Surrounding skin appears healthy.                 ASSESSMENT AND PLAN:       Wound continues slow improvement.     Arterial flow appears improved clinically since vascular intervention.     Dressing ordered:   Xeroform on the leg  wounds and then dry gauze and roll gauze changed 3 times per week.     The patient will follow up in wound care center again in 4 weeks.        FINAL DIAGNOSES:  Nonhealing wounds right lower leg, peripheral artery disease.     L97.912, I73.9     Leonor Schwab MD

## 2020-11-23 ENCOUNTER — HOSPITAL ENCOUNTER (OUTPATIENT)
Dept: WOUND CARE | Age: 65
Discharge: HOME | End: 2020-11-23
Admitting: SURGERY
Payer: MEDICARE

## 2020-11-23 VITALS
DIASTOLIC BLOOD PRESSURE: 63 MMHG | SYSTOLIC BLOOD PRESSURE: 142 MMHG | RESPIRATION RATE: 16 BRPM | TEMPERATURE: 97.1 F | HEART RATE: 63 BPM

## 2020-11-23 DIAGNOSIS — L97.912 NON-PRESSURE CHRONIC ULCER OF RIGHT LOWER LEG, WITH FAT LAYER EXPOSED (HCC): ICD-10-CM

## 2020-11-23 DIAGNOSIS — I73.9 PAD (PERIPHERAL ARTERY DISEASE) (HCC): ICD-10-CM

## 2020-11-23 PROCEDURE — 11045 DBRDMT SUBQ TISS EACH ADDL: CPT | Performed by: SURGERY

## 2020-11-23 PROCEDURE — 11042 DBRDMT SUBQ TIS 1ST 20SQCM/<: CPT | Performed by: SURGERY

## 2020-11-23 PROCEDURE — 11045 DBRDMT SUBQ TISS EACH ADDL: CPT

## 2020-11-23 PROCEDURE — 11042 DBRDMT SUBQ TIS 1ST 20SQCM/<: CPT

## 2020-11-23 NOTE — DISCHARGE INSTRUCTIONS
Discharge Instructions for  [place] [address]  Telephone: [phone]    NAME:  Greg Hollingsworth  YOB: 1955  MEDICAL RECORD NUMBER:  [id number]  DATE:  11/23/2020  WOUND CARE ORDERS:  Right Lower Leg Wound- Cleanse with Normal Saline and gauze, pat dry with gauze. Apply Xeroform, cover with Gauze & ABD pad. Secure with Roll gauze. Dressing to be changed 3 x week, and as needed, for compromise of dressing.  MD follow up in 4 weeks    TREATMENT ORDERS:    Elevate leg(s) above the level of the heart when sitting. Avoid prolonged standing in one place. Do no get dressing/wrap wet. Follow Diet as prescribed:   [x] Diet as tolerated: [] Calorie Diabetic Diet: Low carb and no Sugar [x] No Added Salt:  [] Increase Protein: [] Limit the amount of liquid you are drinking and avoid drinking in between meals           Return Appointment:  [x] Return Appointment: With DR Jaron Arnold  in  4 Week(s)  [] Nurse Visit : *** days  [] Ordered tests:    Electronically signed Rachele Mendoza RN on 11/23/2020 at 10:44 AM     [facility] Information: Should you experience any significant changes in your wound(s) or have questions about your wound care, please contact the [address] [place] [address] at [address] [place] [address] 8:00 am - 4:30. If you need help with your wound outside these hours and cannot wait until we are again available, contact your PCP or go to the hospital emergency room. PLEASE NOTE: IF YOU ARE UNABLE TO OBTAIN WOUND SUPPLIES, CONTINUE TO USE THE SUPPLIES YOU HAVE AVAILABLE UNTIL YOU ARE ABLE TO REACH US. IT IS MOST IMPORTANT TO KEEP THE WOUND COVERED AT ALL TIMES.      Physician Signature:_______________________    Date: ___________ Time:  ____________

## 2020-11-23 NOTE — PROGRESS NOTES
HISTORY OF PRESENT ILLNESS:  The patient is a 67-year-old man who is referred to the wound care center regarding nonhealing wound right lower leg and right posterior ankle.  The patient was first seen at the [facility] [address] on 5/7/2020.     The patient reports that he had struck his right ankle about 5 months ago and developed a wound, which has been nonhealing.     He more recently developed swelling and redness in the right lower leg earlier this year, which then was followed by blister formation and a wound formation.  He eventually was hospitalized at [facility] and says he was in the hospital for about 18 days. [facility] saw an Infectious Disease doctor while in the hospital. [facility] was given intravenous antibiotics and then eventually discharged on oral [id number] [address] had been getting pain medication prescribed by his primary physician who is Dr. Oscar Willett. He now has minimal pain and has stopped all pain meds.     The patient reports chronic numbness of both feet.     The patient had at the time of an ER visit for leg symptoms on the right in 01/2020, a venous duplex scan which did not find any evidence of deep venous thrombosis.  Vein valves were not tested.     The patient has history of lumbar laminectomy and diskectomy on 09/04/2019. [facility] reports related to his disk disease, he has right side footdrop.  He reported history of neurogenic claudication.  The patient was scheduled to have further back surgery within the month, but that has been canceled related to COVID-19.     The patient reports that he walks with a cane.  He denies shortness of breath at rest or with exertion.  He has no anginal chest pain.     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    The patient on 5/29/2020 had open right femoral endarterectomy and patch angioplasty and also balloon angioplasty of right superficial femoral artery. Shaylee Fortune has occlusion of the popliteal artery with large collaterals.  Dr Jhony Milian also debrided the dry eschars on the leg wounds.     The patient has seen Dr Jhony Milian in follow up.     The patient has a history of coronary artery disease and has had angioplasty and stent. Shaylee Fortune has also had coronary artery bypass grafting. Shaylee Fortune does not have any history of diabetes.  Past medical history does include asthma, skin cancer, gastroesophageal reflux disease, hypertension, hyperlipidemia, fatty liver disease, potential sleep apnea, but never tested.     SOCIAL HISTORY:  The patient smoked until 1998 when he quit. Shaylee Fortune does use alcohol.     The patient's medications do not include a diuretic.     Prior dressing:   Xeroform on the lower leg wound and then dry gauze and roll gauze changed daily.     Dressing started 8/13/2020:  Aquacell AG on the leg  wounds and then dry gauze and roll gauze changed 3 times per week.     Patient had problems with adherence of the dressing, went back to Xeroform (3 times per week) after 1 week.     PHYSICAL EXAMINATION:  GENERAL:  The patient is an alert man in no acute distress.     EXTREMITIES:       The patient's lower extremities were examined.  On the left side, there is no edema.  There were no wounds present.  I did not palpate left dorsalis pedis or posterior tibial pulses.     On the right lower extremity, I did not palpate dorsalis pedis or posterior tibial pulses.  There is trace edema in the right lower leg.  The right foot feels warm. Skin is healthy appearing.     On the anterior aspect of the mid lower leg on the right, there is a cluster of ulcers with total dimension 15.5 x 5.8 x 0.3 cm. Epithilialization from periphery continues. Epitheilial islands have gotten larger. There is granulation and slough in the wounds in all areas.  Calf is soft.  Surrounding skin appears healthy.      Wound debrided - see note.           ASSESSMENT AND PLAN:       Wound continues slow improvement.     Arterial flow appears improved clinically since vascular intervention.     Dressing ordered:   Xeroform on the leg  wounds and then dry gauze and roll gauze changed 3 times per week.     The patient will follow up in wound care center again in 4 weeks.        FINAL DIAGNOSES:  Nonhealing wounds right lower leg, peripheral artery disease.     L97.912, I73.9     Meka Barbosa MD

## 2020-11-23 NOTE — WOUND CARE
11/23/20 1001 RLE Peripheral Vascular Capillary Refill Less than/equal to 3 seconds Color Appropriate for race Temperature Warm Sensation Decreased Pedal Pulse Present Circumference of Calf (cm) 39.3 cm Location of Measurement (Calf) Mid   
Circumference of Ankle (cm) 23 cm Location of Measurement (Ankle) Upper Wound Leg lower Right # 1 Date First Assessed/Time First Assessed: 05/07/20 0835   Present on Hospital Admission: Yes  Wound Approximate Age at First Assessment (Weeks): 4 weeks  Primary Wound Type: Vascular  Location: Leg lower  Wound Location Orientation: Right  Wound Descri. .. Wound Image Wound Etiology (cellulitis) Dressing Status Reinforced dressing Cleansed Cleansed with saline Dressing/Treatment Xeroform;Gauze dressing/dressing sponge;Roll gauze Wound Length (cm) 15.5 cm Wound Width (cm) 5.8 cm Wound Depth (cm) 0.3 cm Wound Surface Area (cm^2) 89.9 cm^2 Change in Wound Size % 55.21 Wound Volume (cm^3) 26.97 cm^3 Wound Healing % 33 Drainage Amount Moderate Drainage Description Serosanguinous Wound Odor None Chana-Wound/Incision Assessment Intact Edges Epibole (rolled edges) Wound Thickness Description Full thickness Visit Vitals BP (!) 142/63 (BP 1 Location: Left arm, BP Patient Position: At rest) Pulse 63 Temp 97.1 °F (36.2 °C) Resp 16

## 2020-12-21 ENCOUNTER — HOSPITAL ENCOUNTER (OUTPATIENT)
Dept: WOUND CARE | Age: 65
Discharge: HOME | End: 2020-12-21
Admitting: SURGERY
Payer: MEDICARE

## 2020-12-21 VITALS
DIASTOLIC BLOOD PRESSURE: 64 MMHG | SYSTOLIC BLOOD PRESSURE: 121 MMHG | RESPIRATION RATE: 18 BRPM | TEMPERATURE: 98.7 F | HEART RATE: 71 BPM

## 2020-12-21 DIAGNOSIS — L97.912 NON-PRESSURE CHRONIC ULCER OF RIGHT LOWER LEG, WITH FAT LAYER EXPOSED (HCC): ICD-10-CM

## 2020-12-21 DIAGNOSIS — I73.9 PAD (PERIPHERAL ARTERY DISEASE) (HCC): ICD-10-CM

## 2020-12-21 PROCEDURE — 11045 DBRDMT SUBQ TISS EACH ADDL: CPT | Performed by: SURGERY

## 2020-12-21 PROCEDURE — 11045 DBRDMT SUBQ TISS EACH ADDL: CPT

## 2020-12-21 PROCEDURE — 11042 DBRDMT SUBQ TIS 1ST 20SQCM/<: CPT

## 2020-12-21 PROCEDURE — 11042 DBRDMT SUBQ TIS 1ST 20SQCM/<: CPT | Performed by: SURGERY

## 2020-12-21 NOTE — PROGRESS NOTES
Debridement Wound Care        Problem List Items Addressed This Visit     None          Procedure Note  Indications:  Based on my examination of this patient's wound(s)/ulcer(s) today, debridement is required to promote healing and evaluate the wound base. Performed by: Migue Mix MD    Consent obtained: Yes    Time out taken: Yes    Debridement: Excisional    Using curette the wound(s)/ulcer(s) was/were sharply debrided down through and including the removal of    subcutaneous tissue    Devitalized Tissue Debrided: slough and granulation into the subcutaneous layer    Pre Debridement Measurements:  Are located in the Wound/Ulcer Documentation Flow Sheet    Wound/Ulcer #: 1    Post Debridement Measurements:  Wound/Ulcer Descriptions are Pre Debridement except measurements:Other: Following debridement, depth increased by 0.1 cm.     Wound Back (Active)   Number of days: 479       Wound Leg lower Right # 1 (Active)   Wound Image   12/21/20 0958   Wound Etiology Arterial 12/21/20 0958   Dressing Status Old drainage noted 12/21/20 0958   Cleansed Cleansed with saline 12/21/20 0958   Dressing/Treatment Xeroform;Gauze dressing/dressing sponge;ABD pad;Roll gauze;Tape/Soft cloth adhesive tape 11/23/20 1104   Wound Length (cm) 15 cm 12/21/20 0958   Wound Width (cm) 7 cm 12/21/20 0958   Wound Depth (cm) 0.5 cm 12/21/20 0958   Wound Surface Area (cm^2) 105 cm^2 12/21/20 0958   Change in Wound Size % 47.69 12/21/20 0958   Wound Volume (cm^3) 52.5 cm^3 12/21/20 0958   Wound Healing % -31 12/21/20 0958   Post-Procedure Length (cm) 15.6 cm 09/24/20 1116   Post-Procedure Width (cm) 535 cm 09/24/20 1116   Post-Procedure Depth (cm) 0.4 cm 09/24/20 1116   Post-Procedure Surface Area (cm^2) 8346 cm^2 09/24/20 1116   Post-Procedure Volume (cm^3) 3338.4 cm^3 09/24/20 1116   Drainage Amount Moderate 12/21/20 0958   Drainage Description Serous 12/21/20 0958   Wound Odor None 12/21/20 0958   Chana-Wound/Incision Assessment Intact 12/21/20 0958   Edges Epibole (rolled edges) 12/21/20 0958   Wound Thickness Description Full thickness 12/21/20 0958   Number of days: 228       Wound Groin Right (Active)   Number of days: 206        Percent of Wound(s)/Ulcer(s) Debrided: 30%    Total Surface Area Debrided:  32 sq cm     Diabetic/Pressure/Non Pressure Ulcers only:  Ulcer:     Estimated Blood Loss:  Minimal     Hemostasis Achieved: Pressure    Procedural Pain: 0 / 10     Post Procedural Pain: 0 / 10     Response to treatment: Well tolerated by patient

## 2020-12-21 NOTE — WOUND CARE
12/21/20 4543 RLE Peripheral Vascular Capillary Refill Less than/equal to 3 seconds Color Appropriate for race Temperature Cool Sensation Decreased Pedal Pulse Palpable Circumference of Calf (cm) 38.5 cm Location of Measurement (Calf) Mid   
Circumference of Ankle (cm) 23.5 cm Location of Measurement (Ankle) Upper Wound Leg lower Right # 1 Date First Assessed/Time First Assessed: 05/07/20 0835   Present on Hospital Admission: Yes  Wound Approximate Age at First Assessment (Weeks): 4 weeks  Primary Wound Type: Vascular  Location: Leg lower  Wound Location Orientation: Right  Wound Descri. .. Wound Image Wound Etiology Arterial  
Dressing Status Old drainage noted Cleansed Cleansed with saline Wound Length (cm) 15 cm Wound Width (cm) 7 cm Wound Depth (cm) 0.5 cm Wound Surface Area (cm^2) 105 cm^2 Change in Wound Size % 47.69 Wound Volume (cm^3) 52.5 cm^3 Wound Healing % -31 Drainage Amount Moderate Drainage Description Serous Wound Odor None Chana-Wound/Incision Assessment Intact Edges Epibole (rolled edges) Wound Thickness Description Full thickness Visit Vitals /64 (BP 1 Location: Left arm, BP Patient Position: At rest;Sitting) Pulse 71 Temp 98.7 °F (37.1 °C) Resp 18

## 2020-12-21 NOTE — PROGRESS NOTES
HISTORY OF PRESENT ILLNESS:  The patient is a 64-year-old man who is referred to the wound care center regarding nonhealing wound right lower leg and right posterior ankle.  The patient was first seen at the [address] [place] [address] on 5/7/2020.     The patient reports that he had struck his right ankle about 5 months ago and developed a wound, which has been nonhealing.     He more recently developed swelling and redness in the right lower leg earlier this year, which then was followed by blister formation and a wound formation.  He eventually was hospitalized at [facility] and says he was in the hospital for about 18 days. Vandana Brown saw an Infectious Disease doctor while in the hospital. Vandana Brown was given intravenous antibiotics and then eventually discharged on oral Keflex.    He had been getting pain medication prescribed by his primary physician who is Dr. Beatriz Kumari now has minimal pain and has stopped all pain meds.     The patient reports chronic numbness of both feet.     The patient had at the time of an ER visit for leg symptoms on the right in 01/2020, a venous duplex scan which did not find any evidence of deep venous thrombosis.  Vein valves were not tested.     The patient has history of lumbar laminectomy and diskectomy on 09/04/2019. Vandana Brown reports related to his disk disease, he has right side footdrop.  He reported history of neurogenic claudication.  The patient was scheduled to have further back surgery within the month, but that has been canceled related to COVID-19.     The patient reports that he walks with Yohana Poot denies shortness of breath at rest or with exertion.  He has no anginal chest pain.     The patient had angiography by Dr Juno Manjarrez on 5/17/2020 with finding of severe multilevel arterial occlusive disease in legs.    The patient on 5/29/2020 had open right femoral endarterectomy and patch angioplasty and also balloon angioplasty of right superficial femoral artery. David Navarrete has occlusion of the popliteal artery with large collaterals.  Dr Mariel Cagle also debrided the dry eschars on the leg wounds.     The patient has seen Dr Mariel Cagle in follow up.     The patient has a history of coronary artery disease and has had angioplasty and stent. David Navarrete has also had coronary artery bypass grafting. David Navarrete does not have any history of diabetes.  Past medical history does include asthma, skin cancer, gastroesophageal reflux disease, hypertension, hyperlipidemia, fatty liver disease, potential sleep apnea, but never tested.     SOCIAL HISTORY:  The patient smoked until 1998 when he quit. David Navarrete does use alcohol.     The patient's medications do not include a diuretic.     Prior dressing:   Xeroform on the lower leg wound and then dry gauze and roll gauze changed daily.     Dressing started 8/13/2020:  Aquacell AG on the leg  wounds and then dry gauze and roll gauze changed 3 times per week.     Patient had problems with adherence of the dressing, went back to Xeroform (3 times per week) after 1 week.     PHYSICAL EXAMINATION:  GENERAL:  The patient is an alert man in no acute distress.     EXTREMITIES:       The patient's lower extremities were examined.  On the left side, there is no edema.  There were no wounds present.  I did not palpate left dorsalis pedis or posterior tibial pulses.     On the right lower extremity, I did not palpate dorsalis pedis or posterior tibial pulses.  There is trace edema in the right lower leg.  The right foot feels warm. Skin is healthy appearing.     On the anterior aspect of the mid lower leg on the right, there is a cluster of ulcers with total dimension 15 x 7 x 0.5 cm. Epithilialization from periphery continues. Epitheilial islands have gotten larger. There is somewhat senescent granulation and slough in the wounds in all areas. Calf is soft.  Surrounding skin appears healthy.   Mild edema.      Wound debrided - see note.           ASSESSMENT AND PLAN:       Moderate drainage from wound     Arterial flow appears improved clinically since vascular intervention.     Dressing ordered:   Draw-miko on the leg  wounds and then dry gauze and roll gauze changed 3 times per week.   Covered by double Tubigrip to help with edema control.     The patient will follow up in wound care center again in 2 weeks.        FINAL DIAGNOSES:  Nonhealing wounds right lower leg, peripheral artery disease.     L97.912, I73.9     Delfino Mckeon MD

## 2020-12-21 NOTE — DISCHARGE INSTRUCTIONS
Discharge Instructions for  [facility]  [address] [facility] [address] [facility]  Telephone: [phone]    NAME:  Luis Oleary  YOB: 1955  MEDICAL RECORD NUMBER:  [id number]  DATE:  12/21/2020  WOUND CARE ORDERS:  Right lower leg wound - cleanse with saline, apply Drawtex, cover with exudry, secure with roll gauze, apply tubigrip (single layer size E applied in clinic today, Home Health may use double layer size F). Change dressing 3 x week. Return to clinic in 2 weeks for MD follow-up. TREATMENT ORDERS:    Elevate leg(s) above the level of the heart when sitting. Avoid prolonged standing in one place. Do no get dressing/wrap wet. Follow Diet as prescribed:   [x] Diet as tolerated: [] Calorie Diabetic Diet: Low carb and no Sugar [x] No Added Salt:  [x] Increase Protein: [] Limit the amount of liquid you are drinking and avoid drinking in between meals           Return Appointment:  [x] Return Appointment: With DR Mike Wen  in  [facility])  [] Nurse Visit : *** days  [] Ordered tests:    Electronically signed Brigid Judd RN on 12/21/2020 at 10:22 AM     S[facility] [address]: Should you experience any significant changes in your wound(s) or have questions about your wound care, please contact the [address] [place] [address] at [address] [place] [address] 8:00 am - 4:30. If you need help with your wound outside these hours and cannot wait until we are again available, contact your PCP or go to the hospital emergency room. PLEASE NOTE: IF YOU ARE UNABLE TO OBTAIN WOUND SUPPLIES, CONTINUE TO USE THE SUPPLIES YOU HAVE AVAILABLE UNTIL YOU ARE ABLE TO REACH US. IT IS MOST IMPORTANT TO KEEP THE WOUND COVERED AT ALL TIMES.      Physician Signature:_______________________    Date: ___________ Time:  ____________

## 2020-12-29 ENCOUNTER — HOSPITAL ENCOUNTER (OUTPATIENT)
Dept: WOUND CARE | Age: 65
Discharge: HOME | End: 2020-12-29
Admitting: PODIATRIST
Payer: MEDICARE

## 2020-12-29 VITALS
RESPIRATION RATE: 18 BRPM | HEART RATE: 63 BPM | SYSTOLIC BLOOD PRESSURE: 115 MMHG | TEMPERATURE: 97.3 F | DIASTOLIC BLOOD PRESSURE: 59 MMHG

## 2020-12-29 PROCEDURE — 11042 DBRDMT SUBQ TIS 1ST 20SQCM/<: CPT

## 2020-12-29 NOTE — PROGRESS NOTES
Patient presents to wound clinic for: Gonzales Burgess is a 77 y.o. male who presents subsequent wound care. of the following:  Right lower leg ulceration. He is usually followed by Dr. Roseann Long, and has been having a non healing wound to the area since approximately May 2020. He also has a history of revascularization of the RLE by Dr. Irene Villarreal earlier this year as well. Per Dr. Governor Rivas last note, \"The patient had angiography by Dr Lukasz Hassan on 5/17/2020 with finding of severe multilevel arterial occlusive disease in legs.    The patient on 5/29/2020 had open right femoral endarterectomy and patch angioplasty and also balloon angioplasty of right superficial femoral artery.  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 \"  Recently the wound have been treated with regular debridements and xeroform and dry dressings. At last week's visit, the dressing was switched to drawtex and tubigrip. Yesterday, patient's home health nurse noted that the patietn hwas having increased drainage and irritation to the periwound skin and called the wound center to see if the patient could be seen sooner. Wound care nursing at the time recommended the patient switch back to the xeroform dressing. The patient notes that the drainage and irritation of the skin has greatly decreased since switching back to the xeroform. Denies f/c/n/v/d.     Pertinent Medical History:  Past Medical History:   Diagnosis Date    Arthritis     OSTEO    Asthma     AS A YOUNGER ADULT    CAD (coronary artery disease)     stent X1    Cancer ([facility] [address]     BCC, SCC    Chronic pain     GERD (gastroesophageal reflux disease)     HTN (hypertension)     Hyperlipidemia     Kidney stone 1993    Liver disease 03/2018    FATTY LIVER    Sleep apnea     WAS TOLD, BUT NEVER TESTED     Past Surgical History:   Procedure Laterality Date    CABG, ARTERY-VEIN, THREE  03/2018    CARDIAC SURG PROCEDURE UNLIST  1998 & 2018    CARDIAC CATH    HX COLONOSCOPY      HX ORTHOPAEDIC Left 2005    HIP REPLACEMENT    HX ORTHOPAEDIC Right 2009    HIP REPLACEMENT    IR INJ FORAMIN EPID LUMB ANES/STER SNGL  1/3/2020    NEUROLOGICAL PROCEDURE UNLISTED  1999    L1 or L3 LAMINECTOMY     Prior to Admission medications    Medication Sig Start Date End Date Taking? Authorizing Provider   nystatin (MYCOSTATIN) powder Apply  to affected area two (2) times a day. 6/4/20  Yes Ziggy Barnes MD   clopidogreL (PLAVIX) 75 mg tab Take 1 Tab by mouth daily. 6/1/20  Yes Mendel Patron, MD   omeprazole (PRILOSEC) 40 mg capsule Take 40 mg by mouth daily. Yes Provider, Historical   rosuvastatin (CRESTOR) 40 mg tablet Take 40 mg by mouth nightly. Yes Provider, Historical   SAFETY-HIEN 3CC SYR 23GX1\" 3 mL 23 gauge x 1\" syrg INJECT 0.4ML INTRAMUSCULARLY ONCE EVERY 7 DAYS 12/7/16  Yes Tobin Shaw MD   testosterone cypionate (DEPOTESTOTERONE CYPIONATE) 200 mg/mL injection 0.35 mL by IntraMUSCular route every seven (7) days. Patient taking differently: 35 mg by IntraMUSCular route every seven (7) days. PT LAST TOOK THIS MEDICATION ON Tuesday, 8/27/19. 3/1/16  Yes Tobin Shaw MD   Needle, Disp, 21 G 21 x 1 \" Ndle 1 mL by Does Not Apply route every seven (7) days. 7/9/12  Yes Tobin Shaw MD   Syringe, Disposable, 1 mL Syrg 0.4 mL by Does Not Apply route every seven (7) days. 7/9/12  Yes Tobin Shaw MD   aspirin delayed-release 81 mg tablet Take 81 mg by mouth daily. Yes Provider, Historical   atenolol (TENORMIN) 25 mg tablet Take 25 mg by mouth daily. Yes Provider, Historical   allopurinol (ZYLOPRIM) 300 mg tablet Take 450 mg by mouth daily. PATIENT TAKES A TOTAL OF 1 1/2 TABS PER DAY TO EQUAL 450 MG   Yes Provider, Historical   lisinopril (PRINIVIL, ZESTRIL) 10 mg tablet Take 5 mg by mouth every evening.    Yes Provider, Historical     No Known Allergies  Family History   Problem Relation Age of Onset    Cancer Brother         prostate     Heart Disease Brother         CABG x4     Heart Disease Father 45    High Cholesterol Father     Heart Disease Brother 48        CABG    Other Mother         DIVERTICULITIS    Heart Attack Son         AGE 44    No Known Problems Daughter     Anesth Problems Neg Hx      Social History     Socioeconomic History    Marital status: LEGALLY      Spouse name: Not on file    Number of children: Not on file    Years of education: Not on file    Highest education level: Not on file   Occupational History    Not on file   Social Needs    Financial resource strain: Not on file    Food insecurity     Worry: Not on file     Inability: Not on file    Transportation needs     Medical: Not on file     Non-medical: Not on file   Tobacco Use    Smoking status: Former Smoker     Packs/day: 0.50     Quit date: 1998     Years since quittin.1    Smokeless tobacco: Never Used   Substance and Sexual Activity    Alcohol use:  Yes     Alcohol/week: 5.0 standard drinks     Types: 5 Cans of beer per week     Comment: occasionally    Drug use: Not Currently    Sexual activity: Not on file   Lifestyle    Physical activity     Days per week: Not on file     Minutes per session: Not on file    Stress: Not on file   Relationships    Social connections     Talks on phone: Not on file     Gets together: Not on file     Attends Anglican service: Not on file     Active member of club or organization: Not on file     Attends meetings of clubs or organizations: Not on file     Relationship status: Not on file    Intimate partner violence     Fear of current or ex partner: Not on file     Emotionally abused: Not on file     Physically abused: Not on file     Forced sexual activity: Not on file   Other Topics Concern    Not on file   Social History Narrative    Not on file       Vitals:    20 1308   BP: (!) 115/59   Pulse: 63   Resp: 18   Temp: 97.3 °F (36.3 °C)       Review of Systems:    Gen: No fever, chills, malaise, weight loss/gain. Heent: No headache, rhinorrhea, epistaxis, ear pain, hearing loss, sinus pain, neck pain/stiffness, sore throat. Heart: No chest pain, palpitations, HAWTHORNE, pnd, or orthopnea. Resp: No cough, hemoptysis, wheezing and shortness of breath. GI: No nausea, vomiting, diarrhea, constipation, melena or hematochezia. : No urinary obstruction, dysuria or hematuria. Derm: see below  Musc/skeletal: no bone or joint complains. Vasc: No edema, cyanosis or claudication. Endo: No heat/cold intolerance, no polyuria,polydipsia or polyphagia. Neuro: No unilateral weakness, numbness, tingling. No seizures. Heme: No easy bruising or bleeding. Wound Description:   12/29/20 1323   Anesthetic   Anesthetic 4% Lidocaine Cream   RLE Peripheral Vascular    Capillary Refill Less than/equal to 3 seconds   Color Appropriate for race   Temperature Cool   Sensation Decreased   Pedal Pulse Palpable   Circumference of Calf (cm) 37.4 cm   Location of Measurement (Calf) Mid    Circumference of Ankle (cm) 22.6 cm   Location of Measurement (Ankle) Upper    Wound Leg lower Right # 1   Date First Assessed/Time First Assessed: 05/07/20 0835   Present on Hospital Admission: Yes  Wound Approximate Age at First Assessment (Weeks): 4 weeks  Primary Wound Type: Vascular  Location: Leg lower  Wound Location Orientation: Right  Wound Descri. ..    Wound Image    Wound Etiology Arterial   Cleansed Cleansed with saline   Wound Length (cm) 14.8 cm   Wound Width (cm) 7 cm   Wound Depth (cm) 0.5 cm   Wound Surface Area (cm^2) 103.6 cm^2   Change in Wound Size % 48.39   Wound Volume (cm^3) 51.8 cm^3   Wound Healing % -29   Drainage Amount Moderate   Drainage Description Serous   Wound Odor None   Chana-Wound/Incision Assessment Intact   Edges Epibole (rolled edges)   Wound Thickness Description Full thickness          Debridement: required today for wound healing    Debridement Wound Care        Problem List Items Addressed This Visit     None          Procedure Note  Indications:  Based on my examination of this patient's wound(s)/ulcer(s) today, debridement is required to promote healing and evaluate the wound base. Performed by: Boby Fabry, DPM    Consent obtained: Yes    Time out taken: Yes    Debridement: Excisional    Using # 15 blade scalpel the wound(s)/ulcer(s) was/were sharply debrided down through and including the removal of    epidermis, dermis and subcutaneous tissue    Devitalized Tissue Debrided: fibrin, biofilm, slough and exudate    Pre Debridement Measurements:  Are located in the Wound/Ulcer Documentation Flow Sheet    Wound/Ulcer #: 1    Post Debridement Measurements:  Wound/Ulcer Descriptions are Pre Debridement except measurements:Non-Pressure ulcer, fat layer exposed    Wound Back (Active)   Number of days: 489       Wound Leg lower Right # 1 (Active)   Wound Image   12/29/20 1323   Wound Etiology Arterial 12/29/20 1323   Dressing Status New dressing applied 12/29/20 1523   Cleansed Cleansed with saline 12/29/20 1523   Dressing/Treatment Xeroform;ABD pad;Gauze dressing/dressing sponge;Roll gauze; Tubular bandage 12/29/20 1523   Wound Length (cm) 14.8 cm 12/29/20 1323   Wound Width (cm) 7 cm 12/29/20 1323   Wound Depth (cm) 0.5 cm 12/29/20 1323   Wound Surface Area (cm^2) 103.6 cm^2 12/29/20 1323   Change in Wound Size % 48.39 12/29/20 1323   Wound Volume (cm^3) 51.8 cm^3 12/29/20 1323   Wound Healing % -29 12/29/20 1323   Post-Procedure Length (cm) 14.8 cm 12/29/20 1405   Post-Procedure Width (cm) 7 cm 12/29/20 1405   Post-Procedure Depth (cm) 0.6 cm 12/29/20 1405   Post-Procedure Surface Area (cm^2) 103.6 cm^2 12/29/20 1405   Post-Procedure Volume (cm^3) 62.16 cm^3 12/29/20 1405   Drainage Amount Moderate 12/29/20 1323   Drainage Description Serous 12/29/20 1323   Wound Odor None 12/29/20 1323   Chana-Wound/Incision Assessment Intact 12/29/20 1323   Edges Epibole (rolled edges) 12/29/20 1323   Wound Thickness Description Full thickness 12/29/20 1323   Number of days: 238       Wound Groin Right (Active)   Number of days: 216        Percent of Wound(s)/Ulcer(s) Debrided: 100%    Total Surface Area Debrided:  103.6 sq cm     Diabetic/Pressure/Non Pressure Ulcers only:  Ulcer:     Estimated Blood Loss:  Minimal    Hemostasis Achieved: Pressure    Procedural Pain: 0 / 10     Post Procedural Pain: 0 / 10     Response to treatment: Well tolerated by patient     Infection: no    Assessment:   1. PAD  2.  Nonhealing right leg wound with fat layer exposed    Plan:  -Patient seen and assessed  -Culture taken to rule out any long-standing colonization but no overt cellulitis or infection noted at this time.   -Wound debrided as noted above.  -Continue xeroform, dsd  -Follow-up next week with Dr. Tricia Dow

## 2020-12-29 NOTE — WOUND CARE
12/29/20 1323 Anesthetic Anesthetic 4% Lidocaine Cream  
RLE Peripheral Vascular Capillary Refill Less than/equal to 3 seconds Color Appropriate for race Temperature Cool Sensation Decreased Pedal Pulse Palpable Circumference of Calf (cm) 37.4 cm Location of Measurement (Calf) Mid   
Circumference of Ankle (cm) 22.6 cm Location of Measurement (Ankle) Upper Wound Leg lower Right # 1 Date First Assessed/Time First Assessed: 05/07/20 0835   Present on Hospital Admission: Yes  Wound Approximate Age at First Assessment (Weeks): 4 weeks  Primary Wound Type: Vascular  Location: Leg lower  Wound Location Orientation: Right  Wound Descri. .. Wound Image Wound Etiology Arterial  
Cleansed Cleansed with saline Wound Length (cm) 14.8 cm Wound Width (cm) 7 cm Wound Depth (cm) 0.5 cm Wound Surface Area (cm^2) 103.6 cm^2 Change in Wound Size % 48.39 Wound Volume (cm^3) 51.8 cm^3 Wound Healing % -29 Drainage Amount Moderate Drainage Description Serous Wound Odor None Chana-Wound/Incision Assessment Intact Edges Epibole (rolled edges) Wound Thickness Description Full thickness 12/29/20 1323 Anesthetic Anesthetic 4% Lidocaine Cream  
RLE Peripheral Vascular Capillary Refill Less than/equal to 3 seconds Color Appropriate for race Temperature Cool Sensation Decreased Pedal Pulse Palpable Circumference of Calf (cm) 37.4 cm Location of Measurement (Calf) Mid   
Circumference of Ankle (cm) 22.6 cm Location of  
 
Measurement (Ankle) Upper Wound Leg lower Right # 1 Date First Assessed/Time First Assessed: 05/07/20 0835   Present on Hospital Admission: Yes  Wound Approximate Age at First Assessment (Weeks): 4 weeks  Primary Wound Type: Vascular  Location: Leg lower  Wound Location Orientation: Right  Wound Descri. .. Wound Image Wound Etiology Arterial  
Cleansed Cleansed with saline Wound Length (cm) 14.8 cm Wound Width (cm) 7 cm Wound Depth (cm) 0.5 cm Wound Surface Area (cm^2) 103.6 cm^2 Change in Wound Size % 48.39 Wound Volume (cm^3) 51.8 cm^3 Wound Healing % -29 Drainage Amount Moderate Drainage Description Serous Wound Odor None Chana-Wound/Incision Assessment Intact Edges Epibole (rolled edges) Wound Thickness Description Full thickness Excoriated area on lateral part of leg. Picture added in note. Visit Vitals BP (!) 115/59 (BP 1 Location: Left arm, BP Patient Position: Sitting) Pulse 63 Temp 97.3 °F (36.3 °C) Resp 18

## 2020-12-29 NOTE — DISCHARGE INSTRUCTIONS
Discharge Instructions for  [facility]  [address] [facility]  Telephone: [phone]    NAME:  Angela White  YOB: 1955  MEDICAL RECORD NUMBER:  [id number]  DATE:  12/29/2020     WOUND CARE ORDERS:Right lower leg wound - Cleanse wound and periwound with saline and gauze. Cover wound with Xeroform, ABD pad (or Exudry), secure with roll gauze, Apply double layer of  Tubigrip.  PCG or [address] [facility] to change dressing 3 x week, and as needed, for compromise of dressing. Return to clinic for MD follow up in 1 week for MD follow-up. Wound Culture Today-Right anterior lower leg wound. TREATMENT ORDERS:    Elevate leg(s)  when sitting. Avoid prolonged standing in one place. Do not get dressing/wrap wet. Return Appointment:  [x] Return Appointment: With DR Christian Santana  in [facility])  [] Nurse Visit : *** days  [] Ordered tests:    Electronically signed Toi Tejeda RN on 12/29/2020 at 2:05 PM     Sallie Vega 281: Should you experience any significant changes in your wound(s) or have questions about your wound care, please contact the [address] [place] [address] at [address] [place] [address] 8:00 am - 4:30. If you need help with your wound outside these hours and cannot wait until we are again available, contact your PCP or go to the hospital emergency room. PLEASE NOTE: IF YOU ARE UNABLE TO OBTAIN WOUND SUPPLIES, CONTINUE TO USE THE SUPPLIES YOU HAVE AVAILABLE UNTIL YOU ARE ABLE TO REACH US. IT IS MOST IMPORTANT TO KEEP THE WOUND COVERED AT ALL TIMES.      Physician Signature:_______________________    Date: ___________ Time:  ____________

## 2020-12-31 RX ORDER — DOXYCYCLINE 100 MG/1
100 CAPSULE ORAL 2 TIMES DAILY
Qty: 28 CAP | Refills: 0 | Status: SHIPPED | OUTPATIENT
Start: 2020-12-31 | End: 2021-01-14

## 2020-12-31 NOTE — PROGRESS NOTES
[address] [facility]    Wound culture grew staph aureus, sens pending. I have sent by email Rx for Doxycycline 100 mg po bid for 14 days. Patient to be instructed to take with water on empty stomach 1 hour before eating, not to take at same time with calcium, magnesium, or vitamins.     Diana Denton MD

## 2021-01-01 LAB
BACTERIA SPEC AEROBE CULT: ABNORMAL

## 2021-01-04 ENCOUNTER — HOSPITAL ENCOUNTER (OUTPATIENT)
Dept: WOUND CARE | Age: 66
Discharge: HOME | End: 2021-01-04
Admitting: SURGERY
Payer: MEDICARE

## 2021-01-04 VITALS
HEART RATE: 58 BPM | SYSTOLIC BLOOD PRESSURE: 131 MMHG | TEMPERATURE: 97.1 F | DIASTOLIC BLOOD PRESSURE: 60 MMHG | RESPIRATION RATE: 18 BRPM

## 2021-01-04 DIAGNOSIS — L97.912 NON-PRESSURE CHRONIC ULCER OF RIGHT LOWER LEG, WITH FAT LAYER EXPOSED (HCC): ICD-10-CM

## 2021-01-04 DIAGNOSIS — I73.9 PAD (PERIPHERAL ARTERY DISEASE) (HCC): ICD-10-CM

## 2021-01-04 PROBLEM — R60.0 LEG EDEMA, RIGHT: Status: ACTIVE | Noted: 2021-01-04

## 2021-01-04 PROCEDURE — 99213 OFFICE O/P EST LOW 20 MIN: CPT | Performed by: SURGERY

## 2021-01-04 PROCEDURE — 99213 OFFICE O/P EST LOW 20 MIN: CPT

## 2021-01-04 NOTE — DISCHARGE INSTRUCTIONS
Discharge Instructions for  [facility]  [address], [address] [facility]  Telephone: [phone]    NAME:  Fidelia Noble  YOB: 1955  MEDICAL RECORD NUMBER:  [id number]  DATE:  1/4/2021     WOUND CARE ORDERS:   Right lower leg wound - Cleanse wound and periwound with saline and gauze. Cover wound with Xeroform, ABD pad (or Exudry), secure with roll gauze, Apply double layer of  Tubigrip (E or F). PCG or [address] [facility] to change dressing 3 x week, and as needed, for compromise of dressing. Return to clinic for MD follow up in 1 week for MD follow-up. TREATMENT ORDERS:    Elevate leg(s) when sitting. Avoid prolonged standing in one place. Do not get dressing/wrap wet. Finish prescription for Doxycycline as presribed  Follow Diet as prescribed:   [] Calorie Diabetic Diet: Low carb and no Sugar [x] No Added Salt:Avoid \"prepared\" foods, as these are typically high in salt. *  [] Increase Protein: [] Limit the amount of liquid you are drinking and avoid drinking in between meals           Return Appointment:  [x] Return Appointment: With DR Shahnaz Orlando  In 1 [facility])  [] Nurse Visit : *** days  [] Ordered tests:    Electronically signed Tessie Ortiz RN on 1/4/2021 at 10:15 AM     Sallie Vega 281: Should you experience any significant changes in your wound(s) or have questions about your wound care, please contact the [address] [place] [address] at [address] [place] [address] 8:00 am - 4:30. If you need help with your wound outside these hours and cannot wait until we are again available, contact your PCP or go to the hospital emergency room. PLEASE NOTE: IF YOU ARE UNABLE TO OBTAIN WOUND SUPPLIES, CONTINUE TO USE THE SUPPLIES YOU HAVE AVAILABLE UNTIL YOU ARE ABLE TO REACH US. IT IS MOST IMPORTANT TO KEEP THE WOUND COVERED AT ALL TIMES.      Physician Signature:_______________________    Date: ___________ Time: ____________

## 2021-01-04 NOTE — PROGRESS NOTES
HISTORY OF PRESENT ILLNESS:  The patient is a 68-year-old man who is referred to the wound care center regarding nonhealing wound right lower leg and right posterior ankle.  The patient was first seen at the [address] [place] [address] on 5/7/2020.     The patient reports that he had struck his right ankle and developed a wound, which has been nonhealing.     He  developed swelling and redness in the right lower leg earlier this year, which then was followed by blister formation and a wound formation.  He eventually was hospitalized at [facility] and says he was in the hospital for about 18 days. [facility] saw an Infectious Disease doctor while in the hospital. [facility] was given intravenous antibiotics and then eventually discharged on oral Keflex.    He had been getting pain medication prescribed by his primary physician who is Dr. Penelope Seo now has minimal pain and has stopped all pain meds.     The patient reports chronic numbness of both feet.     The patient had at the time of an ER visit for leg symptoms on the right in 01/2020, a venous duplex scan which did not find any evidence of deep venous thrombosis.  Vein valves were not tested.     The patient has history of lumbar laminectomy and diskectomy on 09/04/2019. [facility] reports related to his disk disease, he has right side footdrop.  He reported history of neurogenic claudication.  The patient was scheduled to have further back surgery within the month, but that has been canceled related to COVID-19.     The patient reports that he walks with [place] denies shortness of breath at rest or with exertion.  He has no anginal chest pain.     The patient had angiography by Dr Dorothy Berumen on 5/17/2020 with finding of severe multilevel arterial occlusive disease in legs.    The patient on 5/29/2020 had open right femoral endarterectomy and patch angioplasty and also balloon angioplasty of right superficial femoral artery.  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  Dr Eura Hammans also debrided the dry eschars on the leg wounds.     The patient has seen Dr Eura Hammans in follow up.     The patient has a history of coronary artery disease and has had angioplasty and stent. [facility] has also had coronary artery bypass grafting. [facility] does not have any history of diabetes.  Past medical history does include asthma, skin cancer, gastroesophageal reflux disease, hypertension, hyperlipidemia, fatty liver disease, potential sleep apnea, but never tested.     SOCIAL HISTORY:  The patient smoked until 1998 when he quit. [facility] does use alcohol.     The patient's medications do not include a diuretic.     Prior dressing:   Xeroform on the lower leg wound and then dry gauze and roll gauze changed daily.     Dressing started 8/13/2020:  Aquacell AG on the leg  wounds and then dry gauze and roll gauze changed 3 times per week.     Patient had problems with adherence of the dressing, went back to Xeroform (3 times per week) after 1 week.     Drawtex dressing tried. He developed more drainage, skin irritation. Culture on 12/29/2020 grew pseudomonas and MRSA. He started doxycycline on 12/31/2020. Resumed xeroform dressing. Patient reports he still has a good deal of drainage. Skin irritation decreased. PHYSICAL EXAMINATION:  GENERAL:  The patient is an alert man in no acute distress.     EXTREMITIES:       The patient's lower extremities were examined.  On the left side, there is no edema.  There were no wounds present.      On the right lower extremity, I did not palpate dorsalis pedis or posterior tibial pulses.  There is trace edema in the right lower leg.  The right foot feels warm. Skin is healthy appearing.     On the anterior aspect of the mid lower leg on the right, there is a cluster of ulcers with total dimension 15.5 x 7 x 0.5 cm. Epithilialization from periphery continues.  Epitheilial islands have gotten larger.  Granulation in all areas with minimal slough in all areas. Calf is soft.  Surrounding skin appears healthy. Wound abraded with dry gauze.           ASSESSMENT AND PLAN:       Moderate drainage from wound     Arterial flow appears improved clinically since vascular intervention. Complete course of doxycycline. Treating MRSA; not treating pseudomonas at this point.     Dressing ordered:   Xeroform on the leg  wounds and then dry gauze and roll gauze changed 3 times per week. Covered by double Tubigrip to help with edema control. May consider compression wrap or Unna to help with edema.     Consider Dakins.     The patient will follow up in wound care center again in 2 weeks.        FINAL DIAGNOSES:  Nonhealing wounds right lower leg, peripheral artery disease.     L97.912, I73.9     Ramila Mazariegos MD

## 2021-01-04 NOTE — WOUND CARE
01/04/21 8306 Anesthetic Anesthetic  
(4% Lidocaine gel) Right Leg Edema Point of Measurement Compression Therapy  
(tubi ) RLE Peripheral Vascular Capillary Refill Less than/equal to 3 seconds Color Appropriate for race Temperature Cool Sensation Decreased Pedal Pulse Palpable Circumference of Calf (cm) 38 cm Location of Measurement (Calf) Mid   
Circumference of Ankle (cm) 23 cm Location of Measurement (Ankle) Upper Wound Leg lower Right # 1 Date First Assessed/Time First Assessed: 05/07/20 0835   Present on Hospital Admission: Yes  Wound Approximate Age at First Assessment (Weeks): 4 weeks  Primary Wound Type: Vascular  Location: Leg lower  Wound Location Orientation: Right  Wound Descri. .. Wound Image Wound Etiology Venous Dressing Status Old drainage noted; Intact (Removed Xeroform;gauze; Exudry;ABD pad roll gauze tape; tubi ) Cleansed Cleansed with saline Wound Length (cm) 14.9 cm Wound Width (cm) 7 cm Wound Depth (cm) 0.5 cm Wound Surface Area (cm^2) 104.3 cm^2 Change in Wound Size % 48.04 Wound Volume (cm^3) 52.15 cm^3 Wound Healing % -30 Drainage Amount Large Drainage Description Serous (With yellowish greenish discharge) Wound Odor None Chana-Wound/Incision Assessment Intact Edges Epibole (rolled edges) Wound Thickness Description Full thickness Visit Vitals /60 (BP 1 Location: Left arm, BP Patient Position: At rest) Pulse (!) 58 Temp 97.1 °F (36.2 °C) Resp 18 RLE Peripheral Vascular Capillary Refill: (P) Less than/equal to 3 seconds (01/04/21 0958) Color: (P) Appropriate for race (01/04/21 5159) Temperature: (P) Cool (01/04/21 4828) Sensation: (P) Decreased (01/04/21 0958) Pedal Pulse: (P) Palpable (01/04/21 0958) Circumference of Calf (cm): (P) 38 cm (01/04/21 0958) Location of Measurement (Calf): (P) Mid  (01/04/21 7604) Circumference of Ankle (cm): (P) 23 cm (01/04/21 0958) Location of Measurement (Ankle): (P) Upper  (01/04/21 0983)

## 2021-01-11 ENCOUNTER — HOSPITAL ENCOUNTER (OUTPATIENT)
Dept: WOUND CARE | Age: 66
Discharge: HOME | End: 2021-01-11
Admitting: SURGERY
Payer: MEDICARE

## 2021-01-11 VITALS
DIASTOLIC BLOOD PRESSURE: 59 MMHG | TEMPERATURE: 97.5 F | RESPIRATION RATE: 18 BRPM | HEART RATE: 53 BPM | SYSTOLIC BLOOD PRESSURE: 129 MMHG

## 2021-01-11 DIAGNOSIS — L97.912 NON-PRESSURE CHRONIC ULCER OF RIGHT LOWER LEG, WITH FAT LAYER EXPOSED (HCC): ICD-10-CM

## 2021-01-11 DIAGNOSIS — I73.9 PAD (PERIPHERAL ARTERY DISEASE) (HCC): ICD-10-CM

## 2021-01-11 PROCEDURE — 99213 OFFICE O/P EST LOW 20 MIN: CPT | Performed by: SURGERY

## 2021-01-11 PROCEDURE — 99213 OFFICE O/P EST LOW 20 MIN: CPT

## 2021-01-11 NOTE — PROGRESS NOTES
HISTORY OF PRESENT ILLNESS:  The patient is a 73-year-old man who is referred to the wound care center regarding nonhealing wound right lower leg and right posterior ankle.  The patient was first seen at the [facility] on 5/7/2020.     The patient reports that he had struck his right ankle and developed a wound, which has been nonhealing.     He  developed swelling and redness in the right lower leg earlier this year, which then was followed by blister formation and a wound formation.  He eventually was hospitalized at [facility] and says he was in the hospital for about 18 days. Christi Watts saw an Infectious Disease doctor while in the hospital. Christi Watts was given intravenous antibiotics and then eventually discharged on oral Keflex.    He had been getting pain medication prescribed by his primary physician who is Dr. Kisha Mcdonald now has minimal pain and has stopped all pain meds.     The patient reports chronic numbness of both feet.     The patient had at the time of an ER visit for leg symptoms on the right in 01/2020, a venous duplex scan which did not find any evidence of deep venous thrombosis.  Vein valves were not tested.     The patient has history of lumbar laminectomy and diskectomy on 09/04/2019. Christi Watts reports related to his disk disease, he has right side footdrop.  He reported history of neurogenic claudication.  The patient was scheduled to have further back surgery within the month, but that has been canceled related to COVID-19.     The patient reports that he walks with Romilda  denies shortness of breath at rest or with exertion.  He has no anginal chest pain.     The patient had angiography by Dr Elena Lezama on 5/17/2020 with finding of severe multilevel arterial occlusive disease in legs.    The patient on 5/29/2020 had open right femoral endarterectomy and patch angioplasty and also balloon angioplasty of right superficial femoral artery.  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  Dr Rajinder Momin also debrided the dry eschars on the leg wounds.     The patient has seen Dr Rajinder Momin in follow up.     The patient has a history of coronary artery disease and has had angioplasty and stent. Montse Bazan has also had coronary artery bypass grafting. Montse Bazan does not have any history of diabetes.  Past medical history does include asthma, skin cancer, gastroesophageal reflux disease, hypertension, hyperlipidemia, fatty liver disease, potential sleep apnea, but never tested.     SOCIAL HISTORY:  The patient smoked until 1998 when he quit. Montse Bazan does use alcohol.     The patient's medications do not include a diuretic.     Prior dressing:   Xeroform on the lower leg wound and then dry gauze and roll gauze changed daily.     Dressing started 8/13/2020:  Aquacell AG on the leg  wounds and then dry gauze and roll gauze changed 3 times per week.     Patient had problems with adherence of the dressing, went back to Xeroform (3 times per week) after 1 week.     Drawtex dressing tried. He developed more drainage, skin irritation. Culture on 12/29/2020 grew pseudomonas and MRSA. He started doxycycline on 12/31/2020. Resumed xeroform dressing.     Patient reports he has less drainage. Skin irritation decreased.     PHYSICAL EXAMINATION:  GENERAL:  The patient is an alert man in no acute distress.     EXTREMITIES:       The patient's lower extremities were examined.  On the left side, there is no edema.  There were no wounds present.      On the right lower extremity, I did not palpate dorsalis pedis or posterior tibial pulses.  There is trace edema in the right lower leg.  The right foot feels warm. Skin is healthy appearing.     On the anterior aspect of the mid lower leg on the right, there is a cluster of ulcers with total dimension 14.9 x 6.5 x 0.3 cm. Epithilialization from periphery continues.  Epitheilial islands have gotten larger.  Granulation in all areas with minimal slough in all areas. Calf is soft.  Surrounding skin appears healthy.       Wound abraded with dry gauze.           ASSESSMENT AND PLAN:       Moderate drainage from wound     Arterial flow appears improved clinically since vascular intervention.     Complete course of doxycycline.   Treating MRSA; not treating pseudomonas at this point.     Dressing ordered:   Xeroform on the leg  wounds and then dry gauze and roll gauze changed 3 times per week.  Covered by double Tubigrip to help with edema control.     May consider compression wrap or Unna to help with edema, but arterial status not normal.          The patient will follow up in wound care center again in 2 weeks.        FINAL DIAGNOSES:  Nonhealing wounds right lower leg, peripheral artery disease.     L97.912, I73.9     Stephanie Rivero MD

## 2021-01-11 NOTE — DISCHARGE INSTRUCTIONS
Discharge Instructions/Wound [facility]  [address] [facility]  [place], [address] [facility]  Telephone: [phone]    NAME:  Shamika Mercado  YOB: 1955  MEDICAL RECORD NUMBER:  [id number]  DATE:  1/11/2021      WOUND CARE ORDERS:Right lower leg wound - Cleanse wound and periwound with saline and gauze. Moisturize dry, intact RLE skin with Petroleum jelly (vaseline) Cover wound with Xeroform, ABD pad (or Exudry),  Pad dorsal foot with foam & secure with roll gauze, Apply double layer of  Tubigrip (E) PCG or Home Health Care to change dressing 3 x week, and as needed, for compromise of dressing. Return to clinic for MD follow up in 2 week for MD follow-up. TREATMENT ORDERS:    Elevate leg(s) above the level of the heart when sitting. Avoid prolonged standing in one place. Do no get dressing/wrap wet. Follow Diet as prescribed:   [] Diet as tolerated: [] Calorie Diabetic Diet: [x] No Added Salt  [] Increase Protein: [] Other:                 Return Appointment:  [x] Return Appointment: With Dr. Marielle Garcia in 2 [facility])  [] Ordered tests:      Electronically signed Mehreen Jovel RN on 1/11/2021 at 10:34 AM     [address] [facility] Information: Should you experience any significant changes in your wound(s) or have questions about your wound care, please contact the [address] [place] [address] at [address] [place] [address] 8:00 am - 4:30. If you need help with your wound outside these hours and cannot wait until we are again available, contact your PCP or go to the hospital emergency room. PLEASE NOTE: IF YOU ARE UNABLE TO OBTAIN WOUND SUPPLIES, CONTINUE TO USE THE SUPPLIES YOU HAVE AVAILABLE UNTIL YOU ARE ABLE TO REACH US. IT IS MOST IMPORTANT TO KEEP THE WOUND COVERED AT ALL TIMES.      Physician Signature:_______________________    Date: ___________ Time:  ____________

## 2021-01-11 NOTE — WOUND CARE
01/11/21 1005 Wound Care Charges  
$$ Wound Care  
(4% Lidocaine gel) Right Leg Edema Point of Measurement Compression Therapy  
(double tubi ) RLE Peripheral Vascular Capillary Refill Less than/equal to 3 seconds Color Appropriate for race Temperature Cool Sensation Decreased Pedal Pulse Palpable Circumference of Calf (cm) 38.5 cm Location of Measurement (Calf) Mid   
Circumference of Ankle (cm) 23 cm Location of Measurement (Ankle) Upper Wound Leg lower Right # 1 Date First Assessed/Time First Assessed: 05/07/20 0835   Present on Hospital Admission: Yes  Wound Approximate Age at First Assessment (Weeks): 4 weeks  Primary Wound Type: Vascular  Location: Leg lower  Wound Location Orientation: Right  Wound Descri. .. Wound Image Wound Etiology Venous Dressing Status Old drainage noted; Intact (Removed Xeroform;ABD pad;Exudry; roll gauze tape;doubll tubi) Cleansed Cleansed with saline Wound Length (cm) 13.8 cm Wound Width (cm) 6.5 cm Wound Depth (cm) 0.3 cm Wound Surface Area (cm^2) 89.7 cm^2 Change in Wound Size % 55.31 Wound Volume (cm^3) 26.91 cm^3 Wound Healing % 33 Drainage Amount Large Drainage Description Serous (With serous yellowish greenish drainage) Wound Odor None Chana-Wound/Incision Assessment Intact Edges Epibole (rolled edges) Wound Thickness Description Full thickness Visit Vitals BP (!) 129/59 (BP 1 Location: Left arm, BP Patient Position: At rest) Pulse (!) 53 Comment: No dizziness noted Temp 97.5 °F (36.4 °C) Resp 18 RLE Peripheral Vascular Capillary Refill: (P) Less than/equal to 3 seconds (01/11/21 1005) Color: (P) Appropriate for race (01/11/21 1005) Temperature: (P) Cool (01/11/21 1005) Sensation: (P) Decreased (01/11/21 1005) Pedal Pulse: (P) Palpable (01/11/21 1005) Circumference of Calf (cm): (P) 38.5 cm (01/11/21 1005) Location of Measurement (Calf): (P) Mid  (01/11/21 1005) Circumference of Ankle (cm): (P) 23 cm (01/11/21 1005) Location of Measurement (Ankle): (P) Upper  (01/11/21 1005)

## 2021-01-25 ENCOUNTER — HOSPITAL ENCOUNTER (OUTPATIENT)
Dept: WOUND CARE | Age: 66
Discharge: HOME | End: 2021-01-25
Admitting: SURGERY
Payer: MEDICARE

## 2021-01-25 VITALS
TEMPERATURE: 96.8 F | DIASTOLIC BLOOD PRESSURE: 59 MMHG | SYSTOLIC BLOOD PRESSURE: 115 MMHG | HEART RATE: 60 BPM | RESPIRATION RATE: 18 BRPM

## 2021-01-25 DIAGNOSIS — R60.0 LEG EDEMA, RIGHT: ICD-10-CM

## 2021-01-25 DIAGNOSIS — I73.9 PAD (PERIPHERAL ARTERY DISEASE) (HCC): ICD-10-CM

## 2021-01-25 DIAGNOSIS — L97.912 NON-PRESSURE CHRONIC ULCER OF RIGHT LOWER LEG, WITH FAT LAYER EXPOSED (HCC): ICD-10-CM

## 2021-01-25 PROCEDURE — 99213 OFFICE O/P EST LOW 20 MIN: CPT

## 2021-01-25 PROCEDURE — 99213 OFFICE O/P EST LOW 20 MIN: CPT | Performed by: SURGERY

## 2021-01-25 NOTE — DISCHARGE INSTRUCTIONS
Discharge Instructions for  [facility]  [address], [address] [facility]  Telephone: [phone]    NAME:  Kathy June  YOB: 1955  MEDICAL RECORD NUMBER:  [id number]  DATE:  1/25/2021     WOUND CARE ORDERS:  Right lower leg wound - Cleanse wound with saline and gauze. Moisturize dry, intact RLE skin with Petroleum jelly (vaseline) Cover wound with Xeroform, ABD pad (or Exudry),  Pad dorsal foot with foam & secure with roll gauze, Apply double layer of  Tubigrip (E) PCG or Home Health Care to change dressing 3 x week, and as needed, for compromise of dressing. *Patient & PCG given sizing information to purchase Farrow 4000 compression garment. When [place] arrives, [facility] to use Farrow 4000 in place of Tubigrip. Farrow wrap to be applied first thing, every morning, & removed at bedtime. Return to clinic for MD follow up in 2 weeks for MD follow-up    TREATMENT ORDERS:    Elevate leg(s) above the level of the heart when sitting. Avoid prolonged standing in one place. Do no get dressing/wrap wet. Follow Diet as prescribed:   [] Diet as tolerated: [] Calorie Diabetic Diet: Low carb and no Sugar [x] No Added Salt. Avoid \"prepared\" or \"processed\" foods, as these are typically high in salt content  [] Increase Protein: [] Limit the amount of liquid you are drinking and avoid drinking in between meals           Return Appointment:  [x] Return Appointment: With DR Babin Living  in [address] [place] [address])  [] Nurse Visit : *** days  [] Ordered tests:    Electronically signed Michael Walsh RN on 1/25/2021 at 10:44 AM     [address] [facility] [address] Information: Should you experience any significant changes in your wound(s) or have questions about your wound care, please contact the [address] [place] [address] at [address] [place] [address] Street 8:00 am - 4:30.   If you need help with your wound outside these hours and cannot wait until we are again available, contact your PCP or go to the hospital emergency room. PLEASE NOTE: IF YOU ARE UNABLE TO OBTAIN WOUND SUPPLIES, CONTINUE TO USE THE SUPPLIES YOU HAVE AVAILABLE UNTIL YOU ARE ABLE TO REACH US. IT IS MOST IMPORTANT TO KEEP THE WOUND COVERED AT ALL TIMES.      Physician Signature:_______________________    Date: ___________ Time:  ____________

## 2021-01-25 NOTE — WOUND CARE
01/25/21 1007 Anesthetic Anesthetic 4% Lidocaine Liquid Topical 
(gel) Right Leg Edema Point of Measurement Compression Therapy (Double tubi) RLE Peripheral Vascular Capillary Refill Less than/equal to 3 seconds Color Appropriate for race Temperature Cool Sensation Decreased Pedal Pulse Present Circumference of Calf (cm) 38 cm Location of Measurement (Calf) Mid   
Circumference of Ankle (cm) 23 cm Location of Measurement (Ankle) Upper Wound Leg lower Right # 1 Date First Assessed/Time First Assessed: 05/07/20 0835   Present on Hospital Admission: Yes  Wound Approximate Age at First Assessment (Weeks): 4 weeks  Primary Wound Type: Vascular  Location: Leg lower  Wound Location Orientation: Right  Wound Descri. .. Wound Image Wound Etiology Venous Dressing Status (Removed xeroform, exudry, roll gauze, double tubi) Cleansed Soap and water;Cleansed with saline Wound Length (cm) 14 cm Wound Width (cm) 6.8 cm Wound Depth (cm) 0.3 cm Wound Surface Area (cm^2) 95.2 cm^2 Change in Wound Size % 52.57 Wound Volume (cm^3) 28.56 cm^3 Wound Healing % 29 Wound Assessment [place]/red;Slough Drainage Amount Large Drainage Description Serous;Green Wound Odor None Chana-Wound/Incision Assessment Intact Edges Flat/open edges Wound Thickness Description Full thickness Pain 1 Pain Scale 1 Numeric (0 - 10) Pain Intensity 1 0 Patient Stated Pain Goal 0 Pain Reassessment 1 Yes Visit Vitals BP (!) 115/59 Pulse 60 Temp 96.8 °F (36 °C) Resp 18

## 2021-01-25 NOTE — PROGRESS NOTES
HISTORY OF PRESENT ILLNESS:  The patient is a 69-year-old man who is referred to the wound care center regarding nonhealing wound right lower leg and right posterior ankle.  The patient was first seen at the [address] [place] [address] on 5/7/2020.     The patient reports that he had struck his right ankle and developed a wound, which has been nonhealing.     He  developed swelling and redness in the right lower leg earlier this year, which then was followed by blister formation and a wound formation.  He eventually was hospitalized at [facility] and says he was in the hospital for about 18 days. [facility] saw an Infectious Disease doctor while in the hospital. [facility] was given intravenous antibiotics and then eventually discharged on oral Keflex.    He had been getting pain medication prescribed by his primary physician who is Dr. Vito Perez now has minimal pain and has stopped all pain meds.     The patient reports chronic numbness of both feet.   He has right drop foot.     The patient had at the time of an ER visit for leg symptoms on the right in 01/2020, a venous duplex scan which did not find any evidence of deep venous thrombosis.  Vein valves were not tested.     The patient has history of lumbar laminectomy and diskectomy on 09/04/2019. [facility] reports related to his disk disease, he has right side footdrop.  He reported history of neurogenic claudication.  The patient was scheduled to have further back surgery within the month, but that has been canceled related to COVID-19.     The patient reports that he walks with Delores Bullion denies shortness of breath at rest or with exertion.  He has no anginal chest pain.     The patient had angiography by Dr Fred Bahena on 5/17/2020 with finding of severe multilevel arterial occlusive disease in legs.    The patient on 5/29/2020 had open right femoral endarterectomy and patch angioplasty and also balloon angioplasty of right superficial femoral artery.  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  Dr Ashlyn Muñiz also debrided the dry eschars on the leg wounds.     The patient has seen Dr sAhlyn Muñiz in follow up.     The patient has a history of coronary artery disease and has had angioplasty and stent. [facility] has also had coronary artery bypass grafting. [facility] does not have any history of diabetes.  Past medical history does include asthma, skin cancer, gastroesophageal reflux disease, hypertension, hyperlipidemia, fatty liver disease, potential sleep apnea, but never tested.     SOCIAL HISTORY:  The patient smoked until 1998 when he quit. [facility] does use alcohol.     The patient's medications do not include a diuretic.     Prior dressing:   Xeroform on the lower leg wound and then dry gauze and roll gauze changed daily.     Dressing started 8/13/2020:  Aquacell AG on the leg  wounds and then dry gauze and roll gauze changed 3 times per week.     Patient had problems with adherence of the dressing, went back to Xeroform (3 times per week) after 1 week.     Drawtex dressing tried. [facility] developed more drainage, skin irritation.  Culture on 12/29/2020 grew pseudomonas and MRSA. [facility] started doxycycline on 12/31/2020.  Resumed xeroform dressing.     Patient reports he has minimal drainage.  Skin irritation decreased.     PHYSICAL EXAMINATION:  GENERAL:  The patient is an alert man in no acute distress.     EXTREMITIES:       The patient's lower extremities were examined.  On the left side, there is no edema.  There were no wounds present.      On the right lower extremity, I did not palpate dorsalis pedis or posterior tibial pulses.  There is trace to 1+ edema in the right lower leg.  The right foot feels warm. Skin is healthy appearing.     On the anterior aspect of the mid lower leg on the right, there is a cluster of ulcers with total dimension 14 x 6.8 x 0.3 cm. Epithilialization from periphery continues.  Epitheilial islands have gotten larger. Granulation in all areas with minimal slough in all areas. Calf is soft.  Surrounding skin appears healthy.       Wound abraded with dry gauze.           ASSESSMENT AND PLAN:          Arterial flow appears improved clinically since vascular intervention.     Complete course of doxycycline.  Treating MRSA; not treating pseudomonas at this point.     Dressing ordered:   Xeroform on the leg  wounds and then dry gauze and roll gauze changed 3 times per week.  Covered by double Tubigrip to help with edema control.     I have recommended he get Farrow wrap to wear when out of bed. This would replace the Tubigrips. Insurance will not cover since he is on [address] [facility].   He will order himself.           The patient will follow up in wound care center again in 2 weeks.        FINAL DIAGNOSES:  Nonhealing wounds right lower leg, peripheral artery disease.     L97.912, I73.9     Analilia Norton MD 07-Oct-2017 02:10

## 2021-02-08 ENCOUNTER — HOSPITAL ENCOUNTER (OUTPATIENT)
Dept: WOUND CARE | Age: 66
Discharge: HOME | End: 2021-02-08
Admitting: SURGERY
Payer: MEDICARE

## 2021-02-08 DIAGNOSIS — I73.9 PAD (PERIPHERAL ARTERY DISEASE) (HCC): ICD-10-CM

## 2021-02-08 DIAGNOSIS — L97.912 NON-PRESSURE CHRONIC ULCER OF RIGHT LOWER LEG, WITH FAT LAYER EXPOSED (HCC): ICD-10-CM

## 2021-02-08 PROBLEM — I70.229 CRITICAL LOWER LIMB ISCHEMIA (HCC): Status: RESOLVED | Noted: 2020-05-29 | Resolved: 2021-02-08

## 2021-02-08 PROCEDURE — 99213 OFFICE O/P EST LOW 20 MIN: CPT | Performed by: SURGERY

## 2021-02-08 PROCEDURE — 99213 OFFICE O/P EST LOW 20 MIN: CPT

## 2021-02-08 NOTE — DISCHARGE INSTRUCTIONS
Discharge Instructions for  [facility]  [address] [facility], [address] [facility]  Telephone: [phone]    NAME:  Jacquie Cash  YOB: 1955  MEDICAL RECORD NUMBER:  [id number]  DATE:  2/8/2021     WOUND CARE ORDERS:Right lower leg wound - Cleanse wound with Dakin's solution, 0.125%. Apply Dakin's moistened gauze & allow to sit on wounds for approx 2-3 minutes, then remove & rinse w/with saline. Moisturize dry, intact RLE skin with Petroleum jelly (vaseline) Cover wounds with Xeroform, ABD pad (or Exudry), secure with roll gauze, Apply patient's Dedra Farrier. PCG or Home Health Care to change dressing 3 x week, and as needed, for compromise of dressing. Farrow wrap to be applied first thing, every morning, & removed at bedtime. Return to clinic for MD follow up in 2 weeks for MD follow-up    TREATMENT ORDERS:    Elevate leg(s) when sitting. Avoid prolonged standing in one place. Do not get dressing/wrap wet. Follow Diet as prescribed:   [] Diet as tolerated: [] Calorie Diabetic Diet: Low carb and no Sugar [x] No Added Salt  [] Increase Protein: [] Limit the amount of liquid you are drinking and avoid drinking in between meals           Return Appointment:  [x] Return Appointment: With DR Anthony Wright  in  2 [facility])  [] Nurse Visit : *** days  [] Ordered tests:    Electronically signed Genesis Barr RN on 2/8/2021 at 10:07 AM     Sallie Vega 281: Should you experience any significant changes in your wound(s) or have questions about your wound care, please contact the [address] [place] [address] at [address] [place] [address] 8:00 am - 4:30. If you need help with your wound outside these hours and cannot wait until we are again available, contact your PCP or go to the hospital emergency room.      PLEASE NOTE: IF YOU ARE UNABLE TO OBTAIN WOUND SUPPLIES, CONTINUE TO USE THE SUPPLIES YOU HAVE AVAILABLE UNTIL YOU ARE ABLE TO REACH US. IT IS MOST IMPORTANT TO KEEP THE WOUND COVERED AT ALL TIMES.      Physician Signature:_______________________    Date: ___________ Time:  ____________

## 2021-02-08 NOTE — PROGRESS NOTES
HISTORY OF PRESENT ILLNESS:  The patient is a 68-year-old man who is referred to the wound care center regarding nonhealing wound right lower leg and right posterior ankle.  The patient was first seen at the [address] [place] [address] on 5/7/2020.     The patient reports that he had struck his right ankle and developed a wound, which has been nonhealing.     He  developed swelling and redness in the right lower leg earlier this year, which then was followed by blister formation and a wound formation.  He eventually was hospitalized at [facility] and says he was in the hospital for about 18 days. [facility] saw an Infectious Disease doctor while in the hospital. [facility] was given intravenous antibiotics and then eventually discharged on oral Keflex.    He had been getting pain medication prescribed by his primary physician who is Dr. Donis Kawasaki now has minimal pain and has stopped all pain meds.     The patient reports chronic numbness of both feet.   He has right drop foot.     The patient had at the time of an ER visit for leg symptoms on the right in 01/2020, a venous duplex scan which did not find any evidence of deep venous thrombosis.  Vein valves were not tested.     The patient has history of lumbar laminectomy and diskectomy on 09/04/2019. [facility] reports related to his disk disease, he has right side footdrop.  He reported history of neurogenic claudication.  The patient was scheduled to have further back surgery within the month, but that has been canceled related to COVID-19.     The patient reports that he walks with [place] denies shortness of breath at rest or with exertion.  He has no anginal chest pain.     The patient had angiography by Dr Herber Mcmahon on 5/17/2020 with finding of severe multilevel arterial occlusive disease in legs.    The patient on 5/29/2020 had open right femoral endarterectomy and patch angioplasty and also balloon angioplasty of right superficial femoral artery.  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  Dr Soo Rendon also debrided the dry eschars on the leg wounds.     The patient has seen Dr Soo Rendon in follow up.     The patient has a history of coronary artery disease and has had angioplasty and stent. [facility] has also had coronary artery bypass grafting. [facility] does not have any history of diabetes.  Past medical history does include asthma, skin cancer, gastroesophageal reflux disease, hypertension, hyperlipidemia, fatty liver disease, potential sleep apnea, but never tested.     SOCIAL HISTORY:  The patient smoked until 1998 when he quit. [facility] does use alcohol.     The patient's medications do not include a diuretic.     Prior dressing:   Xeroform on the lower leg wound and then dry gauze and roll gauze changed daily.     Dressing started 8/13/2020:  Aquacell AG on the leg  wounds and then dry gauze and roll gauze changed 3 times per week.     Patient had problems with adherence of the dressing, went back to Xeroform (3 times per week) after 1 week.     Drawtex dressing tried. [facility] developed more drainage, skin irritation.  Culture on 12/29/2020 grew pseudomonas and MRSA. [facility] started doxycycline on 12/31/2020.      Resumed xeroform dressing.     Using Farrow wrap with dressing currently.   Leaving liner stocking on at night.     PHYSICAL EXAMINATION:  GENERAL:  The patient is an alert man in no acute distress.     EXTREMITIES:       The patient's lower extremities were examined.  On the left side, there is no edema.  There were no wounds present.      On the right lower extremity, I did not palpate dorsalis pedis or posterior tibial pulses.  There is trace edema in the right lower leg.  The right foot feels warm. Skin is healthy appearing.     On the anterior aspect of the mid lower leg on the right, there is a cluster of ulcers with total dimension 14 x 6.8 x 0.3 cm. Epithilialization from periphery continues.  Epitheilial islands have gotten larger. Granulation in all areas with minimal slough in all areas. Calf is soft.  Surrounding skin appears healthy.      Drainage on dressing noted to be green, consistent with pseudomonas.     Wound abraded with dry gauze.           ASSESSMENT AND PLAN:          Arterial flow appears improved clinically since vascular intervention.     Dressing ordered:   Wash surface of woun dwith Dakins 0.125%, then rinse with saline.   Xeroform on the leg  wounds and then dry gauze and roll gauze changed 3 times per week.  Covered by [place] wrap to help with edema control.           The patient will follow up in wound care center again in 2 weeks.        FINAL DIAGNOSES:  Nonhealing wounds right lower leg, peripheral artery disease.     L97.912, I73.9     Oitlio Hamilton MD

## 2021-02-08 NOTE — WOUND CARE
02/08/21 6512 Right Leg Edema Point of Measurement Compression Therapy (Elevates leg when sitting or when in bed) RLE Peripheral Vascular Capillary Refill Less than/equal to 3 seconds Color Appropriate for race Temperature Cool Sensation Decreased Pedal Pulse Palpable Circumference of Calf (cm) 37 cm Location of Measurement (Calf) Mid   
Circumference of Ankle (cm) 23 cm Location of Measurement (Ankle) Upper Wound Leg lower Right # 1 Date First Assessed/Time First Assessed: 05/07/20 0835   Present on Hospital Admission: Yes  Wound Approximate Age at First Assessment (Weeks): 4 weeks  Primary Wound Type: Vascular  Location: Leg lower  Wound Location Orientation: Right  Wound Descri. .. Wound Image Wound Etiology Venous Dressing Status Old drainage noted; Intact (Removed Xeroform;Exudry;roll gauze tape) Cleansed Soap and water;Cleansed with saline Wound Length (cm) 14 cm Wound Width (cm) 6.8 cm Wound Depth (cm) 0.4 cm Wound Surface Area (cm^2) 95.2 cm^2 Change in Wound Size % 52.57 Wound Volume (cm^3) 38.08 cm^3 Wound Healing % 5 Wound Assessment Slough;Pink/red Drainage Amount Large Drainage Description Serous;Green Wound Odor Mild Chana-Wound/Incision Assessment Intact Edges Flat/open edges Wound Thickness Description Full thickness There were no vitals taken for this visit. RLE Peripheral Vascular Capillary Refill: (P) Less than/equal to 3 seconds (02/08/21 0939) Color: (P) Appropriate for race (02/08/21 8599) Temperature: (P) Cool (02/08/21 1181) Sensation: (P) Decreased (02/08/21 0939) Pedal Pulse: (P) Palpable (02/08/21 0939) Circumference of Calf (cm): (P) 37 cm (02/08/21 0939) Location of Measurement (Calf): (P) Mid  (02/08/21 4274) Circumference of Ankle (cm): (P) 23 cm (02/08/21 0939) Location of Measurement (Ankle): (P) Upper  (02/08/21 0939)

## 2021-02-22 ENCOUNTER — HOSPITAL ENCOUNTER (OUTPATIENT)
Dept: WOUND CARE | Age: 66
Discharge: HOME | End: 2021-02-22
Admitting: SURGERY
Payer: MEDICARE

## 2021-02-22 VITALS
TEMPERATURE: 97.1 F | SYSTOLIC BLOOD PRESSURE: 150 MMHG | HEART RATE: 67 BPM | DIASTOLIC BLOOD PRESSURE: 69 MMHG | RESPIRATION RATE: 18 BRPM

## 2021-02-22 DIAGNOSIS — L97.912 NON-PRESSURE CHRONIC ULCER OF RIGHT LOWER LEG, WITH FAT LAYER EXPOSED (HCC): ICD-10-CM

## 2021-02-22 PROCEDURE — 11042 DBRDMT SUBQ TIS 1ST 20SQCM/<: CPT

## 2021-02-22 PROCEDURE — 11045 DBRDMT SUBQ TISS EACH ADDL: CPT | Performed by: SURGERY

## 2021-02-22 PROCEDURE — 11045 DBRDMT SUBQ TISS EACH ADDL: CPT

## 2021-02-22 PROCEDURE — 11042 DBRDMT SUBQ TIS 1ST 20SQCM/<: CPT | Performed by: SURGERY

## 2021-02-22 RX ORDER — OXYCODONE AND ACETAMINOPHEN 5; 325 MG/1; MG/1
0.5 TABLET ORAL DAILY
COMMUNITY
End: 2021-11-23

## 2021-02-22 NOTE — WOUND CARE
02/22/21 1011 Wound Leg Proximal;Right; Lower Date First Assessed/Time First Assessed: 02/08/21 1021   Wound Approximate Age at First Assessment (Weeks): (c)   Primary Wound Type: Venous  Location: Leg  Wound Location Orientation: Proximal;Right; Lower Dressing Status New dressing applied Cleansed Cleansed with saline Dressing/Treatment Xeroform Post-Procedure Length (cm) 1.5 cm Post-Procedure Width (cm) 1.6 cm Post-Procedure Depth (cm) 0.2 cm Post-Procedure Surface Area (cm^2) 2.4 cm^2 Post-Procedure Volume (cm^3) 0.48 cm^3 Wound Leg lower Right # 1 Date First Assessed/Time First Assessed: 05/07/20 0835   Present on Hospital Admission: Yes  Wound Approximate Age at First Assessment (Weeks): 4 weeks  Primary Wound Type: Vascular  Location: Leg lower  Wound Location Orientation: Right  Wound Descri. .. Dressing Status New dressing applied Cleansed Cleansed with saline Dressing/Treatment ABD pad;Roll gauze;Tape/Soft cloth adhesive tape;Xeroform (Farrow wrap) Post-Procedure Length (cm) 10.2 cm Post-Procedure Width (cm) 7.8 cm Post-Procedure Depth (cm) 0.4 cm Post-Procedure Surface Area (cm^2) 79.56 cm^2 Post-Procedure Volume (cm^3) 31.82 cm^3

## 2021-02-22 NOTE — WOUND CARE
02/22/21 4984 Wound Leg Proximal;Right; Lower Date First Assessed/Time First Assessed: 02/08/21 1021   Wound Approximate Age at First Assessment (Weeks): (c)   Primary Wound Type: Venous  Location: Leg  Wound Location Orientation: Proximal;Right; Lower Wound Image Wound Etiology Venous Dressing Status Old drainage noted Cleansed Cleansed with saline Dressing/Treatment  
(Xeroform, gauze, ABD, RG) Wound Length (cm) 1.5 cm Wound Width (cm) 1.6 cm Wound Depth (cm) 0.1 cm Wound Surface Area (cm^2) 2.4 cm^2 Change in Wound Size % 27.27 Wound Volume (cm^3) 0.24 cm^3 Wound Healing % 64 Wound Assessment [place]/red;Slough Drainage Amount Moderate Drainage Description Serosanguinous Wound Odor None Chana-Wound/Incision Assessment Intact Edges Flat/open edges Wound Thickness Description Full thickness Wound Leg lower Right # 1 Date First Assessed/Time First Assessed: 05/07/20 0835   Present on Hospital Admission: Yes  Wound Approximate Age at First Assessment (Weeks): 4 weeks  Primary Wound Type: Vascular  Location: Leg lower  Wound Location Orientation: Right  Wound Descri. .. Wound Image Wound Etiology Venous Dressing Status Old drainage noted Cleansed Cleansed with saline Dressing/Treatment  
(Xeroform, gauzer, ABD, RG) Wound Length (cm) 10.2 cm Wound Width (cm) 7.8 cm Wound Depth (cm) 0.3 cm Wound Surface Area (cm^2) 79.56 cm^2 Change in Wound Size % 60.36 Wound Volume (cm^3) 23.87 cm^3 Wound Healing % 41 Wound Assessment [place]/red;Slough Drainage Amount Moderate Drainage Description Serosanguinous Wound Odor None Chana-Wound/Incision Assessment Intact Edges Flat/open edges Wound Thickness Description Full thickness

## 2021-02-22 NOTE — PROGRESS NOTES
Debridement Wound Care        Problem List Items Addressed This Visit     None          Procedure Note  Indications:  Based on my examination of this patient's wound(s)/ulcer(s) today, debridement is required to promote healing and evaluate the wound base.     Performed by: Stefano Wharton MD    Consent obtained: Yes    Time out taken: Yes    Debridement: Excisional    Using curette the wound(s)/ulcer(s) was/were sharply debrided down through and including the removal of    subcutaneous tissue    Devitalized Tissue Debrided: slough and granulation into the SQ layer    Pre Debridement Measurements:  Are located in the Wound/Ulcer Documentation Flow Sheet    Wound/Ulcer #: 1 and 2    Post Debridement Measurements:  Wound/Ulcer Descriptions are Pre Debridement except measurements:Other: depth increased by 0.1 cm after debridement    Wound Back (Active)   Number of days: 542       Wound Leg lower Right # 1 (Active)   Wound Image   02/22/21 0938   Wound Etiology Venous 02/22/21 0938   Dressing Status New dressing applied 02/22/21 1011   Cleansed Cleansed with saline 02/22/21 1011   Dressing/Treatment ABD pad;Roll gauze;Tape/Soft cloth adhesive tape;Xeroform 02/22/21 1011   Wound Length (cm) 10.2 cm 02/22/21 0938   Wound Width (cm) 7.8 cm 02/22/21 0938   Wound Depth (cm) 0.3 cm 02/22/21 0938   Wound Surface Area (cm^2) 79.56 cm^2 02/22/21 0938   Change in Wound Size % 60.36 02/22/21 0938   Wound Volume (cm^3) 23.87 cm^3 02/22/21 0938   Wound Healing % 41 02/22/21 0938   Post-Procedure Length (cm) 10.2 cm 02/22/21 1011   Post-Procedure Width (cm) 7.8 cm 02/22/21 1011   Post-Procedure Depth (cm) 0.4 cm 02/22/21 1011   Post-Procedure Surface Area (cm^2) 79.56 cm^2 02/22/21 1011   Post-Procedure Volume (cm^3) 31.82 cm^3 02/22/21 1011   Wound Assessment Pink/red;Slough 02/22/21 0938   Drainage Amount Moderate 02/22/21 0938   Drainage Description Serosanguinous 02/22/21 0938   Wound Odor None 02/22/21 3702 Chana-Wound/Incision Assessment Intact 02/22/21 0938   Edges Flat/open edges 02/22/21 0938   Wound Thickness Description Full thickness 02/22/21 0938   Number of days: 291       Wound Leg Proximal;Right; Lower (Active)   Wound Image   02/22/21 0938   Wound Etiology Venous 02/22/21 0938   Dressing Status New dressing applied 02/22/21 1011   Cleansed Cleansed with saline 02/22/21 1011   Dressing/Treatment Xeroform 02/22/21 1011   Wound Length (cm) 1.5 cm 02/22/21 0938   Wound Width (cm) 1.6 cm 02/22/21 0938   Wound Depth (cm) 0.1 cm 02/22/21 0938   Wound Surface Area (cm^2) 2.4 cm^2 02/22/21 0938   Change in Wound Size % 27.27 02/22/21 0938   Wound Volume (cm^3) 0.24 cm^3 02/22/21 0938   Wound Healing % 64 02/22/21 0938   Post-Procedure Length (cm) 1.5 cm 02/22/21 1011   Post-Procedure Width (cm) 1.6 cm 02/22/21 1011   Post-Procedure Depth (cm) 0.2 cm 02/22/21 1011   Post-Procedure Surface Area (cm^2) 2.4 cm^2 02/22/21 1011   Post-Procedure Volume (cm^3) 0.48 cm^3 02/22/21 1011   Wound Assessment Pink/red;Slough 02/22/21 0938   Drainage Amount Moderate 02/22/21 0938   Drainage Description Serosanguinous 02/22/21 0938   Wound Odor None 02/22/21 0938   Chana-Wound/Incision Assessment Intact 02/22/21 0938   Edges Flat/open edges 02/22/21 0938   Wound Thickness Description Full thickness 02/22/21 0938   Number of days: 14        Percent of Wound(s)/Ulcer(s) Debrided: 75%    Total Surface Area Debrided:  60 sq cm     Diabetic/Pressure/Non Pressure Ulcers only:  Ulcer:     Estimated Blood Loss:  Minimal     Hemostasis Achieved: Pressure    Procedural Pain: 1 / 10     Post Procedural Pain: 0 / 10     Response to treatment: Well tolerated by patient

## 2021-02-22 NOTE — DISCHARGE INSTRUCTIONS
Discharge Instructions for  [facility]  [address], [address] [facility]  Telephone: [phone]    NAME:  Kathy June  YOB: 1955  MEDICAL RECORD NUMBER:  [id number]  DATE:  2/22/2021  WOUND CARE ORDERS:  Right lower leg wound - Cleanse wound with Dakin's solution, 0.125%. Allow Dakin's moistened to wounds & allow to sit on wounds for approx 2-3 minutes, then remove & rinse w/with saline and gauze. Moisturize dry, intact RLE skin with Petroleum jelly (vaseline) Cover wounds with Xeroform, ABD pad (or Exudry), secure with roll gauze, Apply patient's [employer]. [facility] or Home Health Care to change dressing 3 x week, and as needed, for compromise of dressing. Farrow wrap to be applied first thing, every morning, & removed at bedtime. Return to clinic for MD follow up in 2 weeks for MD follow-up    TREATMENT ORDERS:    Elevate leg(s) above the level of the heart when sitting. Avoid prolonged standing in one place. Do no get dressing/wrap wet. Follow Diet as prescribed:   [] Diet as tolerated: [] Calorie Diabetic Diet: Low carb and no Sugar [x] No Added Salt:  [] Increase Protein: [] Limit the amount of liquid you are drinking and avoid drinking in between meals           Return Appointment:  [x] Return Appointment: With DR Babin Living  in  [address] [place] [address])  [] Nurse Visit : *** days  [] Ordered tests:    Electronically signed Neeraj Caicedo RN on 2/22/2021 at 10:10 AM     [address] [place] [address] Information: Should you experience any significant changes in your wound(s) or have questions about your wound care, please contact the [address] [place] [address] at [address] [place] [address] Street 8:00 am - 4:30. If you need help with your wound outside these hours and cannot wait until we are again available, contact your PCP or go to the hospital emergency room.      PLEASE NOTE: IF YOU ARE UNABLE TO OBTAIN WOUND SUPPLIES, CONTINUE TO USE THE SUPPLIES YOU HAVE AVAILABLE UNTIL YOU ARE ABLE TO REACH US. IT IS MOST IMPORTANT TO KEEP THE WOUND COVERED AT ALL TIMES.      Physician Signature:_______________________    Date: ___________ Time:  ____________

## 2021-02-22 NOTE — PROGRESS NOTES
HISTORY OF PRESENT ILLNESS:  The patient is a 69-year-old man who is referred to the wound care center regarding nonhealing wound right lower leg and right posterior ankle.  The patient was first seen at the [address] [place] [address] on 5/7/2020.     The patient reports that he had struck his right ankle and developed a wound, which has been nonhealing.     He  developed swelling and redness in the right lower leg earlier this year, which then was followed by blister formation and a wound formation.  He eventually was hospitalized at [facility] and says he was in the hospital for about 18 days. [facility] saw an Infectious Disease doctor while in the hospital. [facility] was given intravenous antibiotics and then eventually discharged on oral Keflex.    He had been getting pain medication prescribed by his primary physician who is Dr. Ellison Lay now has minimal pain and has stopped all pain meds.     The patient reports chronic numbness of both feet.  He has right drop foot.     The patient had at the time of an ER visit for leg symptoms on the right in 01/2020, a venous duplex scan which did not find any evidence of deep venous thrombosis.  Vein valves were not tested.     The patient has history of lumbar laminectomy and diskectomy on 09/04/2019. [facility] reports related to his disk disease, he has right side footdrop.  He reported history of neurogenic claudication.  The patient was scheduled to have further back surgery within the month, but that has been canceled related to COVID-19.     The patient reports that he walks with [place] denies shortness of breath at rest or with exertion.  He has no anginal chest pain.     The patient had angiography by Dr Katherine Vernon on 5/17/2020 with finding of severe multilevel arterial occlusive disease in legs.    The patient on 5/29/2020 had open right femoral endarterectomy and patch angioplasty and also balloon angioplasty of right superficial femoral artery.  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  Dr Tari Leiva also debrided the dry eschars on the leg wounds.     The patient has seen Dr Tari Lieva in follow up.     The patient has a history of coronary artery disease and has had angioplasty and stent. [facility] has also had coronary artery bypass grafting. [facility] does not have any history of diabetes.  Past medical history does include asthma, skin cancer, gastroesophageal reflux disease, hypertension, hyperlipidemia, fatty liver disease, potential sleep apnea, but never tested.     SOCIAL HISTORY:  The patient smoked until 1998 when he quit. [facility] Cutler does use alcohol.     The patient's medications do not include a diuretic.     Prior dressing:   Xeroform on the lower leg wound and then dry gauze and roll gauze changed daily.     Dressing started 8/13/2020:  Aquacell AG on the leg  wounds and then dry gauze and roll gauze changed 3 times per week.     Patient had problems with adherence of the dressing, went back to Xeroform (3 times per week) after 1 week.     Drawtex dressing tried. [facility] developed more drainage, skin irritation.  Culture on 12/29/2020 grew pseudomonas and MRSA. [facility] started doxycycline on 12/31/2020.       Resumed xeroform dressing.     Using Farrow wrap with dressing currently. Leaving liner stocking on at night. No further green drainage since starting 0.125% Dakins washes at dressing change.     PHYSICAL EXAMINATION:  GENERAL:  The patient is an alert man in no acute distress.     EXTREMITIES:       The patient's lower extremities were examined.  On the left side, there is no edema.  There were no wounds present.      On the right lower extremity, I did not palpate dorsalis pedis or posterior tibial pulses.  There is trace edema in the right lower leg.  The right foot feels warm. Skin is healthy appearing.     On the anterior aspect of the mid lower leg on the right, there are 2 separate ulcers (previously recorded as one cluster).   Proximal right lower leg ulcer 1.5 x 1.6 x 0.2 cm with slough and granulation. Distal right lower leg ulcer 10.2 x 7.8 x 0.4 cm with slough and granulation. Calf is soft.  Surrounding skin appears healthy.        Debridement recommended - see separate note.           ASSESSMENT AND PLAN:          Arterial flow appears improved clinically since vascular intervention.     Dressing ordered:   Wash surface of woun dwith Dakins 0.125%, then rinse with saline.   Xeroform on the leg  wounds and then dry gauze and roll gauze changed 3 times per week.  Covered by [place] wrap to help with edema control.           The patient will follow up in wound care center again in 2 weeks.        FINAL DIAGNOSES:  Nonhealing wounds right lower leg, peripheral artery disease.     L97.912, I73.9     Fransico Box MD

## 2021-02-22 NOTE — WOUND CARE
02/22/21 3992 Wound Leg Proximal;Right; Lower Date First Assessed/Time First Assessed: 02/08/21 1021   Wound Approximate Age at First Assessment (Weeks): (c)   Primary Wound Type: Venous  Location: Leg  Wound Location Orientation: Proximal;Right; Lower Wound Image Wound Etiology Venous Dressing Status Old drainage noted Cleansed Cleansed with saline Dressing/Treatment  
(Xeroform, gauze, ABD, RG) Wound Length (cm) 1.5 cm Wound Width (cm) 1.6 cm Wound Depth (cm) 0.1 cm Wound Surface Area (cm^2) 2.4 cm^2 Change in Wound Size % 27.27 Wound Volume (cm^3) 0.24 cm^3 Wound Healing % 64 Wound Assessment [place]/red;Slough Drainage Amount Moderate Drainage Description Serosanguinous Wound Odor None Chana-Wound/Incision Assessment Intact Edges Flat/open edges Wound Thickness Description Full thickness Wound Leg lower Right # 1 Date First Assessed/Time First Assessed: 05/07/20 0835   Present on Hospital Admission: Yes  Wound Approximate Age at First Assessment (Weeks): 4 weeks  Primary Wound Type: Vascular  Location: Leg lower  Wound Location Orientation: Right  Wound Descri. .. Wound Image Wound Etiology Venous Dressing Status Old drainage noted Cleansed Cleansed with saline Dressing/Treatment  
(Xeroform, gauzer, ABD, RG) Wound Length (cm) 10.2 cm Wound Width (cm) 7.8 cm Wound Depth (cm) 0.3 cm Wound Surface Area (cm^2) 79.56 cm^2 Change in Wound Size % 60.36 Wound Volume (cm^3) 23.87 cm^3 Wound Healing % 41 Wound Assessment [place]/red;Slough Drainage Amount Moderate Drainage Description Serosanguinous Wound Odor None Chana-Wound/Incision Assessment Intact Edges Flat/open edges Wound Thickness Description Full thickness Visit Vitals BP (!) 150/69 (BP 1 Location: Right upper arm, BP Patient Position: At rest;Sitting) Pulse 67 Temp 97.1 °F (36.2 °C) Resp 18

## 2021-03-08 ENCOUNTER — HOSPITAL ENCOUNTER (OUTPATIENT)
Dept: WOUND CARE | Age: 66
Discharge: HOME | End: 2021-03-08
Admitting: SURGERY
Payer: MEDICARE

## 2021-03-08 VITALS
RESPIRATION RATE: 18 BRPM | SYSTOLIC BLOOD PRESSURE: 119 MMHG | TEMPERATURE: 98.4 F | HEART RATE: 69 BPM | DIASTOLIC BLOOD PRESSURE: 58 MMHG

## 2021-03-08 DIAGNOSIS — L97.912 NON-PRESSURE CHRONIC ULCER OF RIGHT LOWER LEG, WITH FAT LAYER EXPOSED (HCC): ICD-10-CM

## 2021-03-08 DIAGNOSIS — I73.9 PAD (PERIPHERAL ARTERY DISEASE) (HCC): ICD-10-CM

## 2021-03-08 DIAGNOSIS — R60.0 LEG EDEMA, RIGHT: ICD-10-CM

## 2021-03-08 PROCEDURE — 99213 OFFICE O/P EST LOW 20 MIN: CPT

## 2021-03-08 PROCEDURE — 99213 OFFICE O/P EST LOW 20 MIN: CPT | Performed by: SURGERY

## 2021-03-08 NOTE — PROGRESS NOTES
HISTORY OF PRESENT ILLNESS:  The patient is a 64-year-old man who is referred to the wound care center regarding nonhealing wound right lower leg and right posterior ankle.  The patient was first seen at the [address] [place] [address] on 5/7/2020.     The patient reports that he had struck his right ankle and developed a wound, which has been nonhealing.     He  developed swelling and redness in the right lower leg earlier this year, which then was followed by blister formation and a wound formation.  He eventually was hospitalized at [facility] and says he was in the hospital for about 18 days. [facility] saw an Infectious Disease doctor while in the hospital. [facility] was given intravenous antibiotics and then eventually discharged on oral Keflex.    He had been getting pain medication prescribed by his primary physician who is Dr. Elizabeth Hemphill now has minimal pain and has stopped all pain meds.     The patient reports chronic numbness of both feet.  He has right drop foot.     The patient had at the time of an ER visit for leg symptoms on the right in 01/2020, a venous duplex scan which did not find any evidence of deep venous thrombosis.  Vein valves were not tested.     The patient has history of lumbar laminectomy and diskectomy on 09/04/2019. [facility] reports related to his disk disease, he has right side footdrop.  He reported history of neurogenic claudication.  The patient was scheduled to have further back surgery within the month, but that has been canceled related to COVID-19.     The patient reports that he walks with Jayne Summerfield denies shortness of breath at rest or with exertion.  He has no anginal chest pain.     The patient had angiography by Dr Sadia Pearson on 5/17/2020 with finding of severe multilevel arterial occlusive disease in legs.    The patient on 5/29/2020 had open right femoral endarterectomy and patch angioplasty and also balloon angioplasty of right superficial femoral artery.  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  Dr Leonarda Stewart also debrided the dry eschars on the leg wounds.     The patient has seen Dr Leonarda Stewart in follow up.     The patient has a history of coronary artery disease and has had angioplasty and stent. Levi Aceves has also had coronary artery bypass grafting. Levi Aceves does not have any history of diabetes.  Past medical history does include asthma, skin cancer, gastroesophageal reflux disease, hypertension, hyperlipidemia, fatty liver disease, potential sleep apnea, but never tested.     SOCIAL HISTORY:  The patient smoked until 1998 when he quit. Levi Aceves does use alcohol.     The patient's medications do not include a diuretic.     Prior dressing:   Xeroform on the lower leg wound and then dry gauze and roll gauze changed daily.     Dressing started 8/13/2020:  Aquacell AG on the leg  wounds and then dry gauze and roll gauze changed 3 times per week.     Patient had problems with adherence of the dressing, went back to Xeroform (3 times per week) after 1 week.     Drawtex dressing tried. Levi Aceves developed more drainage, skin irritation.  Culture on 12/29/2020 grew pseudomonas and MRSA. Levi Aceves started doxycycline on 12/31/2020.       Resumed xeroform dressing.     Using Farrow wrap with dressing currently.  Leaving liner stocking on at night.     Less green drainage since starting 0.125% Dakins washes at dressing change.     PHYSICAL EXAMINATION:  GENERAL:  The patient is an alert man in no acute distress.     EXTREMITIES:       The patient's lower extremities were examined.  On the left side, there is no edema.  There were no wounds present.      On the right lower extremity, I did not palpate dorsalis pedis or posterior tibial pulses.  There is trace edema in the right lower leg.  The right foot feels warm. Skin is healthy appearing.     On the anterior aspect of the mid lower leg on the right, there are 2 separate ulcers (previously recorded as one cluster).   Proximal right lower leg ulcer 1.3 x 2 x 0.1 cm with mild slough and granulation.   Distal right lower leg ulcer 10 x 7 x 0.3 cm with mild slough and granulation.     Calf is soft.  Surrounding skin appears healthy.                  ASSESSMENT AND PLAN:          Arterial flow appears improved clinically since vascular intervention.     Dressing ordered:   Wash surface of wound with Dakins 0.125%, then rinse with saline.  Xeroform on the leg  wounds and then dry gauze and roll gauze changed 3 times per week.  Covered by Farrow wrap to help with edema control.           The patient will follow up in wound care center again in 2 weeks.        FINAL DIAGNOSES:  Nonhealing wounds right lower leg, peripheral artery disease.     L97.912, I73.9     Monique Jernigan MD

## 2021-03-08 NOTE — DISCHARGE INSTRUCTIONS
Discharge Instructions for  [facility]  [address]  [address] [facility] [address], [address]  Telephone: [phone]    NAME:  Yonas Otero  YOB: 1955  MEDICAL RECORD NUMBER:  [id number]  DATE:  3/8/2021  WOUND CARE ORDERS:  Right lower leg wound - Cleanse wound with Dakin's solution, 0.125%. Allow Dakin's moistened to wounds & allow to sit on wounds for approx 2-3 minutes, then remove & rinse w/with saline and gauze. Moisturize dry, intact RLE skin with Petroleum jelly (vaseline) Cover wounds with Xeroform, ABD pad (or Exudry), secure with roll gauze, Apply patient's [employer]. [facility] Care to change dressing 3 x week, and as needed, for compromise of dressing. Farrow wrap to be applied first thing, every morning, & removed at bedtime. Return to clinic for MD follow up in 2 weeks for MD follow-up    TREATMENT ORDERS:    Elevate leg(s) above the level of the heart when sitting. Avoid prolonged standing in one place. Do no get dressing/wrap wet. Follow Diet as prescribed:   [x] Diet as tolerated: [] Calorie Diabetic Diet: Low carb and no Sugar [x] No Added Salt:  [x] Increase Protein: [] Limit the amount of liquid you are drinking and avoid drinking in between meals           Return Appointment:  [x] Return Appointment: With DR Logan Murry  in  2 [facility])  [] Nurse Visit : *** days  [] Ordered tests:    Electronically signed Yoseph Oh RN on 3/8/2021 at 10:36 AM     [address] [place] [address] Information: Should you experience any significant changes in your wound(s) or have questions about your wound care, please contact the [address] [place] [address] at [address] [place] [address] Street 8:00 am - 4:30. If you need help with your wound outside these hours and cannot wait until we are again available, contact your PCP or go to the hospital emergency room.      PLEASE NOTE: IF YOU ARE UNABLE TO OBTAIN WOUND SUPPLIES, CONTINUE TO USE THE SUPPLIES YOU HAVE AVAILABLE UNTIL YOU ARE ABLE TO REACH US. IT IS MOST IMPORTANT TO KEEP THE WOUND COVERED AT ALL TIMES.      Physician Signature:_______________________    Date: ___________ Time:  ____________

## 2021-03-22 ENCOUNTER — HOSPITAL ENCOUNTER (OUTPATIENT)
Dept: WOUND CARE | Age: 66
Discharge: HOME | End: 2021-03-22
Admitting: SURGERY
Payer: MEDICARE

## 2021-03-22 VITALS
TEMPERATURE: 97.1 F | HEART RATE: 59 BPM | SYSTOLIC BLOOD PRESSURE: 117 MMHG | RESPIRATION RATE: 18 BRPM | DIASTOLIC BLOOD PRESSURE: 58 MMHG

## 2021-03-22 DIAGNOSIS — I73.9 PAD (PERIPHERAL ARTERY DISEASE) (HCC): ICD-10-CM

## 2021-03-22 DIAGNOSIS — L97.912 NON-PRESSURE CHRONIC ULCER OF RIGHT LOWER LEG, WITH FAT LAYER EXPOSED (HCC): ICD-10-CM

## 2021-03-22 DIAGNOSIS — L89.891 PRESSURE INJURY OF RIGHT FOOT, STAGE 1: ICD-10-CM

## 2021-03-22 PROCEDURE — 99213 OFFICE O/P EST LOW 20 MIN: CPT | Performed by: SURGERY

## 2021-03-22 PROCEDURE — 99213 OFFICE O/P EST LOW 20 MIN: CPT

## 2021-03-22 NOTE — PROGRESS NOTES
HISTORY OF PRESENT ILLNESS:  The patient is a 73-year-old man who is referred to the wound care center regarding nonhealing wound right lower leg and right posterior ankle.  The patient was first seen at the [address] on 5/7/2020.     The patient reports that he had struck his right ankle and developed a wound, which had been nonhealing.     He  developed swelling and redness in the right lower leg in 2020, which then was followed by blister formation and a wound formation.  He eventually was hospitalized at [facility] and says he was in the hospital for about 18 days.      The patient reports chronic numbness of both feet.  He has right drop foot.     The patient had at the time of an ER visit for leg symptoms on the right in 01/2020, a venous duplex scan which did not find any evidence of deep venous thrombosis.  Vein valves were not tested.     The patient has history of lumbar laminectomy and diskectomy on 09/04/2019. Shreyas Alcala reports related to his disk disease, he has right side footdrop.  He reported history of neurogenic claudication.  The patient was scheduled to have further back surgery within the month, but that has been canceled related to COVID-19.     The patient reports that he walks with [employer] denies shortness of breath at rest or with exertion.  He has no anginal chest pain.     The patient had angiography by Dr Britt Delgado on 5/17/2020 with finding of severe multilevel arterial occlusive disease in legs.    The patient on 5/29/2020 had open right femoral endarterectomy and patch angioplasty and also balloon angioplasty of right superficial femoral artery.  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  Dr Mehreen Cohen also debrided the dry eschars on the leg wounds.     The patient has seen Dr Mehreen Cohen in follow up.     The patient has a history of coronary artery disease and has had angioplasty and stent. Shreyas Alcala has also had coronary artery bypass grafting. Shreyas Alcala does not have any history of diabetes.  Past medical history does include asthma, skin cancer, gastroesophageal reflux disease, hypertension, hyperlipidemia, fatty liver disease, potential sleep apnea, but never tested.     SOCIAL HISTORY:  The patient smoked until 1998 when he quit. [facility] does use alcohol.     The patient's medications do not include a diuretic.     Prior dressing:   Xeroform on the lower leg wound and then dry gauze and roll gauze changed daily.     Dressing started 8/13/2020:  Aquacell AG on the leg  wounds and then dry gauze and roll gauze changed 3 times per week.     Patient had problems with adherence of the dressing, went back to Xeroform (3 times per week) after 1 week.     Drawtex dressing tried. [facility] developed more drainage, skin irritation.  Culture on 12/29/2020 grew pseudomonas and MRSA. [facility] started doxycycline on 12/31/2020.       Resumed xeroform dressing.     Using Farrow wrap with dressing currently.  Leaving liner stocking on at night.     Less green drainage since starting 0.125% Dakins washes at dressing change.     PHYSICAL EXAMINATION:  GENERAL:  The patient is an alert man in no acute distress.     EXTREMITIES:       The patient's lower extremities were examined.  On the left side, there is no edema.  There were no wounds present.      On the right lower extremity, I did not palpate dorsalis pedis or posterior tibial pulses.  There is trace edema in the right lower leg.  The right foot feels warm. Skin of the leg is healthy appearing.     On the anterior aspect of the mid lower leg on the right, there are 2 separate ulcers (previously recorded as one cluster).  Proximal right lower leg ulcer 1.5 x 0.7 x 0.1 cm with mild slough and granulation.  Distal right lower leg ulcer 9.2 x 6.6 x 0.3 cm with mild slough and granulation.     Calf is soft.  Surrounding skin appears healthy.      New area of possible tissue injury on lateral right foot at 5th  metatarsal head.   Mild discoloration, no skin opening. No erythema.                 ASSESSMENT AND PLAN:            Leg wound continues to slowly decrease in size. New foot site - ?pressure related. Sys he does not use shoes in house. Goes out only once per week. Had been keeping liner stocking of Farrow on at night; now using Tubigrip with end of foot open. Arterial flow appears improved clinically since vascular intervention.     Dressing ordered:   Wash surface of wound with Dakins 0.125%, then rinse with saline.  Xeroform on the leg  wounds and then dry gauze and roll gauze changed 3 times per week.  Covered by Farrow wrap to help with edema control. Use Tubigrip instead of liner stocking.      No dressing for foot site. Keep all pressure off the area.     The patient will follow up in wound care center again in 1 week.        FINAL DIAGNOSES:  Nonhealing wounds right lower leg, peripheral artery disease.   Possible pressure site right foot, stage 1.     L97.912, I73.9, Z27.846     Leisa Joseph MD

## 2021-03-22 NOTE — DISCHARGE INSTRUCTIONS
Discharge Instructions for  [facility]  [address], [address] [facility]  Telephone: [phone]    NAME:  Charlene Caldwell  YOB: 1955  MEDICAL RECORD NUMBER:  [id number]  DATE:  3/22/2021     WOUND CARE ORDERS:Right lower leg wound - Cleanse wound with Dakin's solution, 0.125%. Allow Dakin's moistened to wounds & allow to sit on wounds for approx 2-3 minutes, remove & rinse w/with saline and gauze. Moisturize dry, intact RLE skin with Petroleum jelly (vaseline) Cover wounds with Xeroform, ABD pad (or Exudry), secure with roll gauze, Apply patient's [employer]. [facility] or [facility] to change dressing 3 x week, and as needed, for compromise of dressing. Right Lateral foot - leave  Open to air & avoid any pressure on area. Farrow wrap to be applied first thing, every morning, & removed at bedtime. May Use Tubigrip in place of Farrow liner to decrease pressure on Right lateral foot. Return to clinic for MD follow up in1 week for MD follow-up    TREATMENT ORDERS:    Elevate leg(s) when sitting. Avoid prolonged standing in one place. Do no get dressing/wrap wet. Follow Diet as prescribed:   [x] No Added Salt: Avoid \"prepared\" or processed foods. These are typically high in salt content. Return Appointment:  [x] Return Appointment: With DR Mariana Alvarez  in  1 [facility])  [] Nurse Visit : *** days  [] Ordered tests:    Electronically signed Drew Pickard RN on 3/22/2021 at 10:22 AM     Sallie Vega 281: Should you experience any significant changes in your wound(s) or have questions about your wound care, please contact the [address] [place] [address] at [address] [place] [address] 8:00 am - 4:30. If you need help with your wound outside these hours and cannot wait until we are again available, contact your PCP or go to the hospital emergency room.      PLEASE NOTE: IF YOU ARE UNABLE TO OBTAIN WOUND SUPPLIES, CONTINUE TO USE THE SUPPLIES YOU HAVE AVAILABLE UNTIL YOU ARE ABLE TO REACH US. IT IS MOST IMPORTANT TO KEEP THE WOUND COVERED AT ALL TIMES.      Physician Signature:_______________________    Date: ___________ Time:  ____________

## 2021-03-22 NOTE — WOUND CARE
03/22/21 0945 Wound Leg lower Right # 1 Date First Assessed/Time First Assessed: 05/07/20 0835   Present on Hospital Admission: Yes  Wound Approximate Age at First Assessment (Weeks): 4 weeks  Primary Wound Type: Vascular  Location: Leg lower  Wound Location Orientation: Right  Wound Descri. .. Wound Image Wound Etiology Venous Dressing Status  
(removed xeroform, abd) Cleansed Cleansed with saline Wound Length (cm) 9.2 cm Wound Width (cm) 6.6 cm Wound Depth (cm) 0.3 cm Wound Surface Area (cm^2) 60.72 cm^2 Change in Wound Size % 69.75 Wound Volume (cm^3) 18.22 cm^3 Wound Healing % 55 Wound Assessment Strawberry/red;Slough Drainage Amount Moderate Drainage Description Shae Poke Wound Odor None Chana-Wound/Incision Assessment Intact Edges Flat/open edges Wound Thickness Description Full thickness Wound Leg Proximal;Right; Lower Date First Assessed/Time First Assessed: 02/08/21 1021   Wound Approximate Age at First Assessment (Weeks): (c)   Primary Wound Type: Venous  Location: Leg  Wound Location Orientation: Proximal;Right; Lower Wound Image Wound Etiology Venous Dressing Status Old drainage noted Cleansed Cleansed with saline Wound Length (cm) 1.5 cm Wound Width (cm) 0.7 cm Wound Depth (cm) 0.1 cm Wound Surface Area (cm^2) 1.05 cm^2 Change in Wound Size % 68.18 Wound Volume (cm^3) 0.1 cm^3 Wound Healing % 85 Wound Assessment Strawberry/red;Slough Drainage Amount Moderate Drainage Description Shae Poke Wound Odor None Chana-Wound/Incision Assessment Intact Edges Flat/open edges Wound Thickness Description Full thickness Visit Vitals BP (!) 117/58 (BP 1 Location: Right upper arm, BP Patient Position: At rest) Pulse (!) 59 Temp 97.1 °F (36.2 °C) Resp 18

## 2021-03-29 ENCOUNTER — HOSPITAL ENCOUNTER (OUTPATIENT)
Dept: WOUND CARE | Age: 66
Discharge: HOME | End: 2021-03-29
Admitting: SURGERY
Payer: MEDICARE

## 2021-03-29 VITALS
RESPIRATION RATE: 16 BRPM | TEMPERATURE: 97.1 F | HEART RATE: 55 BPM | SYSTOLIC BLOOD PRESSURE: 103 MMHG | DIASTOLIC BLOOD PRESSURE: 58 MMHG

## 2021-03-29 DIAGNOSIS — L89.891 PRESSURE INJURY OF RIGHT FOOT, STAGE 1: ICD-10-CM

## 2021-03-29 DIAGNOSIS — I73.9 PAD (PERIPHERAL ARTERY DISEASE) (HCC): ICD-10-CM

## 2021-03-29 DIAGNOSIS — L97.912 NON-PRESSURE CHRONIC ULCER OF RIGHT LOWER LEG, WITH FAT LAYER EXPOSED (HCC): ICD-10-CM

## 2021-03-29 PROCEDURE — 99213 OFFICE O/P EST LOW 20 MIN: CPT

## 2021-03-29 PROCEDURE — 99213 OFFICE O/P EST LOW 20 MIN: CPT | Performed by: SURGERY

## 2021-03-29 NOTE — DISCHARGE INSTRUCTIONS
Discharge Instructions for  [facility]  [address] [facility], [address] [facility]  Telephone: [phone]    NAME:  Jose Noonan  YOB: 1955  MEDICAL RECORD NUMBER:  [id number]  DATE:  3/29/2021  WOUND CARE ORDERS:  Right lower leg wound - Cleanse wound with Dakin's solution, 0.125%. Allow Dakin's moistened to wounds & allow to sit on wounds for approx 2-3 minutes, remove & rinse w/with saline and gauze. Moisturize dry, intact RLE skin with Petroleum jelly (vaseline) Cover wounds with Xeroform, ABD pad (or Exudry), secure with roll gauze, Apply patient's [place] [employer]. [facility] or [facility] to change dressing 3 x week, and as needed, for compromise of dressing. Right Lateral foot - leave  Open to air & avoid any pressure on area. Farrow wrap to be applied first thing, every morning, & removed at bedtime. May Use Tubigrip in place of Farrow liner to decrease pressure on Right lateral foot. Return to clinic for MD follow up in 2 week for MD follow-up    TREATMENT ORDERS:    Elevate leg(s) above the level of the heart when sitting. Avoid prolonged standing in one place. Do no get dressing/wrap wet. Follow Diet as prescribed:   [x] Diet as tolerated: [] Calorie Diabetic Diet: Low carb and no Sugar [x] No Added Salt:  [x] Increase Protein: [] Limit the amount of liquid you are drinking and avoid drinking in between meals           Return Appointment:  [x] Return Appointment: With DR Micaela Smith  in  2 [facility]  [] Nurse Visit : *** days  [] Ordered tests:    Electronically signed Bee Auguste RN on 3/29/2021 at 10:46 AM     [address] [place] [address] Road Information: Should you experience any significant changes in your wound(s) or have questions about your wound care, please contact the [address] [place] [address] at [address] [place] [address] 8:00 am - 4:30.   If you need help with your wound outside these hours and cannot wait until we are again available, contact your PCP or go to the hospital emergency room. PLEASE NOTE: IF YOU ARE UNABLE TO OBTAIN WOUND SUPPLIES, CONTINUE TO USE THE SUPPLIES YOU HAVE AVAILABLE UNTIL YOU ARE ABLE TO REACH US. IT IS MOST IMPORTANT TO KEEP THE WOUND COVERED AT ALL TIMES.      Physician Signature:_______________________    Date: ___________ Time:  ____________

## 2021-03-29 NOTE — WOUND CARE
03/29/21 1056 Right Leg Edema Point of Measurement Compression Therapy (Farrow wrap) Wound Leg Proximal;Right; Lower Date First Assessed/Time First Assessed: 02/08/21 1021   Wound Approximate Age at First Assessment (Weeks): (c)   Primary Wound Type: Venous  Location: Leg  Wound Location Orientation: Proximal;Right; Lower Dressing Status New dressing applied Cleansed Cleansed with saline Dressing/Treatment Xeroform Wound Leg lower Right # 1 Date First Assessed/Time First Assessed: 05/07/20 0835   Present on Hospital Admission: Yes  Wound Approximate Age at First Assessment (Weeks): 4 weeks  Primary Wound Type: Vascular  Location: Leg lower  Wound Location Orientation: Right  Wound Descri. .. Dressing Status New dressing applied Cleansed Cleansed with saline Dressing/Treatment Xeroform;ABD pad;Roll gauze;Tape change (Farrow wrap)

## 2021-03-29 NOTE — WOUND CARE
03/29/21 5487 Anesthetic Anesthetic 4% Lidocaine Liquid Topical 
(gel) Right Leg Edema Point of Measurement Compression Therapy (Farrow wrap) RLE Peripheral Vascular Capillary Refill Less than/equal to 3 seconds Color Appropriate for race Temperature Warm Sensation Present Pedal Pulse Palpable Circumference of Calf (cm) 38 cm Location of Measurement (Calf) Mid   
Circumference of Ankle (cm) 25 cm Location of Measurement (Ankle) Upper Wound Leg Proximal;Right; Lower Date First Assessed/Time First Assessed: 02/08/21 1021   Wound Approximate Age at First Assessment (Weeks): (c)   Primary Wound Type: Venous  Location: Leg  Wound Location Orientation: Proximal;Right; Lower Wound Image Wound Etiology Venous Dressing Status  
(removed xeroform, exudry roll gauze and tape) Wound Length (cm) 1 cm Wound Width (cm) 1.2 cm Wound Depth (cm) 0.1 cm Wound Surface Area (cm^2) 1.2 cm^2 Change in Wound Size % 63.64 Wound Volume (cm^3) 0.12 cm^3 Wound Healing % 82 Wound Assessment [place]/red;Slough Drainage Amount Moderate Drainage Description Beau Wood Wound Odor None Chana-Wound/Incision Assessment Intact Edges Flat/open edges Wound Thickness Description Full thickness Wound Leg lower Right # 1 Date First Assessed/Time First Assessed: 05/07/20 0835   Present on Hospital Admission: Yes  Wound Approximate Age at First Assessment (Weeks): 4 weeks  Primary Wound Type: Vascular  Location: Leg lower  Wound Location Orientation: Right  Wound Descri. .. Wound Image Wound Etiology Venous Dressing Status  
(removed Xeroform) Cleansed Cleansed with saline Wound Length (cm) 8.5 cm Wound Width (cm) 6.6 cm Wound Depth (cm) 0.3 cm Wound Surface Area (cm^2) 56.1 cm^2 Change in Wound Size % 72.05 Wound Volume (cm^3) 16.83 cm^3 Wound Healing % 58 Wound Assessment [place]/red;Slough Drainage Amount Moderate Drainage Description Beau Wood Wound Odor None Chana-Wound/Incision Assessment Intact Edges Flat/open edges Wound Thickness Description Full thickness Pain 1 Pain Scale 1 Numeric (0 - 10) Pain Intensity 1 0 Patient Stated Pain Goal 0 Pain Reassessment 1 Yes Visit Vitals BP (!) 103/58 Pulse (!) 55 Temp 97.1 °F (36.2 °C) Resp 16

## 2021-03-29 NOTE — PROGRESS NOTES
HISTORY OF PRESENT ILLNESS:  The patient is a 47-year-old man who is referred to the wound care center regarding nonhealing wound right lower leg and right posterior ankle.  The patient was first seen at the [facility] [address] on 5/7/2020.   The patient reports that he had struck his right ankle and developed a wound, which had been nonhealing.     He  developed swelling and redness in the right lower leg in 2020, which then was followed by blister formation and a wound formation.  He eventually was hospitalized at [facility] and says he was in the hospital for about 18 days.      The patient reports chronic numbness of both feet.  He has right drop foot.     The patient had at the time of an ER visit for leg symptoms on the right in 01/2020, a venous duplex scan which did not find any evidence of deep venous thrombosis.  Vein valves were not tested.     The patient has history of lumbar laminectomy and diskectomy on 09/04/2019. Radha Rivera reports related to his disk disease, he has right side footdrop.  He reported history of neurogenic claudication.  The patient was scheduled to have further back surgery within the month, but that has been canceled related to COVID-19.     The patient reports that he walks with [place] denies shortness of breath at rest or with exertion.  He has no anginal chest pain.     The patient had angiography by Dr Bogdan Su on 5/17/2020 with finding of severe multilevel arterial occlusive disease in legs.    The patient on 5/29/2020 had open right femoral endarterectomy and patch angioplasty and also balloon angioplasty of right superficial femoral artery.  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  Dr No Robledo also debrided the dry eschars on the leg wounds.     The patient has seen Dr No Robledo in follow up.     The patient has a history of coronary artery disease and has had angioplasty and stent. Radha Rivera has also had coronary artery bypass grafting.  He does not have any history of diabetes.  Past medical history does include asthma, skin cancer, gastroesophageal reflux disease, hypertension, hyperlipidemia, fatty liver disease, potential sleep apnea, but never tested.     SOCIAL HISTORY:  The patient smoked until 1998 when he quit. Mahnaz Metz does use alcohol.     The patient's medications do not include a diuretic.     Prior dressing:   Xeroform on the lower leg wound and then dry gauze and roll gauze changed daily.     Dressing started 8/13/2020:  Aquacell AG on the leg  wounds and then dry gauze and roll gauze changed 3 times per week.     Patient had problems with adherence of the dressing, went back to Xeroform (3 times per week) after 1 week.     Drawtex dressing tried. Mahnaz Metz developed more drainage, skin irritation.  Culture on 12/29/2020 grew pseudomonas and MRSA. Mahnaz Metz started doxycycline on 12/31/2020.       Resumed xeroform dressing.     Using Farrow wrap with dressing currently.  Leaving liner stocking on at night.     Less green drainage since starting 0.125% Dakins washes at dressing change.     PHYSICAL EXAMINATION:  GENERAL:  The patient is an alert man in no acute distress.     EXTREMITIES:       The patient's lower extremities were examined.  On the left side, there is no edema.  There were no wounds present.      On the right lower extremity, I did not palpate dorsalis pedis or posterior tibial pulses.  There is trace edema in the right lower leg.  The right foot feels warm. Skin of the leg is healthy appearing.     On the anterior aspect of the mid lower leg on the right, there are 2 separate ulcers (previously recorded as one cluster).  Proximal right lower leg ulcer 1 x 1.2 x 0.1 cm with mild slough and granulation.  Distal right lower leg ulcer 8.5 x 6.6 x 0.3 cm with mild slough and granulation.     Calf is soft.  Surrounding skin appears healthy.       Area of possible tissue injury on lateral right foot at 5th  metatarsal head.   Mild callus, no skin opening. No erythema.           ASSESSMENT AND PLAN:             Leg wounds continue to slowly decrease in size.     Foot site - ?pressure related. Says he does not use shoes in house. Goes out only once per week. Had been keeping liner stocking of Farrow on at night; now using Tubigrip with end of foot open.     Arterial flow appears improved clinically since vascular intervention.     Dressing ordered:   Wash surface of wound with Dakins 0.125%, then rinse with saline.  Xeroform on the leg  wounds and then dry gauze and roll gauze changed 3 times per week.  Covered by Farrow wrap to help with edema control. Use Tubigrip instead of liner stocking.      No dressing for foot site. Keep all pressure off the area. Use fabric bandaid if desired for padding.     The patient will follow up in wound care center again in 2 weeks.        FINAL DIAGNOSES:  Nonhealing wounds right lower leg, peripheral artery disease.   Possible pressure site right foot, stage 1.     L97.912, I73.9, B27.474     Kinjal Malik MD

## 2021-04-12 ENCOUNTER — HOSPITAL ENCOUNTER (OUTPATIENT)
Dept: WOUND CARE | Age: 66
Discharge: HOME | End: 2021-04-12
Admitting: SURGERY
Payer: MEDICARE

## 2021-04-12 VITALS
RESPIRATION RATE: 16 BRPM | TEMPERATURE: 97.1 F | HEART RATE: 60 BPM | DIASTOLIC BLOOD PRESSURE: 56 MMHG | SYSTOLIC BLOOD PRESSURE: 122 MMHG

## 2021-04-12 DIAGNOSIS — I73.9 PAD (PERIPHERAL ARTERY DISEASE) (HCC): ICD-10-CM

## 2021-04-12 DIAGNOSIS — L97.912 NON-PRESSURE CHRONIC ULCER OF RIGHT LOWER LEG, WITH FAT LAYER EXPOSED (HCC): ICD-10-CM

## 2021-04-12 DIAGNOSIS — R60.0 LEG EDEMA, RIGHT: ICD-10-CM

## 2021-04-12 PROCEDURE — 11042 DBRDMT SUBQ TIS 1ST 20SQCM/<: CPT

## 2021-04-12 PROCEDURE — 11045 DBRDMT SUBQ TISS EACH ADDL: CPT | Performed by: SURGERY

## 2021-04-12 PROCEDURE — 11045 DBRDMT SUBQ TISS EACH ADDL: CPT

## 2021-04-12 PROCEDURE — 11042 DBRDMT SUBQ TIS 1ST 20SQCM/<: CPT | Performed by: SURGERY

## 2021-04-12 RX ORDER — SILVER NITRATE 38.21; 12.74 MG/1; MG/1
1 STICK TOPICAL ONCE
Status: COMPLETED | OUTPATIENT
Start: 2021-04-12 | End: 2021-04-12

## 2021-04-12 RX ADMIN — SILVER NITRATE 1 APPLICATOR: 38.21; 12.74 STICK TOPICAL at 10:15

## 2021-04-12 NOTE — PROGRESS NOTES
Debridement Wound Care        Problem List Items Addressed This Visit     None          Procedure Note  Indications:  Based on my examination of this patient's wound(s)/ulcer(s) today, debridement is required to promote healing and evaluate the wound base. Performed by: Jael Olivier MD    Consent obtained: Yes    Time out taken: Yes    Debridement: Excisional    Using curette the wound(s)/ulcer(s) was/were sharply debrided down through and including the removal of    subcutaneous tissue  Sharp excisional debridement was carried out with removal of necrotic tissue, slough, and granulation into the subcutaneous layer. Debridement was carried down to bleeding viable tissue. Devitalized Tissue Debrided: slough, necrotic/eschar and into SQ layer    Pre Debridement Measurements:  Are located in the Wound/Ulcer Documentation Flow Sheet    Wound/Ulcer #: 1    Post Debridement Measurements:  Wound/Ulcer Descriptions are Pre Debridement except measurements:Other: after debridement, depth increased bvy 0.1 cm.     Wound Back (Active)   Number of days: 591       Wound Leg lower Right # 1 (Active)   Wound Image   04/12/21 0935   Wound Etiology Venous 03/29/21 0958   Dressing Status New dressing applied 03/29/21 1056   Cleansed Cleansed with saline 04/12/21 0935   Dressing/Treatment Xeroform;ABD pad;Roll gauze;Tape change 03/29/21 1056   Wound Length (cm) 9.5 cm 04/12/21 0935   Wound Width (cm) 6.8 cm 04/12/21 0935   Wound Depth (cm) 0.2 cm 04/12/21 0935   Wound Surface Area (cm^2) 64.6 cm^2 04/12/21 0935   Change in Wound Size % 67.82 04/12/21 0935   Wound Volume (cm^3) 12.92 cm^3 04/12/21 0935   Wound Healing % 68 04/12/21 0935   Post-Procedure Length (cm) 10.2 cm 02/22/21 1011   Post-Procedure Width (cm) 7.8 cm 02/22/21 1011   Post-Procedure Depth (cm) 0.4 cm 02/22/21 1011   Post-Procedure Surface Area (cm^2) 79.56 cm^2 02/22/21 1011   Post-Procedure Volume (cm^3) 31.82 cm^3 02/22/21 1011   Wound Assessment Pink/red;Slough 04/12/21 0935   Drainage Amount Moderate 04/12/21 0935   Drainage Description Green 04/12/21 0935   Wound Odor None 04/12/21 0935   Chana-Wound/Incision Assessment Intact 04/12/21 0935   Edges Flat/open edges 04/12/21 0935   Wound Thickness Description Full thickness 04/12/21 0935   Number of days: 340       Wound Leg Proximal;Right; Lower (Active)   Wound Image   04/12/21 0935   Wound Etiology Venous 04/12/21 0935   Dressing Status New dressing applied 04/12/21 1013   Cleansed Cleansed with saline; Other (Comment) 04/12/21 1013   Dressing/Treatment ABD pad;Xeroform; Other (Comment) 04/12/21 1013   Wound Length (cm) 0.7 cm 04/12/21 0935   Wound Width (cm) 0.4 cm 04/12/21 0935   Wound Depth (cm) 0.1 cm 04/12/21 0935   Wound Surface Area (cm^2) 0.28 cm^2 04/12/21 0935   Change in Wound Size % 91.52 04/12/21 0935   Wound Volume (cm^3) 0.03 cm^3 04/12/21 0935   Wound Healing % 95 04/12/21 0935   Post-Procedure Length (cm) 1.5 cm 02/22/21 1011   Post-Procedure Width (cm) 1.6 cm 02/22/21 1011   Post-Procedure Depth (cm) 0.2 cm 02/22/21 1011   Post-Procedure Surface Area (cm^2) 2.4 cm^2 02/22/21 1011   Post-Procedure Volume (cm^3) 0.48 cm^3 02/22/21 1011   Wound Assessment Pink/red 04/12/21 0935   Drainage Amount Moderate 04/12/21 0935   Drainage Description Green 04/12/21 0935   Wound Odor None 04/12/21 0935   Chana-Wound/Incision Assessment Intact 04/12/21 0935   Edges Flat/open edges 04/12/21 0935   Wound Thickness Description Full thickness 04/12/21 0935   Number of days: 63        Percent of Wound(s)/Ulcer(s) Debrided: 80%    Total Surface Area Debrided:  35 sq cm     Diabetic/Pressure/Non Pressure Ulcers only:  Ulcer:     Estimated Blood Loss:  Minimal     Hemostasis Achieved: Pressure and Silver Nitrate    Procedural Pain: 0 / 10     Post Procedural Pain: 0 / 10     Response to treatment: Well tolerated by patient

## 2021-04-12 NOTE — WOUND CARE
04/12/21 0935 Wound Leg lower Right # 1 Date First Assessed/Time First Assessed: 05/07/20 0835   Present on Hospital Admission: Yes  Wound Approximate Age at First Assessment (Weeks): 4 weeks  Primary Wound Type: Vascular  Location: Leg lower  Wound Location Orientation: Right  Wound Descri. .. Wound Image Wound Etiology (xeroform, abd pad, roll gauze and tape) Cleansed Cleansed with saline Wound Length (cm) 9.5 cm Wound Width (cm) 6.8 cm Wound Depth (cm) 0.2 cm Wound Surface Area (cm^2) 64.6 cm^2 Change in Wound Size % 67.82 Wound Volume (cm^3) 12.92 cm^3 Wound Healing % 68 Wound Assessment [place]/red;Slough Drainage Amount Moderate Drainage Description Chanell Bryan Wound Odor None Chana-Wound/Incision Assessment Intact Edges Flat/open edges Wound Thickness Description Full thickness Wound Leg Proximal;Right; Lower Date First Assessed/Time First Assessed: 02/08/21 1021   Wound Approximate Age at First Assessment (Weeks): (c)   Primary Wound Type: Venous  Location: Leg  Wound Location Orientation: Proximal;Right; Lower Wound Image Wound Etiology Venous Cleansed Cleansed with saline Wound Length (cm) 0.7 cm Wound Width (cm) 0.4 cm Wound Depth (cm) 0.1 cm Wound Surface Area (cm^2) 0.28 cm^2 Change in Wound Size % 91.52 Wound Volume (cm^3) 0.03 cm^3 Wound Healing % 95 Wound Assessment Pink/red Drainage Amount Moderate Drainage Description Chanell Bryan Wound Odor None Chana-Wound/Incision Assessment Intact Edges Flat/open edges Wound Thickness Description Full thickness Visit Vitals BP (!) 122/56 Pulse 60 Temp 97.1 °F (36.2 °C) Resp 16

## 2021-04-12 NOTE — DISCHARGE INSTRUCTIONS
Discharge Instructions for  [facility]  [address], [address] [facility]  Telephone: [phone]    NAME:  Lila Barcenas  YOB: 1955  MEDICAL RECORD NUMBER:  [id number]  DATE:  4/12/2021  WOUND CARE ORDERS:    Right lower leg wound - Cleanse wound with Dakin's solution, 0.125%. Allow Dakin's moistened to wounds & allow to sit on wounds for approx 2-3 minutes, remove & rinse w/with saline and gauze. Moisturize dry, intact RLE skin with Petroleum jelly (vaseline) Cover wounds with Xeroform, ABD pad (or Exudry), secure with roll gauze, Apply patient's Matthew Kipper. PCG or HHC to change dressing 3 x week, and as needed, for compromise of dressing. Right Lateral foot - leave  Open to air & avoid any pressure on area. Farrow wrap to be applied first thing, every morning, & removed at bedtime. May Use Tubigrip in place of Farrow liner to decrease pressure on Right lateral foot. Return to clinic for MD follow up in 3 week for MD follow-up    TREATMENT ORDERS:    Elevate leg(s) above the level of the heart when sitting. Avoid prolonged standing in one place. Do no get dressing/wrap wet. Follow Diet as prescribed:   [x] Diet as tolerated: [] Calorie Diabetic Diet: Low carb and no Sugar [] No Added Salt:  [] Increase Protein: [] Limit the amount of liquid you are drinking and avoid drinking in between meals           Return Appointment:  [x] Return Appointment: With DR Mitchell Méndez  in  [facility])  [] Nurse Visit : *** days  [] Ordered tests:    Electronically signed Bobbi Swift on 4/12/2021 at [address]: Should you experience any significant changes in your wound(s) or have questions about your wound care, please contact the [address] [place] [address] at [address] [place] [address] 8:00 am - 4:30.   If you need help with your wound outside these hours and cannot wait until we are again available, contact your PCP or go to the hospital emergency room. PLEASE NOTE: IF YOU ARE UNABLE TO OBTAIN WOUND SUPPLIES, CONTINUE TO USE THE SUPPLIES YOU HAVE AVAILABLE UNTIL YOU ARE ABLE TO REACH US. IT IS MOST IMPORTANT TO KEEP THE WOUND COVERED AT ALL TIMES.      Physician Signature:_______________________    Date: ___________ Time:  ____________

## 2021-04-12 NOTE — PROGRESS NOTES
HISTORY OF PRESENT ILLNESS:  The patient is a 18-year-old man who is referred to the wound care center regarding nonhealing wound right lower leg and right posterior ankle.  The patient was first seen at the [address] [place] [address] on 5/7/2020.   The patient reports that he had struck his right ankle and developed a wound, which had been nonhealing.     He  developed swelling and redness in the right lower leg in 2020, which then was followed by blister formation and a wound formation.  He eventually was hospitalized at [facility] and says he was in the hospital for about 18 days.      The patient reports chronic numbness of both feet.  He has right drop foot.     The patient had at the time of an ER visit for leg symptoms on the right in 01/2020, a venous duplex scan which did not find any evidence of deep venous thrombosis.  Vein valves were not tested.     The patient has history of lumbar laminectomy and diskectomy on 09/04/2019. Benjie Lesches reports related to his disk disease, he has right side footdrop.  He reported history of neurogenic claudication.  The patient was scheduled to have further back surgery within the month, but that has been canceled related to COVID-19.     The patient reports that he walks with Kathy Limk denies shortness of breath at rest or with exertion.  He has no anginal chest pain.     The patient had angiography by Dr Akosua Conroy on 5/17/2020 with finding of severe multilevel arterial occlusive disease in legs.    The patient on 5/29/2020 had open right femoral endarterectomy and patch angioplasty and also balloon angioplasty of right superficial femoral artery.  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  Dr Perla Heck also debrided the dry eschars on the leg wounds.     The patient has seen Dr Perla Heck in follow up.     The patient has a history of coronary artery disease and has had angioplasty and stent. Benjie Lesches has also had coronary artery bypass grafting.  He does not have any history of diabetes.  Past medical history does include asthma, skin cancer, gastroesophageal reflux disease, hypertension, hyperlipidemia, fatty liver disease, potential sleep apnea, but never tested.     SOCIAL HISTORY:  The patient smoked until 1998 when he quit. Janell Mayer does use alcohol.     The patient's medications do not include a diuretic.     Prior dressing:   Xeroform on the lower leg wound and then dry gauze and roll gauze changed daily.     Dressing started 8/13/2020:  Aquacell AG on the leg  wounds and then dry gauze and roll gauze changed 3 times per week.     Patient had problems with adherence of the dressing, went back to Xeroform (3 times per week) after 1 week. He has Home Health.     Drawtex dressing tried. Janell Mayer developed more drainage, skin irritation.  Culture on 12/29/2020 grew pseudomonas and MRSA. Janell Mayer started doxycycline on 12/31/2020.       Resumed xeroform dressing.     Using Farrow wrap with dressing currently.  Leaving liner stocking on at night.     Less green drainage since starting 0.125% Dakins washes at dressing change.     PHYSICAL EXAMINATION:  GENERAL:  The patient is an alert man in no acute distress.     EXTREMITIES:       The patient's lower extremities were examined.  On the left side, there is no edema.  There were no wounds present.      On the right lower extremity, I did not palpate dorsalis pedis or posterior tibial pulses.  There is trace edema in the right lower leg.  The right foot feels warm. Skin of the leg is healthy appearing.     On the anterior aspect of the mid lower leg on the right, there are 2 separate ulcers (previously recorded as one cluster).  Proximal right lower leg ulcer 0.7 x 0.4 x 0.1 cm with mild slough and granulation.  Distal right lower leg ulcer 9.5 x 6.8 x 0.2 cm with dry slough and granulation.     Calf is soft.  Surrounding skin appears healthy.       Area of possible tissue injury on lateral right foot at 5th  metatarsal head.  Mild callus, no skin opening.  No erythema. Dry and unchanged. Debridement of leg wound carried out  -  See note.             ASSESSMENT AND PLAN:             Leg wounds continue to slowly decrease in size.     Foot site - ?pressure related.  Says he does not use shoes in house.  Goes out only once per week. Michael Mcghee been keeping liner stocking of Farrow on at night; now using Tubigrip with end of foot open.     Arterial flow appears improved clinically since vascular intervention.     Dressing ordered:   Wash surface of wound with Dakins 0.125%, then rinse with saline.  Xeroform on the leg  wounds and then dry gauze and roll gauze changed 3 times per week.  Covered by Farrow wrap to help with edema control.  Use Tubigrip instead of liner stocking.      No dressing for foot site.  Keep all pressure off the area.   Use fabric bandaid if desired for padding.     The patient will follow up in wound care center again in 3 weeks.        FINAL DIAGNOSES:  Nonhealing wounds right lower leg, peripheral artery disease.  Possible pressure site right foot, stage 1.     L97.912, I73.9, L89.891     Keerthi Morton MD

## 2021-05-03 ENCOUNTER — HOSPITAL ENCOUNTER (OUTPATIENT)
Dept: WOUND CARE | Age: 66
Discharge: HOME | End: 2021-05-03
Admitting: SURGERY
Payer: MEDICARE

## 2021-05-03 VITALS
HEART RATE: 55 BPM | RESPIRATION RATE: 16 BRPM | SYSTOLIC BLOOD PRESSURE: 122 MMHG | TEMPERATURE: 97.7 F | DIASTOLIC BLOOD PRESSURE: 58 MMHG

## 2021-05-03 DIAGNOSIS — R60.0 LEG EDEMA, RIGHT: ICD-10-CM

## 2021-05-03 DIAGNOSIS — L97.912 NON-PRESSURE CHRONIC ULCER OF RIGHT LOWER LEG, WITH FAT LAYER EXPOSED (HCC): ICD-10-CM

## 2021-05-03 DIAGNOSIS — I73.9 PAD (PERIPHERAL ARTERY DISEASE) (HCC): ICD-10-CM

## 2021-05-03 PROCEDURE — 11042 DBRDMT SUBQ TIS 1ST 20SQCM/<: CPT

## 2021-05-03 PROCEDURE — 11042 DBRDMT SUBQ TIS 1ST 20SQCM/<: CPT | Performed by: SURGERY

## 2021-05-03 PROCEDURE — 11045 DBRDMT SUBQ TISS EACH ADDL: CPT | Performed by: SURGERY

## 2021-05-03 NOTE — WOUND CARE
05/03/21 0930 Anesthetic Anesthetic 4% Lidocaine Liquid Topical 
(gel) Left Leg Edema Point of Measurement Compression Therapy Velcro compression wrap 
(farrow wrap) Wound Leg Proximal;Right; Lower Date First Assessed/Time First Assessed: 02/08/21 1021   Wound Approximate Age at First Assessment (Weeks): (c)   Primary Wound Type: Venous  Location: Leg  Wound Location Orientation: Proximal;Right; Lower Wound Image Wound Etiology Venous Dressing Status (xeroform, abd pad roll gauze and tape) Cleansed Cleansed with saline Wound Length (cm) 0.1 cm Wound Width (cm) 0.1 cm Wound Depth (cm) 0.1 cm Wound Surface Area (cm^2) 0.01 cm^2 Change in Wound Size % 99.7 Wound Volume (cm^3) 0 cm^3 Wound Healing % 100 Wound Assessment Epithelialization Drainage Amount None Wound Odor None Wound Leg lower Right # 1 Date First Assessed/Time First Assessed: 05/07/20 0835   Present on Hospital Admission: Yes  Wound Approximate Age at First Assessment (Weeks): 4 weeks  Primary Wound Type: Vascular  Location: Leg lower  Wound Location Orientation: Right  Wound Descri. .. Wound Image Wound Etiology Venous Cleansed Cleansed with saline Post-Procedure Length (cm) 9.7 cm Post-Procedure Width (cm) 4.7 cm Post-Procedure Depth (cm) 0.2 cm Post-Procedure Surface Area (cm^2) 45.59 cm^2 Post-Procedure Volume (cm^3) 9.12 cm^3 Wound Assessment Pink/red;Slough Drainage Amount Moderate Drainage Description Serosanguinous;Green Wound Odor None Chana-Wound/Incision Assessment Blanchable erythema Edges Flat/open edges Wound Thickness Description Full thickness Pain 1 Pain Scale 1 Numeric (0 - 10) Pain Intensity 1 0 Patient Stated Pain Goal 0 Pain Reassessment 1 Yes Visit Vitals BP (!) 122/58 Pulse (!) 55 Temp 97.7 °F (36.5 °C) Resp 16

## 2021-05-03 NOTE — DISCHARGE INSTRUCTIONS
Discharge Instructions for  [facility]  [address] [facility], [address] [facility]  Telephone: [phone]    NAME:  Jamarcus Anne  YOB: 1955  MEDICAL RECORD NUMBER:  [id number]  DATE:  5/3/2021  WOUND CARE ORDERS:  Right lower leg wound - Cleanse wound with saline and gauze. Moisturize dry, intact RLE skin with Petroleum jelly (vaseline) Cover wounds with collagen silver, cover with hydrofera blue ready,  ABD pad (or Exudry), secure with roll gauze, Apply patient's [employer]. PCG or HHC to change dressing 3 x week, and as needed, for compromise of dressing. Right Lateral foot - leave  Open to air & avoid any pressure on area. Farrow wrap to be applied first thing, every morning, & removed at bedtime. May Use Tubigrip in place of Farrow liner to decrease pressure on Right lateral foot. Return to clinic for MD follow up in 3 week for MD follow-up    TREATMENT ORDERS:    Elevate leg(s) above the level of the heart when sitting. Avoid prolonged standing in one place. Do no get dressing/wrap wet. Follow Diet as prescribed:   [x] Diet as tolerated: [] Calorie Diabetic Diet: Low carb and no Sugar [x] No Added Salt:  [x] Increase Protein: [] Limit the amount of liquid you are drinking and avoid drinking in between meals           Return Appointment:  [x] Return Appointment: With DR Elizabeth Connors  in  3 Week(s)  [] Nurse Visit : *** days  [] Ordered tests:    Electronically signed Dylan Galaviz RN on 5/3/2021 at 5:04 PM     [address]: Should you experience any significant changes in your wound(s) or have questions about your wound care, please contact the [address] [place] [address] at [address] [place] [address] Street 8:00 am - 4:30. If you need help with your wound outside these hours and cannot wait until we are again available, contact your PCP or go to the hospital emergency room.      PLEASE NOTE: IF YOU ARE UNABLE TO OBTAIN WOUND SUPPLIES, CONTINUE TO USE THE SUPPLIES YOU HAVE AVAILABLE UNTIL YOU ARE ABLE TO REACH US. IT IS MOST IMPORTANT TO KEEP THE WOUND COVERED AT ALL TIMES.      Physician Signature:_______________________    Date: ___________ Time:  ____________

## 2021-05-03 NOTE — WOUND CARE
05/03/21 1038   Left Leg Edema Point of Measurement   Compression Therapy Velcro compression wrap  (farrow wrap)   Wound Leg lower Right # 1   Date First Assessed/Time First Assessed: 05/07/20 0835   Present on Hospital Admission: Yes  Wound Approximate Age at First Assessment (Weeks): 4 weeks  Primary Wound Type: Vascular  Location: Leg lower  Wound Location Orientation: Right  Wound Descri. ..    Dressing Status New dressing applied   Cleansed Cleansed with saline   Dressing/Treatment ABD pad;Collagen with Ag;Foam;Roll gauze;Tape/Soft cloth adhesive tape  (HFBR)

## 2021-05-04 NOTE — PROGRESS NOTES
HISTORY OF PRESENT ILLNESS:  The patient is a 24-year-old man who is referred to the wound care center regarding nonhealing wound right lower leg and right posterior ankle.  The patient was first seen at the [facility] [address] on 5/7/2020.  The patient reports that he had struck his right ankle and developed a wound, which had been nonhealing.     He  developed swelling and redness in the right lower leg in 2020, which then was followed by blister formation and a wound formation.  He eventually was hospitalized at [facility] and says he was in the hospital for about 18 days.      The patient reports chronic numbness of both feet.  He has right drop foot.     The patient had at the time of an ER visit for leg symptoms on the right in 01/2020, a venous duplex scan which did not find any evidence of deep venous thrombosis.  Vein valves were not tested.     The patient has history of lumbar laminectomy and diskectomy on 09/04/2019. Crystal Kerr reports related to his disk disease, he has right side footdrop.  He reported history of neurogenic claudication.  The patient was scheduled to have further back surgery within the month, but that has been canceled related to COVID-19.     The patient reports that he walks with Phineas Niece denies shortness of breath at rest or with exertion.  He has no anginal chest pain.     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    The patient on 5/29/2020 had open right femoral endarterectomy and patch angioplasty and also balloon angioplasty of right superficial femoral artery.  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  Dr Kristy Kayser also debrided the dry eschars on the leg wounds.     The patient has seen Dr Kristy Kayser in follow up.     The patient has a history of coronary artery disease and has had angioplasty and stent. Crystal Kerr has also had coronary artery bypass grafting.  He does not have any history of diabetes.  Past medical history does include asthma, skin cancer, gastroesophageal reflux disease, hypertension, hyperlipidemia, fatty liver disease, potential sleep apnea, but never tested.     SOCIAL HISTORY:  The patient smoked until 1998 when he quit. [facility] does use alcohol.     The patient's medications do not include a diuretic.     Prior dressing:   Xeroform on the lower leg wound and then dry gauze and roll gauze changed daily.     Dressing started 8/13/2020:  Aquacell AG on the leg  wounds and then dry gauze and roll gauze changed 3 times per week.     Patient had problems with adherence of the dressing, went back to Xeroform (3 times per week) after 1 week. He has Home Health.     Drawtex dressing tried. [facility] developed more drainage, skin irritation.  Culture on 12/29/2020 grew pseudomonas and MRSA. [facility] started doxycycline on 12/31/2020.       Resumed xeroform dressing.     Using Farrow wrap with dressing currently.  Leaving liner stocking on at night.     Less green drainage since starting 0.125% Dakins washes at dressing change.     PHYSICAL EXAMINATION:  GENERAL:  The patient is an alert man in no acute distress.     EXTREMITIES:       The patient's lower extremities were examined.  On the left side, there is no edema.  There were no wounds present.      On the right lower extremity, I did not palpate dorsalis pedis or posterior tibial pulses.  There is trace edema in the right lower leg.  The right foot feels warm. Skin of the leg is healthy appearing.     On the anterior aspect of the mid lower leg on the right, distal right lower leg ulcer 9.5 x 6.8 x 0.2 cm with moderate dry slough and granulation.     Calf is soft.  Surrounding skin appears healthy.       Area of possible tissue injury on lateral right foot at 5th  metatarsal head.  Mild callus, no skin opening.  No erythema.  Dry and unchanged.        Debridement of leg wound carried out  -  See note.              ASSESSMENT AND PLAN:             Leg wounds continue to slowly decrease in size.     Foot site - ?pressure related.  Says he does not use shoes in house.  Goes out only once per week. Mykel Layton been keeping liner stocking of Farrow on at night; now using Tubigrip with end of foot open.     Arterial flow appears improved clinically since vascular intervention.     Dressing ordered:   Wash surface of wound with Dakins 0.125%, then rinse with saline.  Zandra / Hydrofera Blue on the leg  wounds and then dry gauze and roll gauze changed 3 times per week.  Covered by Farrow wrap to help with edema control.  Use Tubigrip instead of liner stocking.     Depending on response to this new dressing, consider skin substitute.      No dressing for foot site.  Keep all pressure off the area.       The patient will follow up in wound care center again in 3 weeks.        FINAL DIAGNOSES:  Nonhealing wounds right lower leg, peripheral artery disease.  Possible pressure site right foot, stage 1.     L97.912, I73.9, Y23.635     Lasha East MD

## 2021-05-04 NOTE — PROGRESS NOTES
Debridement Wound Care        Problem List Items Addressed This Visit     None          Procedure Note  Indications:  Based on my examination of this patient's wound(s)/ulcer(s) today, debridement is required to promote healing and evaluate the wound base. Performed by: Jamir Lobo MD    Consent obtained: Yes    Time out taken: Yes    Debridement: Excisional    Using curette the wound(s)/ulcer(s) was/were sharply debrided down through and including the removal of    subcutaneous tissue  Sharp excisional debridement was carried out with removal of necrotic tissue, slough, and granulation into the subcutaneous layer. Debridement was carried down to bleeding viable tissue.     Devitalized Tissue Debrided: slough, necrotic/eschar and and granulation into SQ layer    Pre Debridement Measurements:  Are located in the Wound/Ulcer Documentation Flow Sheet    Wound/Ulcer #: 1    Post Debridement Measurements:  Wound/Ulcer Descriptions are Pre Debridement except measurements:Other: depth increased by 0.1 cm after debridement    Wound Back (Active)   Number of days: 613       Wound Leg lower Right # 1 (Active)   Wound Image   05/03/21 0930   Wound Etiology Venous 05/03/21 0930   Dressing Status New dressing applied 05/03/21 1038   Cleansed Cleansed with saline 05/03/21 1038   Dressing/Treatment ABD pad;Collagen with Ag;Foam;Roll gauze;Tape/Soft cloth adhesive tape 05/03/21 1038   Wound Length (cm) 9.5 cm 04/12/21 0935   Wound Width (cm) 6.8 cm 04/12/21 0935   Wound Depth (cm) 0.2 cm 04/12/21 0935   Wound Surface Area (cm^2) 64.6 cm^2 04/12/21 0935   Change in Wound Size % 67.82 04/12/21 0935   Wound Volume (cm^3) 12.92 cm^3 04/12/21 0935   Wound Healing % 68 04/12/21 0935   Post-Procedure Length (cm) 9.7 cm 05/03/21 0930   Post-Procedure Width (cm) 4.7 cm 05/03/21 0930   Post-Procedure Depth (cm) 0.2 cm 05/03/21 0930   Post-Procedure Surface Area (cm^2) 45.59 cm^2 05/03/21 0930   Post-Procedure Volume (cm^3) 9.12 cm^3 05/03/21 0930   Wound Assessment Pink/red;Slough 05/03/21 0930   Drainage Amount Moderate 05/03/21 0930   Drainage Description Serosanguinous;Green 05/03/21 0930   Wound Odor None 05/03/21 0930   Chana-Wound/Incision Assessment Blanchable erythema 05/03/21 0930   Edges Flat/open edges 05/03/21 0930   Wound Thickness Description Full thickness 05/03/21 0930   Number of days: 362       Wound Leg Proximal;Right; Lower (Active)   Wound Image   05/03/21 0930   Wound Etiology Venous 05/03/21 0930   Dressing Status New dressing applied 04/12/21 1013   Cleansed Cleansed with saline 05/03/21 0930   Dressing/Treatment ABD pad;Xeroform; Other (Comment) 04/12/21 1013   Wound Length (cm) 0.1 cm 05/03/21 0930   Wound Width (cm) 0.1 cm 05/03/21 0930   Wound Depth (cm) 0.1 cm 05/03/21 0930   Wound Surface Area (cm^2) 0.01 cm^2 05/03/21 0930   Change in Wound Size % 99.7 05/03/21 0930   Wound Volume (cm^3) 0 cm^3 05/03/21 0930   Wound Healing % 100 05/03/21 0930   Post-Procedure Length (cm) 1.5 cm 02/22/21 1011   Post-Procedure Width (cm) 1.6 cm 02/22/21 1011   Post-Procedure Depth (cm) 0.2 cm 02/22/21 1011   Post-Procedure Surface Area (cm^2) 2.4 cm^2 02/22/21 1011   Post-Procedure Volume (cm^3) 0.48 cm^3 02/22/21 1011   Wound Assessment Epithelialization 05/03/21 0930   Drainage Amount None 05/03/21 0930   Drainage Description Green 04/12/21 0935   Wound Odor None 05/03/21 0930   Chana-Wound/Incision Assessment Intact 04/12/21 0935   Edges Flat/open edges 04/12/21 0935   Wound Thickness Description Full thickness 04/12/21 0935   Number of days: 85        Percent of Wound(s)/Ulcer(s) Debrided: 60%    Total Surface Area Debrided:  36 sq cm     Diabetic/Pressure/Non Pressure Ulcers only:  Ulcer:     Estimated Blood Loss:  Minimal     Hemostasis Achieved: Pressure and Silver Nitrate    Procedural Pain: 0 / 10     Post Procedural Pain: 0 / 10     Response to treatment: Well tolerated by patient

## 2021-05-06 NOTE — WOUND CARE
05/21/20 1038 Wound Leg lower Right # 1 Date First Assessed/Time First Assessed: 05/07/20 0835   Present on Hospital Admission: Yes  Wound Approximate Age at First Assessment (Weeks): 4 weeks  Primary Wound Type: Vascular  Location: Leg lower  Wound Location Orientation: Right  Wound Descri. .. Dressing Status Removed Dressing Type Xeroform;ABD pad;Gauze wrap (ant); Special tape (comment) Wound Length (cm) 17.8 cm Wound Width (cm) 10.2 cm Wound Depth (cm) 0.2 cm Wound Surface Area (cm^2) 181.56 cm^2 Wound Volume (cm^3) 36.31 cm^3 Change in Wound Size % 9.55 Condition of Base Eschar;Slough Condition of Edges Open Drainage Amount Moderate Drainage Color Serosanguinous Wound Odor None Chana-wound Assessment Blanchable erythema Cleansing and Cleansing Agents  Normal saline Wound Ankle Right;Posterior # 2 Date First Assessed/Time First Assessed: 05/07/20 0851   Present on Hospital Admission: Yes  Wound Approximate Age at First Assessment (Weeks): 17 weeks  Primary Wound Type: Vascular  Location: Ankle  Wound Location Orientation: Right;Posterior  Wound. .. Dressing Status Removed Dressing Type Xeroform;Gauze;Gauze wrap (ant); Special tape (comment) Wound Length (cm) 0.4 cm Wound Width (cm) 0.6 cm Wound Depth (cm) 0.1 cm Wound Surface Area (cm^2) 0.24 cm^2 Wound Volume (cm^3) 0.02 cm^3 Change in Wound Size % 68.83 Condition of Base  
(scab) Drainage Amount None Cleansing and Cleansing Agents  Normal saline Visit Vitals /53 (BP 1 Location: Left arm, BP Patient Position: Sitting; At rest) Pulse (!) 51 Comment: taken manually; no dizziness noted. Temp 97.3 °F (36.3 °C) Resp 16 LLE Peripheral Vascular Capillary Refill: Less than/equal to 3 seconds (05/21/20 1036) Color: Appropriate for race (05/21/20 1036) Temperature: Cool (05/21/20 1036) Sensation: Present (05/21/20 1036) Pedal Pulse: Doppler (05/21/20 1036) Circumference of Calf (cm): 37.8 cm (05/21/20 1036) Location of Measurement (Calf): Mid  (05/21/20 1036) Circumference of Ankle (cm): 20.8 cm (05/21/20 1036) Location of Measurement (Ankle): Upper  (05/21/20 1036) RLE Peripheral Vascular Capillary Refill: Less than/equal to 3 seconds (05/21/20 1036) Color: Pink (05/21/20 1036) Temperature: Warm (05/21/20 1036) Sensation: Decreased (05/21/20 1036) Pedal Pulse: Doppler (05/21/20 1036) Circumference of Calf (cm): 37.3 cm (05/21/20 1036) Location of Measurement (Calf): Mid  (05/21/20 1036) Circumference of Ankle (cm): 21.8 cm (05/21/20 1036) Location of Measurement (Ankle): Upper  (05/21/20 1036) done

## 2021-05-24 ENCOUNTER — HOSPITAL ENCOUNTER (OUTPATIENT)
Dept: WOUND CARE | Age: 66
Discharge: HOME | End: 2021-05-24
Admitting: SURGERY
Payer: MEDICARE

## 2021-05-24 VITALS
DIASTOLIC BLOOD PRESSURE: 54 MMHG | RESPIRATION RATE: 16 BRPM | SYSTOLIC BLOOD PRESSURE: 92 MMHG | TEMPERATURE: 97.6 F | HEART RATE: 58 BPM

## 2021-05-24 DIAGNOSIS — L97.912 NON-PRESSURE CHRONIC ULCER OF RIGHT LOWER LEG, WITH FAT LAYER EXPOSED (HCC): ICD-10-CM

## 2021-05-24 DIAGNOSIS — L89.891 PRESSURE INJURY OF RIGHT FOOT, STAGE 1: ICD-10-CM

## 2021-05-24 DIAGNOSIS — I73.9 PAD (PERIPHERAL ARTERY DISEASE) (HCC): ICD-10-CM

## 2021-05-24 PROCEDURE — 11045 DBRDMT SUBQ TISS EACH ADDL: CPT

## 2021-05-24 PROCEDURE — 11045 DBRDMT SUBQ TISS EACH ADDL: CPT | Performed by: SURGERY

## 2021-05-24 PROCEDURE — 11042 DBRDMT SUBQ TIS 1ST 20SQCM/<: CPT | Performed by: SURGERY

## 2021-05-24 PROCEDURE — 11042 DBRDMT SUBQ TIS 1ST 20SQCM/<: CPT

## 2021-05-24 NOTE — PROGRESS NOTES
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 Performed by: Kiran Carrillo MD    Consent obtained: Yes    Time out taken: Yes    Debridement: Excisional    Using curette the wound(s)/ulcer(s) was/were sharply debrided down through and including the removal of    subcutaneous tissue  Sharp excisional debridement was carried out with removal of necrotic tissue, slough, and granulation into the subcutaneous layer. Debridement was carried down to bleeding viable tissue.     Devitalized Tissue Debrided: slough, necrotic/eschar and and granulation into SQ layer    Pre Debridement Measurements:  Are located in the Wound/Ulcer Documentation Flow Sheet    Wound/Ulcer #: 1    Post Debridement Measurements:  Wound/Ulcer Descriptions are Pre Debridement except measurements:Other: depth increased by 0.1 cm after debridement    Wound Back (Active)   Number of days: 633       Wound Leg lower Right # 1 (Active)   Wound Image   05/24/21 1000   Wound Etiology Venous 05/24/21 1000   Dressing Status New dressing applied 05/03/21 1038   Cleansed Cleansed with saline 05/24/21 1000   Dressing/Treatment Collagen;Hydrofera Blue;ABD pad 05/24/21 1045   Wound Length (cm) 10 cm 05/24/21 1000   Wound Width (cm) 5.5 cm 05/24/21 1000   Wound Depth (cm) 0.2 cm 05/24/21 1000   Wound Surface Area (cm^2) 55 cm^2 05/24/21 1000   Change in Wound Size % 72.6 05/24/21 1000   Wound Volume (cm^3) 11 cm^3 05/24/21 1000   Wound Healing % 73 05/24/21 1000   Post-Procedure Length (cm) 10 cm 05/24/21 1000   Post-Procedure Width (cm) 5.5 cm 05/24/21 1000   Post-Procedure Depth (cm) 0.3 cm 05/24/21 1000   Post-Procedure Surface Area (cm^2) 55 cm^2 05/24/21 1000   Post-Procedure Volume (cm^3) 16.5 cm^3 05/24/21 1000   Wound Assessment Pink/red;Slough 05/24/21 1000   Drainage Amount Moderate 05/24/21 1000   Drainage Description Serosanguinous;Green 05/24/21 1000   Wound Odor None 05/24/21 1000   Chana-Wound/Incision Assessment Blanchable erythema 05/24/21 1000   Edges Flat/open edges 05/24/21 1000   Wound Thickness Description Full thickness 05/24/21 1000   Number of days: 382        Percent of Wound(s)/Ulcer(s) Debrided: 50%    Total Surface Area Debrided:  35 sq cm     Diabetic/Pressure/Non Pressure Ulcers only:  Ulcer:     Estimated Blood Loss:  Minimal     Hemostasis Achieved: Pressure    Procedural Pain: 0 / 10     Post Procedural Pain: 0 / 10     Response to treatment: Well tolerated by patient

## 2021-05-24 NOTE — WOUND CARE
05/24/21 1000 Anesthetic Anesthetic 4% Lidocaine Liquid Topical 
(gel) Right Leg Edema Point of Measurement Leg circumference 38.5 cm Ankle circumference 24.5 cm Compression Therapy Velcro compression wrap RLE Peripheral Vascular Capillary Refill Less than/equal to 3 seconds Color Appropriate for race Temperature Warm Sensation Present Pedal Pulse Present Wound Leg Proximal;Right; Lower Date First Assessed/Time First Assessed: 02/08/21 1021   Wound Approximate Age at First Assessment (Weeks): (c)   Primary Wound Type: Venous  Location: Leg  Wound Location Orientation: Proximal;Right; Lower Wound Length (cm) 0 cm Wound Width (cm) 0 cm Wound Depth (cm) 0 cm Wound Surface Area (cm^2) 0 cm^2 Change in Wound Size % 100 Wound Volume (cm^3) 0 cm^3 Wound Healing % 100 Wound Assessment Epithelialization Wound Leg lower Right # 1 Date First Assessed/Time First Assessed: 05/07/20 0835   Present on Hospital Admission: Yes  Wound Approximate Age at First Assessment (Weeks): 4 weeks  Primary Wound Type: Vascular  Location: Leg lower  Wound Location Orientation: Right  Wound Descri. .. Wound Image Wound Etiology Venous Dressing Status  
(removed HFBR, gauze, abd pad, single tubi and circaid) Cleansed Cleansed with saline Post-Procedure Length (cm) 10 cm Post-Procedure Width (cm) 5.5 cm Post-Procedure Depth (cm) 0.2 cm Post-Procedure Surface Area (cm^2) 55 cm^2 Post-Procedure Volume (cm^3) 11 cm^3 Wound Assessment Pink/red;Slough Drainage Amount Moderate Drainage Description Serosanguinous;Green Wound Odor None Chana-Wound/Incision Assessment Blanchable erythema Edges Flat/open edges Wound Thickness Description Full thickness Pain 1 Pain Scale 1 Numeric (0 - 10) Pain Intensity 1 0 Patient Stated Pain Goal 0 Pain Reassessment 1 Yes Visit Vitals BP (!) 92/54 Pulse (!) 58 Temp 97.6 °F (36.4 °C) Resp 16

## 2021-05-24 NOTE — PROGRESS NOTES
HISTORY OF PRESENT ILLNESS:  The patient is a 53-year-old man who is referred to the wound care center regarding nonhealing wound right lower leg and right posterior ankle.    The patient reports that he had struck his right ankle and developed a wound, which had been nonhealing.     The patient was first seen at the [address] [place] [address] on 5/7/2020.     He  developed swelling and redness in the right lower leg in 2020, which then was followed by blister formation and a wound formation.  He eventually was hospitalized at [facility] and says he was in the hospital for about 18 days.      The patient reports chronic numbness of both feet.  He has right drop foot.     The patient had at the time of an ER visit for leg symptoms on the right in 01/2020, a venous duplex scan which did not find any evidence of deep venous thrombosis.  Vein valves were not tested.     The patient has history of lumbar laminectomy and diskectomy on 09/04/2019. Deng Church reports related to his disk disease, he has right side footdrop.  He reported history of neurogenic claudication.  The patient was scheduled to have further back surgery within the month, but that has been canceled related to COVID-19.     The patient reports that he walks with Dorenda Anastasiya denies shortness of breath at rest or with exertion.  He has no anginal chest pain.     The patient had angiography by Dr Claudine Thomas on 5/17/2020 with finding of severe multilevel arterial occlusive disease in legs.    The patient on 5/29/2020 had open right femoral endarterectomy and patch angioplasty and also balloon angioplasty of right superficial femoral artery.  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  Dr Evelio Lagunas also debrided the dry eschars on the leg wounds.     The patient has seen Dr Evelio Lagunas in follow up.     The patient has a history of coronary artery disease and has had angioplasty and stent. Deng Church has also had coronary artery bypass grafting. Deng Church does not have any history of diabetes.  Past medical history does include asthma, skin cancer, gastroesophageal reflux disease, hypertension, hyperlipidemia, fatty liver disease, potential sleep apnea, but never tested.     SOCIAL HISTORY:  The patient smoked until 1998 when he quit. Miriam Hansen does use alcohol.     The patient's medications do not include a diuretic.     Prior dressing:   Xeroform on the lower leg wound and then dry gauze and roll gauze changed daily.     Dressing started 8/13/2020:  Aquacell AG on the leg  wounds and then dry gauze and roll gauze changed 3 times per week.     Patient had problems with adherence of the dressing, went back to Xeroform (3 times per week) after 1 week.  He has Home Health.     Drawtex dressing tried. Miriam Hansen developed more drainage, skin irritation.  Culture on 12/29/2020 grew pseudomonas and MRSA. Miriam Hansen started doxycycline on 12/31/2020.       Resumed xeroform dressing.     Using Farrow wrap with dressing currently.  Leaving liner stocking on at night.     Less green drainage since starting 0.125% Dakins washes at dressing change. Current dressing as of 5/3/2021:  Wash surface of wound with Dakins 0.125%, then rinse with saline.  Zandra / Hydrofera Blue on the leg  wounds and then dry gauze and roll gauze changed 3 times per week.  Covered by Farrow wrap to help with edema control.  Use Tubigrip instead of liner stocking.     PHYSICAL EXAMINATION:  GENERAL:  The patient is an alert man in no acute distress.     EXTREMITIES:       The patient's lower extremities were examined.  On the left side, there is no edema.  There were no wounds present.      On the right lower extremity, I did not palpate dorsalis pedis or posterior tibial pulses.  There is trace edema in the right lower leg.  The right foot feels warm. Skin of the leg is healthy appearing.   Proximal ulcer healed.     On the anterior aspect of the mid lower leg on the right, distal right lower leg ulcer 10 x 5.5 x 0.2 cm with moderate dry slough and granulation.     Calf is soft.  Surrounding skin appears healthy.       Area of possible tissue injury on lateral right foot at 5th  metatarsal head.  Moderate  callus, no skin opening.  No erythema. Dry and unchanged.        Debridement of leg wound carried out  -  See note.              ASSESSMENT AND PLAN:             Leg wounds continue to slowly decrease in size.     Foot site - ?pressure related.  Says he does not use shoes in house.  Goes out only once per week. Jessy Velazqeuzs been keeping liner stocking of [facility] on at night; now using Tubigrip with end of foot open.     Consider reassessment of arterial status.     Dressing ordered:   Wash surface of wound with Dakins 0.125%, then rinse with saline.  Zandra / Hydrofera Blue on the leg  wounds and then dry gauze and roll gauze changed 3 times per week.  Covered by Farrow wrap to help with edema control.  Use Tubigrip instead of liner stocking.     Depending on response to this new dressing, consider skin substitute.   Consider Cutimed.      No dressing for foot site.  Keep all pressure off the area.       The patient will follow up in wound care center again in 3 weeks.        FINAL DIAGNOSES:  Nonhealing wounds right lower leg, peripheral artery disease.  Possible pressure site right foot, stage 1.     L97.912, I73.9, M01.608     Horace Gaming MD

## 2021-05-24 NOTE — DISCHARGE INSTRUCTIONS
Discharge Instructions for  [facility]  [address], [address] [facility]  Telephone: [phone]    NAME:  Mirian Harp  YOB: 1955  MEDICAL RECORD NUMBER:  [id number]  DATE:  5/24/2021  WOUND CARE ORDERS:  Right lower leg wound - Cleanse wound with saline and gauze. Moisturize dry, intact RLE skin with Petroleum jelly (vaseline) Cover wounds with collagen silver, cover with hydrofera blue ready,  ABD pad (or Exudry), secure with roll gauze, Apply patient's [employer]. PCG or HHC to change dressing 3 x week, and as needed, for compromise of dressing. Right Lateral foot - leave  Open to air & avoid any pressure on area. Farrow wrap to be applied first thing, every morning, & removed at bedtime. May Use Tubigrip in place of Farrow liner to decrease pressure on Right lateral foot. Return to clinic for MD follow up in 3 week for MD follow-up    TREATMENT ORDERS:    Elevate leg(s) above the level of the heart when sitting. Avoid prolonged standing in one place. Do no get dressing/wrap wet. Follow Diet as prescribed:   [x] Diet as tolerated: [] Calorie Diabetic Diet: Low carb and no Sugar [x] No Added Salt:  [x] Increase Protein: [] Limit the amount of liquid you are drinking and avoid drinking in between meals           Return Appointment:  [x] Return Appointment: With DR Lex Hi  in  3 Week(s)  [] Nurse Visit : *** days  [] Ordered tests:    Electronically signed Kiki Rubi RN on 5/24/2021 at 10:29 AM     [address] [place] [address] Information: Should you experience any significant changes in your wound(s) or have questions about your wound care, please contact the [address] [place] [address] at [address] [place] [address] Street 8:00 am - 4:30. If you need help with your wound outside these hours and cannot wait until we are again available, contact your PCP or go to the hospital emergency room.      PLEASE NOTE: IF YOU ARE UNABLE TO OBTAIN WOUND SUPPLIES, CONTINUE TO USE THE SUPPLIES YOU HAVE AVAILABLE UNTIL YOU ARE ABLE TO REACH US. IT IS MOST IMPORTANT TO KEEP THE WOUND COVERED AT ALL TIMES.      Physician Signature:_______________________    Date: ___________ Time:  ____________

## 2021-06-14 ENCOUNTER — APPOINTMENT (OUTPATIENT)
Dept: GENERAL RADIOLOGY | Age: 66
End: 2021-06-14
Attending: EMERGENCY MEDICINE
Payer: MEDICARE

## 2021-06-14 ENCOUNTER — HOSPITAL ENCOUNTER (OUTPATIENT)
Dept: WOUND CARE | Age: 66
Discharge: HOME | End: 2021-06-14
Admitting: SURGERY
Payer: MEDICARE

## 2021-06-14 ENCOUNTER — HOSPITAL ENCOUNTER (EMERGENCY)
Age: 66
Discharge: HOME | End: 2021-06-14
Attending: EMERGENCY MEDICINE
Payer: MEDICARE

## 2021-06-14 VITALS
SYSTOLIC BLOOD PRESSURE: 148 MMHG | HEART RATE: 70 BPM | RESPIRATION RATE: 14 BRPM | DIASTOLIC BLOOD PRESSURE: 70 MMHG | TEMPERATURE: 97.9 F

## 2021-06-14 VITALS
OXYGEN SATURATION: 96 % | HEART RATE: 69 BPM | WEIGHT: 240 LBS | DIASTOLIC BLOOD PRESSURE: 86 MMHG | RESPIRATION RATE: 17 BRPM | HEIGHT: 71 IN | BODY MASS INDEX: 33.6 KG/M2 | SYSTOLIC BLOOD PRESSURE: 176 MMHG | TEMPERATURE: 99.3 F

## 2021-06-14 DIAGNOSIS — L97.912 NON-PRESSURE CHRONIC ULCER OF RIGHT LOWER LEG, WITH FAT LAYER EXPOSED (HCC): ICD-10-CM

## 2021-06-14 DIAGNOSIS — R60.0 LEG EDEMA, RIGHT: ICD-10-CM

## 2021-06-14 DIAGNOSIS — I73.9 PAD (PERIPHERAL ARTERY DISEASE) (HCC): ICD-10-CM

## 2021-06-14 DIAGNOSIS — R29.6 RECURRENT FALLS: Primary | ICD-10-CM

## 2021-06-14 DIAGNOSIS — M21.371 FOOT DROP, RIGHT: ICD-10-CM

## 2021-06-14 DIAGNOSIS — S80.212A ABRASION OF LEFT KNEE, INITIAL ENCOUNTER: ICD-10-CM

## 2021-06-14 LAB
ATRIAL RATE: 68 BPM
CALCULATED P AXIS, ECG09: 28 DEGREES
CALCULATED R AXIS, ECG10: -42 DEGREES
CALCULATED T AXIS, ECG11: 39 DEGREES
DIAGNOSIS, 93000: NORMAL
P-R INTERVAL, ECG05: 158 MS
Q-T INTERVAL, ECG07: 432 MS
QRS DURATION, ECG06: 98 MS
QTC CALCULATION (BEZET), ECG08: 459 MS
VENTRICULAR RATE, ECG03: 68 BPM

## 2021-06-14 PROCEDURE — 74011250637 HC RX REV CODE- 250/637: Performed by: EMERGENCY MEDICINE

## 2021-06-14 PROCEDURE — 97161 PT EVAL LOW COMPLEX 20 MIN: CPT

## 2021-06-14 PROCEDURE — 99213 OFFICE O/P EST LOW 20 MIN: CPT

## 2021-06-14 PROCEDURE — 73560 X-RAY EXAM OF KNEE 1 OR 2: CPT

## 2021-06-14 PROCEDURE — 97116 GAIT TRAINING THERAPY: CPT

## 2021-06-14 PROCEDURE — 99285 EMERGENCY DEPT VISIT HI MDM: CPT

## 2021-06-14 PROCEDURE — 99213 OFFICE O/P EST LOW 20 MIN: CPT | Performed by: SURGERY

## 2021-06-14 PROCEDURE — 93005 ELECTROCARDIOGRAM TRACING: CPT

## 2021-06-14 RX ORDER — ACETAMINOPHEN 500 MG
1000 TABLET ORAL
Status: COMPLETED | OUTPATIENT
Start: 2021-06-14 | End: 2021-06-14

## 2021-06-14 RX ORDER — ONDANSETRON 4 MG/1
8 TABLET, ORALLY DISINTEGRATING ORAL
Status: COMPLETED | OUTPATIENT
Start: 2021-06-14 | End: 2021-06-14

## 2021-06-14 RX ADMIN — ONDANSETRON 8 MG: 4 TABLET, ORALLY DISINTEGRATING ORAL at 12:56

## 2021-06-14 RX ADMIN — ACETAMINOPHEN 1000 MG: 500 TABLET ORAL at 12:56

## 2021-06-14 NOTE — PROGRESS NOTES
PHYSICAL THERAPY EMERGENCY DEPARTMENT EVALUATION WITH DISCHARGE  Patient: Geno Smoker (49 y.o. male)  Date: 6/14/2021  Primary Diagnosis: No admission diagnoses are documented for this encounter. Precautions: fall       ASSESSMENT  MD requested eval for this ED pt who comes in s/p fall at wound clinic post his appt for ongoing cellulitis RLE. Pt is c/o pain L knee and low back but is able to mobilize to EOB with min A of 1 and come to stand with CGA. He amb with slow pace with CGA with RW. He normally amb with cane. Advised use of RW at this time for support and safety. Pt has had another fall last Sat with hx of some falls since 2019 mainly due to his R foot drop. He has an AFO but is unable to use it due to the wounds on the RLE. Advised family that if wound would get to a point where wound care would be comfortable with use of an ACE wrap assist to R dorsiflexion for fall prevention, this could be considered for brief periods when going to appointments and having to amb in and out prior to being able to return to use of the AFO. This would not be a current option however due to wounds and pt needs to employ a steppage gait to clear foot. Pt and family voiced understanding. Reviewed stair amb for entry into home. Discussed HHPT for pt to work on strengthening and reduced risk of fall but they declined at this time. Educated pt and family on access to [address] or any PT services through his PCP. All rounded with MD. Pt safe for d/c home with assist of family and use of RW which he owns. Functional Outcome Measure: The patient scored 55/100 on the barthel outcome measure which is indicative of 45% functional impairment. Other factors to consider for discharge: good assist of girlfriend/fiance and family; will need use of RW     Further skilled acute physical therapy is not indicated at this time.      PLAN :  Recommendation for discharge: (in order for the patient to meet his/her long term goals)  Physical therapy at least 2 days/week in the home AND ensure assist and/or supervision for safety with mobility but pt and family decline HH at this time. This discharge recommendation:  Has been made in collaboration with the attending provider and/or case management    Equipment recommendations for successful discharge: patient owns DME required for discharge     SUBJECTIVE:   Patient stated I am sore.     OBJECTIVE DATA SUMMARY:   HISTORY:    Past Medical History:   Diagnosis Date    Arthritis     OSTEO    Asthma     AS A YOUNGER ADULT    CAD (coronary artery disease)     stent X1    Cancer ([facility] [address]     BCC, SCC    Chronic pain     GERD (gastroesophageal reflux disease)     HTN (hypertension)     Hyperlipidemia     Kidney stone 1993    Liver disease 03/2018    FATTY LIVER    Sleep apnea     WAS TOLD, BUT NEVER TESTED     Past Surgical History:   Procedure Laterality Date    HX COLONOSCOPY      HX ORTHOPAEDIC Left 2005    HIP REPLACEMENT    HX ORTHOPAEDIC Right 2009    HIP REPLACEMENT    IR INJ FORAMIN EPID LUMB ANES/STER SNGL  1/3/2020    NEUROLOGICAL PROCEDURE UNLISTED  1999    L1 or L3 LAMINECTOMY    [place] CABG, ARTERY-VEIN, THREE  03/2018    [place] CARDIAC SURG PROCEDURE UNLIST  1998 & 2018    CARDIAC CATH     Prior Level of Function/Home Situation: was mod I with use of cane; does have hx of falls with hx of R foot drop since back surgery in 2019  Personal factors and/or comorbidities impacting plan of care: RLE cellulitis, wounds; back surgery in 2019; hx falls    Home Situation  Home Environment: Private residence  # Steps to Enter: 2  Rails to Enter: No  One/Two Story Residence: One story  Living Alone: No  Support Systems: Spouse/Significant Other/Partner, Other (comments) (girlfriend works)  Current DME Used/Available at Home: Cane, straight, Walker, rolling, Grab bars (electric scooter)  Tub or Shower Type: Tub/Shower combination    EXAMINATION/PRESENTATION/DECISION MAKING: Critical Behavior:  Neurologic State: Alert  Orientation Level: Oriented X4  Cognition: Follows commands     Hearing: Auditory  Auditory Impairment: None    Range Of Motion:  AROM: Generally decreased, functional ( R foot drop)                       Strength:    Strength: Generally decreased, functional (R foot drop)                    Tone & Sensation:                  Sensation: Impaired (distal RLE)               Coordination:  Coordination: Within functional limits       Functional Mobility:  Bed Mobility:     Supine to Sit: Minimum assistance  Sit to Supine: Minimum assistance;Assist x2 (onto narrow stretcher in ED)  Scooting: Contact guard assistance  Transfers:  Sit to Stand: Contact guard assistance  Stand to Sit: Contact guard assistance                       Balance:   Sitting: Intact  Standing: Impaired  Standing - Static: Fair; Other (comment) (with RW )  Standing - Dynamic : Fair; Other (comment) (with RW)  Ambulation/Gait Training:  Distance (ft): 45 Feet (ft)  Assistive Device: Gait belt;Walker, rolling  Ambulation - Level of Assistance: Contact guard assistance        Gait Abnormalities: Decreased step clearance; Foot drop; Steppage gait; Other (foot drop with steppage gait on R)        Base of Support: Widened;Shift to left     Speed/Alethea: Slow;Pace decreased (<100 feet/min)  Step Length: Right shortened;Left shortened             Functional Measure:  Barthel Index:    Bathin  Bladder: 10  Bowels: 10  Groomin  Dressin  Feeding: 10  Mobility: 0 (limited by distance)  Stairs: 0  Toilet Use: 5  Transfer (Bed to Chair and Back): 10  Total: 55/100       The Barthel ADL Index: Guidelines  1. The index should be used as a record of what a patient does, not as a record of what a patient could do. 2. The main aim is to establish degree of independence from any help, physical or verbal, however minor and for whatever reason. 3. The need for supervision renders the patient not independent.   4. A patient's performance should be established using the best available evidence. Asking the patient, friends/relatives and nurses are the usual sources, but direct observation and common sense are also important. However direct testing is not needed. 5. Usually the patient's performance over the preceding 24-48 hours is important, but occasionally longer periods will be relevant. 6. Middle categories imply that the patient supplies over 50 per cent of the effort. 7. Use of aids to be independent is allowed. Bola Bowman., Barthel, D.W. ([id number]). Functional evaluation: the Barthel Index. [facility] (14)2. Russ Martinez, IRENEF, Yahaira De La Torre., Maycol Tamayo., Mccormick [facility], [address] (1999). Measuring the change indisability after inpatient rehabilitation; comparison of the responsiveness of the Barthel Index and Functional Cass Measure. Journal of Neurology, Neurosurgery, and Psychiatry, 66(4), 647-619. Rigo Tucker, N.J.A, SATHYA Rojas, & Roberto Kinney, M.A. (2004.) Assessment of post-stroke quality of life in cost-effectiveness studies: The usefulness of the Barthel Index and the EuroQoL-5D.  Quality of Life Research, 15, 590-45        Physical Therapy Evaluation Charge Determination   History Examination Presentation Decision-Making   HIGH Complexity :3+ comorbidities / personal factors will impact the outcome/ POC  HIGH Complexity : 4+ Standardized tests and measures addressing body structure, function, activity limitation and / or participation in recreation  LOW Complexity : Stable, uncomplicated  LOW Complexity : FOTO score of       Based on the above components, the patient evaluation is determined to be of the following complexity level: LOW       Pain:  Pain Scale 1: Numeric (0 - 10)  Pain Intensity 1: 6  Pain Location 1: Knee  Pain Orientation 1: Left          Activity Tolerance:   Fair    After treatment patient left in no apparent distress:   Supine in bed, Call bell within reach and ED stretcher rails up and family present    COMMUNICATION/EDUCATION:   Communication/Collaboration:  Fall prevention education was provided and the patient/caregiver indicated understanding. Findings and recommendations were discussed with: MD physician and Registered nurse.      Thank you for this referral.  Danny Armstrong, PT   Time Calculation: 26 mins

## 2021-06-14 NOTE — PROGRESS NOTES
Physical Therapy    MD requested eval for this ED pt who comes in s/p fall at wound clinic post his appt for ongoing cellulitis RLE. Pt is c/o pain L knee and low back but is able to mobilize to EOB with min A of 1 and come to stand with CGA. He amb with slow pace with CGA with RW. He normally amb with cane. Advised use of RW at this time for support and safety. Pt has had another fall last Sat with hx of some falls since 2019 mainly due to his R foot drop. He has an AFO but is unable to use it due to the wounds on the RLE. Advised family that if wound would get to a point where wound care would be comfortable with use of an ACE wrap assist to R dorsiflexion this could be considered for brief periods when going to appointments and having to amb in and out prior to being able to return to use of the AFO. This would not be a current option however due to wounds and pt needs to employ a steppage gait to clear foot. Pt and family voiced understanding. Discussed HHPT for pt to work on strengthening and reduced risk of fall but they declined at this time. Educated pt and family on access to [address] or any PT services through his PCP. Full report to follow.  All rounded with MD. Edwin Pitts PT

## 2021-06-14 NOTE — ED PROVIDER NOTES
EMERGENCY DEPARTMENT HISTORY AND PHYSICAL EXAM      Date: 6/14/2021  Patient Name: Amanda Garsia  Patient Age and Sex: 74 y.o. male     History of Presenting Illness     Chief Complaint   Patient presents with    Fall     Pt arrived via [facility] for complaints of recent falls as well as one today. Pt was at the wound care clinic here at [facility] for cellulitis in the RLE. Pt has black eye from fall a few days ago as well as scrapes to left knee and elbow. Pt is on plavis and has a hx of bypass x3. Pt states he lost his balance today when he fell. History Provided By: Patient    HPI: Amanda Garsia is a 61-year-old male with a history of foot drop, back surgeries, hypertension presenting with ground-level fall. Patient states that ever since his back surgery August 2019 has been dealing with a right foot drop. Is supposed to be wearing a brace but has been now dealing with cellulitis of the right shin. Unfortunately cannot wear the brace while dealing with the cellulitis. Has been following up with wound clinic and the cellulitis has been slowly healing. On Saturday, 2 days ago he caught his right foot in the carpet falling forward and hitting his face against the door. Caused some bruising of his face. No loss of consciousness then. Then today as he was exiting the wound clinic caught his foot again and fell injuring his left knee and left elbow. Scraped up his left knee and having pain with movement. Is able to move his left elbow and scraped it up a little. Tetanus up-to-date. Denies any head injury or loss of consciousness. States he is only been using a cane but not a walker. Is on Plavix. There are no other complaints, changes, or physical findings at this time.     PCP: Heaven Ugarte MD    Current Facility-Administered Medications on File Prior to Encounter   Medication Dose Route Frequency Provider Last Rate Last Admin    [COMPLETED] lidocaine (ALOCANE) 4 % topical gel   Topical Blanco Salas MD   Given at 06/14/21 7535     Current Outpatient Medications on File Prior to Encounter   Medication Sig Dispense Refill    oxyCODONE-acetaminophen (PERCOCET) 5-325 mg per tablet Take 1 Tab by mouth every four (4) hours as needed for Pain.  nystatin (MYCOSTATIN) powder Apply  to affected area two (2) times a day. 60 g 1    clopidogreL (PLAVIX) 75 mg tab Take 1 Tab by mouth daily. 30 Tab 3    omeprazole (PRILOSEC) 40 mg capsule Take 40 mg by mouth daily.  rosuvastatin (CRESTOR) 40 mg tablet Take 40 mg by mouth nightly.  SAFETY-HINE 3CC SYR 23GX1\" 3 mL 23 gauge x 1\" syrg INJECT 0.4ML INTRAMUSCULARLY ONCE EVERY 7 DAYS 100 Pen Needle 0    testosterone cypionate (DEPOTESTOTERONE CYPIONATE) 200 mg/mL injection 0.35 mL by IntraMUSCular route every seven (7) days. (Patient taking differently: 35 mg by IntraMUSCular route every seven (7) days. PT LAST TOOK THIS MEDICATION ON Tuesday, 8/27/19.) 10 mL 1    Needle, Disp, 21 G 21 x 1 \" Ndle 1 mL by Does Not Apply route every seven (7) days. 12 Each 6    Syringe, Disposable, 1 mL Syrg 0.4 mL by Does Not Apply route every seven (7) days. 10 Each 3    aspirin delayed-release 81 mg tablet Take 81 mg by mouth daily.  atenolol (TENORMIN) 25 mg tablet Take 25 mg by mouth daily.  allopurinol (ZYLOPRIM) 300 mg tablet Take 450 mg by mouth daily. PATIENT TAKES A TOTAL OF 1 1/2 TABS PER DAY TO EQUAL 450 MG      lisinopril (PRINIVIL, ZESTRIL) 10 mg tablet Take 5 mg by mouth every evening.          Past History     Past Medical History:  Past Medical History:   Diagnosis Date    Arthritis     OSTEO    Asthma     AS A YOUNGER ADULT    CAD (coronary artery disease)     stent X1    Cancer ([facility] [address]     BCC, SCC    Chronic pain     GERD (gastroesophageal reflux disease)     HTN (hypertension)     Hyperlipidemia     Kidney stone 1993    Liver disease 03/2018    FATTY LIVER    Sleep apnea     WAS TOLD, BUT NEVER TESTED       Past Surgical History:  Past Surgical History:   Procedure Laterality Date    HX COLONOSCOPY      HX ORTHOPAEDIC Left     HIP REPLACEMENT    HX ORTHOPAEDIC Right 2009    HIP REPLACEMENT    IR INJ FORAMIN EPID LUMB ANES/STER SNGL  1/3/2020    NEUROLOGICAL PROCEDURE UNLISTED      L1 or L3 LAMINECTOMY    [place] CABG, ARTERY-VEIN, THREE  2018    [place] CARDIAC SURG PROCEDURE UNLIST   &     CARDIAC CATH       Family History:  Family History   Problem Relation Age of Onset    Cancer Brother         prostate     Heart Disease Brother         CABG x4     Heart Disease Father 45    High Cholesterol Father     Heart Disease Brother 48        CABG    Other Mother         DIVERTICULITIS    Heart Attack Son         AGE 44    No Known Problems Daughter     Anesth Problems Neg Hx        Social History:  Social History     Tobacco Use    Smoking status: Former Smoker     Packs/day: 0.50     Quit date: 1998     Years since quittin.6    Smokeless tobacco: Never Used   Substance Use Topics    Alcohol use: Yes     Alcohol/week: 5.0 standard drinks     Types: 5 Cans of beer per week     Comment: occasionally    Drug use: Not Currently       Allergies:  No Known Allergies      Review of Systems   Review of Systems   Constitutional: Negative for chills and fever. Respiratory: Negative for cough and shortness of breath. Cardiovascular: Negative for chest pain. Gastrointestinal: Negative for abdominal pain, constipation, diarrhea, nausea and vomiting. Genitourinary: Negative for dysuria, frequency and hematuria. Musculoskeletal: Positive for arthralgias. Skin: Positive for wound. Neurological: Negative for weakness and numbness. All other systems reviewed and are negative. Physical Exam   Physical Exam  Vitals and nursing note reviewed. Constitutional:       Appearance: He is well-developed. HENT:      Head: Normocephalic.       Comments: Patient has healing ecchymosis over the right side of his face. No tenderness over the face and extraocular movements are intact. Nose: Nose normal.      Mouth/Throat:      Mouth: Mucous membranes are moist.   Eyes:      Pupils: Pupils are equal, round, and reactive to light. Cardiovascular:      Rate and Rhythm: Normal rate and regular rhythm. Pulmonary:      Effort: Pulmonary effort is normal.      Breath sounds: Normal breath sounds. Abdominal:      General: There is no distension. Palpations: Abdomen is soft. Tenderness: There is no abdominal tenderness. Musculoskeletal:      Cervical back: Normal range of motion. Skin:     General: Skin is warm and dry. Comments: Patient has significant abrasion over the left knee but nothing that needs to be sutured. Pain elicited when trying to range his left knee. Able to completely move his left elbow without any difficulty with minimal abrasion there. Neurological:      General: No focal deficit present. Mental Status: He is alert and oriented to person, place, and time. Mental status is at baseline. Cranial Nerves: No cranial nerve deficit.       Comments: CN 2-12 intact, 5/5 strength in all 4 extremities, Finger to nose intact, negative Romberg, normal gait   Psychiatric:         Mood and Affect: Mood normal.          Diagnostic Study Results     Labs -     Recent Results (from the past 12 hour(s))   EKG, 12 LEAD, INITIAL    Collection Time: 06/14/21 12:03 PM   Result Value Ref Range    Ventricular Rate 68 BPM    Atrial Rate 68 BPM    P-R Interval 158 ms    QRS Duration 98 ms    Q-T Interval 432 ms    QTC Calculation (Bezet) 459 ms    Calculated P Axis 28 degrees    Calculated R Axis -42 degrees    Calculated T Axis 39 degrees    Diagnosis       ** Poor data quality, interpretation may be adversely affected  Normal sinus rhythm  Left axis deviation    No significant change was found  Confirmed by Tita Smith ([id number]) on 6/14/2021 1:35:29 PM         Radiologic Studies -   XR KNEE LT MAX 2 VWS   Final Result   No acute abnormality. CT Results  (Last 48 hours)    None        CXR Results  (Last 48 hours)    None            Medical Decision Making   I am the first provider for this patient. I reviewed the vital signs, available nursing notes, past medical history, past surgical history, family history and social history. Vital Signs-Reviewed the patient's vital signs. Patient Vitals for the past 12 hrs:   Temp Pulse Resp BP SpO2   06/14/21 1211  69 17  96 %   06/14/21 1210  69 16  96 %   06/14/21 1209     96 %   06/14/21 1208    (!) 176/86 96 %   06/14/21 1207     97 %   06/14/21 1205 99.3 °F (37.4 °C) 70 17 (!) 176/86 96 %       Records Reviewed: Nursing Notes and Old Medical Records    Provider Notes (Medical Decision Making):   Patient presenting with ground-level fall with left knee abrasion and pain. Will get x-rays there. Given his multiple falls secondary to his foot drop complicated by a cellulitis, will get physical therapy involved. We will hold off on labs for right now. ED Course:   Initial assessment performed. The patients presenting problems have been discussed, and they are in agreement with the care plan formulated and outlined with them. I have encouraged them to ask questions as they arise throughout their visit. ED Course as of Jun 14 1931   Mon Jun 14, 2021   1445 PT worked with him. Can walk with walker. Gave some suggestions to help with foot drop. [JS]      ED Course User Index  [JS] Jessica Hi MD     Critical Care Time:   0    Disposition:  Discharge Note:  The patient has been re-evaluated and is ready for discharge. Reviewed available results with patient. Counseled patient on diagnosis and care plan. Patient has expressed understanding, and all questions have been answered. Patient agrees with plan and agrees to follow up as recommended, or to return to the ED if their symptoms worsen.  Discharge instructions have been provided and explained to the patient, along with reasons to return to the ED. PLAN:  Discharge Medication List as of 6/14/2021  2:38 PM        2. Follow-up Information     Follow up With Specialties Details Why Miguelina Fernández MD Internal Medicine  As needed [address] [id number]  [phone]          3. Return to ED if worse     Diagnosis     Clinical Impression:   1. Recurrent falls    2. Foot drop, right    3. Abrasion of left knee, initial encounter        Attestations:    Alisia Tapia M.D. Please note that this dictation was completed with Wiztango, the computer voice recognition software. Quite often unanticipated grammatical, syntax, homophones, and other interpretive errors are inadvertently transcribed by the computer software. Please disregard these errors. Please excuse any errors that have escaped final proofreading. Thank you.

## 2021-06-14 NOTE — ED NOTES
Jeremi Adkins RN reviewed discharge instructions with the patient. The patient verbalized understanding. All questions and concerns were addressed. The patient is discharged via wheelchair in the care of family members with instructions and prescriptions in hand. Pt is alert and oriented x 4. Respirations are clear and unlabored.

## 2021-06-14 NOTE — PROGRESS NOTES
HISTORY OF PRESENT ILLNESS:  The patient is a 20-year-old man who is referred to the wound care center regarding nonhealing wound right lower leg and right posterior ankle.    The patient reports that he had struck his right ankle and developed a wound, which had been nonhealing.     The patient was first seen at the [address] [place] [address] on 5/7/2020.     He  developed swelling and redness in the right lower leg in 2020, which then was followed by blister formation and a wound formation.  He eventually was hospitalized at [facility] and says he was in the hospital for about 18 days.      The patient reports chronic numbness of both feet.  He has right drop foot.     The patient had at the time of an ER visit for leg symptoms on the right in 01/2020, a venous duplex scan which did not find any evidence of deep venous thrombosis.  Vein valves were not tested.     The patient has history of lumbar laminectomy and diskectomy on 09/04/2019. Rico Pickard reports related to his disk disease, he has right side footdrop.  He reported history of neurogenic claudication.  The patient was scheduled to have further back surgery within the month, but that has been canceled related to COVID-19.     The patient reports that he walks with Aviiro denies shortness of breath at rest or with exertion.  He has no anginal chest pain.     The patient had angiography by Dr Analilia Vasquez on 5/17/2020 with finding of severe multilevel arterial occlusive disease in legs.    The patient on 5/29/2020 had open right femoral endarterectomy and patch angioplasty and also balloon angioplasty of right superficial femoral artery.  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  Dr Verenice Voss also debrided the dry eschars on the leg wounds.     The patient has seen Dr Verenice Voss in follow up.     The patient has a history of coronary artery disease and has had angioplasty and stent. Rico Pickard has also had coronary artery bypass grafting. Rico Pickard does not have any history of diabetes.  Past medical history does include asthma, skin cancer, gastroesophageal reflux disease, hypertension, hyperlipidemia, fatty liver disease, potential sleep apnea, but never tested.     SOCIAL HISTORY:  The patient smoked until 1998 when he quit. Yolanda Mohamud does use alcohol.     The patient's medications do not include a diuretic.     Prior dressing:   Xeroform on the lower leg wound and then dry gauze and roll gauze changed daily.     Dressing started 8/13/2020:  Aquacell AG on the leg  wounds and then dry gauze and roll gauze changed 3 times per week.     Patient had problems with adherence of the dressing, went back to Xeroform (3 times per week) after 1 week.  He has Home Health.     Drawtex dressing tried. Yolanda Mohamud developed more drainage, skin irritation.  Culture on 12/29/2020 grew pseudomonas and MRSA. Yolanda Mohamud started doxycycline on 12/31/2020.       Resumed xeroform dressing.     Using Farrow wrap with dressing currently.  Leaving liner stocking on at night.     Less green drainage since starting 0.125% Dakins washes at dressing change.     Current dressing as of 5/3/2021:  Wash surface of wound with Dakins 0.125%, then rinse with saline.  Zandra / Hydrofera Blue on the leg  wounds and then dry gauze and roll gauze changed 3 times per week.  Covered by Farrow wrap to help with edema control.  Use Tubigrip instead of liner stocking.          PHYSICAL EXAMINATION:  GENERAL:  The patient is an alert man in no acute distress.     EXTREMITIES:       The patient's lower extremities were examined.  On the left side, there is no edema.  There were no wounds present.      On the right lower extremity, right DP pulse is palpable. There is trace+ edema in the right lower leg.  The right foot feels warm. Skin of the leg is healthy appearing.   Proximal ulcer healed.     On the anterior aspect of the mid lower leg on the right, distal right lower leg ulcer 10 x 7.3 x 0.3 cm with minimal dry slough and granulation.     Calf is soft.  Surrounding skin appears healthy.       Area of possible tissue injury on lateral right foot at 5th  metatarsal head.  Moderate  callus, no skin opening.  No erythema. Dry and unchanged.           ASSESSMENT AND PLAN:             Leg wound stalled.       Foot site - ?pressure related.  Says he does not use shoes in house.  Goes out only once per week. Shaina Powers been keeping liner stocking of Shine Tomlinson on at night; now using Tubigrip with end of foot open.     JENNA ordered to assess arterial status.     Dressing ordered:   Wash surface of wound with Dakins 0.125%, then rinse with saline.  Zandra / Hydrofera Blue on the leg  wounds and then dry gauze and roll gauze changed 3 times per week.  Covered by Farrow wrap to help with edema control.  Use Tubigrip instead of liner stocking.   Has Home Health.     See if insurance will cover skin substitute - Nushield weekly x 4, then Puraply weekly x 6.      No dressing for foot site.  Keep all pressure off the area.       The patient will follow up in wound care center again in 3 weeks.        FINAL DIAGNOSES:  Nonhealing wounds right lower leg, peripheral artery disease.  Possible pressure site right foot, stage 1.     L97.912, I73.9, L89.891     Nelly Xiong MD

## 2021-06-14 NOTE — WOUND CARE
06/14/21 0950 Wound Leg lower Right # 1 Date First Assessed/Time First Assessed: 05/07/20 0835   Present on Hospital Admission: Yes  Wound Approximate Age at First Assessment (Weeks): 4 weeks  Primary Wound Type: Vascular  Location: Leg lower  Wound Location Orientation: Right  Wound Descri. .. Wound Image Dressing Status Old drainage noted Cleansed Cleansed with saline Wound Length (cm) 10 cm Wound Width (cm) 7.3 cm Wound Depth (cm) 0.3 cm Wound Surface Area (cm^2) 73 cm^2 Change in Wound Size % 63.63 Wound Volume (cm^3) 21.9 cm^3 Wound Healing % 45 Wound Assessment Pink/red Drainage Amount Moderate Drainage Description Serosanguinous Wound Odor None Chana-Wound/Incision Assessment Blanchable erythema; Intact Edges Undefined edges Wound Thickness Description Full thickness Visit Vitals BP (!) 148/70 (BP 1 Location: Left upper arm, BP Patient Position: Sitting) Pulse 70 Temp 97.9 °F (36.6 °C) Resp 14

## 2021-06-14 NOTE — DISCHARGE INSTRUCTIONS
Discharge Instructions for  [address] [address]  [facility]  [place], [address] [facility]  Telephone: [phone]    NAME:  Mina Hernandez  YOB: 1955  MEDICAL RECORD NUMBER:  [id number]  DATE:  6/14/2021  WOUND CARE ORDERS:    Right lower leg wound - Cleanse wound with saline and gauze. Moisturize dry, intact RLE skin with Petroleum jelly (vaseline) Cover wounds with collagen silver, cover with hydrofera blue ready,  ABD pad (or Exudry), secure with roll gauze, Apply patient's [employer]. PCG or HHC to change dressing 3 x week, and as needed, for compromise of dressing. Right Lateral foot - leave  Open to air & avoid any pressure on area. Farrow wrap to be applied first thing, every morning, & removed at bedtime. May Use Tubigrip in place of Farrow liner to decrease pressure on Right lateral foot. Return to clinic for MD follow up in 2 weeks for MD follow-up     For JENNA of Bilateral Lower Extremities at [facility]    TREATMENT ORDERS:    Elevate leg(s) above the level of the heart when sitting. Avoid prolonged standing in one place. Do no get dressing/wrap wet. Follow Diet as prescribed:   [x] Diet as tolerated: [] Calorie Diabetic Diet: Low carb and no Sugar [] No Added Salt:  [] Increase Protein: [] Limit the amount of liquid you are drinking and avoid drinking in between meals           Return Appointment:  [x] Return Appointment: With DR Jayme Rodriguez  in  2 [facility])  [] Nurse Visit : *** days  [] Ordered tests:    Electronically signed James Elizalde on 6/14/2021 at 11:00 AM   We're moving, Effective July 6, 2021  New Address is  [facility] [address], [address] [facility], [facility] [facility] Information: Should you experience any significant changes in your wound(s) or have questions about your wound care, please contact the [address] [place] [address] at [address] [place] [address] 8:00 am - 4:30.   If you need help with your wound outside these hours and cannot wait until we are again available, contact your PCP or go to the hospital emergency room. PLEASE NOTE: IF YOU ARE UNABLE TO OBTAIN WOUND SUPPLIES, CONTINUE TO USE THE SUPPLIES YOU HAVE AVAILABLE UNTIL YOU ARE ABLE TO REACH US. IT IS MOST IMPORTANT TO KEEP THE WOUND COVERED AT ALL TIMES.      Physician Signature:_______________________    Date: ___________ Time:  ____________

## 2021-06-18 ENCOUNTER — TELEPHONE (OUTPATIENT)
Dept: WOUND CARE | Age: 66
End: 2021-06-18

## 2021-06-18 ENCOUNTER — DOCUMENTATION ONLY (OUTPATIENT)
Dept: SURGERY | Age: 66
End: 2021-06-18

## 2021-06-18 LAB
MICROORGANISM/AGENT SPEC: ABNORMAL
MICROORGANISM/AGENT SPEC: ABNORMAL

## 2021-06-18 RX ORDER — DOXYCYCLINE 100 MG/1
100 CAPSULE ORAL 2 TIMES DAILY
Qty: 28 CAPSULE | Refills: 0 | Status: SHIPPED | OUTPATIENT
Start: 2021-06-18 | End: 2021-07-02

## 2021-06-18 NOTE — PROGRESS NOTES
[address] [facility]    Wound culture on 6/10/2021 grew staph aureus (MRSA). I have sent by email Rx for Doxycycline 100 mg po bid for 14 days. Patient to be instructed to take with water on empty stomach 1 hour before eating; not to take at same time with iron, calcium, magnesium, or vitamins.     Soham Coelho MD

## 2021-06-18 NOTE — TELEPHONE ENCOUNTER
Patient notified of prescription for Doxycycline escribed by Dr. Vanda Merchant to patients pharmacy. He was instructed to take med 2 x day on empty stomach for the full 14 days and he verbalized understanding.

## 2021-07-08 ENCOUNTER — HOSPITAL ENCOUNTER (OUTPATIENT)
Dept: WOUND CARE | Age: 66
Discharge: HOME | End: 2021-07-08
Admitting: SURGERY
Payer: MEDICARE

## 2021-07-08 VITALS
HEART RATE: 61 BPM | DIASTOLIC BLOOD PRESSURE: 51 MMHG | SYSTOLIC BLOOD PRESSURE: 99 MMHG | TEMPERATURE: 97.5 F | RESPIRATION RATE: 16 BRPM

## 2021-07-08 DIAGNOSIS — L89.891 PRESSURE INJURY OF RIGHT FOOT, STAGE 1: ICD-10-CM

## 2021-07-08 DIAGNOSIS — I73.9 PAD (PERIPHERAL ARTERY DISEASE) (HCC): ICD-10-CM

## 2021-07-08 DIAGNOSIS — R60.0 LEG EDEMA, RIGHT: ICD-10-CM

## 2021-07-08 DIAGNOSIS — L97.912 NON-PRESSURE CHRONIC ULCER OF RIGHT LOWER LEG, WITH FAT LAYER EXPOSED (HCC): ICD-10-CM

## 2021-07-08 PROCEDURE — 99214 OFFICE O/P EST MOD 30 MIN: CPT | Performed by: SURGERY

## 2021-07-08 PROCEDURE — 29580 STRAPPING UNNA BOOT: CPT

## 2021-07-08 NOTE — WOUND CARE
07/08/21 0944   Right Leg Edema Point of Measurement   Compression Therapy Velcro compression wrap   RLE Peripheral Vascular    Capillary Refill Less than/equal to 3 seconds   Color Appropriate for race   Temperature Warm   Sensation Present   Circumference of Calf (cm) 38.4 cm   Circumference of Ankle (cm) 23 cm   Wound Leg lower Right # 1   Date First Assessed/Time First Assessed: 05/07/20 0835   Present on Hospital Admission: Yes  Wound Approximate Age at First Assessment (Weeks): 4 weeks  Primary Wound Type: Vascular  Location: Leg lower  Wound Location Orientation: Right  Wound Descri. .. Wound Image    Wound Etiology Venous   Dressing Status Old drainage noted   Cleansed Cleansed with saline   Dressing/Treatment ABD pad;Collagen;Roll gauze; Tubular bandage   Wound Length (cm) 10.7 cm   Wound Width (cm) 8 cm   Wound Depth (cm) 0.3 cm   Wound Surface Area (cm^2) 85.6 cm^2   Change in Wound Size % 57.35   Wound Volume (cm^3) 25.68 cm^3   Wound Healing % 36   Wound Assessment Pink/red   Drainage Amount Moderate   Drainage Description Serosanguinous   Wound Odor None   Chana-Wound/Incision Assessment Blanchable erythema   Edges Undefined edges   Wound Thickness Description Full thickness   Pain 1   Pain Scale 1 Numeric (0 - 10)   Pain Intensity 1 0   Patient Stated Pain Goal 0   Pain Reassessment 1 Yes     Visit Vitals  BP (!) 99/51 Comment: no symptom   Pulse 61   Temp 97.5 °F (36.4 °C)   Resp 16

## 2021-07-08 NOTE — DISCHARGE INSTRUCTIONS
Discharge Instructions for  [facility]  [address]  MO[place] 1, [address] [facility] [place], [id number]  Telephone: [phone]    NAME:  Peter Lemus  YOB: 1955  MEDICAL RECORD NUMBER:  [id number]  DATE:  7/8/2021  WOUND CARE ORDERS:  Right Lower leg wound - Cleanse site with Normal Saline, pat dry. Apply Xeroform to wound bed; cover with ABD pad or Exudry. Apply unna  boot ( 50% stretch). Home Health to [facility] dressing on Monday (7/12/2012). Return to clinic to see Dr. Tesha May in one week ( July 15, Thursday). TREATMENT ORDERS:    Elevate leg(s) above the level of the heart when sitting. Avoid prolonged standing in one place. Do no get dressing/wrap wet. Follow Diet as prescribed:   [x] Diet as tolerated: [] Calorie Diabetic Diet: Low carb and no Sugar [] No Added Salt:  [] Increase Protein: [] Limit the amount of liquid you are drinking and avoid drinking in between meals           Return Appointment:  [x] Return Appointment: With DR Tesha May  in  [facility])  [] Nurse Visit : *** days  [] Ordered tests:    Electronically signed Lyla Erwin on 7/8/2021 at [address] [facility] [address]: Should you experience any significant changes in your wound(s) or have questions about your wound care, please contact the [facility] at [address] [place] [address] 8:00 am - 4:30. If you need help with your wound outside these hours and cannot wait until we are again available, contact your PCP or go to the hospital emergency room. PLEASE NOTE: IF YOU ARE UNABLE TO OBTAIN WOUND SUPPLIES, CONTINUE TO USE THE SUPPLIES YOU HAVE AVAILABLE UNTIL YOU ARE ABLE TO REACH US. IT IS MOST IMPORTANT TO KEEP THE WOUND COVERED AT ALL TIMES.      Physician Signature:_______________________    Date: ___________ Time:  ____________

## 2021-07-08 NOTE — PROGRESS NOTES
HISTORY OF PRESENT ILLNESS:  The patient is a 69-year-old man who was referred to the wound care center regarding nonhealing wound right lower leg and right posterior ankle.    The patient reports that he had struck his right ankle and developed a wound, which had been nonhealing.     The patient was first seen at the [address] [place] [address] on 5/7/2020. Ankle  wound has healed.     He  developed swelling and redness in the right lower leg in 2020, which then was followed by blister formation and a wound formation.  He eventually was hospitalized at [facility] and says he was in the hospital for about 18 days.      The patient reports chronic numbness of both feet.  He has right drop foot.     The patient had at the time of an ER visit for leg symptoms on the right in 01/2020, a venous duplex scan which did not find any evidence of deep venous thrombosis.  Vein valves were not tested.     The patient has history of lumbar laminectomy and diskectomy on 09/04/2019. Mehreen Ford reports related to his disk disease, he has right side footdrop.  He reported history of neurogenic claudication.  The patient was scheduled to have further back surgery within the month, but that has been canceled related to COVID-19.     The patient reports that he walks with Gloria Mas denies shortness of breath at rest or with exertion.  He has no anginal chest pain.     The patient had angiography by Dr Saurabh Santos on 5/17/2020 with finding of severe multilevel arterial occlusive disease in legs.    The patient on 5/29/2020 had open right femoral endarterectomy and patch angioplasty and also balloon angioplasty of right superficial femoral artery.  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  Dr Slava Baron also debrided the dry eschars on the leg wounds.     Wound Care    The patient had JENNA bilateral lower extremities at [facility] on 6/24/2021. This showed:       Right JENNA 0.70 at , TBI0. 42.      Left JENNA 0.67 at [facility] 0.32.       The patient has a history of coronary artery disease and has had angioplasty and stent. [facility] has also had coronary artery bypass grafting.  He does not have any history of diabetes.  Past medical history does include asthma, skin cancer, gastroesophageal reflux disease, hypertension, hyperlipidemia, fatty liver disease, potential sleep apnea, but never tested.     SOCIAL HISTORY:  The patient smoked until 1998 when he quit. [facility] does use alcohol.     The patient's medications do not include a diuretic.     Prior dressing:   Xeroform on the lower leg wound and then dry gauze and roll gauze changed daily.     Dressing started 8/13/2020:  Aquacell AG on the leg  wounds and then dry gauze and roll gauze changed 3 times per week.     Patient had problems with adherence of the dressing, went back to Xeroform (3 times per week) after 1 week.  He has Home Health.     Drawtex dressing tried. [facility] developed more drainage, skin irritation.  Culture on 12/29/2020 grew pseudomonas and MRSA. [facility] started doxycycline on 12/31/2020.      Wound culture on 6/10/2021 grew staph aureus (MRSA).   Treated with Doxycycline 100 mg po bid for 14 days.     Resumed xeroform dressing.     Using Farrow wrap with dressing currently.  Leaving liner stocking on at night.     Less green drainage since starting 0.125% Dakins washes at dressing change.     Current dressing as of 5/3/2021:  Wash surface of wound with Dakins 0.125%, then rinse with saline.  Zandra / Hydrofera Blue on the leg  wounds and then dry gauze and roll gauze changed 3 times per week.  Covered by Farrow wrap to help with edema control.  Use Tubigrip instead of liner stocking.           PHYSICAL EXAMINATION:  GENERAL:  The patient is an alert man in no acute distress.     EXTREMITIES:       The patient's lower extremities were examined.  On the left side, there is no edema.  There were no wounds present.      On the right lower extremity, right DP pulse is trace palpable. There is trace to 1+ edema in the right lower leg.  The right foot feels warm. Skin of the leg is healthy appearing.  Proximal ulcer healed.     On the anterior aspect of the mid lower leg on the right, distal right lower leg ulcer 10 x 8 x 0.3 cm with minimal dry slough and pink granulation.     Calf is soft.  Surrounding skin appears healthy.       Area of possible tissue injury on lateral right foot at 5th  metatarsal head.  Moderate  callus, no skin opening.  No erythema. Dry and unchanged.           ASSESSMENT AND PLAN:             Leg wound stalled. JENNA report from [facility] reviewed - arterial perfusion on the right appears adequate for healing at the leg level,  although not normalized     Foot site - ?pressure related.  Says he does not use shoes in house.  Goes out only once per week. Verna Rae been keeping liner stocking of Farrow on at night; now using Tubigrip with end of foot open.     Dressing ordered:   Wash surface of wound with Dakins 0.125%, then rinse with saline.  Xeroform placed over the ulcer(s). Unna boot placed form base of toes to upper calf with overlying Coban at 0.50 stretch. Has Home Health for change in 4 days and weekly.     Apply to insurance for skin substitute - Nushield weekly x 4, then Puraply weekly x 6.      No dressing for foot site.  Keep all pressure off the area.      Hold venous testing. Would want to avoid saphenous ablation in view of PAD.     The patient will follow up in wound care center again in 1 week.        FINAL DIAGNOSES:  Nonhealing wounds right lower leg, peripheral artery disease.  Possible pressure site right foot, stage 1.   Leg edema.     L97.912, I73.9, L89.891, R60.0     Kavya Ham MD

## 2021-07-08 NOTE — WOUND CARE
[place] Application   Below Knee    NAME:  Ronni Love  YOB: 1955  MEDICAL RECORD NUMBER:  [id number]  DATE:  7/8/2021    Applied moisturizing agent to dry skin as needed. Appied primary and secondary dressing as ordered. Applied Unna roll from toes to knee overlapping each time. Applied ace wrap or coban from toes to below the knee. Secured with tape and/or metal clips covered with tape. Instructed patient/caregiver to keep dressing dry and intact. DO NOT REMOVE DRESSING. Instructed pt/family/caregiver to report excessive draining, loose bandage, wet dressing, severe pain or tingling in toes. Applied [place] dressing below the knee to right lower leg. Unna Boot(s) were applied per  Guidelines.      Electronically signed by Annie Loo on 7/8/2021 at 10:36 AM

## 2021-07-15 ENCOUNTER — HOSPITAL ENCOUNTER (OUTPATIENT)
Dept: WOUND CARE | Age: 66
Discharge: HOME | End: 2021-07-15
Admitting: SURGERY
Payer: MEDICARE

## 2021-07-15 VITALS
SYSTOLIC BLOOD PRESSURE: 110 MMHG | RESPIRATION RATE: 16 BRPM | TEMPERATURE: 97.6 F | HEART RATE: 56 BPM | DIASTOLIC BLOOD PRESSURE: 55 MMHG

## 2021-07-15 DIAGNOSIS — L97.912 NON-PRESSURE CHRONIC ULCER OF RIGHT LOWER LEG, WITH FAT LAYER EXPOSED (HCC): ICD-10-CM

## 2021-07-15 DIAGNOSIS — R60.0 LEG EDEMA, RIGHT: ICD-10-CM

## 2021-07-15 DIAGNOSIS — I73.9 PAD (PERIPHERAL ARTERY DISEASE) (HCC): ICD-10-CM

## 2021-07-15 PROCEDURE — 11042 DBRDMT SUBQ TIS 1ST 20SQCM/<: CPT | Performed by: SURGERY

## 2021-07-15 PROCEDURE — 15271 SKIN SUB GRAFT TRNK/ARM/LEG: CPT

## 2021-07-15 PROCEDURE — 11045 DBRDMT SUBQ TISS EACH ADDL: CPT | Performed by: SURGERY

## 2021-07-15 PROCEDURE — 15271 SKIN SUB GRAFT TRNK/ARM/LEG: CPT | Performed by: SURGERY

## 2021-07-15 NOTE — PROGRESS NOTES
Debridement Wound Care        Problem List Items Addressed This Visit     None          Procedure Note  Indications:  Based on my examination of this patient's wound(s)/ulcer(s) today, debridement is required to promote healing and evaluate the wound base. Performed by: Treasure Kruse MD    Consent obtained: Yes    Time out taken: Yes    Debridement: Excisional    Using curette the wound(s)/ulcer(s) was/were sharply debrided down through and including the removal of    subcutaneous tissue  Sharp excisional debridement was carried out with removal of necrotic tissue, slough, and granulation into the subcutaneous layer. Debridement was carried down to bleeding viable tissue.     Devitalized Tissue Debrided: slough and and granulation into SQ layer    Pre Debridement Measurements:  Are located in the Wound/Ulcer Documentation Flow Sheet    Wound/Ulcer #: 1    Post Debridement Measurements:  Wound/Ulcer Descriptions are Pre Debridement except measurements:Other: depth increased by 0.1 cm after debriodement    Wound Back (Active)   Number of days: 685       Wound Leg lower Right # 1 (Active)   Wound Image   07/15/21 1122   Wound Etiology Venous 07/15/21 1122   Dressing Status Old drainage noted 07/08/21 0944   Cleansed Cleansed with saline 07/15/21 1122   Dressing/Treatment Xeroform;ABD pad 07/08/21 1034   Wound Length (cm) 10.9 cm 07/15/21 1122   Wound Width (cm) 8 cm 07/15/21 1122   Wound Depth (cm) 0.4 cm 07/15/21 1122   Wound Surface Area (cm^2) 87.2 cm^2 07/15/21 1122   Change in Wound Size % 56.56 07/15/21 1122   Wound Volume (cm^3) 34.88 cm^3 07/15/21 1122   Wound Healing % 13 07/15/21 1122   Post-Procedure Length (cm) 10 cm 05/24/21 1000   Post-Procedure Width (cm) 5.5 cm 05/24/21 1000   Post-Procedure Depth (cm) 0.3 cm 05/24/21 1000   Post-Procedure Surface Area (cm^2) 55 cm^2 05/24/21 1000   Post-Procedure Volume (cm^3) 16.5 cm^3 05/24/21 1000   Wound Assessment Pink/red;Slough 07/15/21 1122 Drainage Amount Moderate 07/15/21 1122   Drainage Description Serosanguinous 07/15/21 1122   Wound Odor None 07/15/21 1122   Chana-Wound/Incision Assessment Hemosiderin staining (brown yellow) 07/15/21 1122   Edges Undefined edges 07/15/21 1122   Wound Thickness Description Full thickness 07/15/21 1122   Number of days: 434        Percent of Wound(s)/Ulcer(s) Debrided: 60%    Total Surface Area Debrided:  50 sq cm     Diabetic/Pressure/Non Pressure Ulcers only:  Ulcer:     Estimated Blood Loss:  Minimal     Hemostasis Achieved: Pressure    Procedural Pain: 0 / 10     Post Procedural Pain: 0 / 10     Response to treatment: Well tolerated by patient

## 2021-07-15 NOTE — WOUND CARE
07/15/21 1122   Right Leg Edema Point of Measurement   Leg circumference 38 cm   Ankle circumference 23 cm   Compression Therapy Unna boot   Left Leg Edema Point of Measurement   Compression Therapy   (elevation)   RLE Peripheral Vascular    Capillary Refill Less than/equal to 3 seconds   Color Appropriate for race   Temperature Warm   Sensation Present   Wound Leg lower Right # 1   Date First Assessed/Time First Assessed: 05/07/20 0835   Present on Hospital Admission: Yes  Wound Approximate Age at First Assessment (Weeks): 4 weeks  Primary Wound Type: Vascular  Location: Leg lower  Wound Location Orientation: Right  Wound Descri. ..    Wound Image    Wound Etiology Venous   Dressing Status   (removed xeroform, abd pad and unna boot)   Cleansed Cleansed with saline   Wound Length (cm) 10.9 cm   Wound Width (cm) 8 cm   Wound Depth (cm) 0.4 cm   Wound Surface Area (cm^2) 87.2 cm^2   Change in Wound Size % 56.56   Wound Volume (cm^3) 34.88 cm^3   Wound Healing % 13   Wound Assessment [place]/red;Slough   Drainage Amount Moderate   Drainage Description Serosanguinous   Wound Odor None   Chana-Wound/Incision Assessment Hemosiderin staining (brown yellow)   Edges Undefined edges   Wound Thickness Description Full thickness   Pain 1   Pain Scale 1 Numeric (0 - 10)   Pain Intensity 1 0   Patient Stated Pain Goal 0   Pain Reassessment 1 Yes     Visit Vitals  BP (!) 110/55   Pulse (!) 56   Temp 97.6 °F (36.4 °C)   Resp 16

## 2021-07-15 NOTE — PROGRESS NOTES
HISTORY OF PRESENT ILLNESS:  The patient is a 68-year-old man who was referred to the wound care center regarding nonhealing wound right lower leg and right posterior ankle.    The patient reports that he had struck his right ankle and developed a wound, which had been nonhealing.     The patient was first seen at the [address] [place] [address] on 5/7/2020. Ankle  wound has healed.     He  developed swelling and redness in the right lower leg in 2020, which then was followed by blister formation and a wound formation.  He eventually was hospitalized at [facility] and says he was in the hospital for about 18 days.      The patient reports chronic numbness of both feet.  He has right drop foot.     The patient had at the time of an ER visit for leg symptoms on the right in 01/2020, a venous duplex scan which did not find any evidence of deep venous thrombosis.  Vein valves were not tested.     The patient has history of lumbar laminectomy and diskectomy on 09/04/2019. [facility] reports related to his disk disease, he has right side footdrop.  He reported history of neurogenic claudication.  The patient was scheduled to have further back surgery within the month, but that has been canceled related to COVID-19.     The patient reports that he walks with Ken Poser denies shortness of breath at rest or with exertion.  He has no anginal chest pain.     The patient had angiography by Dr David Dent on 5/17/2020 with finding of severe multilevel arterial occlusive disease in legs.    The patient on 5/29/2020 had open right femoral endarterectomy and patch angioplasty and also balloon angioplasty of right superficial femoral artery.  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  Dr Amie Reese also debrided the dry eschars on the leg wounds.     Wound Care     The patient had JENNA bilateral lower extremities at [facility] on 6/24/2021. This showed:                   Right JENNA 0.70 at DP, TBI0. 42.    Left JENNA 0.67 at DP, TBI 0.32.        The patient has a history of coronary artery disease and has had angioplasty and stent. Timmy Toth has also had coronary artery bypass grafting.  He does not have any history of diabetes.  Past medical history does include asthma, skin cancer, gastroesophageal reflux disease, hypertension, hyperlipidemia, fatty liver disease, potential sleep apnea, but never tested.     SOCIAL HISTORY:  The patient smoked until 1998 when he quit. Timmy Toth does use alcohol.     The patient's medications do not include a diuretic.     Prior dressing:   Xeroform on the lower leg wound and then dry gauze and roll gauze changed daily.     Dressing started 8/13/2020:  Aquacell AG on the leg  wounds and then dry gauze and roll gauze changed 3 times per week.     Patient had problems with adherence of the dressing, went back to Xeroform (3 times per week) after 1 week.  He has Home Health.     Drawtex dressing tried. Timmy Toth developed more drainage, skin irritation.  Culture on 12/29/2020 grew pseudomonas and MRSA. Timmy Toth started doxycycline on 12/31/2020.       Wound culture on 6/10/2021 grew staph aureus (MRSA). Treated with Doxycycline 100 mg po bid for 14 days.     Resumed xeroform dressing.     Using Farrow wrap with dressing currently.  Leaving liner stocking on at night.     Less green drainage since starting 0.125% Dakins washes at dressing change.     Prior dressing as of 5/3/2021:  Wash surface of wound with Dakins 0.125%, then rinse with saline.  Zandra / Hydrofera Blue on the leg  wounds and then dry gauze and roll gauze changed 3 times per week.  Covered by Farrow wrap to help with edema control.  Use Tubigrip instead of liner stocking.     Current dressing as of 7/8/2021:  Wash surface of wound with Dakins 0.125%, then rinse with saline.  Xeroform placed over the ulcer(s).   Unna boot placed form base of toes to upper calf with overlying Coban at 0.50 stretch.  [facility] in 4 days and weekly.        PHYSICAL EXAMINATION:  GENERAL:  The patient is an alert man in no acute distress.     EXTREMITIES:       The patient's lower extremities were examined.  On the left side, there is no edema.  There were no wounds present.      On the right lower extremity, right DP pulse is trace palpable. There is trace edema in the right lower leg.  The right foot feels warm. Skin of the leg is healthy appearing.  Proximal ulcer healed.     On the anterior aspect of the mid lower leg on the right, distal right lower leg ulcer 10.9 x 8 x 0.4 cm with minimal dry slough and pink granulation.     Calf is soft.  Surrounding skin appears healthy.       Area of possible tissue injury on lateral right foot at 5th  metatarsal head.  Moderate  callus, no skin opening.  No erythema. Dry and unchanged.        Debridement was carried out of the wound on the anterior right lower leg. See separate note. On the superior portion of the wound, two 2 x 4 cm sheets of Puraply were placed. On the inferior portion of the wound a sheet of 5 x 5 endoform was placed. The wound was covered by Versatel silicone fenestrated sheet. ABD was applied over the wound. Unna boot was applied from base of toes to upper calf with 0.50 stretch on Coban.       ASSESSMENT AND PLAN:             Leg wound stalled.           JENNA report from [facility] reviewed - arterial perfusion on the right appears adequate for healing at the leg level,  although not normalized     Foot site - ?pressure related.       Dressing as noted above. Keep in place.     Plan:  Puraply weekly x 4, then [place] weekly x 6.      No dressing for foot site.  Keep all pressure off the area.       Hold venous testing. Would want to avoid saphenous ablation in view of PAD.     The patient will follow up in wound care center again in 1 week.        FINAL DIAGNOSES:  Nonhealing wounds right lower leg, peripheral artery disease.  Possible pressure site right foot, stage 1.   Leg edema.     L97.912, I73.9, L89.891, R60.0     Jorge Ross MD

## 2021-07-15 NOTE — DISCHARGE INSTRUCTIONS
Discharge Instructions for  [facility]  [address]  MO[address], [address] [facility] [address] [facility] [id number]  Telephone: [phone]    NAME:  Winnie Hilario  YOB: 1955  MEDICAL RECORD NUMBER:  [id number]  DATE:  7/15/2021  WOUND CARE ORDERS:  Right lower leg - cleanse with saline, apply Puraply, (Endoform applied to distal portion) cover with Mepitel, secure with steri strips, cover with ABD, apply unna boot. Change in one week. Home Health do no remove dressing patient will return to clinic in one week for dressing change. TREATMENT ORDERS:    Elevate leg(s) above the level of the heart when sitting. Avoid prolonged standing in one place. Do no get dressing/wrap wet. Follow Diet as prescribed:   [x] Diet as tolerated: [] Calorie Diabetic Diet: Low carb and no Sugar [x] No Added Salt:  [x] Increase Protein: [] Limit the amount of liquid you are drinking and avoid drinking in between meals           Return Appointment:  [x] Return Appointment: With DR Yulisa Ayala  in  [facility])  [] Nurse Visit : *** days  [] Ordered tests:    Electronically signed Kendy Ybarra RN on 7/15/2021 at 12:24 PM     Sallie Vega 281: Should you experience any significant changes in your wound(s) or have questions about your wound care, please contact the [facility] at [address] [place] [address] 8:00 am - 4:30. If you need help with your wound outside these hours and cannot wait until we are again available, contact your PCP or go to the hospital emergency room. PLEASE NOTE: IF YOU ARE UNABLE TO OBTAIN WOUND SUPPLIES, CONTINUE TO USE THE SUPPLIES YOU HAVE AVAILABLE UNTIL YOU ARE ABLE TO REACH US. IT IS MOST IMPORTANT TO KEEP THE WOUND COVERED AT ALL TIMES.      Physician Signature:_______________________    Date: ___________ Time:  ____________

## 2021-07-22 ENCOUNTER — HOSPITAL ENCOUNTER (OUTPATIENT)
Dept: WOUND CARE | Age: 66
Discharge: HOME | End: 2021-07-22
Admitting: SURGERY
Payer: MEDICARE

## 2021-07-22 VITALS
HEART RATE: 72 BPM | RESPIRATION RATE: 16 BRPM | TEMPERATURE: 98 F | DIASTOLIC BLOOD PRESSURE: 58 MMHG | SYSTOLIC BLOOD PRESSURE: 127 MMHG

## 2021-07-22 DIAGNOSIS — R60.0 LEG EDEMA, RIGHT: ICD-10-CM

## 2021-07-22 DIAGNOSIS — I73.9 PAD (PERIPHERAL ARTERY DISEASE) (HCC): ICD-10-CM

## 2021-07-22 DIAGNOSIS — L97.912 NON-PRESSURE CHRONIC ULCER OF RIGHT LOWER LEG, WITH FAT LAYER EXPOSED (HCC): ICD-10-CM

## 2021-07-22 PROCEDURE — 11045 DBRDMT SUBQ TISS EACH ADDL: CPT | Performed by: SURGERY

## 2021-07-22 PROCEDURE — 11042 DBRDMT SUBQ TIS 1ST 20SQCM/<: CPT | Performed by: SURGERY

## 2021-07-22 PROCEDURE — 15271 SKIN SUB GRAFT TRNK/ARM/LEG: CPT

## 2021-07-22 PROCEDURE — 15271 SKIN SUB GRAFT TRNK/ARM/LEG: CPT | Performed by: SURGERY

## 2021-07-22 NOTE — WOUND CARE
07/22/21 1049   Anesthetic   Anesthetic   (4% Lidocaine gel)   Right Leg Edema Point of Measurement   Leg circumference 38 cm   Ankle circumference 24.5 cm   Compression Therapy Unna boot   RLE Peripheral Vascular    Capillary Refill Less than/equal to 3 seconds   Color Pink   Temperature Warm   Sensation Present   Pedal Pulse Palpable   Wound Leg lower Right # 1   Date First Assessed/Time First Assessed: 05/07/20 0835   Present on Hospital Admission: Yes  Wound Approximate Age at First Assessment (Weeks): 4 weeks  Primary Wound Type: Vascular  Location: Leg lower  Wound Location Orientation: Right  Wound Descri. .. Wound Image    Wound Etiology Venous   Dressing Status Intact; Old drainage noted  (Removed Mepitel; steri;ABD;Unna boot)   Cleansed Cleansed with saline   Wound Length (cm) 11.2 cm   Wound Width (cm) 9 cm   Wound Depth (cm) 0.4 cm   Wound Surface Area (cm^2) 100.8 cm^2   Change in Wound Size % 49.78   Wound Volume (cm^3) 40.32 cm^3   Wound Healing % 0   Wound Assessment Guadalupe/red;Slough   Drainage Amount Large   Drainage Description Serosanguinous   Wound Odor None   Chana-Wound/Incision Assessment Hemosiderin staining (brown yellow)   Edges Undefined edges   Wound Thickness Description Full thickness     Visit Vitals  BP (!) 127/58 (BP 1 Location: Left upper arm, BP Patient Position: At rest)   Pulse 72   Temp 98 °F (36.7 °C)   Resp 16        RLE Peripheral Vascular   Capillary Refill: (P) Less than/equal to 3 seconds (07/22/21 1049)  Color: (P) Pink (07/22/21 1049)  Temperature: (P) Warm (07/22/21 1049)  Sensation: (P) Present (07/22/21 1049)  Pedal Pulse: (P) Palpable (07/22/21 1049)

## 2021-07-22 NOTE — PROGRESS NOTES
HISTORY OF PRESENT ILLNESS:  The patient is a 66-year-old man who was referred to the wound care center regarding nonhealing wound right lower leg and right posterior ankle.    The patient reports that he had struck his right ankle and developed a wound, which had been nonhealing.     The patient was first seen at the [address] [place] [address] on 5/7/2020.  Ankle  wound has healed.     He  developed swelling and redness in the right lower leg in 2020, which then was followed by blister formation and a wound formation.  He eventually was hospitalized at [facility] and says he was in the hospital for about 18 days.      The patient reports chronic numbness of both feet.  He has right drop foot.     The patient had at the time of an ER visit for leg symptoms on the right in 01/2020, a venous duplex scan which did not find any evidence of deep venous thrombosis.  Vein valves were not tested.     The patient has history of lumbar laminectomy and diskectomy on 09/04/2019. Curmaicol Balbuena reports related to his disk disease, he has right side footdrop.  He reported history of neurogenic claudication.  The patient was scheduled to have further back surgery within the month, but that has been canceled related to COVID-19.     The patient reports that he walks with Jose Kil denies shortness of breath at rest or with exertion.  He has no anginal chest pain.     The patient had angiography by Dr Roselyn Cummings on 5/17/2020 with finding of severe multilevel arterial occlusive disease in legs.    The patient on 5/29/2020 had open right femoral endarterectomy and patch angioplasty and also balloon angioplasty of right superficial femoral artery.  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  Dr Oswaldo Denise also debrided the dry eschars on the leg wounds.     The patient had JENNA bilateral lower extremities at [address] [facility] on 6/24/2021.  This showed:                   Right JENNA 0.70 at DP, TBI0. 42.                 Left JENNA 0.67 at [facility], TBI 0.32. JENNA report from [facility] reviewed - arterial perfusion on the right appears adequate for healing at the leg level,  although not normalized        The patient has a history of coronary artery disease and has had angioplasty and stent.  He has also had coronary artery bypass grafting.  He does not have any history of diabetes.  Past medical history does include asthma, skin cancer, gastroesophageal reflux disease, hypertension, hyperlipidemia, fatty liver disease, potential sleep apnea, but never tested.     SOCIAL HISTORY:  The patient smoked until 1998 when he quit. Ranjan Kern does use alcohol.     The patient's medications do not include a diuretic.     Prior dressing:   Xeroform on the lower leg wound and then dry gauze and roll gauze changed daily.     Dressing started 8/13/2020:  Aquacell AG on the leg  wounds and then dry gauze and roll gauze changed 3 times per week.     Patient had problems with adherence of the dressing, went back to Xeroform (3 times per week) after 1 week.  He has Home Health.     Drawtex dressing tried. Ranjan Kern developed more drainage, skin irritation.  Culture on 12/29/2020 grew pseudomonas and MRSA. Ranjan Kern started doxycycline on 12/31/2020.       Wound culture on 6/10/2021 grew staph aureus (MRSA).   Treated with Doxycycline 100 mg po bid for 14 days.     Resumed xeroform dressing.     Used Farrow wrap with dressing.  Leaving liner stocking on at night.     Less green drainage since starting 0.125% Dakins washes at dressing change.     Prior dressing as of 5/3/2021:  Wash surface of wound with Dakins 0.125%, then rinse with saline.  Zandra / Hydrofera Blue on the leg  wounds and then dry gauze and roll gauze changed 3 times per week.  Covered by Farrow wrap to help with edema control.  Use Tubigrip instead of liner stocking.     Prior dressing as of 7/8/2021:  Wash surface of wound with Dakins 0.125%, then rinse with saline.  Xeroform placed over the ulcer(s).  Unna boot placed form base of toes to upper calf with overlying Coban at 0.50 stretch.   Has Home Health for change in 4 days and weekly. Current dressing as of 7/14/2021: On the superior portion of the wound, two 2 x 4 cm sheets of Puraply were placed. On the inferior portion of the wound a sheet of 5 x 5 endoform was placed. The wound was covered by Mepitel silicone fenestrated sheet. ABD was applied over the wound. Unna boot was applied from base of toes to upper calf with 0.50 stretch on Coban.        PHYSICAL EXAMINATION:  GENERAL:  The patient is an alert man in no acute distress.     EXTREMITIES:       The patient's lower extremities were examined.  On the left side, there is no edema.  There were no wounds present.      On the right lower extremity, right DP pulse is trace palpable. There is trace edema in the right lower leg.  The right foot feels warm. Some irritation / maceration of skin around wound.      On the anterior aspect of the mid lower leg on the right, distal right lower leg ulcer 11.2 x 9 x 0.4 with slough and improved granulation. Puraply site with greatest improvement in granulation.      Area of possible tissue injury on lateral right foot at 5th  metatarsal head.  Moderate  callus, no skin opening.  No erythema. Dry and unchanged.          Debridement was carried out of the wound on the anterior right lower leg. See separate note. On the inferior portion of the wound, a 5 x 5 cm fenestrated sheet of Puraply were placed. On the rest of the wound two sheets of 5 x 5 endoform were placed. The wound was covered by Mepitel  silicone fenestrated sheet; steri strips on the edge. ABD was applied over the wound, roll gauze. Single Tubigrip from base of toes to upper calf.        ASSESSMENT AND PLAN:             Leg wound slightly improved. Maceration of surrounding skin. [de-identified]                Foot site - ?pressure related.       Dressing as noted above.   Keep in place.     Plan:  Puraply weekly x 4, then Merged with [facility] weekly x 6.      No dressing for foot site.  Keep all pressure off the area.       Hold venous testing.  Would want to avoid saphenous ablation in view of PAD.     The patient will follow up in wound care center again in 1 week.        FINAL DIAGNOSES:  Nonhealing wounds right lower leg, peripheral artery disease.  Possible pressure site right foot, stage 1.  Leg edema.     L97.912, I73.9, L89.891, R60.0     Saira Fernández MD

## 2021-07-22 NOTE — DISCHARGE INSTRUCTIONS
Discharge Instructions/Wound Orders  [address], [address] [facility] [place], [id number]  Telephone: [phone]    NAME:  Nighat Hassan OF BIRTH:  1955  MEDICAL RECORD NUMBER:  [id number]  DATE:  7/22/2021  Wound Care Orders:    Right lower leg - cleanse with saline, apply Puraply, (Endoform applied to distal portion) cover with Mepitel, secure with steri strips, cover with ABD, apply roll gauze and single layer tubigrip size F. Home Health to change ABD, roll gauze and tubigrip only, do not remove anything below steri strips. Follow-up in clinic in one week. Dietary:  [x] Diet as tolerated: [] Calorie Diabetic Diet:Low carb and no Sugar [x] No Added Salt:[x] Increase Protein: [] Other:Limit the amount of liquid you are drinking and avoid drinking in between meals   Activity:  [x] Activity as tolerated:  [x] Patient has no activity restrictions     [] Strict Bedrest: [] Remain off Work:     [] May return to full duty work:                                   [] Return to work with restrictions:             Return Appointment:  [x] Return Appointment: With DR Muriel Zamora  in  1 Week(s)  [] Ordered tests:    Electronically signed Stef Vazquez RN on 7/22/2021 at 11:16 AM     Sallie Vega 281: Should you experience any significant changes in your wound(s) or have questions about your wound care, please contact the [address] [place] [address] at [address] [place] [address] 8:00 am - 4:30. If you need help with your wound outside these hours and cannot wait until we are again available, contact your PCP or go to the hospital emergency room. PLEASE NOTE: IF YOU ARE UNABLE TO OBTAIN WOUND SUPPLIES, CONTINUE TO USE THE SUPPLIES YOU HAVE AVAILABLE UNTIL YOU ARE ABLE TO REACH US. IT IS MOST IMPORTANT TO KEEP THE WOUND COVERED AT ALL TIMES.      Physician Signature:_______________________    Date: ___________ Time: ____________

## 2021-07-22 NOTE — PROGRESS NOTES
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 Performed by: Rianna Rosario MD    Consent obtained: Yes    Time out taken: Yes    Debridement: Excisional    Using curette the wound(s)/ulcer(s) was/were sharply debrided down through and including the removal of    subcutaneous tissue  Sharp excisional debridement was carried out with removal of necrotic tissue, slough, and granulation into the subcutaneous layer. Debridement was carried down to bleeding viable tissue. Devitalized Tissue Debrided: necrotic/eschar and and granulation into sq    Pre Debridement Measurements:  Are located in the Wound/Ulcer Documentation Flow Sheet    Wound/Ulcer #: 1    Post Debridement Measurements:  Wound/Ulcer Descriptions are Pre Debridement except measurements:Other: depth increased by 0.1 cm after debridement    Wound Back (Active)   Number of days: 692       Wound Leg lower Right # 1 (Active)   Wound Image   07/22/21 1049   Wound Etiology Venous 07/22/21 1049   Dressing Status Intact; Old drainage noted 07/22/21 1049   Cleansed Cleansed with saline 07/22/21 1049   Dressing/Treatment Xeroform;ABD pad 07/08/21 1034   Wound Length (cm) 11.2 cm 07/22/21 1049   Wound Width (cm) 9 cm 07/22/21 1049   Wound Depth (cm) 0.4 cm 07/22/21 1049   Wound Surface Area (cm^2) 100.8 cm^2 07/22/21 1049   Change in Wound Size % 49.78 07/22/21 1049   Wound Volume (cm^3) 40.32 cm^3 07/22/21 1049   Wound Healing % 0 07/22/21 1049   Post-Procedure Length (cm) 10 cm 05/24/21 1000   Post-Procedure Width (cm) 5.5 cm 05/24/21 1000   Post-Procedure Depth (cm) 0.3 cm 05/24/21 1000   Post-Procedure Surface Area (cm^2) 55 cm^2 05/24/21 1000   Post-Procedure Volume (cm^3) 16.5 cm^3 05/24/21 1000   Wound Assessment Pink/red;Slough 07/22/21 1049   Drainage Amount Large 07/22/21 1049   Drainage Description Serosanguinous 07/22/21 1049   Wound Odor None 07/22/21 1049   Chana-Wound/Incision Assessment Hemosiderin staining (brown yellow) 07/22/21 1049   Edges Undefined edges 07/22/21 1049   Wound Thickness Description Full thickness 07/22/21 1049   Number of days: 441        Percent of Wound(s)/Ulcer(s) Debrided: 40    Total Surface Area Debrided:  40 sq cm     Diabetic/Pressure/Non Pressure Ulcers only:  Ulcer:     Estimated Blood Loss:  Minimal     Hemostasis Achieved: Pressure    Procedural Pain: 2 / 10     Post Procedural Pain: 0 / 10     Response to treatment: Well tolerated by patient

## 2021-07-29 ENCOUNTER — HOSPITAL ENCOUNTER (OUTPATIENT)
Dept: WOUND CARE | Age: 66
Discharge: HOME | End: 2021-07-29
Admitting: SURGERY
Payer: MEDICARE

## 2021-07-29 VITALS
HEART RATE: 59 BPM | TEMPERATURE: 97.5 F | DIASTOLIC BLOOD PRESSURE: 59 MMHG | RESPIRATION RATE: 16 BRPM | SYSTOLIC BLOOD PRESSURE: 100 MMHG

## 2021-07-29 DIAGNOSIS — R60.0 LEG EDEMA, RIGHT: ICD-10-CM

## 2021-07-29 DIAGNOSIS — I73.9 PAD (PERIPHERAL ARTERY DISEASE) (HCC): ICD-10-CM

## 2021-07-29 DIAGNOSIS — L97.912 NON-PRESSURE CHRONIC ULCER OF RIGHT LOWER LEG, WITH FAT LAYER EXPOSED (HCC): ICD-10-CM

## 2021-07-29 PROCEDURE — 11045 DBRDMT SUBQ TISS EACH ADDL: CPT | Performed by: SURGERY

## 2021-07-29 PROCEDURE — 11042 DBRDMT SUBQ TIS 1ST 20SQCM/<: CPT | Performed by: SURGERY

## 2021-07-29 PROCEDURE — 15271 SKIN SUB GRAFT TRNK/ARM/LEG: CPT

## 2021-07-29 PROCEDURE — 15271 SKIN SUB GRAFT TRNK/ARM/LEG: CPT | Performed by: SURGERY

## 2021-07-29 NOTE — PROGRESS NOTES
Debridement Wound Care        Problem List Items Addressed This Visit     None          Procedure Note  Indications:  Based on my examination of this patient's wound(s)/ulcer(s) today, debridement is required to promote healing and evaluate the wound base. Performed by: Wilver Burgos MD    Consent obtained: Yes    Time out taken: Yes    Debridement: Excisional    Using curette the wound(s)/ulcer(s) was/were sharply debrided down through and including the removal of    subcutaneous tissue  Sharp excisional debridement was carried out with removal of necrotic tissue, slough, and granulation into the subcutaneous layer. Debridement was carried down to bleeding viable tissue. Devitalized Tissue Debrided: slough and and granulation intl sq layer    Pre Debridement Measurements:  Are located in the Wound/Ulcer Documentation Flow Sheet    Wound/Ulcer #: 1    Post Debridement Measurements:  Wound/Ulcer Descriptions are Pre Debridement except measurements:Other: depth increased by 0.1 cm after debridement    Wound Back (Active)   Number of days: 699       Wound Leg lower Right # 1 (Active)   Wound Image   07/29/21 0859   Wound Etiology Venous 07/29/21 0859   Dressing Status Intact; Old drainage noted 07/29/21 0859   Cleansed Cleansed with saline 07/29/21 0859   Dressing/Treatment Xeroform;ABD pad 07/08/21 1034   Wound Length (cm) 11 cm 07/29/21 0859   Wound Width (cm) 8.2 cm 07/29/21 0859   Wound Depth (cm) 0.3 cm 07/29/21 0859   Wound Surface Area (cm^2) 90.2 cm^2 07/29/21 0859   Change in Wound Size % 55.06 07/29/21 0859   Wound Volume (cm^3) 27.06 cm^3 07/29/21 0859   Wound Healing % 33 07/29/21 0859   Post-Procedure Length (cm) 10 cm 05/24/21 1000   Post-Procedure Width (cm) 5.5 cm 05/24/21 1000   Post-Procedure Depth (cm) 0.3 cm 05/24/21 1000   Post-Procedure Surface Area (cm^2) 55 cm^2 05/24/21 1000   Post-Procedure Volume (cm^3) 16.5 cm^3 05/24/21 1000   Wound Assessment Pink/red;Slough 07/29/21 0859   Drainage Amount Moderate 07/29/21 0859   Drainage Description Serosanguinous 07/29/21 0859   Wound Odor None 07/29/21 0859   Chana-Wound/Incision Assessment Hemosiderin staining (brown yellow) 07/29/21 0859   Edges Undefined edges 07/29/21 0859   Wound Thickness Description Full thickness 07/29/21 0859   Number of days: 448        Percent of Wound(s)/Ulcer(s) Debrided: 40%    Total Surface Area Debrided:  36 sq cm     Diabetic/Pressure/Non Pressure Ulcers only:  Ulcer:     Estimated Blood Loss:  Minimal     Hemostasis Achieved: Pressure    Procedural Pain: 2 / 10     Post Procedural Pain: 0 / 10     Response to treatment: Well tolerated by patient

## 2021-07-29 NOTE — PROGRESS NOTES
HISTORY OF PRESENT ILLNESS:  The patient is a 18-year-old man who was referred to the wound care center regarding nonhealing wound right lower leg and right posterior ankle.    The patient reports that he had struck his right ankle and developed a wound, which had been nonhealing.     The patient was first seen at the [address] [place] [address] on 5/7/2020.  Ankle  wound has healed.     He  developed swelling and redness in the right lower leg in 2020, which then was followed by blister formation and a wound formation.  He eventually was hospitalized at [facility] and says he was in the hospital for about 18 days.      The patient reports chronic numbness of both feet.  He has right drop foot.     The patient had at the time of an ER visit for leg symptoms on the right in 01/2020, a venous duplex scan which did not find any evidence of deep venous thrombosis.  Vein valves were not tested.     The patient has history of lumbar laminectomy and diskectomy on 09/04/2019. Kendal Hanna reports related to his disk disease, he has right side footdrop.  He reported history of neurogenic claudication.  The patient was scheduled to have further back surgery within the month, but that has been canceled related to COVID-19.     The patient reports that he walks with Vello Appad denies shortness of breath at rest or with exertion.  He has no anginal chest pain.     The patient had angiography by Dr Elizabeth Simpson on 5/17/2020 with finding of severe multilevel arterial occlusive disease in legs.    The patient on 5/29/2020 had open right femoral endarterectomy and patch angioplasty and also balloon angioplasty of right superficial femoral artery.  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  Dr Karlyn Fabry also debrided the dry eschars on the leg wounds.     The patient had JENNA bilateral lower extremities at [facility] on 6/24/2021.  This showed:                   Right JENNA 0.70 at [facility], TBI0. 42.                 Left JENNA 0.67 at [facility], TBI 0.32.     JENNA report from [facility] reviewed - arterial perfusion on the right appears adequate for healing at the leg level,  although not normalized        The patient has a history of coronary artery disease and has had angioplasty and stent.  He has also had coronary artery bypass grafting.  He does not have any history of diabetes.  Past medical history does include asthma, skin cancer, gastroesophageal reflux disease, hypertension, hyperlipidemia, fatty liver disease, potential sleep apnea, but never tested.     SOCIAL HISTORY:  The patient smoked until 1998 when he quit. [place] does use alcohol.     The patient's medications do not include a diuretic.     Prior dressing:   Xeroform on the lower leg wound and then dry gauze and roll gauze changed daily.     Dressing started 8/13/2020:  Aquacell AG on the leg  wounds and then dry gauze and roll gauze changed 3 times per week.     Patient had problems with adherence of the dressing, went back to Xeroform (3 times per week) after 1 week.  He has Home Health.     Drawtex dressing tried. [place] developed more drainage, skin irritation.  Culture on 12/29/2020 grew pseudomonas and MRSA. [place] started doxycycline on 12/31/2020.       Wound culture on 6/10/2021 grew staph aureus (MRSA).   Treated with Doxycycline 100 mg po bid for 14 days.     Resumed xeroform dressing.   His skin tolerates Xeroform well.     Used Farrow wrap with dressing.  Leaving liner stocking on at night.     Less green drainage since starting 0.125% Dakins washes at dressing change.     Prior dressing as of 5/3/2021:  Wash surface of wound with Dakins 0.125%, then rinse with saline.  Zandra / Hydrofera Blue on the leg  wounds and then dry gauze and roll gauze changed 3 times per week.  Covered by Farrow wrap to help with edema control.  Use Tubigrip instead of liner stocking.     Prior dressing as of 7/8/2021:  Wash surface of wound with Dakins 0.125%, then rinse with saline.  Xeroform placed over the ulcer(s).  Unna boot placed form base of toes to upper calf with overlying Coban at 0.50 stretch.   Has Home Health for change in 4 days and weekly.     Prior dressing as of 7/14/2021:   On the superior portion of the wound, two 2 x 4 cm sheets of Puraply were placed.  On the inferior portion of the wound a sheet of 5 x 5 endoform was placed.  The wound was covered by Mepitel silicone fenestrated sheet.  ABD was applied over the wound.  Unna boot was applied from base of toes to upper calf with 0.50 stretch on Coban.     Current dressing as of 7/21/2021: On the inferior portion of the wound, a 5 x 5 cm fenestrated sheet of Puraply were placed.  On the rest of the wound two sheets of 5 x 5 endoform were placed.  The wound was covered by Amado Ranjit fenestrated sheet; steri strips on the edge.  ABD was applied over the wound, roll gauze. Single Tubigrip from base of toes to upper calf. PHYSICAL EXAMINATION:  GENERAL:  The patient is an alert man in no acute distress.     EXTREMITIES:       The patient's lower extremities were examined.  On the left side, there is no edema.  There were no wounds present.      On the right lower extremity, right DP pulse is trace palpable. There is trace edema in the right lower leg.  The right foot feels warm. reduced irritation / maceration of skin around wound.      On the anterior aspect of the mid lower leg on the right, distal right lower leg ulcer 11 x 8.2 x 0.3 with slough. Skin island remains in middle.   Puraply remnant removed form lower part of wound.     Area of possible tissue injury on lateral right foot at 5th  metatarsal head.  Moderate  callus, no skin opening.  No erythema. Dry and unchanged.           Debridement was carried out of the wound on the anterior right lower leg.  See separate note.           On the inferior portion of the wound, a 4 x 4 cm fenestrated sheet of Puraply (application #9) MEVO placed.  On the rest of the wound two sheets of 5 x 5 endoform were placed.  The wound was covered by two pieces of Versatel sheet.  ABD was applied over the wound, roll gauze. Single Tubigrip from base of toes to upper calf. Home health to assess wound, change outer portion, leaving Versatel 3 times per week.             Leg wound stable. A good bit of slough.     Maceration of surrounding skin decreased.       Foot site - ?pressure related.         Dressing as noted above.       Plan:  Puraply weekly x 4 total, then Nushield weekly x 6.      No dressing for foot site.  Keep all pressure off the area.       Hold venous testing.  Would want to avoid saphenous ablation in view of PAD.     Depending on results with skin substitute, consider VAC.     The patient will follow up in wound care center again in 1 week.        FINAL DIAGNOSES:  Nonhealing wounds right lower leg, peripheral artery disease.  Possible pressure site right foot, stage 1.  Leg edema.     L97.912, I73.9, L89.891, R60.0     Arlet Falcon MD

## 2021-07-29 NOTE — DISCHARGE INSTRUCTIONS
Discharge Instructions for  [facility]  [address]  MOB 1, [address] [facility] [place], [id number]  Telephone: [phone]    NAME:  Grant Days OF BIRTH:  1955  MEDICAL RECORD NUMBER:  [id number]  DATE:  7/29/2021  WOUND CARE ORDERS:  Right lower leg - cleanse with saline, apply Puraply, (Endoform applied to distal portion) cover with Mepitel, secure with steri strips, cover with ABD, apply roll gauze and single layer tubigrip size F. Home Health to change ABD, roll gauze and tubigrip only,and assess amount & color of drainage, as well as krystian wound skin, do not remove anything below steri strips. Follow-up in clinic in one week. TREATMENT ORDERS:    Elevate leg(s) above the level of the heart when sitting. Avoid prolonged standing in one place. Do no get dressing/wrap wet. Follow Diet as prescribed:   [x] Diet as tolerated: [] Calorie Diabetic Diet: Low carb and no Sugar [x] No Added Salt:  [x] Increase Protein: [] Limit the amount of liquid you are drinking and avoid drinking in between meals           Return Appointment:  [x] Return Appointment: With DR Chuck Vazquez  in  [address] [place] [address])  [] Nurse Visit : *** days  [] Ordered tests:    Electronically signed Ciarra Alvarez RN on 7/29/2021 at 9:37 AM     [address] [place] [address] Information: Should you experience any significant changes in your wound(s) or have questions about your wound care, please contact the [address] [place] [address] at [address] [place] [address] 8:00 am - 4:30. If you need help with your wound outside these hours and cannot wait until we are again available, contact your PCP or go to the hospital emergency room. PLEASE NOTE: IF YOU ARE UNABLE TO OBTAIN WOUND SUPPLIES, CONTINUE TO USE THE SUPPLIES YOU HAVE AVAILABLE UNTIL YOU ARE ABLE TO REACH US. IT IS MOST IMPORTANT TO KEEP THE WOUND COVERED AT ALL TIMES.      Physician Signature:_______________________    Date: ___________ Time:  ____________

## 2021-07-29 NOTE — WOUND CARE
07/29/21 0859   Anesthetic   Anesthetic   (4% Lidocaine gel)   Right Leg Edema Point of Measurement   Leg circumference 38 cm   Ankle circumference 23.5 cm   Compression Therapy Tubular elastic support bandage   RLE Peripheral Vascular    Capillary Refill Less than/equal to 3 seconds   Color Pink   Temperature Warm   Sensation Present   Pedal Pulse Palpable   Wound Leg lower Right # 1   Date First Assessed/Time First Assessed: 05/07/20 0835   Present on Hospital Admission: Yes  Wound Approximate Age at First Assessment (Weeks): 4 weeks  Primary Wound Type: Vascular  Location: Leg lower  Wound Location Orientation: Right  Wound Descri. .. Wound Image    Wound Etiology Venous   Dressing Status Intact; Old drainage noted  (Removed gauze; ABD; roll gauze; tape; single tubi)   Cleansed Cleansed with saline   Wound Length (cm) 11 cm   Wound Width (cm) 8.2 cm   Wound Depth (cm) 0.3 cm   Wound Surface Area (cm^2) 90.2 cm^2   Change in Wound Size % 55.06   Wound Volume (cm^3) 27.06 cm^3   Wound Healing % 33   Wound Assessment [place]/red;Slough   Drainage Amount Moderate   Drainage Description Serosanguinous   Wound Odor None   Chana-Wound/Incision Assessment Hemosiderin staining (brown yellow)   Edges Undefined edges   Wound Thickness Description Full thickness     Visit Vitals  BP (!) 100/59 (BP 1 Location: Left upper arm, BP Patient Position: At rest) Comment: No dizziness   Pulse (!) 59   Temp 97.5 °F (36.4 °C)   Resp 16        RLE Peripheral Vascular   Capillary Refill: Less than/equal to 3 seconds (07/29/21 0859)  Color: Pink (07/29/21 0859)  Temperature: Warm (07/29/21 0859)  Sensation: Present (07/29/21 0859)  Pedal Pulse: Palpable (07/29/21 0859)

## 2021-07-29 NOTE — WOUND CARE
07/29/21 0958   Wound Leg lower Right # 1   Date First Assessed/Time First Assessed: 05/07/20 0835   Present on Hospital Admission: Yes  Wound Approximate Age at First Assessment (Weeks): 4 weeks  Primary Wound Type: Vascular  Location: Leg lower  Wound Location Orientation: Right  Wound Descri. .. Dressing/Treatment   (Puraply, versatel, steris, ABD, RG, tubigrip)   PuraPly AM Treatment Note    NAME:  Sohan Robles  YOB: 1955  MEDICAL RECORD NUMBER:  [id number]  DATE:  7/29/2021    Goal:  Patient will receive safe and proper application of skin substitute. Patient will comply with caring for dressing, and reporting complications. Expiration date checked immediately prior to use. Package intact prior to use and no damage noted. Transport temperature controlled and acceptable. PuraPly AM was removed from protective sterile packaging by provider and applied to prepared ulcer bed. PuraPly AM was applied to Right lower leg and affixed with Versatel & steri strips by the provider. PuraPly AM may be applied a total of 10 times per wound over a 12 week period. Date of first application of PuraPly for this current wound is July 22, 2021 .    Guidelines followed    Electronically signed by Trudy Otero RN on 7/29/2021 at 10:53 AM

## 2021-08-04 ENCOUNTER — HOSPITAL ENCOUNTER (OUTPATIENT)
Dept: WOUND CARE | Age: 66
Discharge: HOME | End: 2021-08-04
Admitting: SURGERY
Payer: MEDICARE

## 2021-08-04 VITALS
DIASTOLIC BLOOD PRESSURE: 58 MMHG | SYSTOLIC BLOOD PRESSURE: 126 MMHG | RESPIRATION RATE: 16 BRPM | TEMPERATURE: 97.7 F | HEART RATE: 72 BPM

## 2021-08-04 DIAGNOSIS — L97.912 NON-PRESSURE CHRONIC ULCER OF RIGHT LOWER LEG, WITH FAT LAYER EXPOSED (HCC): ICD-10-CM

## 2021-08-04 DIAGNOSIS — I73.9 PAD (PERIPHERAL ARTERY DISEASE) (HCC): ICD-10-CM

## 2021-08-04 PROCEDURE — 11042 DBRDMT SUBQ TIS 1ST 20SQCM/<: CPT | Performed by: SURGERY

## 2021-08-04 PROCEDURE — 15271 SKIN SUB GRAFT TRNK/ARM/LEG: CPT | Performed by: SURGERY

## 2021-08-04 PROCEDURE — 11045 DBRDMT SUBQ TISS EACH ADDL: CPT | Performed by: SURGERY

## 2021-08-04 PROCEDURE — 15271 SKIN SUB GRAFT TRNK/ARM/LEG: CPT

## 2021-08-04 NOTE — PROGRESS NOTES
Debridement Wound Care        Problem List Items Addressed This Visit     None          Procedure Note  Indications:  Based on my examination of this patient's wound(s)/ulcer(s) today, debridement is required to promote healing and evaluate the wound base. Performed by: Nayeli Gomez MD    Consent obtained: Yes    Time out taken: Yes    Debridement: Excisional    Using curette the wound(s)/ulcer(s) was/were sharply debrided down through and including the removal of    subcutaneous tissue  Sharp excisional debridement was carried out with removal of necrotic tissue, slough, and granulation into the subcutaneous layer. Debridement was carried down to bleeding viable tissue. Devitalized Tissue Debrided: slough and granulation into the SQ layer.     Pre Debridement Measurements:  Are located in the Wound/Ulcer Documentation Flow Sheet    Wound/Ulcer #: 1    Post Debridement Measurements:  Wound/Ulcer Descriptions are Pre Debridement except measurements:Other: depth increased by 0.1 cm after debridement    Wound Back (Active)   Number of days: 705       Wound Leg lower Right # 1 (Active)   Wound Image   08/04/21 1419   Wound Etiology Venous 08/04/21 1419   Dressing Status Old drainage noted 08/04/21 1419   Cleansed Cleansed with saline 08/04/21 1419   Dressing/Treatment Xeroform;ABD pad 07/08/21 1034   Wound Length (cm) 11.5 cm 08/04/21 1419   Wound Width (cm) 8.5 cm 08/04/21 1419   Wound Depth (cm) 0.3 cm 08/04/21 1419   Wound Surface Area (cm^2) 97.75 cm^2 08/04/21 1419   Change in Wound Size % 51.3 08/04/21 1419   Wound Volume (cm^3) 29.325 cm^3 08/04/21 1419   Wound Healing % 27 08/04/21 1419   Post-Procedure Length (cm) 11.5 cm 08/04/21 1419   Post-Procedure Width (cm) 8.5 cm 08/04/21 1419   Post-Procedure Depth (cm) 0.3 cm 05/24/21 1000   Post-Procedure Surface Area (cm^2) 97.75 cm^2 08/04/21 1419   Post-Procedure Volume (cm^3) 16.5 cm^3 05/24/21 1000   Wound Assessment [place]/red;Slough 08/04/21 1419   Drainage Amount Moderate 08/04/21 1419   Drainage Description Serosanguinous 08/04/21 1419   Wound Odor None 08/04/21 1419   Chana-Wound/Incision Assessment Hemosiderin staining (brown yellow) 08/04/21 1419   Edges Undefined edges 08/04/21 1419   Wound Thickness Description Full thickness 08/04/21 1419   Number of days: 702        Percent of Wound(s)/Ulcer(s) Debrided: 40%    Total Surface Area Debrided:  38 sq cm     Diabetic/Pressure/Non Pressure Ulcers only:  Ulcer:     Estimated Blood Loss:  Other 5 ml    Hemostasis Achieved: Pressure    Procedural Pain: 2 / 10     Post Procedural Pain: 0 / 10     Response to treatment: Well tolerated by patient

## 2021-08-04 NOTE — PROGRESS NOTES
HISTORY OF PRESENT ILLNESS:  The patient is a 75-year-old man who was referred to the wound care center regarding nonhealing wound right lower leg and right posterior ankle.    The patient reports that he had struck his right ankle and developed a wound, which had been nonhealing.     The patient was first seen at the [address] [place] [address] on 5/7/2020.  Ankle  wound has healed.     He  developed swelling and redness in the right lower leg in 2020, which then was followed by blister formation and a wound formation.  He eventually was hospitalized at [facility] and says he was in the hospital for about 18 days.      The patient reports chronic numbness of both feet.  He has right drop foot.     The patient had at the time of an ER visit for leg symptoms on the right in 01/2020, a venous duplex scan which did not find any evidence of deep venous thrombosis.  Vein valves were not tested.     The patient has history of lumbar laminectomy and diskectomy on 09/04/2019. Curmaicol Balbuena reports related to his disk disease, he has right side footdrop.  He reported history of neurogenic claudication.  The patient was scheduled to have further back surgery within the month, but that has been canceled related to COVID-19.     The patient reports that he walks with Jose Kil denies shortness of breath at rest or with exertion.  He has no anginal chest pain.     The patient had angiography by Dr Roselyn Cummings on 5/17/2020 with finding of severe multilevel arterial occlusive disease in legs.    The patient on 5/29/2020 had open right femoral endarterectomy and patch angioplasty and also balloon angioplasty of right superficial femoral artery.  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  Dr Oswaldo Denise also debrided the dry eschars on the leg wounds.     The patient had JENNA bilateral lower extremities at [facility] on 6/24/2021.  This showed:                   Right JENNA 0.70 at [facility], TBI0. 42.                 Left JENNA 0.67 at [facility], TBI 0.32.     JENNA report from [facility] reviewed - arterial perfusion on the right appears adequate for healing at the leg level,  although not normalized        The patient has a history of coronary artery disease and has had angioplasty and stent.  He has also had coronary artery bypass grafting.  He does not have any history of diabetes.  Past medical history does include asthma, skin cancer, gastroesophageal reflux disease, hypertension, hyperlipidemia, fatty liver disease, potential sleep apnea, but never tested.     SOCIAL HISTORY:  The patient smoked until 1998 when he quit. Kendal Hanna does use alcohol.     The patient's medications do not include a diuretic.     Prior dressing:   Xeroform on the lower leg wound and then dry gauze and roll gauze changed daily.     Dressing started 8/13/2020:  Aquacell AG on the leg  wounds and then dry gauze and roll gauze changed 3 times per week.     Patient had problems with adherence of the dressing, went back to Xeroform (3 times per week) after 1 week.  He has Home Health.     Drawtex dressing tried. Kendal Hanna developed more drainage, skin irritation.  Culture on 12/29/2020 grew pseudomonas and MRSA. Kendal Hanna started doxycycline on 12/31/2020.       Wound culture on 6/10/2021 grew staph aureus (MRSA).   Treated with Doxycycline 100 mg po bid for 14 days.     Resumed xeroform dressing.   His skin tolerates Xeroform well.     Used Farrow wrap with dressing.  Leaving liner stocking on at night.     Less green drainage since starting 0.125% Dakins washes at dressing change.     Prior dressing as of 5/3/2021:  Wash surface of wound with Dakins 0.125%, then rinse with saline.  Zandra / Hydrofera Blue on the leg  wounds and then dry gauze and roll gauze changed 3 times per week.  Covered by Farrow wrap to help with edema control.  Use Tubigrip instead of liner stocking.     Prior dressing as of 7/8/2021:  Wash surface of wound with Dakins 0.125%, then rinse with saline.  Xeroform placed over the ulcer(s).  Unna boot placed form base of toes to upper calf with overlying Coban at 0.50 stretch.   Has Home Health for change in 4 days and weekly.     Prior dressing as of 7/14/2021:   On the superior portion of the wound, two 2 x 4 cm sheets of Puraply were placed.  On the inferior portion of the wound a sheet of 5 x 5 endoform was placed.  The wound was covered by Mepitel silicone fenestrated sheet.  ABD was applied over the wound.  Unna boot was applied from base of toes to upper calf with 0.50 stretch on Coban.     Current dressing as of 7/21/2021: On the inferior portion of the wound, a 5 x 5 cm fenestrated sheet of Puraply were placed.  On the rest of the wound two sheets of 5 x 5 endoform were placed.  The wound was covered by Mepitel  silicone fenestrated sheet; steri strips on the edge.  ABD was applied over the wound, roll gauze.  Single Tubigrip from base of toes to upper calf.      Patient had occasional intermittent shooting pain in the wound lasting for a few seconds. PHYSICAL EXAMINATION:  GENERAL:  The patient is an alert man in no acute distress.     EXTREMITIES:       The patient's lower extremities were examined.  On the left side, there is no edema.  There were no wounds present.      On the right lower extremity, right DP pulse is trace palpable. There is trace edema in the right lower leg.  The right foot feels warm. reduced irritation / maceration of skin around wound.      On the anterior aspect of the mid lower leg on the right, distal right lower leg ulcer 11.5 x 8.5 x 0.3 with slough.  Skin island remains in middle.   Puraply remnant removed form lower part of wound.     Area of possible tissue injury on lateral right foot at 5th  metatarsal head.  Moderate  callus, no skin opening.  No erythema. Dry and unchanged.           Debridement was carried out of the wound on the anterior right lower leg.  See separate note.             On the superior /  medial portion of the wound, a 4 x 4 cm fenestrated sheet of Puraply (application #0) GTUH placed.  On the rest of the wound two sheets of 5 x 5 endoform were placed.  The wound was covered by two pieces of Versatel sheet.  ABD was applied over the wound, roll gauze.  Single Tubigrip from base of toes to upper calf. Home health to assess wound, change outer portion, leaving Versatel 3 times per week.              Leg wound slightly improved.   A good bit of slough.     Maceration of surrounding skin decreased.       Foot site - ?pressure related.          Dressing as noted above.       Plan:  This is the last Puraply, then Nushield weekly x 6.      No dressing for foot site.  Keep all pressure off the area.       Hold venous testing.  Would want to avoid saphenous ablation in view of PAD.     Depending on results with skin substitute, can consider VAC.     The patient will follow up in wound care center again in 1 week.        FINAL DIAGNOSES:  Nonhealing wounds right lower leg, peripheral artery disease.  Possible pressure site right foot, stage 1.  Leg edema.     L97.912, I73.9, L89.891, R60.0     Stef Levine MD

## 2021-08-04 NOTE — DISCHARGE INSTRUCTIONS
Discharge Instructions for  [facility]  [address]  [facility], [address] [facility] [place], [id number]  Telephone: [phone]    NAME:  Sravan Baxter OF BIRTH:  1955  MEDICAL RECORD NUMBER:  [id number]  DATE:  8/4/2021  WOUND CARE ORDERS:  Right lower leg - cleanse with saline, apply Puraply, (Endoform applied to distal portion) cover with Mepitel, secure with steri strips, cover with ABD, apply roll gauze and single layer tubigrip size F.  Home Health to change ABD, roll gauze and tubigrip only,and assess amount & color of drainage, as well as krystian wound skin, do not remove anything below steri strips. Follow-up in clinic in one week. TREATMENT ORDERS:    Elevate leg(s) above the level of the heart when sitting. Avoid prolonged standing in one place. Do no get dressing/wrap wet. Follow Diet as prescribed:   [x] Diet as tolerated: [] Calorie Diabetic Diet: Low carb and no Sugar [x] No Added Salt:  [x] Increase Protein: [] Limit the amount of liquid you are drinking and avoid drinking in between meals           Return Appointment:  [x] Return Appointment: With DR Mitch Crockett  in  1 [facility])  [] Nurse Visit : *** days  [] Ordered tests:    Electronically signed Trudy Otero RN on 8/4/2021 at 3:11 PM     Sallie Vega 281: Should you experience any significant changes in your wound(s) or have questions about your wound care, please contact the [address] [place] [address] at [address] [place] [address] 8:00 am - 4:30. If you need help with your wound outside these hours and cannot wait until we are again available, contact your PCP or go to the hospital emergency room. PLEASE NOTE: IF YOU ARE UNABLE TO OBTAIN WOUND SUPPLIES, CONTINUE TO USE THE SUPPLIES YOU HAVE AVAILABLE UNTIL YOU ARE ABLE TO REACH US. IT IS MOST IMPORTANT TO KEEP THE WOUND COVERED AT ALL TIMES.      Physician Signature:_______________________    Date: ___________ Time:  ____________

## 2021-08-04 NOTE — WOUND CARE
08/04/21 1419   Right Leg Edema Point of Measurement   Leg circumference 40 cm   Ankle circumference 24 cm   Compression Therapy Tubular elastic support bandage   RLE Peripheral Vascular    Capillary Refill Less than/equal to 3 seconds   Color Pink   Temperature Warm   Sensation Present   Pedal Pulse Palpable   Wound Leg lower Right # 1   Date First Assessed/Time First Assessed: 05/07/20 0835   Present on Hospital Admission: Yes  Wound Approximate Age at First Assessment (Weeks): 4 weeks  Primary Wound Type: Vascular  Location: Leg lower  Wound Location Orientation: Right  Wound Descri. ..    Wound Image    Wound Etiology Venous   Dressing Status Old drainage noted   Cleansed Cleansed with saline   Dressing/Treatment   (skin graft, ABD rolled gauze, tubi)   Wound Length (cm) 11.5 cm   Wound Width (cm) 8.5 cm   Wound Depth (cm) 0.3 cm   Wound Surface Area (cm^2) 97.75 cm^2   Change in Wound Size % 51.3   Wound Volume (cm^3) 29.325 cm^3   Wound Healing % 27   Wound Assessment [place]/red;Slough   Drainage Amount Moderate   Drainage Description Serosanguinous   Wound Odor None   Chana-Wound/Incision Assessment Hemosiderin staining (brown yellow)   Edges Undefined edges   Wound Thickness Description Full thickness   Pain 1   Pain Scale 1 Numeric (0 - 10)   Pain Intensity 1 4   Pain Location 1 Leg   Pain Orientation 1 Right   Pain Description 1 Burning   Pain Intervention(s) 1 Medication (see MAR)       Visit Vitals  BP (!) 126/58   Pulse 72   Temp 97.7 °F (36.5 °C)   Resp 16

## 2021-08-12 ENCOUNTER — HOSPITAL ENCOUNTER (OUTPATIENT)
Dept: WOUND CARE | Age: 66
Discharge: HOME | End: 2021-08-12
Admitting: SURGERY
Payer: MEDICARE

## 2021-08-12 VITALS
TEMPERATURE: 97.1 F | SYSTOLIC BLOOD PRESSURE: 123 MMHG | HEART RATE: 76 BPM | RESPIRATION RATE: 16 BRPM | DIASTOLIC BLOOD PRESSURE: 57 MMHG

## 2021-08-12 DIAGNOSIS — R60.0 LEG EDEMA, RIGHT: ICD-10-CM

## 2021-08-12 DIAGNOSIS — L97.912 NON-PRESSURE CHRONIC ULCER OF RIGHT LOWER LEG, WITH FAT LAYER EXPOSED (HCC): ICD-10-CM

## 2021-08-12 DIAGNOSIS — I73.9 PAD (PERIPHERAL ARTERY DISEASE) (HCC): ICD-10-CM

## 2021-08-12 PROCEDURE — 15271 SKIN SUB GRAFT TRNK/ARM/LEG: CPT

## 2021-08-12 PROCEDURE — 97597 DBRDMT OPN WND 1ST 20 CM/<: CPT | Performed by: SURGERY

## 2021-08-12 PROCEDURE — 15271 SKIN SUB GRAFT TRNK/ARM/LEG: CPT | Performed by: SURGERY

## 2021-08-12 PROCEDURE — 97598 DBRDMT OPN WND ADDL 20CM/<: CPT | Performed by: SURGERY

## 2021-08-12 NOTE — PROGRESS NOTES
Debridement Wound Care        Problem List Items Addressed This Visit     None          Procedure Note  Indications:  Based on my examination of this patient's wound(s)/ulcer(s) today, debridement is required to promote healing and evaluate the wound base. Performed by: Scarlet Mejia MD    Consent obtained: Yes    Time out taken: Yes    Debridement: Non-excisional/Selective    Using curette the wound(s)/ulcer(s) was/were sharply debrided down through and including the removal of    subcutaneous tissue  Sharp excisional debridement was carried out with removal of necrotic tissue, slough, and granulation into the subcutaneous layer. Debridement was carried down to bleeding viable tissue. Devitalized Tissue Debrided: slough    Pre Debridement Measurements:  Are located in the Wound/Ulcer Documentation Flow Sheet    Wound/Ulcer #: 1    Post Debridement Measurements:  Wound/Ulcer Descriptions are Pre Debridement except measurements:Other: minimal change in depth post debridement    Wound Back (Active)   Number of days: 713       Wound Leg lower Right # 1 (Active)   Wound Image   08/12/21 0953   Wound Etiology Venous 08/12/21 0953   Dressing Status Old drainage noted 08/12/21 0953   Cleansed Cleansed with saline 08/12/21 0953   Dressing/Treatment Xeroform;ABD pad 07/08/21 1034   Wound Length (cm) 11.5 cm 08/12/21 0953   Wound Width (cm) 8.7 cm 08/12/21 0953   Wound Depth (cm) 0.3 cm 08/12/21 0953   Wound Surface Area (cm^2) 100.05 cm^2 08/12/21 0953   Change in Wound Size % 50.15 08/12/21 0953   Wound Volume (cm^3) 30. 015 cm^3 08/12/21 0953   Wound Healing % 25 08/12/21 0953   Post-Procedure Length (cm) 11.5 cm 08/12/21 1028   Post-Procedure Width (cm) 8.7 cm 08/12/21 1028   Post-Procedure Depth (cm) 0.4 cm 08/12/21 1028   Post-Procedure Surface Area (cm^2) 100.05 cm^2 08/12/21 1028   Post-Procedure Volume (cm^3) 40.02 cm^3 08/12/21 1028   Wound Assessment Pink/red;Slough 08/12/21 0953   Drainage Amount Moderate 08/12/21 0953   Drainage Description Serous 08/12/21 0953   Wound Odor None 08/12/21 0953   Chana-Wound/Incision Assessment Hemosiderin staining (brown yellow) 08/12/21 0953   Edges Undefined edges 08/12/21 0953   Wound Thickness Description Full thickness 08/12/21 0953   Number of days: 462        Percent of Wound(s)/Ulcer(s) Debrided: 55    Total Surface Area Debrided:  55 sq cm     Diabetic/Pressure/Non Pressure Ulcers only:  Ulcer:     Estimated Blood Loss:  Minimal     Hemostasis Achieved: Pressure    Procedural Pain: 2 / 10     Post Procedural Pain: 0 / 10     Response to treatment: Well tolerated by patient

## 2021-08-12 NOTE — DISCHARGE INSTRUCTIONS
Discharge Instructions/Wound Orders  [address], [address] [place] [address]  [address] [place] [address] [place], [id number]  Telephone: [phone]    NAME:  Donato Vela OF BIRTH:  1955  MEDICAL RECORD NUMBER:  [id number]  DATE:  8/12/2021  Wound Care Orders:    Right lower leg - cleanse with saline, apply Nu shield, (Endoform applied to distal portion) cover with Mepitel, secure with steri strips, cover with ABD, apply roll gauze and single layer tubigrip size F. Pt/cg to change ABD, roll gauze and tubigrip only,daily/as needed to contain drainage, do not remove anything below steri strips. Follow-up in clinic in one week. Home health to discharge patient next visit. Dietary:  [] Diet as tolerated: [] Calorie Diabetic Diet:Low carb and no Sugar [] No Added Salt:[] Increase Protein: [] Other:Limit the amount of liquid you are drinking and avoid drinking in between meals   Activity:  [x] Activity as tolerated:  [] Patient has no activity restrictions     [] Strict Bedrest: [] Remain off Work:     [] May return to full duty work:                                   [] Return to work with restrictions:             Return Appointment:  [] Return Appointment: With DR Maday Osborn  in  one [facility])  [] Ordered tests:    Electronically signed ton Call on 8/12/2021 at [address]: Should you experience any significant changes in your wound(s) or have questions about your wound care, please contact the [address] [place] [address] at [address] [place] [address] 8:00 am - 4:30. If you need help with your wound outside these hours and cannot wait until we are again available, contact your PCP or go to the hospital emergency room. PLEASE NOTE: IF YOU ARE UNABLE TO OBTAIN WOUND SUPPLIES, CONTINUE TO USE THE SUPPLIES YOU HAVE AVAILABLE UNTIL YOU ARE ABLE TO REACH US. IT IS MOST IMPORTANT TO KEEP THE WOUND COVERED AT ALL TIMES.      Physician Signature:_______________________    Date: ___________ Time:  ____________

## 2021-08-12 NOTE — PROGRESS NOTES
HISTORY OF PRESENT ILLNESS:  The patient is a 47-year-old man who was referred to the wound care center regarding nonhealing wound right lower leg and right posterior ankle.    The patient reports that he had struck his right ankle and developed a wound, which had been nonhealing.     The patient was first seen at the [address] [place] [address] on 5/7/2020.  Ankle  wound has healed.     He  developed swelling and redness in the right lower leg in 2020, which then was followed by blister formation and a wound formation.  He eventually was hospitalized at [facility] and says he was in the hospital for about 18 days.      The patient reports chronic numbness of both feet.  He has right drop foot.     The patient had at the time of an ER visit for leg symptoms on the right in 01/2020, a venous duplex scan which did not find any evidence of deep venous thrombosis.  Vein valves were not tested.     The patient has history of lumbar laminectomy and diskectomy on 09/04/2019. Lui Gonzalez reports related to his disk disease, he has right side footdrop.  He reported history of neurogenic claudication.  The patient was scheduled to have further back surgery within the month, but that has been canceled related to COVID-19.     The patient reports that he walks with RedDrummerw denies shortness of breath at rest or with exertion.  He has no anginal chest pain.     The patient had angiography by Dr Amber Gamble on 5/17/2020 with finding of severe multilevel arterial occlusive disease in legs.    The patient on 5/29/2020 had open right femoral endarterectomy and patch angioplasty and also balloon angioplasty of right superficial femoral artery.  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  Dr Jeronimo Alvarez also debrided the dry eschars on the leg wounds.     The patient had JENNA bilateral lower extremities at [facility] on 6/24/2021.  This showed:                   Right JENNA 0.70 at [facility], TBI0. 42.                 Left JENNA 0.67 at [facility], TBI 0.32.     JENNA report from [facility] reviewed - arterial perfusion on the right appears adequate for healing at the leg level,  although not normalized        The patient has a history of coronary artery disease and has had angioplasty and stent.  He has also had coronary artery bypass grafting.  He does not have any history of diabetes.  Past medical history does include asthma, skin cancer, gastroesophageal reflux disease, hypertension, hyperlipidemia, fatty liver disease, potential sleep apnea, but never tested.     SOCIAL HISTORY:  The patient smoked until 1998 when he quit. [facility] does use alcohol.     The patient's medications do not include a diuretic.     Prior dressing:   Xeroform on the lower leg wound and then dry gauze and roll gauze changed daily.     Dressing started 8/13/2020:  Aquacell AG on the leg  wounds and then dry gauze and roll gauze changed 3 times per week.     Patient had problems with adherence of the dressing, went back to Xeroform (3 times per week) after 1 week.  He has Home Health.     Drawtex dressing tried. [facility] developed more drainage, skin irritation.  Culture on 12/29/2020 grew pseudomonas and MRSA. [facility] started doxycycline on 12/31/2020.       Wound culture on 6/10/2021 grew staph aureus (MRSA).   Treated with Doxycycline 100 mg po bid for 14 days.     Resumed xeroform dressing.  His skin tolerates Xeroform well.     Used Farrow wrap with dressing.  Leaving liner stocking on at night.     Less green drainage since starting 0.125% Dakins washes at dressing change.     Prior dressing as of 5/3/2021:  Wash surface of wound with Dakins 0.125%, then rinse with saline.  Zandra / Hydrofera Blue on the leg  wounds and then dry gauze and roll gauze changed 3 times per week.  Covered by Farrow wrap to help with edema control.  Use Tubigrip instead of liner stocking.     Prior dressing as of 7/8/2021:  Wash surface of wound with Dakins 0.125%, then rinse with saline.  Xeroform placed over the ulcer(s).  Unna boot placed form base of toes to upper calf with overlying Coban at 0.50 stretch.   Has Home Health for change in 4 days and weekly.     Prior dressing as of 7/14/2021:   On the superior portion of the wound, two 2 x 4 cm sheets of Puraply were placed.  On the inferior portion of the wound a sheet of 5 x 5 endoform was placed.  The wound was covered by Mepitel silicone fenestrated sheet.  ABD was applied over the wound.  Unna boot was applied from base of toes to upper calf with 0.50 stretch on Coban.     Current dressing as of 7/21/2021:  On the inferior portion of the wound, a 5 x 5 cm fenestrated sheet of Puraply were placed.  On the rest of the wound two sheets of 5 x 5 endoform were placed.  The wound was covered by Mepitel  silicone fenestrated sheet; steri strips on the edge.  ABD was applied over the wound, roll gauze.  Single Tubigrip from base of toes to upper calf.     Patient has intermittent shooting pain in the wound lasting for a few seconds. Can wake him at night. Can occur minutes apart or hours apart.     He would like outer dressing changed more often to minimize moisture at wound.        PHYSICAL EXAMINATION:  GENERAL:  The patient is an alert man in no acute distress.     EXTREMITIES:       The patient's lower extremities were examined.  On the left side, there is no edema.  There were no wounds present.      On the right lower extremity, right DP pulse is trace palpable. There is trace edema in the right lower leg.  The right foot feels warm. Reduced irritation / maceration of skin around wound.      On the anterior aspect of the mid lower leg on the right, distal right lower leg ulcer 11.5 x 8.7 x 0.4 with slough.  Skin island remains in middle.  Puraply remnant removed form  wound.     Area of possible tissue injury on lateral right foot at 5th  metatarsal head.  Moderate  callus, no skin opening.  No erythema. Dry and unchanged.           Debridement was carried out of the wound on the anterior right lower leg.  See separate note.             On the inferior portion of the wound, a 4 x 6 cm sheet of Puraply (application #6) TVRB placed.  On the rest of the wound two sheets of 5 x 5 endoform were cut and placed.  The wound was covered by two pieces of Versatel sheet.  ABD was applied over the wound, roll gauze.  Single Tubigrip from base of toes to upper calf.  Patient will change outer dressing daily to control drainage.              Leg wound slightly stable.     Maceration of surrounding skin decreased.       Foot site - ?pressure related.          Dressing as noted above.       Plan:  [facility] weekly x 6.      No dressing for foot site.  Keep all pressure off the area.       Hold venous testing.  Would want to avoid saphenous ablation in view of PAD.     Depending on results with skin substitute, can consider VAC.     The patient will follow up in wound care center again in 1 week.        FINAL DIAGNOSES:  Nonhealing wounds right lower leg, peripheral artery disease.  Possible pressure site right foot, stage 1.  Leg edema.     L97.912, I73.9, L89.891, R60.0     Moira Weston MD

## 2021-08-12 NOTE — WOUND CARE
08/12/21 0953   Right Leg Edema Point of Measurement   Leg circumference 40 cm   Ankle circumference 23.5 cm   Compression Therapy Tubular elastic support bandage   RLE Peripheral Vascular    Capillary Refill Less than/equal to 3 seconds   Color Appropriate for race   Temperature Warm   Sensation Present   Pedal Pulse Palpable   Wound Leg lower Right # 1   Date First Assessed/Time First Assessed: 05/07/20 0835   Present on Hospital Admission: Yes  Wound Approximate Age at First Assessment (Weeks): 4 weeks  Primary Wound Type: Vascular  Location: Leg lower  Wound Location Orientation: Right  Wound Descri. .. Wound Image    Wound Etiology Venous   Dressing Status Old drainage noted   Cleansed Cleansed with saline   Dressing/Treatment   (Puraply/endoform, versatel, ABDs, RG, tubigrip)   Wound Length (cm) 11.5 cm   Wound Width (cm) 8.7 cm   Wound Depth (cm) 0.3 cm   Wound Surface Area (cm^2) 100.05 cm^2   Change in Wound Size % 50.15   Wound Volume (cm^3) 30. 015 cm^3   Wound Healing % 25   Wound Assessment [place]/red;Slough   Drainage Amount Moderate   Drainage Description Serous   Wound Odor None   Chana-Wound/Incision Assessment Hemosiderin staining (brown yellow)   Edges Undefined edges   Wound Thickness Description Full thickness     Visit Vitals  BP (!) 123/57 (BP 1 Location: Left upper arm, BP Patient Position: At rest;Reclining)   Pulse 76   Temp 97.1 °F (36.2 °C)   Resp 16

## 2021-08-19 ENCOUNTER — HOSPITAL ENCOUNTER (OUTPATIENT)
Dept: WOUND CARE | Age: 66
Discharge: HOME | End: 2021-08-19
Admitting: SURGERY
Payer: MEDICARE

## 2021-08-19 VITALS
TEMPERATURE: 97.6 F | DIASTOLIC BLOOD PRESSURE: 57 MMHG | RESPIRATION RATE: 16 BRPM | HEART RATE: 69 BPM | SYSTOLIC BLOOD PRESSURE: 110 MMHG

## 2021-08-19 PROCEDURE — 15271 SKIN SUB GRAFT TRNK/ARM/LEG: CPT

## 2021-08-19 RX ORDER — EZETIMIBE 10 MG/1
10 TABLET ORAL DAILY
COMMUNITY

## 2021-08-19 NOTE — WOUND CARE
08/19/21 1046   RLE Peripheral Vascular    Capillary Refill Less than/equal to 3 seconds   Color Appropriate for race   Temperature Warm   Sensation Present   Pedal Pulse Palpable   Circumference of Calf (cm) 39.7 cm   Circumference of Ankle (cm) 23 cm   Wound Leg lower Right # 1   Date First Assessed/Time First Assessed: 05/07/20 0835   Present on Hospital Admission: Yes  Wound Approximate Age at First Assessment (Weeks): 4 weeks  Primary Wound Type: Vascular  Location: Leg lower  Wound Location Orientation: Right  Wound Descri. ..    Wound Image    Wound Etiology Venous   Dressing Status Old drainage noted   Cleansed Cleansed with saline   Dressing/Treatment   ([facility] endorform, versatel, ABD tubi )   Wound Length (cm) 10.8 cm   Wound Width (cm) 9.4 cm   Wound Depth (cm) 0.2 cm   Wound Surface Area (cm^2) 101.52 cm^2   Change in Wound Size % 49.42   Wound Volume (cm^3) 20.304 cm^3   Wound Healing % 49   Wound Assessment [place]/red;Slough   Drainage Amount Moderate   Drainage Description Serous   Wound Odor None   Chana-Wound/Incision Assessment Hemosiderin staining (brown yellow)   Edges Undefined edges   Wound Thickness Description Full thickness     Visit Vitals  BP (!) 110/57 (BP Patient Position: At rest)   Pulse 69   Temp 97.6 °F (36.4 °C)   Resp 16

## 2021-08-19 NOTE — PROGRESS NOTES
Debridement Wound Care        Problem List Items Addressed This Visit     None          Procedure Note  Indications:  Based on my examination of this patient's wound(s)/ulcer(s) today, debridement is required to promote healing and evaluate the wound base.     Performed by: Elder Montes MD    Consent obtained: Yes    Time out taken: Yes    Debridement: Selective    Using curette the wound(s)/ulcer(s) was/were sharply debrided down through and including the removal of    subcutaneous tissue    Devitalized Tissue Debrided: slough    Pre Debridement Measurements:  Are located in the Wound/Ulcer Documentation Flow Sheet    Wound/Ulcer #: 1    Post Debridement Measurements:  Wound/Ulcer Descriptions are Pre Debridement except measurements:Other: Depth increased by 0.1 cm after debridement    Wound Back (Active)   Number of days: 720       Wound Leg lower Right # 1 (Active)   Wound Image   08/19/21 1046   Wound Etiology Venous 08/19/21 1046   Dressing Status Old drainage noted 08/19/21 1046   Cleansed Cleansed with saline 08/19/21 1046   Dressing/Treatment Xeroform;ABD pad 07/08/21 1034   Wound Length (cm) 10.8 cm 08/19/21 1046   Wound Width (cm) 9.4 cm 08/19/21 1046   Wound Depth (cm) 0.2 cm 08/19/21 1046   Wound Surface Area (cm^2) 101.52 cm^2 08/19/21 1046   Change in Wound Size % 49.42 08/19/21 1046   Wound Volume (cm^3) 20.304 cm^3 08/19/21 1046   Wound Healing % 49 08/19/21 1046   Post-Procedure Length (cm) 10.8 cm 08/19/21 1046   Post-Procedure Width (cm) 9.4 cm 08/19/21 1046   Post-Procedure Depth (cm) 0.3 cm 08/19/21 1046   Post-Procedure Surface Area (cm^2) 101.52 cm^2 08/19/21 1046   Post-Procedure Volume (cm^3) 30.456 cm^3 08/19/21 1046   Wound Assessment [place]/red;Slough 08/19/21 1046   Drainage Amount Moderate 08/19/21 1046   Drainage Description Serous 08/19/21 1046   Wound Odor None 08/19/21 1046   Chana-Wound/Incision Assessment Hemosiderin staining (brown yellow) 08/19/21 1046   Edges Undefined edges 08/19/21 1046   Wound Thickness Description Full thickness 08/19/21 1046   Number of days: 469        Percent of Wound(s)/Ulcer(s) Debrided: 70    Total Surface Area Debrided:  70 sq cm     Diabetic/Pressure/Non Pressure Ulcers only:  Ulcer:     Estimated Blood Loss:  Minimal     Hemostasis Achieved: Pressure and Silver Nitrate    Procedural Pain: 2 / 10     Post Procedural Pain: 0 / 10     Response to treatment: Well tolerated by patient

## 2021-08-19 NOTE — PROGRESS NOTES
HISTORY OF PRESENT ILLNESS:  The patient is a 57-year-old man who was referred to the wound care center regarding nonhealing wound right lower leg and right posterior ankle.    The patient reports that he had struck his right ankle and developed a wound, which had been nonhealing.     The patient was first seen at the [address] [place] [address] on 5/7/2020.  Ankle  wound has healed.     He  developed swelling and redness in the right lower leg in 2020, which then was followed by blister formation and a wound formation.  He eventually was hospitalized at [facility] and says he was in the hospital for about 18 days.      The patient reports chronic numbness of both feet.  He has right drop foot.     The patient had at the time of an ER visit for leg symptoms on the right in 01/2020, a venous duplex scan which did not find any evidence of deep venous thrombosis.  Vein valves were not tested.     The patient has history of lumbar laminectomy and diskectomy on 09/04/2019. [facility] reports related to his disk disease, he has right side footdrop.  He reported history of neurogenic claudication.  The patient was scheduled to have further back surgery within the month, but that has been canceled related to COVID-19.     The patient reports that he walks with [place] denies shortness of breath at rest or with exertion.  He has no anginal chest pain.     The patient had angiography by Dr Nighat Hill on 5/17/2020 with finding of severe multilevel arterial occlusive disease in legs.    The patient on 5/29/2020 had open right femoral endarterectomy and patch angioplasty and also balloon angioplasty of right superficial femoral artery.  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  Dr Joycelyn Falcon also debrided the dry eschars on the leg wounds.     The patient had JENNA bilateral lower extremities at [facility] on 6/24/2021.  This showed:                   Right JENNA 0.70 at [facility], TBI0. 42.                 Left JENNA 0.67 at [facility], TBI 0.32.     JENNA report from [facility] reviewed - arterial perfusion on the right appears adequate for healing at the leg level,  although not normalized        The patient has a history of coronary artery disease and has had angioplasty and stent.  He has also had coronary artery bypass grafting.  He does not have any history of diabetes.  Past medical history does include asthma, skin cancer, gastroesophageal reflux disease, hypertension, hyperlipidemia, fatty liver disease, potential sleep apnea, but never tested.     SOCIAL HISTORY:  The patient smoked until 1998 when he quit. Mariaelena Hansen does use alcohol.     The patient's medications do not include a diuretic.     Prior dressing:   Xeroform on the lower leg wound and then dry gauze and roll gauze changed daily.     Dressing started 8/13/2020:  Aquacell AG on the leg  wounds and then dry gauze and roll gauze changed 3 times per week.     Patient had problems with adherence of the dressing, went back to Xeroform (3 times per week) after 1 week.  He has Home Health.     Drawtex dressing tried. Mariaelena Hansen developed more drainage, skin irritation.  Culture on 12/29/2020 grew pseudomonas and MRSA. Mariaelena Hansen started doxycycline on 12/31/2020.       Wound culture on 6/10/2021 grew staph aureus (MRSA).   Treated with Doxycycline 100 mg po bid for 14 days.     Resumed xeroform dressing.  His skin tolerates Xeroform well.     Used Farrow wrap with dressing.  Leaving liner stocking on at night.     Less green drainage since starting 0.125% Dakins washes at dressing change.     Prior dressing as of 5/3/2021:  Wash surface of wound with Dakins 0.125%, then rinse with saline.  Zandra / Hydrofera Blue on the leg  wounds and then dry gauze and roll gauze changed 3 times per week.  Covered by Farrow wrap to help with edema control.  Use Tubigrip instead of liner stocking.     Prior dressing as of 7/8/2021:  Wash surface of wound with Dakins 0.125%, then rinse with saline.  Xeroform placed over the ulcer(s).  Unna boot placed form base of toes to upper calf with overlying Coban at 0.50 stretch.   Has Home Health for change in 4 days and weekly.     Prior dressing as of 7/14/2021:   On the superior portion of the wound, two 2 x 4 cm sheets of Puraply were placed.  On the inferior portion of the wound a sheet of 5 x 5 endoform was placed.  The wound was covered by Mepitel silicone fenestrated sheet.  ABD was applied over the wound.  Unna boot was applied from base of toes to upper calf with 0.50 stretch on Coban.     Current dressing as of 7/21/2021:  On the inferior portion of the wound, a 5 x 5 cm fenestrated sheet of Puraply (x 4 episodes) were placed.  On the rest of the wound two sheets of 5 x 5 endoform were placed.  The wound was covered by Mepitel  silicone fenestrated sheet; steri strips on the edge.  ABD was applied over the wound, roll gauze.  Single Tubigrip from base of toes to upper calf. Starting on 8/12/2021: On the inferior portion of the wound, a 4 x 6 cm sheet of Nushield were placed.  On the rest of the wound two sheets of 5 x 5 endoform were cut and placed.  The wound was covered by two pieces of Versatel sheet.  ABD was applied over the wound, roll gauze.  Single Tubigrip from base of toes to upper calf.  Patient will change outer dressing daily to control drainage.     Patient has intermittent shooting pain in the wound lasting for a few seconds. Can wake him at night.   Can occur minutes apart or hours apart.             PHYSICAL EXAMINATION:  GENERAL:  The patient is an alert man in no acute distress.     EXTREMITIES:       The patient's lower extremities were examined.  On the left side, there is no edema.  There were no wounds present.      On the right lower extremity, right DP pulse is trace palpable. There is trace edema in the right lower leg.  The right foot feels warm. Reduced irritation / maceration of skin around wound except mild lateral to the wound.      On the anterior aspect of the mid lower leg on the right, distal right lower leg ulcer 10.8 x 9.4 x 0.2 cm with mild slough.  Skin island remains in middle.  Nushield remnant removed form  wound.     Area of possible tissue injury on lateral right foot at 5th  metatarsal head.  Moderate  callus, no skin opening.  No erythema. Dry and unchanged.           Debridement was carried out of the wound on the anterior right lower leg.  See separate note.             On the inferior portion of the wound, a 4 x 6 cm sheet of Nushield (application #3) FKG placed.  On the rest of the wound two sheets of 5 x 5 endoform were cut and placed.  The wound was covered by two pieces of Versatel sheet.  ABD was applied over the wound, roll gauze.  Single Tubigrip from base of toes to upper calf.  Patient will change outer dressing daily to control drainage.              Leg wound stable. Granulation still not impressive.     Maceration of surrounding skin decreased.       Foot site - ?pressure related.  Stable.        Dressing as noted above.       Plan: Puraply weekly x 4 done. Nushield weekly x 6 total in process.      No dressing for foot site.  Keep all pressure off the area.       Hold venous testing.  Would want to avoid saphenous ablation in view of PAD.     Depending on results with skin substitute, can consider VAC.     At end of applications of skin substitute, consider repeat JENNA.     The patient will follow up in wound care center again in 1 week.        FINAL DIAGNOSES:  Nonhealing wounds right lower leg, peripheral artery disease.  Possible pressure site right foot, stage 1.  Leg edema.     L97.912, I73.9, L89.891, R60.0     Lawrence Joe MD

## 2021-08-19 NOTE — DISCHARGE INSTRUCTIONS
Discharge Instructions/Wound Orders  [address], [address] [facility] [place], [id number]  Telephone: [phone]    NAME:  Nighat Hassan OF BIRTH:  1955  MEDICAL RECORD NUMBER:  [id number]  DATE:  8/19/2021  Wound Care Orders:  Right lower leg - cleanse with saline, apply Nu shield, (Endoform applied to distal portion) cover with Mepitel, secure with steri strips, cover with ABD, apply roll gauze and single layer tubigrip size F.  Pt/cg to change ABD, roll gauze and tubigrip only,daily/as needed to contain drainage, do not remove anything below steri strips. Follow-up in clinic in one week. Dietary:  [x] Diet as tolerated: [] Calorie Diabetic Diet:Low carb and no Sugar [x] No Added Salt:[x] Increase Protein: [] Other:Limit the amount of liquid you are drinking and avoid drinking in between meals   Activity:  [x] Activity as tolerated:  [] Patient has no activity restrictions     [] Strict Bedrest: [] Remain off Work:     [] May return to full duty work:                                   [] Return to work with restrictions:             Return Appointment:  [x] Return Appointment: With DR Muriel Zamora  in  1 Week(s)  [] Ordered tests:    Electronically signed Stef Vazquez RN on 8/19/2021 at [facility] 149: Should you experience any significant changes in your wound(s) or have questions about your wound care, please contact the [address] [place] [address] at [address] [place] [address] 8:00 am - 4:30. If you need help with your wound outside these hours and cannot wait until we are again available, contact your PCP or go to the hospital emergency room. PLEASE NOTE: IF YOU ARE UNABLE TO OBTAIN WOUND SUPPLIES, CONTINUE TO USE THE SUPPLIES YOU HAVE AVAILABLE UNTIL YOU ARE ABLE TO REACH US. IT IS MOST IMPORTANT TO KEEP THE WOUND COVERED AT ALL TIMES.      Physician Signature:_______________________    Date: ___________ Time:  ____________

## 2021-08-26 ENCOUNTER — HOSPITAL ENCOUNTER (OUTPATIENT)
Dept: WOUND CARE | Age: 66
Discharge: HOME | End: 2021-08-26
Admitting: SURGERY
Payer: MEDICARE

## 2021-08-26 VITALS
TEMPERATURE: 97.3 F | DIASTOLIC BLOOD PRESSURE: 59 MMHG | RESPIRATION RATE: 16 BRPM | SYSTOLIC BLOOD PRESSURE: 102 MMHG | HEART RATE: 68 BPM

## 2021-08-26 DIAGNOSIS — I73.9 PAD (PERIPHERAL ARTERY DISEASE) (HCC): ICD-10-CM

## 2021-08-26 DIAGNOSIS — L97.912 NON-PRESSURE CHRONIC ULCER OF RIGHT LOWER LEG, WITH FAT LAYER EXPOSED (HCC): ICD-10-CM

## 2021-08-26 DIAGNOSIS — R60.0 LEG EDEMA, RIGHT: ICD-10-CM

## 2021-08-26 PROCEDURE — 15271 SKIN SUB GRAFT TRNK/ARM/LEG: CPT | Performed by: SURGERY

## 2021-08-26 PROCEDURE — 11042 DBRDMT SUBQ TIS 1ST 20SQCM/<: CPT | Performed by: SURGERY

## 2021-08-26 PROCEDURE — 15272 SKIN SUB GRAFT T/A/L ADD-ON: CPT | Performed by: SURGERY

## 2021-08-26 PROCEDURE — 15271 SKIN SUB GRAFT TRNK/ARM/LEG: CPT

## 2021-08-26 PROCEDURE — 15273 SKIN SUB GRFT T/ARM/LG CHILD: CPT

## 2021-08-26 PROCEDURE — 11045 DBRDMT SUBQ TISS EACH ADDL: CPT | Performed by: SURGERY

## 2021-08-26 PROCEDURE — 15272 SKIN SUB GRAFT T/A/L ADD-ON: CPT

## 2021-08-26 NOTE — DISCHARGE INSTRUCTIONS
Discharge Instructions for  [facility]  [address]  [address], [address] [facility] [place], [id number]  Telephone: [phone]    NAME:  Ori Monroe OF BIRTH:  1955  MEDICAL RECORD NUMBER:  [id number]  DATE:  8/26/2021  WOUND CARE ORDERS:  Right lower leg - cleanse with saline, apply Nu shield, (Endoform applied to distal portion) cover with Mepitel, secure with steri strips, cover with ABD, apply roll gauze and single layer tubigrip size F. Pt/cg to change ABD, roll gauze and tubigrip only,daily/as needed to contain drainage, do not remove anything below steri strips. Follow-up in clinic in one week for nurse visit and 2 weeks with MD. Robert Yo OF 08/30/21 - Nurse visit - remove old dressing & skin sub, cleanse wound with saline, pat dry, apply Endoform to wound bed, cover with Versatel, then ABD, secure with roll gauze, apply single layer tubigrip size F.     TREATMENT ORDERS:    Elevate leg(s) above the level of the heart when sitting. Avoid prolonged standing in one place. Do no get dressing/wrap wet. Follow Diet as prescribed:   [x] Diet as tolerated: [] Calorie Diabetic Diet: Low carb and no Sugar [x] No Added Salt:  [x] Increase Protein: [] Limit the amount of liquid you are drinking and avoid drinking in between meals           Return Appointment:  [x] Return Appointment: With DR Stacey Du  in  [facility])  [x] Nurse Visit : 7 days  [] Ordered tests:    Electronically signed Sidra Aragon RN on 8/26/2021 at 11:41 AM     [address] Information: Should you experience any significant changes in your wound(s) or have questions about your wound care, please contact the [facility] Main at [address] [place] [address] Street 8:00 am - 4:30. If you need help with your wound outside these hours and cannot wait until we are again available, contact your PCP or go to the hospital emergency room.      PLEASE NOTE: IF YOU ARE UNABLE TO OBTAIN WOUND SUPPLIES, CONTINUE TO USE THE SUPPLIES YOU HAVE AVAILABLE UNTIL YOU ARE ABLE TO REACH US. IT IS MOST IMPORTANT TO KEEP THE WOUND COVERED AT ALL TIMES.      Physician Signature:_______________________    Date: ___________ Time:  ____________

## 2021-08-26 NOTE — PROGRESS NOTES
HISTORY OF PRESENT ILLNESS:  The patient is a 69-year-old man who was referred to the wound care center regarding nonhealing wound right lower leg and right posterior ankle.    The patient reports that he had struck his right ankle and developed a wound, which had been nonhealing.     The patient was first seen at the [address] [place] [address] on 5/7/2020.  Ankle  wound has healed.     He  developed swelling and redness in the right lower leg in 2020, which then was followed by blister formation and a wound formation.  He eventually was hospitalized at [facility] and says he was in the hospital for about 18 days.      The patient reports chronic numbness of both feet.  He has right drop foot.     The patient had at the time of an ER visit for leg symptoms on the right in 01/2020, a venous duplex scan which did not find any evidence of deep venous thrombosis.  Vein valves were not tested.     The patient has history of lumbar laminectomy and diskectomy on 09/04/2019. [facility] reports related to his disk disease, he has right side footdrop.  He reported history of neurogenic claudication.  The patient was scheduled to have further back surgery within the month, but that has been canceled related to COVID-19.     The patient reports that he walks with Stacy Oakes denies shortness of breath at rest or with exertion.  He has no anginal chest pain.     The patient had angiography by Dr Karen Morales on 5/17/2020 with finding of severe multilevel arterial occlusive disease in legs.    The patient on 5/29/2020 had open right femoral endarterectomy and patch angioplasty and also balloon angioplasty of right superficial femoral artery.  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  Dr Ezequiel Duncan also debrided the dry eschars on the leg wounds.     The patient had JENNA bilateral lower extremities at [facility] on 6/24/2021.  This showed:                   Right JENNA 0.70 at [facility], TBI0. 42.                 Left JENNA 0.67 at [facility], TBI 0.32.     JENNA report from [facility] reviewed - arterial perfusion on the right appears adequate for healing at the leg level,  although not normalized        The patient has a history of coronary artery disease and has had angioplasty and stent.  He has also had coronary artery bypass grafting.  He does not have any history of diabetes.  Past medical history does include asthma, skin cancer, gastroesophageal reflux disease, hypertension, hyperlipidemia, fatty liver disease, potential sleep apnea, but never tested.     SOCIAL HISTORY:  The patient smoked until 1998 when he quit. [facility] does use alcohol.     The patient's medications do not include a diuretic.     Prior dressing:   Xeroform on the lower leg wound and then dry gauze and roll gauze changed daily.     Dressing started 8/13/2020:  Aquacell AG on the leg  wounds and then dry gauze and roll gauze changed 3 times per week.     Patient had problems with adherence of the dressing, went back to Xeroform (3 times per week) after 1 week.  He has Home Health.     Drawtex dressing tried. [facility] developed more drainage, skin irritation.  Culture on 12/29/2020 grew pseudomonas and MRSA. [facility] started doxycycline on 12/31/2020.       Wound culture on 6/10/2021 grew staph aureus (MRSA).   Treated with Doxycycline 100 mg po bid for 14 days.     Resumed xeroform dressing.  His skin tolerates Xeroform well.     Used Farrow wrap with dressing.  Leaving liner stocking on at night.     Less green drainage since starting 0.125% Dakins washes at dressing change.     Prior dressing as of 5/3/2021:  Wash surface of wound with Dakins 0.125%, then rinse with saline.  Zandra / Hydrofera Blue on the leg  wounds and then dry gauze and roll gauze changed 3 times per week.  Covered by Farrow wrap to help with edema control.  Use Tubigrip instead of liner stocking.     Prior dressing as of 7/8/2021:  Wash surface of wound with Dakins 0.125%, then rinse with saline.  Xeroform placed over the ulcer(s).  Unna boot placed form base of toes to upper calf with overlying Coban at 0.50 stretch.   Has Home Health for change in 4 days and weekly.     Prior dressing as of 7/14/2021:   On the superior portion of the wound, two 2 x 4 cm sheets of Puraply were placed.  On the inferior portion of the wound a sheet of 5 x 5 endoform was placed.  The wound was covered by Mepitel silicone fenestrated sheet.  ABD was applied over the wound.  Unna boot was applied from base of toes to upper calf with 0.50 stretch on Coban.     Current dressing as of 7/21/2021:  On the inferior portion of the wound, a 5 x 5 cm fenestrated sheet of Puraply (x 4 episodes) were placed.  On the rest of the wound two sheets of 5 x 5 endoform were placed.  The wound was covered by Mepitel  silicone fenestrated sheet; steri strips on the edge.  ABD was applied over the wound, roll gauze.  Single Tubigrip from base of toes to upper calf.     Starting on 8/12/2021:   On the inferior portion of the wound, a 4 x 6 cm sheet of Nushield were placed.  On the rest of the wound two sheets of 5 x 5 endoform were cut and placed.  The wound was covered by two pieces of Versatel sheet.  ABD was applied over the wound, roll gauze.  Single Tubigrip from base of toes to upper calf.  Patient will change outer dressing daily to control drainage.     Patient has  pain in the lower leg focused a few inches above the medial malleolus, not directly in the wound.              PHYSICAL EXAMINATION:  GENERAL:  The patient is an alert man in no acute distress.     EXTREMITIES:       The patient's lower extremities were examined.  On the left side, there is no edema.  There were no wounds present.      On the right lower extremity, right DP pulse is not palpable.  There is moderate biphasic doppler signal.  There is trace edema in the right lower leg.  The right foot feels warm. Reduced irritation / maceration of skin around wound except mild lateral to the wound.      On the anterior aspect of the mid lower leg on the right, distal right lower leg ulcer 11.4 x 8.9 x 0.2 cm with mild slough.  A bit more granulation today. Skin island remains in middle.  Nushield remnant removed form  wound.     Area of possible tissue injury on lateral right foot at 5th  metatarsal head.  Less  callus, no skin opening.  No erythema.            Debridement was carried out of the wound on the anterior right lower leg.  See separate note.             On the inferior portion of the wound, a 4 x 6 cm sheet and a 4x4 cm sheet of Nushield (application #9) HVC placed.  On the rest of the wound two sheets of 5 x 5 endoform were cut and placed.  The wound was covered by two pieces of Versatel sheet.  ABD was applied over the wound, roll gauze.  Single Tubigrip from base of toes to upper calf.  Patient will change outer dressing daily to control drainage.              Leg wound stable. Granulation a little improved.     Maceration of surrounding skin decreased.       Foot site - ?pressure related. Improved.        Dressing as noted above.       Plan: Puraply weekly x 4 done. Nushield weekly x 6 total in process.      No dressing for foot site.  Keep all pressure off the area.       Hold venous testing.  Would want to avoid saphenous ablation in view of PAD.     Depending on results with skin substitute, can consider VAC. Consider referral then to plastic surgery for operative debridement and skin graft.     At end of applications of skin substitute, consider repeat JENNA.     The patient will follow up in wound care center again in 1 week for nurse visit; use Endoform then.   See me in 2 weeks.        FINAL DIAGNOSES:  Nonhealing wounds right lower leg, peripheral artery disease.  Possible pressure site right foot, stage 1.  Leg edema.     L97.912, I73.9, L89.891, R60.0     Radhika Sosa MD

## 2021-08-26 NOTE — WOUND CARE
08/26/21 1117   Wound Leg lower Right # 1   Date First Assessed/Time First Assessed: 05/07/20 0835   Present on Hospital Admission: Yes  Wound Approximate Age at First Assessment (Weeks): 4 weeks  Primary Wound Type: Vascular  Location: Leg lower  Wound Location Orientation: Right  Wound Descri. ..    Wound Image    Wound Etiology Venous   Dressing Status Old drainage noted   Cleansed Cleansed with saline   Dressing/Treatment   (Nushield, Versatel, ABD, RG, )   Wound Length (cm) 11.4 cm   Wound Width (cm) 8.9 cm   Wound Depth (cm) 0.2 cm   Wound Surface Area (cm^2) 101.46 cm^2   Change in Wound Size % 49.45   Wound Volume (cm^3) 20.292 cm^3   Wound Healing % 49   Wound Assessment Hennessey/red;Slough   Drainage Amount Moderate   Drainage Description Serous   Wound Odor None   Chana-Wound/Incision Assessment Hemosiderin staining (brown yellow)   Edges Undefined edges   Wound Thickness Description Full thickness     Visit Vitals  BP (!) 102/59 (BP 1 Location: Left upper arm, BP Patient Position: At rest;Reclining)   Pulse 68   Temp 97.3 °F (36.3 °C)   Resp 16

## 2021-08-27 ENCOUNTER — DOCUMENTATION ONLY (OUTPATIENT)
Dept: SURGERY | Age: 66
End: 2021-08-27

## 2021-08-27 NOTE — PROGRESS NOTES
Wound Care    I discussed patient's situation with his daughter Boyd Guzman. I reviewed situation with his right leg wound. It has been very difficult to make progress toward healing even after improvement in arterial perfusion after his femoral endarterectomy and SFA angioplasty last year. I reviewed current treatment with skin substitutes. She is concerned about his level of pain. I then spoke today with patient's primary MD Dr Zain Swan. The patient has previously had disorientation with gabapentin; Dr Roberto Peguero will likely start nortriptylline. Although patient had JENNA on 6/24/2021 suggesting adequate but not normal perfusion of the right leg. In view of worsening of pain and poor progress with wound healing, I have given verbal order for repeat JENNA at [facility]. I have given verbal order for repeat wound culture at next visit. He has had MRSA in the past; if still present consider oral antibiotics versus Dalvance. The patient's daughter expressed concern that the patient may have increased alcohol intake and not be eating well. These may certainly impact healing.     Christa Cardozo MD

## 2021-08-27 NOTE — PROGRESS NOTES
Debridement Wound Care        Problem List Items Addressed This Visit     None          Procedure Note  Indications:  Based on my examination of this patient's wound(s)/ulcer(s) today, debridement is required to promote healing and evaluate the wound base. Performed by: Madhuri Pacheco MD    Consent obtained: Yes    Time out taken: Yes    Debridement: Excisional    Using curette the wound(s)/ulcer(s) was/were sharply debrided down through and including the removal of    subcutaneous tissue  Sharp excisional debridement was carried out with removal of necrotic tissue, slough, and granulation into the subcutaneous layer. Debridement was carried down to bleeding viable tissue.     Devitalized Tissue Debrided: slough, necrotic/eschar and and granulation into SQ layer    Pre Debridement Measurements:  Are located in the Wound/Ulcer Documentation Flow Sheet    Wound/Ulcer #: 1    Post Debridement Measurements:  Wound/Ulcer Descriptions are Pre Debridement except measurements:Other: depth increased by 0.1 cm after debridement    Wound Back (Active)   Number of days: 728       Wound Leg lower Right # 1 (Active)   Wound Image   08/26/21 1117   Wound Etiology Venous 08/26/21 1117   Dressing Status Old drainage noted 08/26/21 1117   Cleansed Cleansed with saline 08/26/21 1117   Dressing/Treatment Xeroform;ABD pad 07/08/21 1034   Wound Length (cm) 11.4 cm 08/26/21 1117   Wound Width (cm) 8.9 cm 08/26/21 1117   Wound Depth (cm) 0.2 cm 08/26/21 1117   Wound Surface Area (cm^2) 101.46 cm^2 08/26/21 1117   Change in Wound Size % 49.45 08/26/21 1117   Wound Volume (cm^3) 20.292 cm^3 08/26/21 1117   Wound Healing % 49 08/26/21 1117   Post-Procedure Length (cm) 10.8 cm 08/19/21 1046   Post-Procedure Width (cm) 9.4 cm 08/19/21 1046   Post-Procedure Depth (cm) 0.3 cm 08/19/21 1046   Post-Procedure Surface Area (cm^2) 101.52 cm^2 08/19/21 1046   Post-Procedure Volume (cm^3) 30.456 cm^3 08/19/21 1046   Wound Assessment Pink/red;Slough 08/26/21 1117   Drainage Amount Moderate 08/26/21 1117   Drainage Description Serous 08/26/21 1117   Wound Odor None 08/26/21 1117   Chana-Wound/Incision Assessment Hemosiderin staining (brown yellow) 08/26/21 1117   Edges Undefined edges 08/26/21 1117   Wound Thickness Description Full thickness 08/26/21 1117   Number of days: 477        Percent of Wound(s)/Ulcer(s) Debrided: 65    Total Surface Area Debrided:  65 sq cm     Diabetic/Pressure/Non Pressure Ulcers only:  Ulcer:     Estimated Blood Loss:  Other 5 ml    Hemostasis Achieved: Pressure and Silver Nitrate    Procedural Pain: 3 / 10     Post Procedural Pain: 0 / 10     Response to treatment: Well tolerated by patient

## 2021-09-02 ENCOUNTER — HOSPITAL ENCOUNTER (OUTPATIENT)
Dept: WOUND CARE | Age: 66
Discharge: HOME | End: 2021-09-02
Payer: MEDICARE

## 2021-09-02 VITALS
HEART RATE: 58 BPM | TEMPERATURE: 97.8 F | SYSTOLIC BLOOD PRESSURE: 105 MMHG | RESPIRATION RATE: 16 BRPM | DIASTOLIC BLOOD PRESSURE: 56 MMHG

## 2021-09-02 PROCEDURE — 99213 OFFICE O/P EST LOW 20 MIN: CPT

## 2021-09-09 ENCOUNTER — HOSPITAL ENCOUNTER (OUTPATIENT)
Dept: WOUND CARE | Age: 66
Discharge: HOME | End: 2021-09-09
Payer: MEDICARE

## 2021-09-09 VITALS
SYSTOLIC BLOOD PRESSURE: 108 MMHG | RESPIRATION RATE: 16 BRPM | DIASTOLIC BLOOD PRESSURE: 55 MMHG | TEMPERATURE: 96.8 F | HEART RATE: 56 BPM

## 2021-09-09 DIAGNOSIS — I73.9 PAD (PERIPHERAL ARTERY DISEASE) (HCC): ICD-10-CM

## 2021-09-09 DIAGNOSIS — R60.0 LEG EDEMA, RIGHT: ICD-10-CM

## 2021-09-09 DIAGNOSIS — L03.115 CELLULITIS OF RIGHT LEG: ICD-10-CM

## 2021-09-09 DIAGNOSIS — L97.912 NON-PRESSURE CHRONIC ULCER OF RIGHT LOWER LEG, WITH FAT LAYER EXPOSED (HCC): ICD-10-CM

## 2021-09-09 PROCEDURE — 99213 OFFICE O/P EST LOW 20 MIN: CPT

## 2021-09-09 PROCEDURE — 99214 OFFICE O/P EST MOD 30 MIN: CPT | Performed by: SURGERY

## 2021-09-09 NOTE — PROGRESS NOTES
HISTORY OF PRESENT ILLNESS:  The patient is a 57-year-old man who was referred to the wound care center regarding nonhealing wound right lower leg and right posterior ankle.    The patient reports that he had struck his right ankle and developed a wound, which had been nonhealing.     The patient was first seen at the [address] [place] [address] on 5/7/2020.  Ankle  wound has healed.     He  developed swelling and redness in the right lower leg in 2020, which then was followed by blister formation and a wound formation.  He eventually was hospitalized at [facility] and says he was in the hospital for about 18 days.      The patient reports chronic numbness of both feet.  He has right drop foot.     The patient had at the time of an ER visit for leg symptoms on the right in 01/2020, a venous duplex scan which did not find any evidence of deep venous thrombosis.  Vein valves were not tested.     The patient has history of lumbar laminectomy and diskectomy on 09/04/2019. [facility] reports related to his disk disease, he has right side footdrop.  He reported history of neurogenic claudication.  The patient was scheduled to have further back surgery within the month, but that has been canceled related to COVID-19.     The patient reports that he walks with Lisaklarissa Hamman denies shortness of breath at rest or with exertion.  He has no anginal chest pain.     The patient had angiography by Dr Maribell Ko on 5/17/2020 with finding of severe multilevel arterial occlusive disease in legs.    The patient on 5/29/2020 had open right femoral endarterectomy and patch angioplasty and also balloon angioplasty of right superficial femoral artery.  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  Dr Adolfo Bloom also debrided the dry eschars on the leg wounds.     The patient had JENNA bilateral lower extremities at [facility] on 6/24/2021.  This showed:                   Right JENNA 0.70 at [facility], TBI0. 42.                 Left JENNA 0.67 at [facility], TBI 0.32.     JENNA report from [facility] reviewed - arterial perfusion on the right appears adequate for healing at the leg level,  although not normalized        The patient has a history of coronary artery disease and has had angioplasty and stent. Ninoska Garcia has also had coronary artery bypass grafting.  He does not have any history of diabetes.  Past medical history does include asthma, skin cancer, gastroesophageal reflux disease, hypertension, hyperlipidemia, fatty liver disease, potential sleep apnea, but never tested.     SOCIAL HISTORY:  The patient smoked until 1998 when he quit. Ninoska Garcia does use alcohol.     The patient's medications do not include a diuretic.     I discussed patient's situation with his daughter Senior Begun on 8/27/2021. I reviewed situation with his right leg wound. It has been very difficult to make progress toward healing even after improvement in arterial perfusion after his femoral endarterectomy and SFA angioplasty last year. I reviewed current treatment with skin substitutes. She is concerned about his level of pain. I then spoke today with patient's primary MD Dr Mathew Chan. The patient has previously had disorientation with gabapentin; Dr Edward Santiago will likely start nortriptylline. Although patient had JENNA on 6/24/2021 suggesting adequate but not normal perfusion of the right leg. In view of worsening of pain and poor progress with wound healing, I gave verbal order for repeat JENNA at [facility]. I gave verbal order for repeat wound culture at nurset visit. He has had MRSA in the past; if still present consider oral antibiotics versus Dalvance. The patient's daughter expressed concern that the patient may have increased alcohol intake and not be eating well.   These may certainly impact healing.     Prior dressing:   Xeroform on the lower leg wound and then dry gauze and roll gauze changed daily.     Dressing started 8/13/2020:  EquityNet on the leg  wounds and then dry gauze and roll gauze changed 3 times per week.     Patient had problems with adherence of the dressing, went back to Xeroform (3 times per week) after 1 week.  He has Home Health.     Drawtex dressing tried. Nickolas Balbuena developed more drainage, skin irritation.  Culture on 12/29/2020 grew pseudomonas and MRSA. Nickolas Balbuena started doxycycline on 12/31/2020.       Wound culture on 6/10/2021 grew staph aureus (MRSA).   Treated with Doxycycline 100 mg po bid for 14 days.     Wound culture on 9/2/2021 grew MRSA and pseudomonas.     Resumed xeroform dressing.  His skin tolerates Xeroform well.     Used Farrow wrap with dressing.  Leaving liner stocking on at night.     Less green drainage since starting 0.125% Dakins washes at dressing change.     Prior dressing as of 5/3/2021:  Wash surface of wound with Dakins 0.125%, then rinse with saline.  Zandra / Hydrofera Blue on the leg  wounds and then dry gauze and roll gauze changed 3 times per week.  Covered by Farrow wrap to help with edema control.  Use Tubigrip instead of liner stocking.     Prior dressing as of 7/8/2021:  Wash surface of wound with Dakins 0.125%, then rinse with saline.  Xeroform placed over the ulcer(s).  Unna boot placed form base of toes to upper calf with overlying Coban at 0.50 stretch.   Has Home Health for change in 4 days and weekly.     Prior dressing as of 7/14/2021:   On the superior portion of the wound, two 2 x 4 cm sheets of Puraply were placed.  On the inferior portion of the wound a sheet of 5 x 5 endoform was placed.  The wound was covered by Mepitel silicone fenestrated sheet.  ABD was applied over the wound.  Unna boot was applied from base of toes to upper calf with 0.50 stretch on Coban.     Prior dressing as of 7/21/2021:  On the inferior portion of the wound, a 5 x 5 cm fenestrated sheet of Puraply (x 4 episodes) were placed.  On the rest of the wound two sheets of 5 x 5 endoform were placed.  The wound was covered by Mepitel  silicone fenestrated sheet; steri strips on the edge.  ABD was applied over the wound, roll gauze.  Single Tubigrip from base of toes to upper calf.     Starting on 8/12/2021:  On the inferior portion of the wound, a 4 x 6 cm sheet of Nushield were placed.  On the rest of the wound two sheets of 5 x 5 endoform were cut and placed.  The wound was covered by two pieces of Versatel sheet.  ABD was applied over the wound, roll gauze.  Single Tubigrip from base of toes to upper calf.  Patient will change outer dressing daily to control drainage.     Patient has  pain in the lower leg focused a few inches above the medial malleolus and in region of wound.              PHYSICAL EXAMINATION:  GENERAL:  The patient is an alert man in no acute distress.     EXTREMITIES:       The patient's lower extremities were examined.  On the left side, there is no edema.  There were no wounds present.      On the right lower extremity, right DP pulse is not palpable.  There is moderate biphasic doppler signal.  There is 1+ edema in the right lower leg.  The right foot feels warm. Some redness and maceration of skin around wound.      On the anterior aspect of the mid lower leg on the right, distal right lower leg ulcer 11.4 x 8.9 x 0.3 cm with mild slough.  A bit more granulation today. Skin island remains in middle.       Area of possible tissue injury on lateral right foot at 5th  metatarsal head.  Less  callus, no skin opening.  No erythema.      No debridement.              Foot site quiet. Apparent low grade cellulitis in region of wound. MRSA /  Pseudomonas in culture.            Dressing:  Crusting of macerated skin. Xeroform over wound, cover with dry gauze and roll gauze, Tubigrip from base of toes to upper calf. I ordered BMP. Plan to order Dalvance for MRSA when I have results of BMP. He has had prior oral antibiotics for MRSA without good response. I will not treat pseudomonas at present.     I will plan to start Wound VAC a short time after Dalvance given.      No dressing for foot site.  Keep all pressure off the area.       Depending on response to plans, consider venous testing.  Would want to avoid saphenous ablation in view of PAD.      JENNA pending. Advised to minimize alcohol intake. Do not use for pain or as a sleep aid.   Can discuss with primary MD.      The patient will follow up in wound care center again in 5 days.        FINAL DIAGNOSES:  Nonhealing wounds right lower leg, peripheral artery disease.  Possible pressure site right foot, stage 1.  Leg edema.     L97.912, I73.9, L89.891, R60.0     Bhavana Varner MD

## 2021-09-09 NOTE — WOUND CARE
09/09/21 1042   Wound Leg lower Right # 1   Date First Assessed/Time First Assessed: 05/07/20 0835   Present on Hospital Admission: Yes  Wound Approximate Age at First Assessment (Weeks): 4 weeks  Primary Wound Type: Vascular  Location: Leg lower  Wound Location Orientation: Right  Wound Descri. ..    Wound Image    Wound Etiology Venous   Dressing Status Old drainage noted   Cleansed Cleansed with saline   Dressing/Treatment   (Nushield, endoform, ABD, RG, single tubigrip)   Wound Length (cm) 11.4 cm   Wound Width (cm) 8.9 cm   Wound Depth (cm) 0.3 cm   Wound Surface Area (cm^2) 101.46 cm^2   Change in Wound Size % 49.45   Wound Volume (cm^3) 30.438 cm^3   Wound Healing % 24   Wound Assessment [place]/red;Slough   Drainage Amount Large   Drainage Description Serous   Wound Odor None   Chana-Wound/Incision Assessment Excoriated   Edges Defined edges   Wound Thickness Description Full thickness     Visit Vitals  BP (!) 108/55 (BP 1 Location: Left upper arm, BP Patient Position: At rest;Sitting)   Pulse (!) 56   Temp 96.8 °F (36 °C)   Resp 16

## 2021-09-09 NOTE — DISCHARGE INSTRUCTIONS
Discharge Instructions for  [facility]  [address]  MO[address] 1, [address] [place], [place]  Telephone: [phone]    NAME:  Peter Lemus  YOB: 1955  MEDICAL RECORD NUMBER:  [id number]  DATE:  9/9/2021  WOUND CARE ORDERS:  Right lower leg - cleanse with saline, pat dry, apply xeroform, cover with gauze and abds, secure with roll gauze, apply single layer tubigrip size F. Change dressing every other day. Follow-up with Dr. Mian Xavier on Monday 09/13/21. Follow-up with Dr. Sri Tejada office regarding starting nortriptyline. TREATMENT ORDERS:    Elevate leg(s) above the level of the heart when sitting. Avoid prolonged standing in one place. Do no get dressing/wrap wet. Follow Diet as prescribed:   [x] Diet as tolerated: [] Calorie Diabetic Diet: Low carb and no Sugar [x] No Added Salt:  [x] Increase Protein: [] Limit the amount of liquid you are drinking and avoid drinking in between meals           Return Appointment:  [x] Return Appointment: With DR Tesha May  in  4 Days  [] Nurse Visit : *** days  [] Ordered tests:    Electronically signed Velma Navarrete RN on 9/9/2021 at [address] Information: Should you experience any significant changes in your wound(s) or have questions about your wound care, please contact the [facility] Main at [address] [place] [address] 8:00 am - 4:30. If you need help with your wound outside these hours and cannot wait until we are again available, contact your PCP or go to the hospital emergency room. PLEASE NOTE: IF YOU ARE UNABLE TO OBTAIN WOUND SUPPLIES, CONTINUE TO USE THE SUPPLIES YOU HAVE AVAILABLE UNTIL YOU ARE ABLE TO REACH US. IT IS MOST IMPORTANT TO KEEP THE WOUND COVERED AT ALL TIMES.      Physician Signature:_______________________    Date: ___________ Time:  ____________

## 2021-09-10 LAB
BUN SERPL-MCNC: 12 MG/DL (ref 8–27)
BUN/CREAT SERPL: 10 (ref 10–24)
CALCIUM SERPL-MCNC: 9.5 MG/DL (ref 8.6–10.2)
CHLORIDE SERPL-SCNC: 96 MMOL/L (ref 96–106)
CO2 SERPL-SCNC: 26 MMOL/L (ref 20–29)
CREAT SERPL-MCNC: 1.25 MG/DL (ref 0.76–1.27)
GLUCOSE SERPL-MCNC: 106 MG/DL (ref 65–99)
MICROORGANISM/AGENT SPEC: ABNORMAL
POTASSIUM SERPL-SCNC: 4.6 MMOL/L (ref 3.5–5.2)
SODIUM SERPL-SCNC: 139 MMOL/L (ref 134–144)
SPECIMEN STATUS REPORT, ROLRST: NORMAL

## 2021-09-13 ENCOUNTER — HOSPITAL ENCOUNTER (OUTPATIENT)
Dept: WOUND CARE | Age: 66
Discharge: HOME | End: 2021-09-13
Payer: MEDICARE

## 2021-09-13 VITALS
HEART RATE: 58 BPM | SYSTOLIC BLOOD PRESSURE: 110 MMHG | TEMPERATURE: 97.6 F | DIASTOLIC BLOOD PRESSURE: 56 MMHG | RESPIRATION RATE: 16 BRPM

## 2021-09-13 DIAGNOSIS — I73.9 PAD (PERIPHERAL ARTERY DISEASE) (HCC): ICD-10-CM

## 2021-09-13 DIAGNOSIS — L97.912 NON-PRESSURE CHRONIC ULCER OF RIGHT LOWER LEG, WITH FAT LAYER EXPOSED (HCC): ICD-10-CM

## 2021-09-13 DIAGNOSIS — L03.115 CELLULITIS OF RIGHT LEG: ICD-10-CM

## 2021-09-13 DIAGNOSIS — R60.0 LEG EDEMA, RIGHT: ICD-10-CM

## 2021-09-13 PROCEDURE — 99213 OFFICE O/P EST LOW 20 MIN: CPT | Performed by: SURGERY

## 2021-09-13 PROCEDURE — 99213 OFFICE O/P EST LOW 20 MIN: CPT

## 2021-09-13 RX ORDER — CITALOPRAM 20 MG/1
20 TABLET, FILM COATED ORAL DAILY
COMMUNITY
End: 2021-11-23

## 2021-09-13 NOTE — DISCHARGE INSTRUCTIONS
Discharge Instructions for  [facility]  [address]  MOB 1, [address] [facility] [place], [id number]  Telephone: [phone]    NAME:  Sha East  YOB: 1955  MEDICAL RECORD NUMBER:  [id number]  DATE:  9/13/2021     WOUND CARE ORDERS:Right Lower Leg- Cleanse w/Normal Saline & gauze. Apply Xeroform, cover w/gauze, ABD pads & roll gauze, followed by Single layer Tubigrip F. Dressing to be changed daily. RTC for MD follow up in 1 week. Referral to [address] [place] [address] for [facility]. TREATMENT ORDERS:    Elevate leg(s) above the level of the heart when sitting. Avoid prolonged standing in one place. Do not get dressing/wrap wet. Follow Diet as prescribed:   [x] Calorie Diabetic Diet: Low carb and no Sugar [x] No Added Salt            Return Appointment:  [x] Return Appointment: With DR Jo Shah  in  [facility])       Electronically signed Shu Mcdonnell RN on 9/13/2021 at 11:06 AM     Sallie Vega 281: Should you experience any significant changes in your wound(s) or have questions about your wound care, please contact the [facility] Main at [address] [place] [address] Street 8:00 am - 4:30. If you need help with your wound outside these hours and cannot wait until we are again available, contact your PCP or go to the hospital emergency room. PLEASE NOTE: IF YOU ARE UNABLE TO OBTAIN WOUND SUPPLIES, CONTINUE TO USE THE SUPPLIES YOU HAVE AVAILABLE UNTIL YOU ARE ABLE TO REACH US. IT IS MOST IMPORTANT TO KEEP THE WOUND COVERED AT ALL TIMES.      Physician Signature:_______________________    Date: ___________ Time:  ____________

## 2021-09-13 NOTE — PROGRESS NOTES
HISTORY OF PRESENT ILLNESS:  The patient is a 78-year-old man who was referred to the wound care center regarding nonhealing wound right lower leg and right posterior ankle.    The patient reports that he had struck his right ankle and developed a wound, which had been nonhealing.     The patient was first seen at the [address] [place] [address] on 2020.  Ankle  wound has healed.     He  developed swelling and redness in the right lower leg in , which then was followed by blister formation and a wound formation.  He eventually was hospitalized at [facility] and says he was in the hospital for about 18 days.      The patient reports chronic numbness of both feet.  He has right drop foot.     The patient had at the time of an ER visit for leg symptoms on the right in 2020, a venous duplex scan which did not find any evidence of deep venous thrombosis.  Vein valves were not tested.     The patient has history of lumbar laminectomy and diskectomy on 2019. Chrystie Apgar reports related to his disk disease, he has right side footdrop.  He reported history of neurogenic claudication.  The patient was scheduled to have further back surgery within the month, but that has been canceled related to COVID-19.     The patient reports that he walks with [place]wood Lev denies shortness of breath at rest or with exertion.  He has no anginal chest pain.     The patient had angiography by Dr Charles Rascon on 2020 with finding of severe multilevel arterial occlusive disease in legs.    The patient on 2020 had open right femoral endarterectomy and patch angioplasty and also balloon angioplasty of right superficial femoral artery.  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  Dr Johnny Kaufman also debrided the dry eschars on the leg wounds.     The patient had JENNA bilateral lower extremities at [facility] on 2021.  This showed:                   Right JENNA 0.70 at [facility], TBI0. 42.                 Left JENNA 0.67 at [facility], TBI 0.32.     JENNA report from [facility] reviewed - arterial perfusion on the right appears adequate for healing at the leg level,  although not normalized        The patient has a history of coronary artery disease and has had angioplasty and stent. Elizabeth Shaffer has also had coronary artery bypass grafting.  He does not have any history of diabetes.  Past medical history does include asthma, skin cancer, gastroesophageal reflux disease, hypertension, hyperlipidemia, fatty liver disease, potential sleep apnea, but never tested.     SOCIAL HISTORY:  The patient smoked until 1998 when he quit. Elizabeth Shaffer does use alcohol.     The patient's medications do not include a diuretic.      (I discussed patient's situation with his daughter Collins Craig on 8/27/2021. I reviewed situation with his right leg wound.  It has been very difficult to make progress toward healing even after improvement in arterial perfusion after his femoral endarterectomy and SFA angioplasty last year. I reviewed current treatment with skin substitutes. She is concerned about his level of pain.  I then spoke today with patient's primary MD Dr Mcmanus [place] patient has previously had disorientation with gabapentin; Dr Thuy Yost will likely start nortriptylline. Although patient had JENNA on 6/24/2021 suggesting adequate but not normal perfusion of the right leg, in view of worsening of pain and poor progress with wound healing, I gave verbal order for repeat JENNA at [facility]. I gave verbal order for repeat wound culture at nurse visit. Elizabeth Shaffer has had MRSA in the past; if still present consider oral antibiotics versus Dalvance.   The patient's daughter expressed concern that the patient may have increased alcohol intake and not be eating well. Alison Lejose may certainly impact healing.)     Prior dressing:   Xeroform on the lower leg wound and then dry gauze and roll gauze changed daily.     Dressing started 8/13/2020:  QReca! on the leg  wounds and then dry gauze and roll gauze changed 3 times per week.     Patient had problems with adherence of the dressing, went back to Xeroform (3 times per week) after 1 week.  He has Home Health.     Drawtex dressing tried. Ondina Paige developed more drainage, skin irritation.  Culture on 12/29/2020 grew pseudomonas and MRSA. Ondina Paige started doxycycline on 12/31/2020.       Wound culture on 6/10/2021 grew staph aureus (MRSA).   Treated with Doxycycline 100 mg po bid for 14 days.     Wound culture on 9/2/2021 grew MRSA and pseudomonas.   To receive Dalvance.     His skin tolerates Xeroform well.     Prior dressing as of 5/3/2021:  Wash surface of wound with Dakins 0.125%, then rinse with saline.  Zandra / Hydrofera Blue on the leg  wounds and then dry gauze and roll gauze changed 3 times per week.  Covered by Farrow wrap to help with edema control.  Use Tubigrip instead of liner stocking.     Prior dressing as of 7/8/2021:  Wash surface of wound with Dakins 0.125%, then rinse with saline.  Xeroform placed over the ulcer(s).  Unna boot placed form base of toes to upper calf with overlying Coban at 0.50 stretch.   Has Home Health for change in 4 days and weekly.     Prior dressing as of 7/14/2021:   On the superior portion of the wound, two 2 x 4 cm sheets of Puraply were placed.  On the inferior portion of the wound a sheet of 5 x 5 endoform was placed.  The wound was covered by Mepitel silicone fenestrated sheet.  ABD was applied over the wound.  Unna boot was applied from base of toes to upper calf with 0.50 stretch on Coban.     Prior dressing as of 7/21/2021:  On the inferior portion of the wound, a 5 x 5 cm fenestrated sheet of Puraply (x 4 episodes) were placed.  On the rest of the wound two sheets of 5 x 5 endoform were placed.  The wound was covered by Mepitel  silicone fenestrated sheet; steri strips on the edge.  ABD was applied over the wound, roll gauze.  Single Tubigrip from base of toes to upper calf.     Starting on 8/12/2021:  On the inferior portion of the wound, a 4 x 6 cm sheet of Nushield were placed.  On the rest of the wound two sheets of 5 x 5 endoform were cut and placed.  The wound was covered by two pieces of Versatel sheet.  ABD was applied over the wound, roll gauze.  Single Tubigrip from base of toes to upper calf.  Patient will change outer dressing daily to control drainage.     Patient had  pain in the lower leg focused a few inches above the medial malleolus and in region of wound. Discussed with Dr Dena Aaron. Current dressing as of 9/9/2021:  Crusting of macerated skin. Xeroform over wound, cover with dry gauze and roll gauze, Tubigrip from base of toes to upper calf.              PHYSICAL EXAMINATION:  GENERAL:  The patient is an alert man in no acute distress.     EXTREMITIES:       The patient's lower extremities were examined.  On the left side, there is no edema.  There were no wounds present.      On the right lower extremity, right DP pulse is not palpable.  There is moderate biphasic doppler signal.  There is 1+ edema in the right lower leg.  The right foot feels warm. Some redness and maceration of skin around wound, mainly laterally.      On the anterior aspect of the mid lower leg on the right, distal right lower leg ulcer 11.4 x 14  x 0.3 cm with mild slough.  A bit more granulation today.  Skin island remains in middle.  Cluster of small ulcers in macerated area laterally increases measurements.     Area of possible tissue injury on lateral right foot at 5th  metatarsal head.  Less  callus, no skin opening.  No erythema. Very quiet.     No debridement.              Apparent low grade cellulitis in region of wound. MRSA /  Pseudomonas in culture.      BMP JUNITO Simpson ordered on 9/10/2021. Awaiting infusion.        Dressing:  Crusting of macerated skin. Xeroform over wound, cover with dry gauze and roll gauze, Tubigrip from base of toes to upper calf.   Change daily because of drainage.     I will plan to start Wound VAC a short time after Dalvance given.      No dressing for foot site.  Keep all pressure off the area.       Depending on response to plans, consider venous testing.  Would want to avoid saphenous ablation in view of PAD.      JENNA pending.     Advised to minimize alcohol intake. Do not use for pain or as a sleep aid.   Can discuss with primary MD.      The patient will follow up in wound care center again in 7 days.        FINAL DIAGNOSES:  Nonhealing wounds right lower leg, peripheral artery disease.  Possible pressure site right foot, stage 1.  Leg edema.     L97.912, I73.9, L89.891, R60.0     Saira Fernández MD

## 2021-09-13 NOTE — WOUND CARE
09/13/21 1033   Wound Leg lower Right # 1   Date First Assessed/Time First Assessed: 05/07/20 0835   Present on Hospital Admission: Yes  Wound Approximate Age at First Assessment (Weeks): 4 weeks  Primary Wound Type: Vascular  Location: Leg lower  Wound Location Orientation: Right  Wound Descri. ..    Wound Image     Wound Etiology Venous   Dressing Status Breakthrough drainage noted   Cleansed Cleansed with saline   Wound Length (cm) 11.5 cm   Wound Width (cm) 14 cm   Wound Depth (cm) 0.3 cm   Wound Surface Area (cm^2) 161 cm^2   Change in Wound Size % 19.79   Wound Volume (cm^3) 48.3 cm^3   Wound Healing % -20   Wound Assessment [place]/red;Slough   Drainage Amount Large   Drainage Description Serosanguinous  (More serous than sanguinous)   Wound Odor None   Chana-Wound/Incision Assessment Denuded   Edges Undefined edges   Wound Thickness Description Full thickness     Visit Vitals  BP (!) 110/56 (BP 1 Location: Left upper arm, BP Patient Position: Sitting) Comment (BP Patient Position): Sitting w/LEs elevated   Pulse (!) 58   Temp 97.6 °F (36.4 °C)   Resp 16

## 2021-09-14 ENCOUNTER — HOSPITAL ENCOUNTER (OUTPATIENT)
Dept: INFUSION THERAPY | Age: 66
Discharge: HOME | End: 2021-09-14
Payer: MEDICARE

## 2021-09-14 VITALS
DIASTOLIC BLOOD PRESSURE: 55 MMHG | HEART RATE: 59 BPM | OXYGEN SATURATION: 100 % | TEMPERATURE: 97.6 F | SYSTOLIC BLOOD PRESSURE: 99 MMHG | RESPIRATION RATE: 16 BRPM

## 2021-09-14 PROCEDURE — 74011250636 HC RX REV CODE- 250/636: Performed by: SURGERY

## 2021-09-14 PROCEDURE — 74011000258 HC RX REV CODE- 258: Performed by: SURGERY

## 2021-09-14 PROCEDURE — 96365 THER/PROPH/DIAG IV INF INIT: CPT

## 2021-09-14 RX ORDER — DEXTROSE MONOHYDRATE 50 MG/ML
25 INJECTION, SOLUTION INTRAVENOUS ONCE
Status: COMPLETED | OUTPATIENT
Start: 2021-09-14 | End: 2021-09-14

## 2021-09-14 RX ORDER — SODIUM CHLORIDE 0.9 % (FLUSH) 0.9 %
10 SYRINGE (ML) INJECTION AS NEEDED
Status: DISCONTINUED | OUTPATIENT
Start: 2021-09-14 | End: 2021-09-15 | Stop reason: HOSPADM

## 2021-09-14 RX ADMIN — DALBAVANCIN 1500 MG: 500 INJECTION, POWDER, FOR SOLUTION INTRAVENOUS at 10:59

## 2021-09-14 RX ADMIN — Medication 10 ML: at 10:25

## 2021-09-14 RX ADMIN — DEXTROSE MONOHYDRATE 25 ML/HR: 5 INJECTION, SOLUTION INTRAVENOUS at 10:27

## 2021-09-14 NOTE — PROGRESS NOTES
[address] @ [facility] VISIT NOTE     1010 Patient arrives for Dalvance Infusion x 1 without acute problems. Please see connect care for complete assessment and education provided. Vital signs stable throughout and prior to discharge, Pt. Tolerated treatment well and discharged without incident. Patient is aware of no further [facility] appointments @ this time & to follow up with healthcare provider for next appointment. Patient did wait 30 minutes post infusion for observation with no adverse reactions noted @ this time. The patient/parent denies any symptoms of COVID (SOB, coughing, fever, or generally not feeling well), denies any known exposure to COVID-19 recently, and denies any known recent contact with family/friend that has a pending COVID test.      Medications Verified by Traci Corea RN via [employer]pl[employer]:  1. Dalvance 1,500 mg IV   2.  D5 IV Flush & KVO  3. NS IV Flush     VITAL SIGNS  Patient Vitals for the past 12 hrs:   Temp Pulse Resp BP SpO2   09/14/21 1205 97.6 °F (36.4 °C) (!) 59 16 (!) 99/55    09/14/21 1133  77 18 111/70    09/14/21 1013 97.8 °F (36.6 °C) (!) 55 18 (!) 91/50 100 %

## 2021-09-20 ENCOUNTER — HOSPITAL ENCOUNTER (OUTPATIENT)
Dept: WOUND CARE | Age: 66
Discharge: HOME | End: 2021-09-20
Payer: MEDICARE

## 2021-09-20 VITALS
TEMPERATURE: 97.8 F | DIASTOLIC BLOOD PRESSURE: 58 MMHG | HEART RATE: 47 BPM | SYSTOLIC BLOOD PRESSURE: 120 MMHG | RESPIRATION RATE: 16 BRPM

## 2021-09-20 DIAGNOSIS — I73.9 PAD (PERIPHERAL ARTERY DISEASE) (HCC): ICD-10-CM

## 2021-09-20 DIAGNOSIS — L97.912 NON-PRESSURE CHRONIC ULCER OF RIGHT LOWER LEG, WITH FAT LAYER EXPOSED (HCC): ICD-10-CM

## 2021-09-20 DIAGNOSIS — L89.891 PRESSURE INJURY OF RIGHT FOOT, STAGE 1: ICD-10-CM

## 2021-09-20 PROBLEM — L03.115 CELLULITIS OF RIGHT LEG: Status: RESOLVED | Noted: 2021-09-09 | Resolved: 2021-09-20

## 2021-09-20 PROCEDURE — 99213 OFFICE O/P EST LOW 20 MIN: CPT

## 2021-09-20 PROCEDURE — 99213 OFFICE O/P EST LOW 20 MIN: CPT | Performed by: SURGERY

## 2021-09-20 NOTE — WOUND CARE
09/20/21 0946   Right Leg Edema Point of Measurement   Compression Therapy Tubular elastic support bandage   Wound Leg lower Right # 1   Date First Assessed/Time First Assessed: 05/07/20 0835   Present on Hospital Admission: Yes  Wound Approximate Age at First Assessment (Weeks): 4 weeks  Primary Wound Type: Vascular  Location: Leg lower  Wound Location Orientation: Right  Wound Descri. ..    Dressing Status New dressing applied   Cleansed Cleansed with saline   Dressing/Treatment Xeroform;ABD pad;Roll gauze;Tape/Soft cloth adhesive tape

## 2021-09-20 NOTE — PROGRESS NOTES
HISTORY OF PRESENT ILLNESS:  The patient is a 53-year-old man who was referred to the wound care center regarding nonhealing wound right lower leg and right posterior ankle.    The patient reports that he had struck his right ankle and developed a wound, which had been nonhealing.     The patient was first seen at the [address] [place] [address] on 5/7/2020.  Ankle  wound has healed.     He  developed swelling and redness in the right lower leg in 2020, which then was followed by blister formation and a wound formation.  He eventually was hospitalized at [facility] and says he was in the hospital for about 18 days.      The patient reports chronic numbness of both feet.  He has right drop foot.     The patient had at the time of an ER visit for leg symptoms on the right in 01/2020, a venous duplex scan which did not find any evidence of deep venous thrombosis.  Vein valves were not tested.     The patient has history of lumbar laminectomy and diskectomy on 09/04/2019. Tuan Hand reports related to his disk disease, he has right side footdrop.  He reported history of neurogenic claudication.  The patient was scheduled to have further back surgery within the month, but that has been canceled related to COVID-19.     The patient reports that he walks with [employer] [place] denies shortness of breath at rest or with exertion.  He has no anginal chest pain.     The patient had angiography by Dr Sara Madrid on 5/17/2020 with finding of severe multilevel arterial occlusive disease in legs.    The patient on 5/29/2020 had open right femoral endarterectomy and patch angioplasty and also balloon angioplasty of right superficial femoral artery.  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  Dr Kenneth Davis also debrided the dry eschars on the leg wounds.     The patient had JENNA bilateral lower extremities at [facility] on 6/24/2021.  This showed:                   Right JENNA 0.70 at [facility], TBI0. 42.                 Left JENNA 0.67 at [facility], TBI 0.32.     JENNA report from [facility] reviewed - arterial perfusion on the right appears adequate for healing at the leg level,  although not normalized        The patient has a history of coronary artery disease and has had angioplasty and stent.  He has also had coronary artery bypass grafting.  He does not have any history of diabetes.  Past medical history does include asthma, skin cancer, gastroesophageal reflux disease, hypertension, hyperlipidemia, fatty liver disease, potential sleep apnea, but never tested.     SOCIAL HISTORY:  The patient smoked until 1998 when he quit. Lynette Lorenzo does use alcohol.     The patient's medications do not include a diuretic.      (I discussed patient's situation with his daughter Bennett Beverage 8/27/2021. Dewain Brunner reviewed situation with his right leg wound.  It has been very difficult to make progress toward healing even after improvement in arterial perfusion after his femoral endarterectomy and SFA angioplasty last year.  I reviewed current treatment with skin substitutes.  She is concerned about his level of pain.  I then spoke today with patient's primary MD Dr Candelario Ryan patient has previously had disorientation with gabapentin; Dr Yara De La Rosa will likely start nortriptylline.  Although patient had JENNA on 6/24/2021 suggesting adequate but not normal perfusion of the right leg, in view of worsening of pain and poor progress with wound healing, I gave verbal order for repeat JENNA at [facility]. Dewain Brunner gave verbal order for repeat wound culture at nurse visit. Lynette Lorenzo has had MRSA in the past; if still present consider oral antibiotics versus Jacumba Blue patient's daughter expressed concern that the patient may have increased alcohol intake and not be eating well. Ulyess Mings may certainly impact healing.)     Prior dressing:   Xeroform on the lower leg wound and then dry gauze and roll gauze changed daily.     Dressing started 8/13/2020:  [email or web address] on the leg  wounds and then dry gauze and roll gauze changed 3 times per week.     Patient had problems with adherence of the dressing, went back to Xeroform (3 times per week) after 1 week.  He has Home Health.     Drawtex dressing tried. Mamadou Rashid developed more drainage, skin irritation.  Culture on 12/29/2020 grew pseudomonas and MRSA. Mamadou Rashid started doxycycline on 12/31/2020.       Wound culture on 6/10/2021 grew staph aureus (MRSA).   Treated with Doxycycline 100 mg po bid for 14 days.     Wound culture on 9/2/2021 grew MRSA and pseudomonas.   He received Dalvance on 9/14/2021.     His skin tolerates Xeroform well.     Prior dressing as of 5/3/2021:  Wash surface of wound with Dakins 0.125%, then rinse with saline.  Zandra / Hydrofera Blue on the leg  wounds and then dry gauze and roll gauze changed 3 times per week.  Covered by Farrow wrap to help with edema control.  Use Tubigrip instead of liner stocking.     Prior dressing as of 7/8/2021:  Wash surface of wound with Dakins 0.125%, then rinse with saline.  Xeroform placed over the ulcer(s).  Unna boot placed form base of toes to upper calf with overlying Coban at 0.50 stretch.   Has Home Health for change in 4 days and weekly.     Prior dressing as of 7/14/2021:   On the superior portion of the wound, two 2 x 4 cm sheets of Puraply were placed.  On the inferior portion of the wound a sheet of 5 x 5 endoform was placed.  The wound was covered by Mepitel silicone fenestrated sheet.  ABD was applied over the wound.  Unna boot was applied from base of toes to upper calf with 0.50 stretch on Coban.     Prior dressing as of 7/21/2021:  On the inferior portion of the wound, a 5 x 5 cm fenestrated sheet of Puraply (x 4 episodes) were placed.  On the rest of the wound two sheets of 5 x 5 endoform were placed.  The wound was covered by Mepitel  silicone fenestrated sheet; steri strips on the edge.  ABD was applied over the wound, roll gauze.  Single Tubigrip from base of toes to upper calf.     Starting on 8/12/2021:  On the inferior portion of the wound, a 4 x 6 cm sheet of Nushield were placed.  On the rest of the wound two sheets of 5 x 5 endoform were cut and placed.  The wound was covered by two pieces of Versatel sheet.  ABD was applied over the wound, roll gauze.  Single Tubigrip from base of toes to upper calf.  Patient will change outer dressing daily to control drainage.     Patient had  pain in the lower leg focused a few inches above the medial malleolus and in region of wound. Discussed with Dr Maikel Dudley.     Current dressing as of 9/9/2021:  Crusting of macerated skin. Xeroform over wound, cover with dry gauze and roll gauze, Tubigrip from base of toes to upper calf. He is changing daily.      He reports much reduced pain in the wound region since receiving Dalvance. Drainage decreased. He is going to start walking on his treadmill.        PHYSICAL EXAMINATION:  GENERAL:  The patient is an alert man in no acute distress.     EXTREMITIES:       The patient's lower extremities were examined.  On the left side, there is no edema.  There were no wounds present.      On the right lower extremity, right DP pulse is not palpable.  There is moderate biphasic doppler signal.  There is 1+ edema in the right lower leg.  The right foot feels warm. Some redness and maceration of skin around wound, mainly laterally.      On the anterior aspect of the mid lower leg on the right, distal right lower leg ulcer 11.4 x 9  x 0.3 cm with decreased slough.  A bit more granulation today.  Skin island remains in middle.  Cluster of small ulcers in macerated area laterally. Skin around wound appears much less inflamed.     Area of possible tissue injury on lateral right foot at 5th  metatarsal head.  Minimal  callus, no skin opening.  No erythema. Very quiet.     No debridement.           Wound clinically improved, marked reduction in cellulitis; much less pain.   This appears related to treatment of his persisting MRSA.             Dressing:  Crusting of macerated skin. Xeroform over wound, cover with dry gauze and roll gauze, Tubigrip from base of toes to upper calf. Change daily.     I will plan to start Wound VAC a short time after Dalvance given.      No dressing for foot site.  Keep all pressure off the area.       Depending on response to plans, consider venous testing.  Would want to avoid saphenous ablation in view of PAD.      JENNA pending.      The patient will follow up in wound care center again in 1 week; will plan to apply VAC. .        FINAL DIAGNOSES:  Nonhealing wounds right lower leg, peripheral artery disease.  Possible pressure site right foot, stage 1.  Leg edema.     L97.912, I73.9, L89.891, R60.0     Diana Curtis MD

## 2021-09-20 NOTE — DISCHARGE INSTRUCTIONS
Discharge Instructions for  [place] [id number] [address]  MOB 1, [address] [facility] [place], [id number]  Telephone: [phone]    NAME:  Bogdan Pisano  YOB: 1955  MEDICAL RECORD NUMBER:  [id number]  DATE:  9/20/2021  WOUND CARE ORDERS:  Right Lower Leg- Cleanse w/Normal Saline & gauze. Apply Xeroform, cover w/gauze, ABD pads & roll gauze, followed by Single layer Tubigrip F. Dressing to be changed daily. RTC for MD follow up in 1 week. TREATMENT ORDERS:    Elevate leg(s) above the level of the heart when sitting. Avoid prolonged standing in one place. Do no get dressing/wrap wet. Follow Diet as prescribed:   [x] Diet as tolerated: [] Calorie Diabetic Diet: Low carb and no Sugar [x] No Added Salt:  [x] Increase Protein: [] Limit the amount of liquid you are drinking and avoid drinking in between meals           Return Appointment:  [x] Return Appointment: With DR Jaison Infante  in  [facility])  [] Nurse Visit : *** days  [] Ordered tests:    Electronically signed Valerie Brooks RN on 9/20/2021 at 9:33 AM     [address] [facility] Information: Should you experience any significant changes in your wound(s) or have questions about your wound care, please contact the [facility] at [address] [place] [address] 8:00 am - 4:30. If you need help with your wound outside these hours and cannot wait until we are again available, contact your PCP or go to the hospital emergency room. PLEASE NOTE: IF YOU ARE UNABLE TO OBTAIN WOUND SUPPLIES, CONTINUE TO USE THE SUPPLIES YOU HAVE AVAILABLE UNTIL YOU ARE ABLE TO REACH US. IT IS MOST IMPORTANT TO KEEP THE WOUND COVERED AT ALL TIMES.      Physician Signature:_______________________    Date: ___________ Time:  ____________

## 2021-09-20 NOTE — WOUND CARE
09/20/21 0918   Right Leg Edema Point of Measurement   Leg circumference 40 cm   Ankle circumference 23 cm   Compression Therapy Tubular elastic support bandage   RLE Peripheral Vascular    Capillary Refill Less than/equal to 3 seconds   Color Appropriate for race   Temperature Warm   Sensation Present   Pedal Pulse Palpable   Wound Leg lower Right # 1   Date First Assessed/Time First Assessed: 05/07/20 0835   Present on Hospital Admission: Yes  Wound Approximate Age at First Assessment (Weeks): 4 weeks  Primary Wound Type: Vascular  Location: Leg lower  Wound Location Orientation: Right  Wound Descri. ..    Wound Image    Wound Etiology Venous   Dressing Status   (removed xeroform abd pad )   Cleansed Cleansed with saline   Wound Length (cm) 11.5 cm   Wound Width (cm) 9 cm   Wound Depth (cm) 0.3 cm   Wound Surface Area (cm^2) 103.5 cm^2   Change in Wound Size % 48.44   Wound Volume (cm^3) 31.05 cm^3   Wound Healing % 23   Wound Assessment Slough;Pink/red   Drainage Amount Moderate   Drainage Description Serous   Wound Odor None   Edges Defined edges   Wound Thickness Description Full thickness   Pain 1   Pain Scale 1 Numeric (0 - 10)   Pain Intensity 1 0   Patient Stated Pain Goal 0   Pain Reassessment 1 Yes     Visit Vitals  BP (!) 120/58   Pulse (!) 47   Temp 97.8 °F (36.6 °C)   Resp 16

## 2021-09-27 ENCOUNTER — HOSPITAL ENCOUNTER (OUTPATIENT)
Dept: WOUND CARE | Age: 66
Discharge: HOME HEALTH | End: 2021-09-27
Payer: MEDICARE

## 2021-09-27 VITALS
DIASTOLIC BLOOD PRESSURE: 56 MMHG | RESPIRATION RATE: 16 BRPM | HEART RATE: 50 BPM | TEMPERATURE: 97.5 F | SYSTOLIC BLOOD PRESSURE: 119 MMHG

## 2021-09-27 DIAGNOSIS — I73.9 PAD (PERIPHERAL ARTERY DISEASE) (HCC): ICD-10-CM

## 2021-09-27 DIAGNOSIS — R60.0 LEG EDEMA, RIGHT: ICD-10-CM

## 2021-09-27 DIAGNOSIS — L89.891 PRESSURE INJURY OF RIGHT FOOT, STAGE 1: ICD-10-CM

## 2021-09-27 DIAGNOSIS — L97.912 NON-PRESSURE CHRONIC ULCER OF RIGHT LOWER LEG, WITH FAT LAYER EXPOSED (HCC): ICD-10-CM

## 2021-09-27 PROCEDURE — 97606 NEG PRS WND THER DME>50 SQCM: CPT

## 2021-09-27 PROCEDURE — 99213 OFFICE O/P EST LOW 20 MIN: CPT | Performed by: SURGERY

## 2021-09-27 NOTE — PROGRESS NOTES
HISTORY OF PRESENT ILLNESS:  The patient is a 68-year-old man who was referred to the wound care center regarding nonhealing wound right lower leg and right posterior ankle.    The patient reports that he had struck his right ankle and developed a wound, which had been nonhealing.     The patient was first seen at the [address] [place] [address] on 5/7/2020.  Ankle  wound has healed.     He  developed swelling and redness in the right lower leg in 2020, which then was followed by blister formation and a wound formation.  He eventually was hospitalized at [facility] and says he was in the hospital for about 18 days.      The patient reports chronic numbness of both feet.  He has right drop foot.     The patient had at the time of an ER visit for leg symptoms on the right in 01/2020, a venous duplex scan which did not find any evidence of deep venous thrombosis.  Vein valves were not tested.     The patient has history of lumbar laminectomy and diskectomy on 09/04/2019. Deidra Stands reports related to his disk disease, he has right side footdrop.  He reported history of neurogenic claudication.  The patient was scheduled to have further back surgery within the month, but that has been canceled related to COVID-19.     The patient reports that he walks with Enoc Medal denies shortness of breath at rest or with exertion.  He has no anginal chest pain.     The patient had angiography by Dr Elizabeth Romo on 5/17/2020 with finding of severe multilevel arterial occlusive disease in legs.    The patient on 5/29/2020 had open right femoral endarterectomy and patch angioplasty and also balloon angioplasty of right superficial femoral artery.  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  Dr Nora Salazar also debrided the dry eschars on the leg wounds.     The patient had JENNA bilateral lower extremities at [facility] on 6/24/2021.  This showed:                   Right JENNA 0.70 at [facility], TBI0. 42.                 Left JENNA 0.67 at [facility], TBI 0.32.     JENNA report from [facility] reviewed - arterial perfusion on the right appears adequate for healing at the leg level,  although not normalized        The patient has a history of coronary artery disease and has had angioplasty and stent.  He has also had coronary artery bypass grafting.  He does not have any history of diabetes.  Past medical history does include asthma, skin cancer, gastroesophageal reflux disease, hypertension, hyperlipidemia, fatty liver disease, potential sleep apnea, but never tested.     SOCIAL HISTORY:  The patient smoked until 1998 when he quit. Bhavya Ward does use alcohol.     The patient's medications do not include a diuretic.      (I discussed patient's situation with his daughter Wolf Pacheco 8/27/2021. Trish Obrien reviewed situation with his right leg wound.  It has been very difficult to make progress toward healing even after improvement in arterial perfusion after his femoral endarterectomy and SFA angioplasty last year.  I reviewed current treatment with skin substitutes.  She is concerned about his level of pain.  I then spoke today with patient's primary MD Dr Brian Isbell patient has previously had disorientation with gabapentin; Dr Hector Lucas will likely start nortriptylline.  Although patient had JENNA on 6/24/2021 suggesting adequate but not normal perfusion of the right leg, in view of worsening of pain and poor progress with wound healing, I gave verbal order for repeat JENNA at [facility]. Trish Obrien gave verbal order for repeat wound culture at nurse visit. Bhavya Ward has had MRSA in the past; if still present consider oral antibiotics versus Lelan Reddish patient's daughter expressed concern that the patient may have increased alcohol intake and not be eating well. Gabino Cox may certainly impact healing.)     Prior dressing:   Xeroform on the lower leg wound and then dry gauze and roll gauze changed daily.     Dressing started 8/13/2020:  [employer] on the leg  wounds and then dry gauze and roll gauze changed 3 times per week.     Patient had problems with adherence of the dressing, went back to Xeroform (3 times per week) after 1 week.  He has Home Health.     Drawtex dressing tried. [facility] developed more drainage, skin irritation.  Culture on 12/29/2020 grew pseudomonas and MRSA. [facility] started doxycycline on 12/31/2020.       Wound culture on 6/10/2021 grew staph aureus (MRSA).   Treated with Doxycycline 100 mg po bid for 14 days.     Wound culture on 9/2/2021 grew MRSA and pseudomonas.  He received Dalvance on 9/14/2021.     His skin tolerates Xeroform well.     Prior dressing as of 5/3/2021:  Wash surface of wound with Dakins 0.125%, then rinse with saline.  Zandra / Hydrofera Blue on the leg  wounds and then dry gauze and roll gauze changed 3 times per week.  Covered by Farrow wrap to help with edema control.  Use Tubigrip instead of liner stocking.     Prior dressing as of 7/8/2021:  Wash surface of wound with Dakins 0.125%, then rinse with saline.  Xeroform placed over the ulcer(s).  Unna boot placed form base of toes to upper calf with overlying Coban at 0.50 stretch.   Has Home Health for change in 4 days and weekly.     Prior dressing as of 7/14/2021:   On the superior portion of the wound, two 2 x 4 cm sheets of Puraply were placed.  On the inferior portion of the wound a sheet of 5 x 5 endoform was placed.  The wound was covered by Mepitel silicone fenestrated sheet.  ABD was applied over the wound.  Unna boot was applied from base of toes to upper calf with 0.50 stretch on Coban.     Prior dressing as of 7/21/2021:  On the inferior portion of the wound, a 5 x 5 cm fenestrated sheet of Puraply (x 4 episodes) were placed.  On the rest of the wound two sheets of 5 x 5 endoform were placed.  The wound was covered by Mepitel  silicone fenestrated sheet; steri strips on the edge.  ABD was applied over the wound, roll gauze.  Single Tubigrip from base of toes to upper calf.     Starting on 8/12/2021:  On the inferior portion of the wound, a 4 x 6 cm sheet of Nushield were placed.  On the rest of the wound two sheets of 5 x 5 endoform were cut and placed.  The wound was covered by two pieces of Versatel sheet.  ABD was applied over the wound, roll gauze.  Single Tubigrip from base of toes to upper calf.  Patient will change outer dressing daily to control drainage.     Patient had  pain in the lower leg focused a few inches above the medial malleolus and in region of wound.  Discussed with Dr Johan Moctezuma.     Current dressing as of 9/9/2021:  Crusting of macerated skin. Xeroform over wound, cover with dry gauze and roll gauze, Tubigrip from base of toes to upper calf. He is changing daily.      He reports much reduced pain in the wound region since receiving Dalvance. Drainage decreased.     He is going to start walking on his treadmill.        PHYSICAL EXAMINATION:  GENERAL:  The patient is an alert man in no acute distress.     EXTREMITIES:       The patient's lower extremities were examined.  On the left side, there is no edema.  There were no wounds present.      On the right lower extremity, right DP pulse is not palpable.  There is moderate biphasic doppler signal.  There is 1+ edema in the right lower leg.  The right foot feels warm. Less redness and maceration of skin around wound, mainly laterally.      On the anterior aspect of the mid lower leg on the right, distal right lower leg ulcer 11.5 x 9  x 0.3 cm with much decreased slough.  More granulation today.  Skin island remains in middle.  Cluster of small ulcers in macerated area laterally. Skin around wound appears less inflamed.     Area of possible tissue injury on lateral right foot at 5th  metatarsal head.  Minimal  callus, no skin opening.  No erythema. Very quiet.     No debridement.           Wound clinically improved; much less pain.   This appears related to treatment of his persisting MRSA.              Dressing:  Black foam on wound, Wound VAC, 125 mm Hg negative pressure.      No dressing for foot site.  Keep all pressure off the area.       Depending on response to plans, consider venous testing.  Would want to avoid saphenous ablation in view of PAD.         The patient will follow up in wound care center for NV and VAC change in 3 days, then see me in 1 week.        FINAL DIAGNOSES:  Nonhealing wounds right lower leg, peripheral artery disease.  Possible pressure site right foot, stage 1.  Leg edema.     L97.912, I73.9, L89.891, R60.0     Morro Mccoy MD

## 2021-09-27 NOTE — WOUND CARE
Negative Pressure    NAME:  Beather Fleischer  YOB: 1955  MEDICAL RECORD NUMBER:  [id number]  DATE:  9/27/2021     Applied Negative Pressure to Right Lower Leg woundwound(s)/ulcer(s).  [x] Applied skin barrier prep to krystian-wound.  [] Cut strips of plastic drape to picture frame wound so that krystian-wound is     covered with the drape.  [x] If bridging dressing to less prominent site, cover any intact skin that will come in contact with the Negative Pressure Therapy sponge, gauze or channel drain with plastic drape. The sponge should never touch intact skin.  [x] Cut sponge, gauze or channel drain to size which will fit into the wound/ulcer bed without being forced.  [x] Be sure the sponge is large enough to hold the entire round plastic flange which is attached to the tubing. Never allow flange to be larger than the sponge or it will produce suction damaging intact skin.  Total number of individual pieces of foam used within the wound bed: one   [x] If bridging the dressing away from the primary site, be sure the bridge leads to a piece of sponge large enough to hold the entire flange without allowing any of the flange to overlap onto intact skin.  [x] Covered sponge, gauze or channel drain with plastic drape.  [x] Cut a hole in this plastic drape directly over the sponge the same size as the plastic drain tubing.  [x] Removed plastic liner from flange and apply it directly over the hole you cut.  [x] Removed the plastic cover from the flange.  [x] Attached the tubing to the wound/ulcer Negative Pressure Therapy and turn it on to be sure a vacuum is created and that there are no leaks.  [x] If air leaks occur, use plastic drape to patch them.  [x] Secured Negative Pressure Therapy dressing with ace wrap loosely if located on an extremity. Maintain tubing outside of ace wrap. Tubing must not exert pressure on intact skin.     Applied per [facility] Gutierrez Guidelines      Electronically signed by Breanna Andrew on 9/27/2021 at 10:14 AM

## 2021-09-27 NOTE — WOUND CARE
09/27/21 0905   Anesthetic   Anesthetic   (4% Lidocaine gel)   Right Leg Edema Point of Measurement   Leg circumference 39.5 cm   Ankle circumference 23 cm   Compression Therapy Tubular elastic support bandage   RLE Peripheral Vascular    Capillary Refill Less than/equal to 3 seconds   Color Appropriate for race   Temperature Warm   Sensation Present   Pedal Pulse Palpable   Wound Leg lower Right # 1   Date First Assessed/Time First Assessed: 05/07/20 0835   Present on Hospital Admission: Yes  Wound Approximate Age at First Assessment (Weeks): 4 weeks  Primary Wound Type: Vascular  Location: Leg lower  Wound Location Orientation: Right  Wound Descri. .. Wound Etiology Venous   Dressing Status Old drainage noted; Intact  (Removed Xeroform; ABD; roll gauze; tape; single tubi )   Cleansed Cleansed with saline   Wound Length (cm) 11.5 cm   Wound Width (cm) 9 cm   Wound Depth (cm) 0.3 cm   Wound Surface Area (cm^2) 103.5 cm^2   Change in Wound Size % 48.44   Wound Volume (cm^3) 31.05 cm^3   Wound Healing % 23   Wound Assessment [place]/red;Slough   Drainage Amount Moderate   Drainage Description Serosanguinous   Wound Odor None   Chana-Wound/Incision Assessment Denuded  (Areas of excoriation)   Edges Defined edges   Wound Thickness Description Full thickness     Visit Vitals  BP (!) 119/56 (BP 1 Location: Left upper arm, BP Patient Position: At rest)   Pulse (!) 50 Comment: no dizziness   Temp 97.5 °F (36.4 °C)   Resp 16        RLE Peripheral Vascular   Capillary Refill: (P) Less than/equal to 3 seconds (09/27/21 0905)  Color: (P) Appropriate for race (09/27/21 0905)  Temperature: (P) Warm (09/27/21 0905)  Sensation: (P) Present (09/27/21 0905)  Pedal Pulse: (P) Palpable (09/27/21 0905)

## 2021-09-27 NOTE — DISCHARGE INSTRUCTIONS
Discharge Instructions for  [facility]  [address]  MOB 1, [address] [facility] [place], [id number]  Telephone: [phone]    NAME:  Papito Yousif  YOB: 1955  MEDICAL RECORD NUMBER:  [id number]  DATE:  9/27/2021  WOUND CARE ORDERS:  Right Lower Leg Wound -  - Cleanse site with Normal Saline or Wound Cleanser. Apply skin prep on krystian wound and windowpane with transparent drape and with Duoderm (  on excoriated areas ). Also protect intact skin  in middle of wound with Duoderm. Attach trak pad. Initiate Wound Vac Therapy at 125mmHg continous. Apply ace wrap. Change twice a week and as needed for leaking. In case of Vac failure, discontinue Wound Vac and apply moist Saline gauze and cover with ABD pad/Exudry. Secure with roll gauze and tape. Change dressings daily and as needed. Nurse Visit this Thursday 9/30/2021. Return to clinic to see Dr. Nazario Nickerson Ma in one week. TREATMENT ORDERS:    Elevate leg(s) above the level of the heart when sitting. Avoid prolonged standing in one place. Do no get dressing/wrap wet. Follow Diet as prescribed:   [x] Diet as tolerated: [] Calorie Diabetic Diet: Low carb and no Sugar [] No Added Salt:  [] Increase Protein: [] Limit the amount of liquid you are drinking and avoid drinking in between meals           Return Appointment:  [x] Return Appointment: With DR Ravinder Jo  in  [facility])  [x] Nurse Visit : Thursday Sept 30,2021  [] Ordered tests:    Electronically signed Anali Villarreal on 9/27/2021 at 9:31 [address]: Should you experience any significant changes in your wound(s) or have questions about your wound care, please contact the [facility] at [address] [place] [address] Street 8:00 am - 4:30. If you need help with your wound outside these hours and cannot wait until we are again available, contact your PCP or go to the hospital emergency room.      PLEASE NOTE: IF YOU ARE UNABLE TO OBTAIN WOUND SUPPLIES, CONTINUE TO USE THE SUPPLIES YOU HAVE AVAILABLE UNTIL YOU ARE ABLE TO REACH US. IT IS MOST IMPORTANT TO KEEP THE WOUND COVERED AT ALL TIMES.      Physician Signature:_______________________    Date: ___________ Time:  ____________

## 2021-09-30 ENCOUNTER — HOSPITAL ENCOUNTER (OUTPATIENT)
Dept: WOUND CARE | Age: 66
Discharge: HOME HEALTH | End: 2021-09-30
Payer: MEDICARE

## 2021-09-30 VITALS
HEART RATE: 53 BPM | RESPIRATION RATE: 16 BRPM | SYSTOLIC BLOOD PRESSURE: 128 MMHG | DIASTOLIC BLOOD PRESSURE: 60 MMHG | TEMPERATURE: 97.5 F

## 2021-09-30 PROCEDURE — 97605 NEG PRS WND THER DME<=50SQCM: CPT

## 2021-09-30 NOTE — WOUND CARE
09/30/21 1120   Wound Leg lower Right # 1   Date First Assessed/Time First Assessed: 05/07/20 0835   Present on Hospital Admission: Yes  Wound Approximate Age at First Assessment (Weeks): 4 weeks  Primary Wound Type: Vascular  Location: Leg lower  Wound Location Orientation: Right  Wound Descri. .. Wound Etiology Venous   Dressing Status New dressing applied   Cleansed Cleansed with saline   Dressing/Treatment Negative Pressure Wound Therapy  (wound vac @ 125mmhg)   Wound Assessment Pink/red   Drainage Amount Moderate   Drainage Description Serosanguinous   Wound Odor None   Krystian-Wound/Incision Assessment Intact   Edges Defined edges   Wound Thickness Description Full thickness   Pain 1   Pain Scale 1 Numeric (0 - 10)   Pain Intensity 1 0       Negative Pressure    NAME:  Jose Noonan  YOB: 1955  MEDICAL RECORD NUMBER:  [id number]  DATE:  9/30/2021     Applied Negative Pressure to RLL wound(s)/ulcer(s).  [x] Applied skin barrier prep to krystian-wound.  [x] Cut strips of plastic drape to picture frame wound so that krystian-wound is     covered with the drape.  [x] If bridging dressing to less prominent site, cover any intact skin that will come in contact with the Negative Pressure Therapy sponge, gauze or channel drain with plastic drape. The sponge should never touch intact skin.  [x] Cut sponge, gauze or channel drain to size which will fit into the wound/ulcer bed without being forced.  [x] Be sure the sponge is large enough to hold the entire round plastic flange which is attached to the tubing. Never allow flange to be larger than the sponge or it will produce suction damaging intact skin.  Total number of individual pieces of foam used within the wound bed: 1   [x] If bridging the dressing away from the primary site, be sure the bridge leads to a piece of sponge large enough to hold the entire flange without allowing any of the flange to overlap onto intact skin.     [x] Covered sponge, gauze or channel drain with plastic drape.  [] Cut a hole in this plastic drape directly over the sponge the same size as the plastic drain tubing.  [x] Removed plastic liner from flange and apply it directly over the hole you cut.  [x] Removed the plastic cover from the flange.  [x] Attached the tubing to the wound/ulcer Negative Pressure Therapy and turn it on to be sure a vacuum is created and that there are no leaks.  [x] If air leaks occur, use plastic drape to patch them.  [x] Secured Negative Pressure Therapy dressing with ace wrap loosely if located on an extremity. Maintain tubing outside of ace wrap. Tubing must not exert pressure on intact skin.     Applied per  Guidelines      Electronically signed by Yissel Frank LPN on 9/32/3752 at 53:78 AM

## 2021-10-04 ENCOUNTER — HOSPITAL ENCOUNTER (OUTPATIENT)
Dept: WOUND CARE | Age: 66
Discharge: HOME HEALTH | End: 2021-10-04
Payer: MEDICARE

## 2021-10-04 VITALS
RESPIRATION RATE: 16 BRPM | DIASTOLIC BLOOD PRESSURE: 67 MMHG | TEMPERATURE: 97.2 F | HEART RATE: 54 BPM | SYSTOLIC BLOOD PRESSURE: 137 MMHG

## 2021-10-04 DIAGNOSIS — R60.0 LEG EDEMA, RIGHT: ICD-10-CM

## 2021-10-04 DIAGNOSIS — L97.912 NON-PRESSURE CHRONIC ULCER OF RIGHT LOWER LEG, WITH FAT LAYER EXPOSED (HCC): ICD-10-CM

## 2021-10-04 DIAGNOSIS — I73.9 PAD (PERIPHERAL ARTERY DISEASE) (HCC): ICD-10-CM

## 2021-10-04 DIAGNOSIS — L89.891 PRESSURE INJURY OF RIGHT FOOT, STAGE 1: ICD-10-CM

## 2021-10-04 PROCEDURE — 99213 OFFICE O/P EST LOW 20 MIN: CPT | Performed by: SURGERY

## 2021-10-04 PROCEDURE — 97606 NEG PRS WND THER DME>50 SQCM: CPT

## 2021-10-04 NOTE — WOUND CARE
10/04/21 0955   Wound Leg lower Right # 1   Date First Assessed/Time First Assessed: 05/07/20 0835   Present on Hospital Admission: Yes  Wound Approximate Age at First Assessment (Weeks): 4 weeks  Primary Wound Type: Vascular  Location: Leg lower  Wound Location Orientation: Right  Wound Descri. .. Dressing/Treatment   (Duoderm, negative pressure)   Negative Pressure    NAME:  Lisa Rae  YOB: 1955  MEDICAL RECORD NUMBER:  [id number]  DATE:  10/4/2021     Applied Negative Pressure to right lower leg wound(s)/ulcer(s).  [x] Applied skin barrier prep to krystian-wound.  [x] Cut strips of plastic drape to picture frame wound so that krystian-wound is     covered with the drape.  [] If bridging dressing to less prominent site, cover any intact skin that will come in contact with the Negative Pressure Therapy sponge, gauze or channel drain with plastic drape. The sponge should never touch intact skin.  [x] Cut sponge, gauze or channel drain to size which will fit into the wound/ulcer bed without being forced.  [x] Be sure the sponge is large enough to hold the entire round plastic flange which is attached to the tubing. Never allow flange to be larger than the sponge or it will produce suction damaging intact skin.  Total number of individual pieces of foam used within the wound bed: 1   [] If bridging the dressing away from the primary site, be sure the bridge leads to a piece of sponge large enough to hold the entire flange without allowing any of the flange to overlap onto intact skin.  [x] Covered sponge, gauze or channel drain with plastic drape.  [x] Cut a hole in this plastic drape directly over the sponge the same size as the plastic drain tubing.  [x] Removed plastic liner from flange and apply it directly over the hole you cut.  [x] Removed the plastic cover from the flange.     [x] Attached the tubing to the wound/ulcer Negative Pressure Therapy and turn it on to be sure a vacuum is created and that there are no leaks.  [x] If air leaks occur, use plastic drape to patch them.  [x] Secured Negative Pressure Therapy dressing with ace wrap loosely if located on an extremity. Maintain tubing outside of ace wrap. Tubing must not exert pressure on intact skin.     Response to treatment: Well tolerated by patient      Applied per  Guidelines      Electronically signed by Dary Pizarro RN on 10/4/2021 at 9:56 AM

## 2021-10-04 NOTE — PROGRESS NOTES
HISTORY OF PRESENT ILLNESS:  The patient is a 24-year-old man who was referred to the wound care center regarding nonhealing wound right lower leg and right posterior ankle.    The patient reports that he had struck his right ankle and developed a wound, which had been nonhealing.     The patient was first seen at the [address] [place] [address] on 5/7/2020.  Ankle  wound has healed.     He  developed swelling and redness in the right lower leg in 2020, which then was followed by blister formation and a wound formation.  He eventually was hospitalized at [facility] and says he was in the hospital for about 18 days.      The patient reports chronic numbness of both feet.  He has right drop foot.     The patient had at the time of an ER visit for leg symptoms on the right in 01/2020, a venous duplex scan which did not find any evidence of deep venous thrombosis.  Vein valves were not tested.     The patient has history of lumbar laminectomy and diskectomy on 09/04/2019. Erin Stanley reports related to his disk disease, he has right side footdrop.  He reported history of neurogenic claudication.  The patient was scheduled to have further back surgery within the month, but that has been canceled related to COVID-19.     The patient reports that he walks with [place] denies shortness of breath at rest or with exertion.  He has no anginal chest pain.     The patient had angiography by Dr Robb Jasso on 5/17/2020 with finding of severe multilevel arterial occlusive disease in legs.    The patient on 5/29/2020 had open right femoral endarterectomy and patch angioplasty and also balloon angioplasty of right superficial femoral artery.  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  Dr Chris Levin also debrided the dry eschars on the leg wounds.     The patient had JENNA bilateral lower extremities at [facility] on 6/24/2021.  This showed:                   Right JENNA 0.70 at [facility], TBI0. 42.                 Left JENNA 0.67 at [facility], TBI 0.32.     JENNA report from [facility] reviewed - arterial perfusion on the right appears adequate for healing at the leg level,  although not normalized        The patient has a history of coronary artery disease and has had angioplasty and stent.  He has also had coronary artery bypass grafting.  He does not have any history of diabetes.  Past medical history does include asthma, skin cancer, gastroesophageal reflux disease, hypertension, hyperlipidemia, fatty liver disease, potential sleep apnea, but never tested.     SOCIAL HISTORY:  The patient smoked until 1998 when he quit. Geeta Veronica does use alcohol.     The patient's medications do not include a diuretic.      (I discussed patient's situation with his daughter Sridhar Wilson 8/27/2021. Phu Murray reviewed situation with his right leg wound.  It has been very difficult to make progress toward healing even after improvement in arterial perfusion after his femoral endarterectomy and SFA angioplasty last year.  I reviewed current treatment with skin substitutes.  She is concerned about his level of pain.  I then spoke today with patient's primary MD Dr Melvin Hernandez patient has previously had disorientation with gabapentin; Dr Deion Wood will likely start nortriptylline.  Although patient had JENNA on 6/24/2021 suggesting adequate but not normal perfusion of the right leg, in view of worsening of pain and poor progress with wound healing, I gave verbal order for repeat JENNA at [facility]. Phu Murray gave verbal order for repeat wound culture at nurse visit. Geeta Veronica has had MRSA in the past; if still present consider oral antibiotics versus Marty Gonzalez patient's daughter expressed concern that the patient may have increased alcohol intake and not be eating well. Natasha Dublin may certainly impact healing.)     Prior dressing:   Xeroform on the lower leg wound and then dry gauze and roll gauze changed daily.     Dressing started 8/13/2020:  Tigermed on the leg  wounds and then dry gauze and roll gauze changed 3 times per week.     Patient had problems with adherence of the dressing, went back to Xeroform (3 times per week) after 1 week.  He has Home Health.     Drawtex dressing tried. [facility] developed more drainage, skin irritation.  Culture on 12/29/2020 grew pseudomonas and MRSA. [facility] started doxycycline on 12/31/2020.       Wound culture on 6/10/2021 grew staph aureus (MRSA).   Treated with Doxycycline 100 mg po bid for 14 days.     Wound culture on 9/2/2021 grew MRSA and pseudomonas.  He received Dalvance on 9/14/2021.     His skin tolerates Xeroform well.     Prior dressing as of 5/3/2021:  Wash surface of wound with Dakins 0.125%, then rinse with saline.  Zandra / Hydrofera Blue on the leg  wounds and then dry gauze and roll gauze changed 3 times per week.  Covered by Farrow wrap to help with edema control.  Use Tubigrip instead of liner stocking.     Prior dressing as of 7/8/2021:  Wash surface of wound with Dakins 0.125%, then rinse with saline.  Xeroform placed over the ulcer(s).  Unna boot placed form base of toes to upper calf with overlying Coban at 0.50 stretch.   Has Home Health for change in 4 days and weekly.     Prior dressing as of 7/14/2021:   On the superior portion of the wound, two 2 x 4 cm sheets of Puraply were placed.  On the inferior portion of the wound a sheet of 5 x 5 endoform was placed.  The wound was covered by Mepitel silicone fenestrated sheet.  ABD was applied over the wound.  Unna boot was applied from base of toes to upper calf with 0.50 stretch on Coban.     Prior dressing as of 7/21/2021:  On the inferior portion of the wound, a 5 x 5 cm fenestrated sheet of Puraply (x 4 episodes) were placed.  On the rest of the wound two sheets of 5 x 5 endoform were placed.  The wound was covered by Mepitel  silicone fenestrated sheet; steri strips on the edge.  ABD was applied over the wound, roll gauze.  Single Tubigrip from base of toes to upper calf.     Starting on 8/12/2021:  On the inferior portion of the wound, a 4 x 6 cm sheet of Nushield were placed.  On the rest of the wound two sheets of 5 x 5 endoform were cut and placed.  The wound was covered by two pieces of Versatel sheet.  ABD was applied over the wound, roll gauze.  Single Tubigrip from base of toes to upper calf.  Patient will change outer dressing daily to control drainage.     Patient had  pain in the lower leg focused a few inches above the medial malleolus and in region of wound.  Discussed with Dr Leyla Bello.     Current dressing as of 9/9/2021:  Crusting of macerated skin. Xeroform over wound, cover with dry gauze and roll gauze, Tubigrip from base of toes to upper calf.  He is changing daily.      He reports much reduced pain in the wound region since receiving Basilia Prom decreased. Current dressing as of 9/27/2021:  Black foam on wound, Wound VAC, 125 mm Hg negative pressure. Duoderm on lateral skin.           PHYSICAL EXAMINATION:  GENERAL:  The patient is an alert man in no acute distress.     EXTREMITIES:       The patient's lower extremities were examined.  On the left side, there is no edema.  There were no wounds present.      On the right lower extremity, right DP pulse is not palpable.  There is moderate biphasic doppler signal.  There is 1+ edema in the right lower leg.  The right foot feels warm.  Less redness and maceration of skin around wound, mainly laterally.      On the anterior aspect of the mid lower leg on the right, distal right lower leg ulcer 11.5 x 8  x 0.2 cm with much decreased slough.  More granulation today.  Skin island remains in middle.  Cluster of small ulcers in macerated area laterally now almost healed.  Skin around wound appears less inflamed.     Area of possible tissue injury on lateral right foot at 5th  metatarsal head.  Minimal  callus, no skin opening.  No erythema. Very quiet.     No debridement.           Wound clinically improved; less pain.  This appears related to treatment of his persisting MRSA. Tolerating VAC.               Dressing:  Black foam on wound, Wound VAC, 125 mm Hg negative pressure.      No dressing for foot site.  Keep all pressure off the area.       Depending on response to plans, consider venous testing.  Would want to avoid saphenous ablation in view of PAD.         The patient will follow up in wound care center for NV and VAC change in 3 days, then see me in 1 week.        FINAL DIAGNOSES:  Nonhealing wounds right lower leg, peripheral artery disease.  Possible pressure site right foot, stage 1.  Leg edema.     L97.912, I73.9, L89.891, R60.0     Janeen Nails MD

## 2021-10-04 NOTE — DISCHARGE INSTRUCTIONS
Discharge Instructions for  [facility]  [address]  MOB 1 [address] [facility] [place], [id number]  Telephone: [phone]    NAME:  Jacquie Cash  YOB: 1955  MEDICAL RECORD NUMBER:  [id number]  DATE:  10/4/2021  WOUND CARE ORDERS:  Right Lower Leg Wound -  - Cleanse site with Normal Saline or Wound Cleanser. Apply skin prep on krystian wound and windowpane with  transparent dressing and with Duoderm ( on excoriated areas ). Also protect intact skin  in middle of wound with Duoderm. Attach trak pad. Initiate Wound Vac Therapy at 125mmHg continous.  Apply ace wrap. Change twice a week and as needed for leaking. In case of Vac failure, discontinue Wound Vac and apply moist Saline gauze and cover with ABD pad/Exudry. Secure with roll gauze and tape. Change dressings daily and as needed. Nurse Visit this Thursday 10/07/21  Return to clinic to see Dr. Vandana Kirkpatrick in one week. TREATMENT ORDERS:    Elevate leg(s) above the level of the heart when sitting. Avoid prolonged standing in one place. Do no get dressing/wrap wet. Follow Diet as prescribed:   [x] Diet as tolerated: [] Calorie Diabetic Diet: Low carb and no Sugar [x] No Added Salt:  [x] Increase Protein: [] Limit the amount of liquid you are drinking and avoid drinking in between meals           Return Appointment:  [x] Return Appointment: With DR Anthony Wright  in  1 [facility])  [x] Nurse Visit :  10/07/21 day  [] Ordered tests:    Electronically signed Devante Huffman RN on 10/4/2021 at 9:34 AM     [address] [place] [address] Road Information: Should you experience any significant changes in your wound(s) or have questions about your wound care, please contact the [facility] Main at [address] [place] [address] Street 8:00 am - 4:30. If you need help with your wound outside these hours and cannot wait until we are again available, contact your PCP or go to the hospital emergency room.      PLEASE NOTE: IF YOU ARE UNABLE TO OBTAIN WOUND SUPPLIES, CONTINUE TO USE THE SUPPLIES YOU HAVE AVAILABLE UNTIL YOU ARE ABLE TO REACH US. IT IS MOST IMPORTANT TO KEEP THE WOUND COVERED AT ALL TIMES.      Physician Signature:_______________________    Date: ___________ Time:  ____________

## 2021-10-04 NOTE — WOUND CARE
10/04/21 0903   Anesthetic   Anesthetic   (4% Lidocaine gel)   Right Leg Edema Point of Measurement   Leg circumference 40 cm   Ankle circumference 22.2 cm   Compression Therapy   (ace wrap)   RLE Peripheral Vascular    Capillary Refill Less than/equal to 3 seconds   Color Appropriate for race   Temperature Warm   Sensation Present   Pedal Pulse Palpable   Wound Leg lower Right # 1   Date First Assessed/Time First Assessed: 05/07/20 0835   Present on Hospital Admission: Yes  Wound Approximate Age at First Assessment (Weeks): 4 weeks  Primary Wound Type: Vascular  Location: Leg lower  Wound Location Orientation: Right  Wound Descri. .. Wound Image    Wound Etiology Venous   Dressing Status Old drainage noted; Intact  (Removed wound vac dressing)   Cleansed Cleansed with saline   Wound Length (cm) 11.5 cm   Wound Width (cm) 8 cm   Wound Depth (cm) 0.2 cm   Wound Surface Area (cm^2) 92 cm^2   Change in Wound Size % 54.17   Wound Volume (cm^3) 18.4 cm^3   Wound Healing % 54   Wound Assessment Pink/red   Drainage Amount Moderate   Drainage Description Serosanguinous   Wound Odor None   Chana-Wound/Incision Assessment Intact   Edges Defined edges   Wound Thickness Description Full thickness     Visit Vitals  /67 (BP 1 Location: Left upper arm, BP Patient Position: At rest)   Pulse (!) 54   Temp 97.2 °F (36.2 °C)   Resp 16        RLE Peripheral Vascular   Capillary Refill: Less than/equal to 3 seconds (10/04/21 0903)  Color: Appropriate for race (10/04/21 0903)  Temperature: Warm (10/04/21 0903)  Sensation: Present (10/04/21 0903)  Pedal Pulse: Palpable (10/04/21 0903)

## 2021-10-07 ENCOUNTER — HOSPITAL ENCOUNTER (OUTPATIENT)
Dept: WOUND CARE | Age: 66
Discharge: HOME | End: 2021-10-07
Payer: MEDICARE

## 2021-10-07 VITALS
SYSTOLIC BLOOD PRESSURE: 168 MMHG | HEART RATE: 55 BPM | TEMPERATURE: 97.6 F | RESPIRATION RATE: 16 BRPM | DIASTOLIC BLOOD PRESSURE: 66 MMHG

## 2021-10-07 PROCEDURE — 97605 NEG PRS WND THER DME<=50SQCM: CPT

## 2021-10-07 NOTE — WOUND CARE
Negative Pressure    NAME:  Gilson Ojeda  YOB: 1955  MEDICAL RECORD NUMBER:  [id number]  DATE:  10/7/2021     Applied Negative Pressure to RLL  wound(s)/ulcer(s).  [x] Applied skin barrier prep to krystian-wound.  [x] Cut strips of plastic drape to picture frame wound so that krystian-wound is     covered with the drape.  [] If bridging dressing to less prominent site, cover any intact skin that will come in contact with the Negative Pressure Therapy sponge, gauze or channel drain with plastic drape. The sponge should never touch intact skin.  [x] Cut sponge, gauze or channel drain to size which will fit into the wound/ulcer bed without being forced.  [x] Be sure the sponge is large enough to hold the entire round plastic flange which is attached to the tubing. Never allow flange to be larger than the sponge or it will produce suction damaging intact skin.  Total number of individual pieces of foam used within the wound bed: 1   [] If bridging the dressing away from the primary site, be sure the bridge leads to a piece of sponge large enough to hold the entire flange without allowing any of the flange to overlap onto intact skin.  [x] Covered sponge, gauze or channel drain with plastic drape.  [] Cut a hole in this plastic drape directly over the sponge the same size as the plastic drain tubing.  [x] Removed plastic liner from flange and apply it directly over the hole you cut.  [x] Removed the plastic cover from the flange.  [x] Attached the tubing to the wound/ulcer Negative Pressure Therapy and turn it on to be sure a vacuum is created and that there are no leaks.  [x] If air leaks occur, use plastic drape to patch them.  [x] Secured Negative Pressure Therapy dressing with ace wrap loosely if located on an extremity. Maintain tubing outside of ace wrap. Tubing must not exert pressure on intact skin.     Applied per  Guidelines      Electronically signed by Orrie Fabry, LPN on 87/1/9429 at 84:24 AM

## 2021-10-11 ENCOUNTER — HOSPITAL ENCOUNTER (OUTPATIENT)
Dept: WOUND CARE | Age: 66
Discharge: HOME HEALTH | End: 2021-10-11
Payer: MEDICARE

## 2021-10-11 VITALS
RESPIRATION RATE: 16 BRPM | TEMPERATURE: 97.5 F | SYSTOLIC BLOOD PRESSURE: 121 MMHG | DIASTOLIC BLOOD PRESSURE: 58 MMHG | HEART RATE: 58 BPM

## 2021-10-11 DIAGNOSIS — R60.0 LEG EDEMA, RIGHT: ICD-10-CM

## 2021-10-11 DIAGNOSIS — L89.891 PRESSURE INJURY OF RIGHT FOOT, STAGE 1: ICD-10-CM

## 2021-10-11 DIAGNOSIS — L97.912 NON-PRESSURE CHRONIC ULCER OF RIGHT LOWER LEG, WITH FAT LAYER EXPOSED (HCC): ICD-10-CM

## 2021-10-11 PROCEDURE — 97606 NEG PRS WND THER DME>50 SQCM: CPT

## 2021-10-11 PROCEDURE — 99213 OFFICE O/P EST LOW 20 MIN: CPT | Performed by: SURGERY

## 2021-10-11 NOTE — WOUND CARE
Negative Pressure    NAME:  Gilson Ojeda  YOB: 1955  MEDICAL RECORD NUMBER:  [id number]  DATE:  10/11/2021     Applied Negative Pressure to RLE wound(s)/ulcer(s).  [x] Applied skin barrier prep to krystian-wound.  [x] Cut strips of plastic drape to picture frame wound so that krystian-wound is  covered with the drape.  [x] If bridging dressing to less prominent site, cover any intact skin that will come in contact with the Negative Pressure Therapy sponge, gauze or channel drain with plastic drape. The sponge should never touch intact skin.  [x] Cut sponge, gauze or channel drain to size which will fit into the wound/ulcer bed without being forced.  [x] Be sure the sponge is large enough to hold the entire round plastic flange which is attached to the tubing. Never allow flange to be larger than the sponge or it will produce suction damaging intact skin.  Total number of individual pieces of foam used within the wound bed: 1   [x] If bridging the dressing away from the primary site, be sure the bridge leads to a piece of sponge large enough to hold the entire flange without allowing any of the flange to overlap onto intact skin.  [x] Covered sponge, gauze or channel drain with plastic drape.  [x] Cut a hole in this plastic drape directly over the sponge the same size as the plastic drain tubing.  [x] Removed plastic liner from flange and apply it directly over the hole you cut.  [x] Removed the plastic cover from the flange.  [x] Attached the tubing to the wound/ulcer Negative Pressure Therapy and turn it on to be sure a vacuum is created and that there are no leaks.  [x] If air leaks occur, use plastic drape to patch them.  [x] Secured Negative Pressure Therapy dressing with ace wrap loosely if located on an extremity. Maintain tubing outside of ace wrap. Tubing must not exert pressure on intact skin.     Applied per  Guidelines     10/11/21 1008   Wound Leg lower Right # 1   Date First Assessed/Time First Assessed: 05/07/20 0835   Present on Hospital Admission: Yes  Wound Approximate Age at First Assessment (Weeks): 4 weeks  Primary Wound Type: Vascular  Location: Leg lower  Wound Location Orientation: Right  Wound Descri. ..    Dressing Status New dressing applied   Cleansed Cleansed with saline   Dressing/Treatment Negative Pressure Wound Therapy  (NPWT @ 125/mmHg,continous(Duoderm to periwound&fragile skin))         Electronically signed by Bernice Kimball RN on 10/11/2021 at 10:11 AM

## 2021-10-11 NOTE — DISCHARGE INSTRUCTIONS
Discharge Instructions for  [facility]  [address] [facility]  MOB 1, [address] [facility] [place], [id number]  Telephone: [phone]    NAME:  Sascha Alcala  YOB: 1955  MEDICAL RECORD NUMBER:  [id number]  DATE:  10/11/2021     WOUND CARE ORDERS: Right Lower Leg Wound -- Cleanse with Normal Saline or Wound Cleanser. After applying skin prep to periwound,  \"Windowpane\" with  transparent dressing and Duoderm . Also Protect fragile periwound as well as intact skin in center of wound with Duoderm. Resume NPWT/Vac Therapy at 125mmHg continuous setting.  Apply ace wrap. Dsg to be changed twice a week and as needed for leaking. In case of Vac failure, discontinue Wound Vac and apply Saline moist gauze and cover with ABD pad/Exudry. Secure with roll gauze and tape. Change dressings daily and as needed. Nurse Visit Thursday 10/14//21   Return to clinic MD follow up in 1 week. TREATMENT ORDERS:    Elevate leg(s) above the level of the heart when sitting. Avoid prolonged standing in one place. Do not get dressing/wrap wet. Follow Diet as prescribed:   [] Diet as tolerated: [] Calorie Diabetic Diet: Low carb and no Sugar [x] No Added Salt  [x] Increase Protein [] Limit the amount of liquid you are drinking and avoid drinking in between meals           Return Appointment:  [x] Return Appointment: With DR Philip Signs  in 1 [employer])  [x] Nurse Visit :In 3 days  [] Ordered tests:    Electronically signed Chava Tobias RN on 10/11/2021 at 9: R José Greenberg 8 Information: Should you experience any significant changes in your wound(s) or have questions about your wound care, please contact the [facility] Main at [address] [place] [address] 8:00 am - 4:30. If you need help with your wound outside these hours and cannot wait until we are again available, contact your PCP or go to the hospital emergency room.      PLEASE NOTE: IF YOU ARE UNABLE TO OBTAIN WOUND SUPPLIES, CONTINUE TO USE THE SUPPLIES YOU HAVE AVAILABLE UNTIL YOU ARE ABLE TO REACH US. IT IS MOST IMPORTANT TO KEEP THE WOUND COVERED AT ALL TIMES.      Physician Signature:_______________________    Date: ___________ Time:  ____________

## 2021-10-11 NOTE — WOUND CARE
10/11/21 0936   Wound Leg lower Right # 1   Date First Assessed/Time First Assessed: 05/07/20 0835   Present on Hospital Admission: Yes  Wound Approximate Age at First Assessment (Weeks): 4 weeks  Primary Wound Type: Vascular  Location: Leg lower  Wound Location Orientation: Right  Wound Descri. .. Wound Image    Dressing Status Old drainage noted; Intact  (Removed NPWT dressing.)   Cleansed Cleansed with saline   Wound Length (cm) 11.3 cm   Wound Width (cm) 8 cm   Wound Depth (cm) 0.2 cm   Wound Surface Area (cm^2) 90.4 cm^2   Change in Wound Size % 54.96   Wound Volume (cm^3) 18.08 cm^3   Wound Healing % 55   Wound Assessment Granulation tissue;Pink/red   Drainage Amount Large   Drainage Description Sanguineous   Wound Odor None   Chana-Wound/Incision Assessment Intact;Fragile   Edges Flat/open edges   Wound Thickness Description Full thickness     Visit Vitals  BP (!) 121/58 (BP 1 Location: Left upper arm, BP Patient Position: Sitting) Comment (BP Patient Position): Sitting w/LEs elevated   Pulse (!) 58   Temp 97.5 °F (36.4 °C)   Resp 16

## 2021-10-11 NOTE — PROGRESS NOTES
HISTORY OF PRESENT ILLNESS:  The patient is a 53-year-old man who was referred to the wound care center regarding nonhealing wound right lower leg and right posterior ankle.    The patient reports that he had struck his right ankle and developed a wound, which had been nonhealing.     The patient was first seen at the [address] [place] [address] on 5/7/2020.  Ankle  wound has healed.     He  developed swelling and redness in the right lower leg in 2020, which then was followed by blister formation and a wound formation.  He eventually was hospitalized at [facility] and says he was in the hospital for about 18 days.      The patient reports chronic numbness of both feet.  He has right drop foot.     The patient had at the time of an ER visit for leg symptoms on the right in 01/2020, a venous duplex scan which did not find any evidence of deep venous thrombosis.  Vein valves were not tested.     The patient has history of lumbar laminectomy and diskectomy on 09/04/2019. Germaine Laen reports related to his disk disease, he has right side footdrop.  He reported history of neurogenic claudication.  The patient was scheduled to have further back surgery within the month, but that has been canceled related to COVID-19.     The patient reports that he walks with Pancho Ivanoff denies shortness of breath at rest or with exertion.  He has no anginal chest pain.     The patient had angiography by Dr Herber Mcmahon on 5/17/2020 with finding of severe multilevel arterial occlusive disease in legs.    The patient on 5/29/2020 had open right femoral endarterectomy and patch angioplasty and also balloon angioplasty of right superficial femoral artery.  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  Dr Christin Badillo also debrided the dry eschars on the leg wounds.     The patient had JENNA bilateral lower extremities at [facility] on 6/24/2021.  This showed:                   Right JENNA 0.70 at [facility], TBI0. 42.                 Left JENNA 0.67 at [facility], TBI 0.32.     JENNA report from [facility] reviewed - arterial perfusion on the right appears adequate for healing at the leg level,  although not normalized        The patient has a history of coronary artery disease and has had angioplasty and stent.  He has also had coronary artery bypass grafting.  He does not have any history of diabetes.  Past medical history does include asthma, skin cancer, gastroesophageal reflux disease, hypertension, hyperlipidemia, fatty liver disease, potential sleep apnea, but never tested.     SOCIAL HISTORY:  The patient smoked until 1998 when he quit. [facility] does use alcohol.     The patient's medications do not include a diuretic.      (I discussed patient's situation with his daughter Yesi Greene 8/27/2021. Aj Morton reviewed situation with his right leg wound.  It has been very difficult to make progress toward healing even after improvement in arterial perfusion after his femoral endarterectomy and SFA angioplasty last year.  I reviewed current treatment with skin substitutes.  She is concerned about his level of pain.  I then spoke today with patient's primary MD Dr Susan Johnson patient has previously had disorientation with gabapentin; Dr Andre Fischer will likely start nortriptylline.  Although patient had EJNNA on 6/24/2021 suggesting adequate but not normal perfusion of the right leg, in view of worsening of pain and poor progress with wound healing, I gave verbal order for repeat JENNA at [facility]. Aj Morton gave verbal order for repeat wound culture at nurse visit. [facility] has had MRSA in the past; if still present consider oral antibiotics versus Mami Moreno patient's daughter expressed concern that the patient may have increased alcohol intake and not be eating well. Marybeth Lemon may certainly impact healing.)     Prior dressing:   Xeroform on the lower leg wound and then dry gauze and roll gauze changed daily.     Dressing started 8/13/2020:  Client Outlook on the leg  wounds and then dry gauze and roll gauze changed 3 times per week.     Patient had problems with adherence of the dressing, went back to Xeroform (3 times per week) after 1 week.  He has Home Health.     Drawtex dressing tried. Estefani Trejo developed more drainage, skin irritation.  Culture on 12/29/2020 grew pseudomonas and MRSA. Estefani Trejo started doxycycline on 12/31/2020.       Wound culture on 6/10/2021 grew staph aureus (MRSA).   Treated with Doxycycline 100 mg po bid for 14 days.     Wound culture on 9/2/2021 grew MRSA and pseudomonas.  He received Dalvance on 9/14/2021.     His skin tolerates Xeroform well.     Prior dressing as of 5/3/2021:  Wash surface of wound with Dakins 0.125%, then rinse with saline.  Zandra / Hydrofera Blue on the leg  wounds and then dry gauze and roll gauze changed 3 times per week.  Covered by Farrow wrap to help with edema control.  Use Tubigrip instead of liner stocking.     Prior dressing as of 7/8/2021:  Wash surface of wound with Dakins 0.125%, then rinse with saline.  Xeroform placed over the ulcer(s).  Unna boot placed form base of toes to upper calf with overlying Coban at 0.50 stretch.   Has Home Health for change in 4 days and weekly.     Prior dressing as of 7/14/2021:   On the superior portion of the wound, two 2 x 4 cm sheets of Puraply were placed.  On the inferior portion of the wound a sheet of 5 x 5 endoform was placed.  The wound was covered by Mepitel silicone fenestrated sheet.  ABD was applied over the wound.  Unna boot was applied from base of toes to upper calf with 0.50 stretch on Coban.     Prior dressing as of 7/21/2021:  On the inferior portion of the wound, a 5 x 5 cm fenestrated sheet of Puraply (x 4 episodes) were placed.  On the rest of the wound two sheets of 5 x 5 endoform were placed.  The wound was covered by Mepitel  silicone fenestrated sheet; steri strips on the edge.  ABD was applied over the wound, roll gauze.  Single Tubigrip from base of toes to upper calf.     Starting on 8/12/2021:  On the inferior portion of the wound, a 4 x 6 cm sheet of Nushield were placed.  On the rest of the wound two sheets of 5 x 5 endoform were cut and placed.  The wound was covered by two pieces of Versatel sheet.  ABD was applied over the wound, roll gauze.  Single Tubigrip from base of toes to upper calf.  Patient will change outer dressing daily to control drainage.     Patient had  pain in the lower leg focused a few inches above the medial malleolus and in region of wound.  Discussed with Dr Zulma Land.     Prior dressing as of 9/9/2021:  Crusting of macerated skin. Xeroform over wound, cover with dry gauze and roll gauze, Tubigrip from base of toes to upper calf.  He is changing daily.      He reports much reduced pain in the wound region since receiving Elder Sor decreased.     Current dressing as of 9/27/2021:  Black foam on wound, Wound VAC, 125 mm Hg negative pressure. Duoderm on area of skin maceration.      Some issues with device keeping suction recently. He will call company about replacement of device.     PHYSICAL EXAMINATION:  GENERAL:  The patient is an alert man in no acute distress.     EXTREMITIES:       The patient's lower extremities were examined.  On the left side, there is no edema.  There were no wounds present.      On the right lower extremity, right DP pulse is not palpable.  There is moderate biphasic doppler signal.  There is 1+ edema in the right lower leg.  The right foot feels warm. minimal redness and maceration of skin around wound at only one area superiorly. Lateral skin now healed. .      On the anterior aspect of the mid lower leg on the right, distal right lower leg ulcer 11.3 x 8  x 0.2 cm with much decreased slough.  Much improved granulation today.  Skin island remains in middle.       Area of possible tissue injury on lateral right foot at 5th  metatarsal head.  Minimal  callus, no skin opening.  No erythema. Very quiet.     No debridement.           Wound clinically improved; less pain.  This appears related to treatment of his persisting MRSA. Tolerating VAC.             Dressing:  Duoderm on area of skin maceration.   Black foam on wound, Wound VAC, 125 mm Hg negative pressure.      No dressing for foot site.  Keep all pressure off the area.       Depending on response to plans, consider venous testing.  Would want to avoid saphenous ablation in view of PAD.         The patient will follow up in wound care center for NV and VAC change in 3 days, then see me in 1 week.        FINAL DIAGNOSES:  Nonhealing wounds right lower leg, peripheral artery disease.  Possible pressure site right foot, stage 1.  Leg edema.     L97.912, I73.9, L89.891, R60.0     Eliza Holguin MD

## 2021-10-14 ENCOUNTER — HOSPITAL ENCOUNTER (OUTPATIENT)
Dept: WOUND CARE | Age: 66
Discharge: HOME | End: 2021-10-14
Payer: MEDICARE

## 2021-10-14 VITALS
DIASTOLIC BLOOD PRESSURE: 62 MMHG | SYSTOLIC BLOOD PRESSURE: 135 MMHG | HEART RATE: 52 BPM | TEMPERATURE: 97.7 F | RESPIRATION RATE: 16 BRPM

## 2021-10-14 PROCEDURE — 97607 NEG PRS WND THR NDME<=50SQCM: CPT

## 2021-10-14 PROCEDURE — 97606 NEG PRS WND THER DME>50 SQCM: CPT

## 2021-10-14 NOTE — WOUND CARE
10/14/21 1132   Wound Leg lower Right # 1   Date First Assessed/Time First Assessed: 05/07/20 0835   Present on Hospital Admission: Yes  Wound Approximate Age at First Assessment (Weeks): 4 weeks  Primary Wound Type: Vascular  Location: Leg lower  Wound Location Orientation: Right  Wound Descri. .. Dressing Status New dressing applied   Cleansed Cleansed with saline   Dressing/Treatment Negative Pressure Wound Therapy  (NPWT @125mmhg)   Wound Assessment Granulation tissue;Pale granulation tissue   Drainage Amount Large   Drainage Description Sanguineous   Wound Odor None   Krystian-Wound/Incision Assessment Intact   Edges Flat/open edges   Wound Thickness Description Full thickness   Negative Pressure    NAME:  Venancio uNnez  YOB: 1955  MEDICAL RECORD NUMBER:  [id number]  DATE:  10/14/2021     Applied Negative Pressure to 1 wound(s)/ulcer(s).  [x] Applied skin barrier prep to krystian-wound.  [x] Cut strips of plastic drape to picture frame wound so that krystian-wound is     covered with the drape.  [x] If bridging dressing to less prominent site, cover any intact skin that will come in contact with the Negative Pressure Therapy sponge, gauze or channel drain with plastic drape. The sponge should never touch intact skin.  [x] Cut sponge, gauze or channel drain to size which will fit into the wound/ulcer bed without being forced.  [x] Be sure the sponge is large enough to hold the entire round plastic flange which is attached to the tubing. Never allow flange to be larger than the sponge or it will produce suction damaging intact skin.  Total number of individual pieces of foam used within the wound bed: 1   [x] If bridging the dressing away from the primary site, be sure the bridge leads to a piece of sponge large enough to hold the entire flange without allowing any of the flange to overlap onto intact skin.  [x] Covered sponge, gauze or channel drain with plastic drape.     [x] Cut a hole in this plastic drape directly over the sponge the same size as the plastic drain tubing.  [x] Removed plastic liner from flange and apply it directly over the hole you cut.  [x] Removed the plastic cover from the flange.  [x] Attached the tubing to the wound/ulcer Negative Pressure Therapy and turn it on to be sure a vacuum is created and that there are no leaks.  [x] If air leaks occur, use plastic drape to patch them.  [x] Secured Negative Pressure Therapy dressing with ace wrap loosely if located on an extremity. Maintain tubing outside of ace wrap. Tubing must not exert pressure on intact skin.     Applied per  Guidelines      Electronically signed by Yissel Frank LPN on 97/65/5420 at 11:35 AM

## 2021-10-18 ENCOUNTER — HOSPITAL ENCOUNTER (OUTPATIENT)
Dept: WOUND CARE | Age: 66
Discharge: HOME HEALTH | End: 2021-10-18
Payer: MEDICARE

## 2021-10-18 VITALS
RESPIRATION RATE: 16 BRPM | TEMPERATURE: 97.7 F | HEART RATE: 53 BPM | SYSTOLIC BLOOD PRESSURE: 133 MMHG | DIASTOLIC BLOOD PRESSURE: 63 MMHG

## 2021-10-18 DIAGNOSIS — R60.0 LEG EDEMA, RIGHT: ICD-10-CM

## 2021-10-18 DIAGNOSIS — L97.912 NON-PRESSURE CHRONIC ULCER OF RIGHT LOWER LEG, WITH FAT LAYER EXPOSED (HCC): ICD-10-CM

## 2021-10-18 DIAGNOSIS — I73.9 PAD (PERIPHERAL ARTERY DISEASE) (HCC): ICD-10-CM

## 2021-10-18 PROCEDURE — 99213 OFFICE O/P EST LOW 20 MIN: CPT | Performed by: SURGERY

## 2021-10-18 PROCEDURE — 97606 NEG PRS WND THER DME>50 SQCM: CPT

## 2021-10-18 NOTE — PROGRESS NOTES
HISTORY OF PRESENT ILLNESS:  The patient is a 24-year-old man who was referred to the wound care center regarding nonhealing wound right lower leg and right posterior ankle.    The patient reports that he had struck his right ankle and developed a wound, which had been nonhealing.     The patient was first seen at the [address] [place] [address] on 5/7/2020.  Ankle  wound has healed; leg wound remains.     He  developed swelling and redness in the right lower leg in 2020, which then was followed by blister formation and a wound formation.  He eventually was hospitalized at [facility] and says he was in the hospital for about 18 days.      The patient reports chronic numbness of both feet.  He has right drop foot.     The patient has history of lumbar laminectomy and diskectomy on 09/04/2019. Celi Melo reports related to his disk disease, he has right side footdrop.  He reported history of neurogenic claudication.  The patient was scheduled to have further back surgery within the month, but that has been canceled related to COVID-19.     The patient reports that he walks with Peola Blades denies shortness of breath at rest or with exertion.  He has no anginal chest pain.     The patient had angiography by Dr Tapan Beckham on 5/17/2020 with finding of severe multilevel arterial occlusive disease in legs.    The patient on 5/29/2020 had open right femoral endarterectomy and patch angioplasty and also balloon angioplasty of right superficial femoral artery.  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  Dr Wen Lambert also debrided the dry eschars on the leg wounds.     The patient had JENNA bilateral lower extremities at [facility] on 6/24/2021.  This showed:                   Right JENNA 0.70 at DP, TBI0. 42.                 Left JENNA 0.67 at DP, TBI 0.32.     JENNA report from [facility] reviewed - arterial perfusion on the right appears adequate for healing at the leg level,  although not normalized     The patient has a history of coronary artery disease and has had angioplasty and stent. [facility] has also had coronary artery bypass grafting.  He does not have any history of diabetes.  Past medical history does include asthma, skin cancer, gastroesophageal reflux disease, hypertension, hyperlipidemia, fatty liver disease, potential sleep apnea, but never tested.     SOCIAL HISTORY:  The patient smoked until 1998 when he quit. [facility] does use alcohol.     The patient's medications do not include a diuretic.      (I discussed patient's situation with his daughter Kirsten Tran 8/27/2021. Jeremydavie James reviewed situation with his right leg wound.  It has been very difficult to make progress toward healing even after improvement in arterial perfusion after his femoral endarterectomy and SFA angioplasty last year.  I reviewed current treatment with skin substitutes.  She is concerned about his level of pain.  I then spoke today with patient's primary MD Dr Krishna Siegel patient has previously had disorientation with gabapentin; Dr Mallory Lam will likely start nortriptylline.  I gave verbal order for repeat wound culture at nurse visit. [facility] has had MRSA in the past; if still present consider Tessa Broderick patient's daughter expressed concern that the patient may have increased alcohol intake and not be eating well. Keli Negrete may certainly impact healing.)     Prior dressing:   Xeroform on the lower leg wound and then dry gauze and roll gauze changed daily.     Dressing started 8/13/2020:  Aquacell AG on the leg  wounds and then dry gauze and roll gauze changed 3 times per week.     Patient had problems with adherence of the dressing, went back to Xeroform (3 times per week) after 1 week.  He has Home Health.     Drawtex dressing tried. [facility] developed more drainage, skin irritation. Culture on 12/29/2020 grew pseudomonas and MRSA.  He started doxycycline on 12/31/2020.       Wound culture on 6/10/2021 grew staph aureus (MRSA).   Treated with Doxycycline 100 mg po bid for 14 days.     Wound culture on 9/2/2021 grew MRSA and pseudomonas.  He received Dalvance on 9/14/2021.     His skin tolerates Xeroform well.     Prior dressing as of 5/3/2021:  Wash surface of wound with Dakins 0.125%, then rinse with saline.  Zandra / Hydrofera Blue on the leg  wounds and then dry gauze and roll gauze changed 3 times per week.  Covered by Farrow wrap to help with edema control.  Use Tubigrip instead of liner stocking.     Prior dressing as of 7/8/2021:  Wash surface of wound with Dakins 0.125%, then rinse with saline.  Xeroform placed over the ulcer(s).  Unna boot placed form base of toes to upper calf with overlying Coban at 0.50 stretch.   Has Home Health for change in 4 days and weekly.     Prior dressing as of 7/14/2021:   On the superior portion of the wound, two 2 x 4 cm sheets of Puraply were placed.  On the inferior portion of the wound a sheet of 5 x 5 endoform was placed.  The wound was covered by Mepitel silicone fenestrated sheet.  ABD was applied over the wound.  Unna boot was applied from base of toes to upper calf with 0.50 stretch on Coban.     Prior dressing as of 7/21/2021:  On the inferior portion of the wound, a 5 x 5 cm fenestrated sheet of Puraply (x 4 episodes) were placed.  On the rest of the wound two sheets of 5 x 5 endoform were placed.  The wound was covered by Mepitel  silicone fenestrated sheet; steri strips on the edge.  ABD was applied over the wound, roll gauze.  Single Tubigrip from base of toes to upper calf.     Starting on 8/12/2021:  On the inferior portion of the wound, a 4 x 6 cm sheet of Nushield were placed.  On the rest of the wound two sheets of 5 x 5 endoform were cut and placed.  The wound was covered by two pieces of Versatel sheet.  ABD was applied over the wound, roll gauze.  Single Tubigrip from base of toes to upper calf.  Patient will change outer dressing daily to control drainage.     Patient had  pain in the lower leg focused a few inches above the medial malleolus and in region of wound.  Discussed with Dr Loreto Schilling.     Prior dressing as of 9/9/2021:  Crusting of macerated skin. Xeroform over wound, cover with dry gauze and roll gauze, Tubigrip from base of toes to upper calf.  He is changing daily.     He reports much reduced pain in the wound region since receiving Aleck Revel decreased.     Current dressing as of 9/27/2021:  Black foam on wound, Wound VAC, 125 mm Hg negative pressure.  Duoderm on area of skin maceration.      Some issues with device keeping suction recently.       PHYSICAL EXAMINATION:  GENERAL:  The patient is an alert man in no acute distress.     EXTREMITIES:      The patient's lower extremities were examined.  On the left side, there is no edema.  There were no wounds present. Black sponge difficult to get off right leg wound today.     On the right lower extremity, right DP pulse is not palpable.  There is moderate biphasic doppler signal.  There is 1+ edema in the right lower leg.  Skin around wound now free of any maceration. Mild maceration on central island.      On the anterior aspect of the mid lower leg on the right, distal right lower leg ulcer 11.6 x 7.8  x 0.2 cm with minimald slough.  Much improved granulation today.  Skin island remains in middle.       Area of possible tissue injury on lateral right foot at 5th  metatarsal head.  Minimal  callus, no skin opening.  No erythema. Very quiet.     No debridement.           Wound clinically improved; less pain since receiving Dalvance for MRSA and starting VAC.                Dressing:  Duoderm on skin around wound. Duoderm on  Island skin.   Adaptic on wound, then black foam on wound, Wound VAC, 125 mm Hg negative pressure.      No dressing for foot site.  Keep all pressure off the area.      Discuss with Dr Ahmet Red the potential for skin graft now that granulation is looking good.         The patient will follow up in wound care center for NV and VAC change in 3 days, then see me in 1 week.        FINAL DIAGNOSES:  Nonhealing wounds right lower leg, peripheral artery disease.  Possible pressure site right foot, stage 1.  Leg edema.     L97.912, I73.9, L89.891, R60.0     Francisca Agee MD

## 2021-10-18 NOTE — DISCHARGE INSTRUCTIONS
Discharge Instructions for  [facility]  [address]  [facility], [address] [facility] [id number]  Telephone: [phone]    NAME:  Elizabeth Andino  YOB: 1955  MEDICAL RECORD NUMBER:  [id number]  DATE:  10/18/2021     WOUND CARE ORDERS:Right Lower Leg Wound -- Cleanse with Normal Saline. After applying skin prep to periwound,  \"Windowpane\" with  transparent dressing and Duoderm. Also cover  intact skin in center of wound with Duoderm. Cover wound bed w/Adaptic, followed by black granufoam. Resume NPWT/Vac Therapy at 125mmHg continuous setting.  Apply ace wrap. Dsg to be changed twice a week and as needed for leaking. In case of Vac failure, discontinue Wound Vac and apply Saline moist gauze and cover with ABD pad/Exudry. Secure with roll gauze and tape. Change dressings daily and as needed. Nurse Visit in 3 days (Thursday)   Return to clinic MD follow up in 1 week. TREATMENT ORDERS:    Elevate leg(s) above the level of the heart when sitting. Avoid prolonged standing in one place. Do not get dressing/wrap wet. In case of Vac failure, discontinue Wound Vac and apply Saline moist gauze and cover with ABD pad/Exudry. Secure with roll gauze and tape. Change dressings daily and as needed. Return Appointment:  [x] Return Appointment: With DR Jalil Lee  in [facility])  [x] Nurse Visit : 3 days  [] Ordered tests:    Electronically signed Joseline Sidhu RN on 10/18/2021 at 10:35 AM     [address] Information: Should you experience any significant changes in your wound(s) or have questions about your wound care, please contact the [address] [place] [address] at [address] [place] [address] Street 8:00 am - 4:30. If you need help with your wound outside these hours and cannot wait until we are again available, contact your PCP or go to the hospital emergency room.      PLEASE NOTE: IF YOU ARE UNABLE TO OBTAIN WOUND SUPPLIES, CONTINUE TO USE THE SUPPLIES YOU HAVE AVAILABLE UNTIL YOU ARE ABLE TO REACH US. IT IS MOST IMPORTANT TO KEEP THE WOUND COVERED AT ALL TIMES.      Physician Signature:_______________________    Date: ___________ Time:  ____________

## 2021-10-18 NOTE — WOUND CARE
10/18/21 1012   Wound Leg lower Right # 1   Date First Assessed/Time First Assessed: 05/07/20 0835   Present on Hospital Admission: Yes  Wound Approximate Age at First Assessment (Weeks): 4 weeks  Primary Wound Type: Vascular  Location: Leg lower  Wound Location Orientation: Right  Wound Descri. .. Wound Image    Wound Etiology Venous   Dressing Status Old drainage noted; Other (Comment)  (Wound vac w/drainage noted under drape/periwound.)   Cleansed Cleansed with saline   Dressing/Treatment   (Removed NPWT )   Wound Length (cm) 11.6 cm   Wound Width (cm) 7.8 cm   Wound Depth (cm) 0.2 cm   Wound Surface Area (cm^2) 90.48 cm^2   Change in Wound Size % 54.92   Wound Volume (cm^3) 18. 096 cm^3   Wound Healing % 55   Wound Assessment Granulation tissue   Drainage Amount Large   Drainage Description Sanguineous   Wound Odor None   Chana-Wound/Incision Assessment Intact; Maceration  (Edges macerated)   Edges Flat/open edges   Wound Thickness Description Full thickness     Visit Vitals  /63   Pulse (!) 53   Temp 97.7 °F (36.5 °C)   Resp 16

## 2021-10-21 ENCOUNTER — HOSPITAL ENCOUNTER (OUTPATIENT)
Dept: WOUND CARE | Age: 66
Discharge: HOME | End: 2021-10-21
Payer: MEDICARE

## 2021-10-21 VITALS
RESPIRATION RATE: 16 BRPM | HEART RATE: 57 BPM | TEMPERATURE: 97.6 F | DIASTOLIC BLOOD PRESSURE: 64 MMHG | SYSTOLIC BLOOD PRESSURE: 141 MMHG

## 2021-10-21 PROCEDURE — 97606 NEG PRS WND THER DME>50 SQCM: CPT

## 2021-10-21 NOTE — WOUND CARE
Negative Pressure    NAME:  Jay eSgovia  YOB: 1955  MEDICAL RECORD NUMBER:  [id number]  DATE:  10/21/2021     Applied Negative Pressure to Right lower leg wound(s)/ulcer(s).  [x] Applied skin barrier prep to krystian-wound.  [x] Cut strips of plastic drape to picture frame wound so that krystian-wound is     covered with the drape.  [x] If bridging dressing to less prominent site, cover any intact skin that will come in contact with the Negative Pressure Therapy sponge, gauze or channel drain with plastic drape. The sponge should never touch intact skin.  [x] Cut sponge, gauze or channel drain to size which will fit into the wound/ulcer bed without being forced.  [x] Be sure the sponge is large enough to hold the entire round plastic flange which is attached to the tubing. Never allow flange to be larger than the sponge or it will produce suction damaging intact skin.  Total number of individual pieces of foam used within the wound bed: one   [x] If bridging the dressing away from the primary site, be sure the bridge leads to a piece of sponge large enough to hold the entire flange without allowing any of the flange to overlap onto intact skin.  [x] Covered sponge, gauze or channel drain with plastic drape.  [x] Cut a hole in this plastic drape directly over the sponge the same size as the plastic drain tubing.  [x] Removed plastic liner from flange and apply it directly over the hole you cut.  [x] Removed the plastic cover from the flange.  [x] Attached the tubing to the wound/ulcer Negative Pressure Therapy and turn it on to be sure a vacuum is created and that there are no leaks.  [x] If air leaks occur, use plastic drape to patch them.  [x] Secured Negative Pressure Therapy dressing with ace wrap loosely if located on an extremity. Maintain tubing outside of ace wrap. Tubing must not exert pressure on intact skin.     Applied per [facility] Gutier[facility] Guidelines      Electronically signed by Norbert Henry on 10/21/2021 at 10:38 AM

## 2021-10-21 NOTE — WOUND CARE
10/21/21 1029   Right Leg Edema Point of Measurement   Compression Therapy   (ace wrap)   Wound Leg lower Right # 1   Date First Assessed/Time First Assessed: 05/07/20 0835   Present on Hospital Admission: Yes  Wound Approximate Age at First Assessment (Weeks): 4 weeks  Primary Wound Type: Vascular  Location: Leg lower  Wound Location Orientation: Right  Wound Descri. .. Wound Etiology Venous   Dressing Status Old drainage noted; Intact  (Removed transparent dsg;duoder;adaptic;blk granulofaom;wd va)   Wound Assessment Granulation tissue;Pink/red   Drainage Amount Moderate   Drainage Description Serosanguinous   Wound Odor None   Chana-Wound/Incision Assessment Intact; Maceration   Edges Flat/open edges   Wound Thickness Description Full thickness     Visit Vitals  BP (!) 141/64 (BP 1 Location: Left arm, BP Patient Position: At rest)   Pulse (!) 57   Temp 97.6 °F (36.4 °C)   Resp 16

## 2021-10-21 NOTE — WOUND CARE
Patient came in for Nurse Visit today. Wound vac dressings changed as ordered. Re-applied NPWT at 125mmHg continous setting. Pt. Tolerated procedure. Instructions given to pt. And able to understand.

## 2021-10-26 ENCOUNTER — HOSPITAL ENCOUNTER (OUTPATIENT)
Dept: WOUND CARE | Age: 66
Discharge: HOME | End: 2021-10-26
Payer: MEDICARE

## 2021-10-26 VITALS
RESPIRATION RATE: 16 BRPM | DIASTOLIC BLOOD PRESSURE: 65 MMHG | HEART RATE: 53 BPM | TEMPERATURE: 97.5 F | SYSTOLIC BLOOD PRESSURE: 143 MMHG

## 2021-10-26 PROCEDURE — 97605 NEG PRS WND THER DME<=50SQCM: CPT

## 2021-10-26 NOTE — PERIOP NOTES
Spoke with Dr. Mercedez Grijalva, patient to continue Plavix preop, do NOT stop. Patient informed, verbalized instructions. CBC 9/2/2021 (Hgb 11.7, plts 211) rec from Dr. Balwinder Pizarro office, placed on patient's paper chart. Last office notes/EKG 7/2021 rec from MAKAYLA Verma NP, placed on patient's paper chart.

## 2021-10-26 NOTE — WOUND CARE
10/26/21 0904   Wound Leg lower Right # 1   Date First Assessed/Time First Assessed: 05/07/20 0835   Present on Hospital Admission: Yes  Wound Approximate Age at First Assessment (Weeks): 4 weeks  Primary Wound Type: Vascular  Location: Leg lower  Wound Location Orientation: Right  Wound Descri. ..    Wound Image    Wound Etiology Venous   Dressing Status Old drainage noted   Cleansed Cleansed with saline   Wound Length (cm) 11.3 cm   Wound Width (cm) 7.5 cm   Wound Depth (cm) 0.2 cm   Wound Surface Area (cm^2) 84.75 cm^2   Change in Wound Size % 57.78   Wound Volume (cm^3) 16.95 cm^3   Wound Healing % 58   Wound Assessment Granulation tissue   Drainage Amount Moderate   Drainage Description Serosanguinous   Wound Odor None   Chana-Wound/Incision Assessment Intact   Edges Flat/open edges   Wound Thickness Description Full thickness     Visit Vitals  BP (!) 143/65 (BP 1 Location: Left arm, BP Patient Position: At rest)   Pulse (!) 53   Temp 97.5 °F (36.4 °C)   Resp 16

## 2021-10-26 NOTE — DISCHARGE INSTRUCTIONS
Discharge Instructions/Wound [address]  [address]  [place], [address] [facility]  Telephone: [phone]    NAME:  Ana Hall  YOB: 1955  MEDICAL RECORD NUMBER:  [id number]  DATE:  10/26/2021      WOUND CARE ORDERS:    Right leg wound   Clean wound with NS, adaptic to wound bed, black foam -125 mmhg. Change 2 x week   Return for nurse visit on Friday,   Return to see MD in 1 week. TREATMENT ORDERS:    Elevate leg(s) above the level of the heart when sitting. Avoid prolonged standing in one place. Do no get dressing/wrap wet. Follow Diet as prescribed:   [] Diet as tolerated: [] Calorie Diabetic Diet: [] No Added Salt:  [] Increase Protein: [] Other:                 Return Appointment:  [x] Return Appointment: With Dr Molly Ellison in 1 week    [] Ordered tests:      Electronically signed Tony Monae RN on 10/26/2021 at 9:20 [address]: Should you experience any significant changes in your wound(s) or have questions about your wound care, please contact the [address] [place] [address] at [address] [place] [address] 8:00 am - 4:30. If you need help with your wound outside these hours and cannot wait until we are again available, contact your PCP or go to the hospital emergency room. PLEASE NOTE: IF YOU ARE UNABLE TO OBTAIN WOUND SUPPLIES, CONTINUE TO USE THE SUPPLIES YOU HAVE AVAILABLE UNTIL YOU ARE ABLE TO REACH US. IT IS MOST IMPORTANT TO KEEP THE WOUND COVERED AT ALL TIMES.      Physician Signature:_______________________    Date: ___________ Time:  ____________

## 2021-10-26 NOTE — PERIOP NOTES
[facility]  Preoperative Instructions        Surgery Date 10/29/2021          Time of Arrival 10:00am    1. On the day of your surgery, please report to the Surgical Services Registration Desk and sign in at your designated time. The Surgery Center is located to the right of the Emergency Room. 2. You must have someone with you to drive you home. You should not drive a car for 24 hours following surgery. Please make arrangements for a friend or family member to stay with you for the first 24 hours after your surgery. 3. Do not have anything to eat or drink (including water, gum, mints, coffee, juice) after midnight. ?This may not apply to medications prescribed by your physician. ?(Please note below the special instructions with medications to take the morning of your procedure.)    4. We recommend you do not drink any alcoholic beverages for 24 hours before and after your surgery. 5. Contact your surgeons office for instructions on the following medications: non-steroidal anti-inflammatory drugs (i.e. Advil, Aleve), vitamins, and supplements. (Some surgeons will want you to stop these medications prior to surgery and others may allow you to take them)  **If you are currently taking Plavix, Coumadin, Aspirin and/or other blood-thinning agents, contact your surgeon for instructions. ** Your surgeon will partner with the physician prescribing these medications to determine if it is safe to stop or if you need to continue taking. Please do not stop taking these medications without instructions from your surgeon    6. Wear comfortable clothes. Wear glasses instead of contacts. Do not bring any money or jewelry. Please bring picture ID, insurance card, and any prearranged co-payment or hospital payment. Do not wear make-up, particularly mascara the morning of your surgery. Do not wear nail polish, particularly if you are having foot /hand surgery.   Wear your hair loose or down, no ponytails, buns, juan pins or clips. All body piercings must be removed. Please shower with antibacterial soap for three consecutive days before and on the morning of surgery, but do not apply any lotions, powders or deodorants after the shower on the day of surgery. Please use a fresh towels after each shower. Please sleep in clean clothes and change bed linens the night before surgery. Please do not shave for 48 hours prior to surgery. Shaving of the face is acceptable. 7. You should understand that if you do not follow these instructions your surgery may be cancelled. If your physical condition changes (I.e. fever, cold or flu) please contact your surgeon as soon as possible. 8. It is important that you be on time. If a situation occurs where you may be late, please call [phone] (OR Holding Area). 9. If you have any questions and or problems, please call [phone] (Pre-admission Testing). 10. Your surgery time may be subject to change. You will receive a phone call the evening prior if your time changes. 11.  If having outpatient surgery, you must have someone to drive you here, stay with you during the duration of your stay, and to drive you home at time of discharge. Special Instructions: DO NOT STOP PLAVIX, per Dr. Mercedez Grijalva. TAKE ALL MEDICATIONS DAY OF SURGERY EXCEPT: none      I understand a pre-operative phone call will be made to verify my surgery time. In the event that I am not available, I give permission for a message to be left on my answering service and/or with another person?   Yes [phone]         ___________________      __________   _________    (Signature of Patient)             (Witness)                (Date and Time)

## 2021-10-26 NOTE — WOUND CARE
Negative Pressure    NAME:  Shawn Lobo  YOB: 1955  MEDICAL RECORD NUMBER:  [id number]  DATE:  10/26/2021     Applied Negative Pressure to 1 wound(s)/ulcer(s).  [x] Applied skin barrier prep to krystian-wound.  [x] Cut strips of plastic drape to picture frame wound so that krystian-wound is     covered with the drape.  [] If bridging dressing to less prominent site, cover any intact skin that will come in contact with the Negative Pressure Therapy sponge, gauze or channel drain with plastic drape. The sponge should never touch intact skin.  [x] Cut sponge, gauze or channel drain to size which will fit into the wound/ulcer bed without being forced.  [x] Be sure the sponge is large enough to hold the entire round plastic flange which is attached to the tubing. Never allow flange to be larger than the sponge or it will produce suction damaging intact skin.  Total number of individual pieces of foam used within the wound bed: 1   [] If bridging the dressing away from the primary site, be sure the bridge leads to a piece of sponge large enough to hold the entire flange without allowing any of the flange to overlap onto intact skin.  [x] Covered sponge, gauze or channel drain with plastic drape.  [x] Cut a hole in this plastic drape directly over the sponge the same size as the plastic drain tubing.  [x] Removed plastic liner from flange and apply it directly over the hole you cut.  [x] Removed the plastic cover from the flange.  [x] Attached the tubing to the wound/ulcer Negative Pressure Therapy and turn it on to be sure a vacuum is created and that there are no leaks.  [x] If air leaks occur, use plastic drape to patch them.  [x] Secured Negative Pressure Therapy dressing with ace wrap loosely if located on an extremity. Maintain tubing outside of ace wrap. Tubing must not exert pressure on intact skin.     Applied per  Guidelines      Electronically signed by Orrie Fabry, LPN on 05/68/8072 at 9:27 AM

## 2021-10-26 NOTE — PROGRESS NOTES
[address]  WOUND CARE PROGRESS NOTE    Name:  Ernie Montanez  MR#:  [id number]  :  1955  ACCOUNT #:  [de-identified]  DATE OF SERVICE:  10/26/2021    SUBJECTIVE:  This is a patient of Dr. Rashmi Hansen who returns to the [address] [facility] with a chronic right anterior leg wound. This began almost 2 years ago with a right leg infection which progressed into an open wound that required hospitalization and treatment with IV antibiotics. He subsequently underwent revascularization of the right leg with open femoral endarterectomy and angioplasty as well as balloon angioplasty of the SFA. He has since been treated with multiple modalities including simple wound care, skin substitutes, and negative pressure wound therapy. Over the past several weeks, the wound has developed nice granulation tissue with the aid of the wound VAC. He denies fevers or chills. He is currently not taking antibiotics. He is not diabetic and quit smoking in . PHYSICAL EXAMINATION:  Reveals a clean, open, granulating wound of the right anterior/distal leg that measures approximately 11.3 x 7.5 x 0.2 cm. There is a central island of reepithelialization. There is mild erythema but no evidence of infection. ASSESSMENT AND PLAN:  Chronic right anterior leg wound of mixed etiology. It has progressed to the point that he is an acceptable candidate for an attempted skin grafting. This operation was explained in detail, including risks, benefits, and alternatives. He understands the risk of graft failure and need for continued wound care. We will try to get scheduled within the next 2 weeks. Until then, continue wound VAC. We will continue wound VAC postoperatively as well.       MD DAVID [email or web address]  D:  10/26/2021 9:49  T:  10/26/2021 12:54  JOB #:  [id number]

## 2021-10-29 ENCOUNTER — ANESTHESIA EVENT (OUTPATIENT)
Dept: SURGERY | Age: 66
End: 2021-10-29
Payer: MEDICARE

## 2021-10-29 ENCOUNTER — ANESTHESIA (OUTPATIENT)
Dept: SURGERY | Age: 66
End: 2021-10-29
Payer: MEDICARE

## 2021-10-29 ENCOUNTER — HOSPITAL ENCOUNTER (OUTPATIENT)
Age: 66
Setting detail: OUTPATIENT SURGERY
Discharge: HOME | End: 2021-10-29
Attending: PLASTIC SURGERY | Admitting: PLASTIC SURGERY
Payer: MEDICARE

## 2021-10-29 VITALS
SYSTOLIC BLOOD PRESSURE: 122 MMHG | OXYGEN SATURATION: 97 % | HEART RATE: 55 BPM | TEMPERATURE: 98.3 F | DIASTOLIC BLOOD PRESSURE: 55 MMHG | BODY MASS INDEX: 32.62 KG/M2 | HEIGHT: 71 IN | RESPIRATION RATE: 14 BRPM | WEIGHT: 233.03 LBS

## 2021-10-29 DIAGNOSIS — L97.912 NON-PRESSURE CHRONIC ULCER OF RIGHT LOWER LEG, WITH FAT LAYER EXPOSED (HCC): Primary | ICD-10-CM

## 2021-10-29 PROCEDURE — 77030038692 HC WND DEB SYS IRMX -B: Performed by: PLASTIC SURGERY

## 2021-10-29 PROCEDURE — 74011250636 HC RX REV CODE- 250/636: Performed by: PLASTIC SURGERY

## 2021-10-29 PROCEDURE — 74011250636 HC RX REV CODE- 250/636: Performed by: ANESTHESIOLOGY

## 2021-10-29 PROCEDURE — 77030021678 HC GLIDESCP STAT DISP VERT -B: Performed by: ANESTHESIOLOGY

## 2021-10-29 PROCEDURE — 77030026438 HC STYL ET INTUB CARD -A: Performed by: ANESTHESIOLOGY

## 2021-10-29 PROCEDURE — 74011000250 HC RX REV CODE- 250: Performed by: PLASTIC SURGERY

## 2021-10-29 PROCEDURE — 77030023092 HC DRSG ANTIMIC ACTICT S&N -B: Performed by: PLASTIC SURGERY

## 2021-10-29 PROCEDURE — 76210000016 HC OR PH I REC 1 TO 1.5 HR: Performed by: PLASTIC SURGERY

## 2021-10-29 PROCEDURE — 74011000250 HC RX REV CODE- 250: Performed by: NURSE ANESTHETIST, CERTIFIED REGISTERED

## 2021-10-29 PROCEDURE — 76010000149 HC OR TIME 1 TO 1.5 HR: Performed by: PLASTIC SURGERY

## 2021-10-29 PROCEDURE — 77030041070 HC DRSG ALG MDII -A: Performed by: PLASTIC SURGERY

## 2021-10-29 PROCEDURE — 76210000021 HC REC RM PH II 0.5 TO 1 HR: Performed by: PLASTIC SURGERY

## 2021-10-29 PROCEDURE — 76060000033 HC ANESTHESIA 1 TO 1.5 HR: Performed by: PLASTIC SURGERY

## 2021-10-29 PROCEDURE — 77030013708 HC HNDPC SUC IRR PULS STRY –B: Performed by: PLASTIC SURGERY

## 2021-10-29 PROCEDURE — 74011250636 HC RX REV CODE- 250/636: Performed by: NURSE ANESTHETIST, CERTIFIED REGISTERED

## 2021-10-29 PROCEDURE — 74011250637 HC RX REV CODE- 250/637: Performed by: PLASTIC SURGERY

## 2021-10-29 PROCEDURE — 77030010829: Performed by: PLASTIC SURGERY

## 2021-10-29 PROCEDURE — 77030006627 HC BLD GRFT DRMTO ZIMM -B: Performed by: PLASTIC SURGERY

## 2021-10-29 PROCEDURE — 2709999900 HC NON-CHARGEABLE SUPPLY: Performed by: PLASTIC SURGERY

## 2021-10-29 PROCEDURE — 77030019934 HC DRSG VAC ASST KCON -B: Performed by: PLASTIC SURGERY

## 2021-10-29 PROCEDURE — 77030008684 HC TU ET CUF COVD -B: Performed by: ANESTHESIOLOGY

## 2021-10-29 RX ORDER — SUCCINYLCHOLINE CHLORIDE 20 MG/ML
INJECTION INTRAMUSCULAR; INTRAVENOUS AS NEEDED
Status: DISCONTINUED | OUTPATIENT
Start: 2021-10-29 | End: 2021-10-29 | Stop reason: HOSPADM

## 2021-10-29 RX ORDER — PROPOFOL 10 MG/ML
INJECTION, EMULSION INTRAVENOUS AS NEEDED
Status: DISCONTINUED | OUTPATIENT
Start: 2021-10-29 | End: 2021-10-29 | Stop reason: HOSPADM

## 2021-10-29 RX ORDER — SODIUM CHLORIDE, SODIUM LACTATE, POTASSIUM CHLORIDE, CALCIUM CHLORIDE 600; 310; 30; 20 MG/100ML; MG/100ML; MG/100ML; MG/100ML
25 INJECTION, SOLUTION INTRAVENOUS CONTINUOUS
Status: DISCONTINUED | OUTPATIENT
Start: 2021-10-29 | End: 2021-10-29 | Stop reason: HOSPADM

## 2021-10-29 RX ORDER — EPHEDRINE SULFATE/0.9% NACL/PF 50 MG/5 ML
SYRINGE (ML) INTRAVENOUS AS NEEDED
Status: DISCONTINUED | OUTPATIENT
Start: 2021-10-29 | End: 2021-10-29 | Stop reason: HOSPADM

## 2021-10-29 RX ORDER — ONDANSETRON 2 MG/ML
4 INJECTION INTRAMUSCULAR; INTRAVENOUS AS NEEDED
Status: DISCONTINUED | OUTPATIENT
Start: 2021-10-29 | End: 2021-10-29 | Stop reason: HOSPADM

## 2021-10-29 RX ORDER — MORPHINE SULFATE 2 MG/ML
2 INJECTION, SOLUTION INTRAMUSCULAR; INTRAVENOUS
Status: DISCONTINUED | OUTPATIENT
Start: 2021-10-29 | End: 2021-10-29 | Stop reason: HOSPADM

## 2021-10-29 RX ORDER — MIDAZOLAM HYDROCHLORIDE 1 MG/ML
INJECTION, SOLUTION INTRAMUSCULAR; INTRAVENOUS AS NEEDED
Status: DISCONTINUED | OUTPATIENT
Start: 2021-10-29 | End: 2021-10-29 | Stop reason: HOSPADM

## 2021-10-29 RX ORDER — LIDOCAINE HYDROCHLORIDE 10 MG/ML
0.1 INJECTION, SOLUTION EPIDURAL; INFILTRATION; INTRACAUDAL; PERINEURAL AS NEEDED
Status: DISCONTINUED | OUTPATIENT
Start: 2021-10-29 | End: 2021-10-29 | Stop reason: HOSPADM

## 2021-10-29 RX ORDER — ONDANSETRON 2 MG/ML
INJECTION INTRAMUSCULAR; INTRAVENOUS AS NEEDED
Status: DISCONTINUED | OUTPATIENT
Start: 2021-10-29 | End: 2021-10-29 | Stop reason: HOSPADM

## 2021-10-29 RX ORDER — SODIUM CHLORIDE 0.9 % (FLUSH) 0.9 %
5-40 SYRINGE (ML) INJECTION EVERY 8 HOURS
Status: DISCONTINUED | OUTPATIENT
Start: 2021-10-29 | End: 2021-10-29 | Stop reason: HOSPADM

## 2021-10-29 RX ORDER — LIDOCAINE HYDROCHLORIDE 20 MG/ML
INJECTION, SOLUTION EPIDURAL; INFILTRATION; INTRACAUDAL; PERINEURAL AS NEEDED
Status: DISCONTINUED | OUTPATIENT
Start: 2021-10-29 | End: 2021-10-29 | Stop reason: HOSPADM

## 2021-10-29 RX ORDER — PHENYLEPHRINE HCL IN 0.9% NACL 0.4MG/10ML
SYRINGE (ML) INTRAVENOUS AS NEEDED
Status: DISCONTINUED | OUTPATIENT
Start: 2021-10-29 | End: 2021-10-29 | Stop reason: HOSPADM

## 2021-10-29 RX ORDER — NEOSTIGMINE METHYLSULFATE 1 MG/ML
INJECTION, SOLUTION INTRAVENOUS AS NEEDED
Status: DISCONTINUED | OUTPATIENT
Start: 2021-10-29 | End: 2021-10-29 | Stop reason: HOSPADM

## 2021-10-29 RX ORDER — SODIUM CHLORIDE 0.9 % (FLUSH) 0.9 %
5-40 SYRINGE (ML) INJECTION AS NEEDED
Status: DISCONTINUED | OUTPATIENT
Start: 2021-10-29 | End: 2021-10-29 | Stop reason: HOSPADM

## 2021-10-29 RX ORDER — DEXAMETHASONE SODIUM PHOSPHATE 4 MG/ML
INJECTION, SOLUTION INTRA-ARTICULAR; INTRALESIONAL; INTRAMUSCULAR; INTRAVENOUS; SOFT TISSUE AS NEEDED
Status: DISCONTINUED | OUTPATIENT
Start: 2021-10-29 | End: 2021-10-29 | Stop reason: HOSPADM

## 2021-10-29 RX ORDER — HYDROMORPHONE HYDROCHLORIDE 1 MG/ML
.2-.5 INJECTION, SOLUTION INTRAMUSCULAR; INTRAVENOUS; SUBCUTANEOUS
Status: DISCONTINUED | OUTPATIENT
Start: 2021-10-29 | End: 2021-10-29 | Stop reason: HOSPADM

## 2021-10-29 RX ORDER — VANCOMYCIN/0.9 % SOD CHLORIDE 1.5G/250ML
1500 PLASTIC BAG, INJECTION (ML) INTRAVENOUS ONCE
Status: COMPLETED | OUTPATIENT
Start: 2021-10-29 | End: 2021-10-29

## 2021-10-29 RX ORDER — FENTANYL CITRATE 50 UG/ML
25 INJECTION, SOLUTION INTRAMUSCULAR; INTRAVENOUS
Status: DISCONTINUED | OUTPATIENT
Start: 2021-10-29 | End: 2021-10-29 | Stop reason: HOSPADM

## 2021-10-29 RX ORDER — DIPHENHYDRAMINE HYDROCHLORIDE 50 MG/ML
12.5 INJECTION, SOLUTION INTRAMUSCULAR; INTRAVENOUS AS NEEDED
Status: DISCONTINUED | OUTPATIENT
Start: 2021-10-29 | End: 2021-10-29 | Stop reason: HOSPADM

## 2021-10-29 RX ORDER — LIDOCAINE HYDROCHLORIDE AND EPINEPHRINE 10; 10 MG/ML; UG/ML
INJECTION, SOLUTION INFILTRATION; PERINEURAL AS NEEDED
Status: DISCONTINUED | OUTPATIENT
Start: 2021-10-29 | End: 2021-10-29 | Stop reason: HOSPADM

## 2021-10-29 RX ORDER — FENTANYL CITRATE 50 UG/ML
INJECTION, SOLUTION INTRAMUSCULAR; INTRAVENOUS AS NEEDED
Status: DISCONTINUED | OUTPATIENT
Start: 2021-10-29 | End: 2021-10-29 | Stop reason: HOSPADM

## 2021-10-29 RX ORDER — ROCURONIUM BROMIDE 10 MG/ML
INJECTION, SOLUTION INTRAVENOUS AS NEEDED
Status: DISCONTINUED | OUTPATIENT
Start: 2021-10-29 | End: 2021-10-29 | Stop reason: HOSPADM

## 2021-10-29 RX ORDER — GLYCOPYRROLATE 0.2 MG/ML
INJECTION INTRAMUSCULAR; INTRAVENOUS AS NEEDED
Status: DISCONTINUED | OUTPATIENT
Start: 2021-10-29 | End: 2021-10-29 | Stop reason: HOSPADM

## 2021-10-29 RX ORDER — HYDROCODONE BITARTRATE AND ACETAMINOPHEN 5; 325 MG/1; MG/1
1 TABLET ORAL
Qty: 20 TABLET | Refills: 0 | Status: SHIPPED | OUTPATIENT
Start: 2021-10-29 | End: 2021-11-01

## 2021-10-29 RX ADMIN — PROPOFOL 200 MG: 10 INJECTION, EMULSION INTRAVENOUS at 11:38

## 2021-10-29 RX ADMIN — SODIUM CHLORIDE, POTASSIUM CHLORIDE, SODIUM LACTATE AND CALCIUM CHLORIDE 25 ML/HR: 600; 310; 30; 20 INJECTION, SOLUTION INTRAVENOUS at 09:48

## 2021-10-29 RX ADMIN — FENTANYL CITRATE 50 MCG: 50 INJECTION, SOLUTION INTRAMUSCULAR; INTRAVENOUS at 11:57

## 2021-10-29 RX ADMIN — ROCURONIUM BROMIDE 10 MG: 10 INJECTION INTRAVENOUS at 11:38

## 2021-10-29 RX ADMIN — NEOSTIGMINE METHYLSULFATE 3 MG: 1 INJECTION, SOLUTION INTRAVENOUS at 12:24

## 2021-10-29 RX ADMIN — ONDANSETRON HYDROCHLORIDE 4 MG: 2 INJECTION, SOLUTION INTRAMUSCULAR; INTRAVENOUS at 11:47

## 2021-10-29 RX ADMIN — Medication 3 AMPULE: at 09:48

## 2021-10-29 RX ADMIN — FENTANYL CITRATE 25 MCG: 50 INJECTION INTRAMUSCULAR; INTRAVENOUS at 12:55

## 2021-10-29 RX ADMIN — ROCURONIUM BROMIDE 10 MG: 10 INJECTION INTRAVENOUS at 11:53

## 2021-10-29 RX ADMIN — SUCCINYLCHOLINE CHLORIDE 200 MG: 20 INJECTION, SOLUTION INTRAMUSCULAR; INTRAVENOUS at 11:38

## 2021-10-29 RX ADMIN — DEXAMETHASONE SODIUM PHOSPHATE 4 MG: 4 INJECTION, SOLUTION INTRAMUSCULAR; INTRAVENOUS at 11:47

## 2021-10-29 RX ADMIN — GLYCOPYRROLATE 0.2 MG: 0.2 INJECTION, SOLUTION INTRAMUSCULAR; INTRAVENOUS at 12:03

## 2021-10-29 RX ADMIN — Medication 80 MCG: at 12:16

## 2021-10-29 RX ADMIN — MIDAZOLAM HYDROCHLORIDE 2 MG: 1 INJECTION, SOLUTION INTRAMUSCULAR; INTRAVENOUS at 11:33

## 2021-10-29 RX ADMIN — FENTANYL CITRATE 50 MCG: 50 INJECTION, SOLUTION INTRAMUSCULAR; INTRAVENOUS at 11:38

## 2021-10-29 RX ADMIN — Medication 10 MG: at 12:16

## 2021-10-29 RX ADMIN — FENTANYL CITRATE 25 MCG: 50 INJECTION INTRAMUSCULAR; INTRAVENOUS at 13:00

## 2021-10-29 RX ADMIN — VANCOMYCIN HYDROCHLORIDE 1500 MG: 10 INJECTION, POWDER, LYOPHILIZED, FOR SOLUTION INTRAVENOUS at 09:49

## 2021-10-29 RX ADMIN — LIDOCAINE HYDROCHLORIDE 100 MG: 20 INJECTION, SOLUTION INTRAVENOUS at 11:38

## 2021-10-29 RX ADMIN — GLYCOPYRROLATE 0.4 MG: 0.2 INJECTION, SOLUTION INTRAMUSCULAR; INTRAVENOUS at 12:24

## 2021-10-29 NOTE — H&P
Preoperative Plastic Surgery History and Physical    Subjective:      Ioana Murrieta is a 74 y.o. male who presents for debridement and skin grafting right leg chronic wound. Past Medical History:   Diagnosis Date    Anxiety     Arthritis     OSTEO    Asthma     AS A YOUNGER ADULT    CAD (coronary artery disease)     stent X1    Cancer (HCC)     BCC, SCC    Cataract     right    Chronic pain     GERD (gastroesophageal reflux disease)     HTN (hypertension)     Hyperlipidemia     Kidney stone     Liver disease 2018    FATTY LIVER    Sleep apnea     WAS TOLD, BUT NEVER TESTED     Past Surgical History:   Procedure Laterality Date    HX COLONOSCOPY      HX ORTHOPAEDIC Left     HIP REPLACEMENT    HX ORTHOPAEDIC Right     HIP REPLACEMENT    IR INJ FORAMIN EPID LUMB ANES/STER SNGL  1/3/2020    NEUROLOGICAL PROCEDURE UNLISTED      L1 or L3 LAMINECTOMY    TN CABG, ARTERY-VEIN, THREE  2018    TN CARDIAC SURG PROCEDURE UNLIST   &     CARDIAC CATH      Family History   Problem Relation Age of Onset    Cancer Brother         prostate     Heart Disease Brother         CABG x4     Heart Disease Father 45    High Cholesterol Father     Heart Disease Brother 48        CABG    Other Mother         DIVERTICULITIS    Heart Attack Son         AGE 44    No Known Problems Daughter     Anesth Problems Neg Hx      Social History     Tobacco Use    Smoking status: Former Smoker     Packs/day: 0.50     Quit date: 1998     Years since quittin.9    Smokeless tobacco: Never Used   Substance Use Topics    Alcohol use: Yes     Alcohol/week: 5.0 standard drinks     Types: 5 Cans of beer per week     Comment: occasionally      Prior to Admission medications    Medication Sig Start Date End Date Taking? Authorizing Provider   citalopram (CELEXA) 20 mg tablet Take 20 mg by mouth daily. Yes Provider, Historical   ezetimibe (ZETIA) 10 mg tablet Take 10 mg by mouth daily. Yes Provider, Historical   oxyCODONE-acetaminophen (PERCOCET) 5-325 mg per tablet Take 0.5 Tablets by mouth daily. Yes Provider, Historical   clopidogreL (PLAVIX) 75 mg tab Take 1 Tab by mouth daily. 20  Yes Cassia Brown MD   omeprazole (PRILOSEC) 40 mg capsule Take 40 mg by mouth daily. Yes Provider, Historical   rosuvastatin (CRESTOR) 40 mg tablet Take 40 mg by mouth nightly. Yes Provider, Historical   SAFETY-HIEN 3CC SYR 23GX1\" 3 mL 23 gauge x 1\" syrg INJECT 0.4ML INTRAMUSCULARLY ONCE EVERY 7 DAYS 16  Yes Glenis Whitman MD   testosterone cypionate (DEPOTESTOTERONE CYPIONATE) 200 mg/mL injection 0.35 mL by IntraMUSCular route every seven (7) days. 3/1/16  Yes Glenis Whitman MD   Needle, Disp, 21 G 21 x 1 \" Ndle 1 mL by Does Not Apply route every seven (7) days. 12  Yes Glenis Whitman MD   Syringe, Disposable, 1 mL Syrg 0.4 mL by Does Not Apply route every seven (7) days. 12  Yes Glenis Whitman MD   aspirin delayed-release 81 mg tablet Take 81 mg by mouth daily. Yes Provider, Historical   atenolol (TENORMIN) 25 mg tablet Take 25 mg by mouth daily. Yes Provider, Historical   allopurinol (ZYLOPRIM) 300 mg tablet Take 450 mg by mouth daily. PATIENT TAKES A TOTAL OF 1 1/2 TABS PER DAY TO EQUAL 450 MG   Yes Provider, Historical   lisinopril (PRINIVIL, ZESTRIL) 10 mg tablet Take 5 mg by mouth every evening. Yes Provider, Historical      No Known Allergies    Review of Systems:  Pertinent items are noted in the History of Present Illness.     Objective:        Patient Vitals for the past 8 hrs:   BP Temp Pulse Resp SpO2 Height Weight   10/29/21 0932 123/67 98.3 °F (36.8 °C) (!) 54 14 100 % 5' 11\" (1.803 m) 105.7 kg (233 lb 0.4 oz)       Temp (24hrs), Av.3 °F (36.8 °C), Min:98.3 °F (36.8 °C), Max:98.3 °F (36.8 °C)      Physical Exam:  General: WD, WN  HEENT: normocephalic, eyes clear  Neck: Supple, no masses  Chest: Clear  Heart: Regular rate and rhythm  Abdomen: soft, NT, ND  Breasts: NA  Extremities: no CCE  Neuro: nonfocal  Skin: open granulating wound right leg    Assessment:     Chronic wound right leg    Plan:     Debridement and STSG. Discussed the benefits, alternatives, anticipated recovery, and risks of surgery, including but not limited to bleeding, infection, graft failure. The patient voiced understanding; any and all questions were answered to the patient's satisfaction.     Signed By: Irma Martin MD     October 29, 2021

## 2021-10-29 NOTE — DISCHARGE INSTRUCTIONS
Keep VAC in place until follow up. Reinforce right thigh dressing as needed. It is normal to fill with bloody fluid. Patient Education      Hydrocodone/Acetaminophen (Vicodin, Norco, Lortab) - (By mouth)   Why this medicine is used:   Treats pain. Contact a nurse or doctor right away if you have:  · Blistering, peeling, red skin rash  · Fast or slow heartbeat, shallow breathing, blue lips, fingernails, or skin  · Anxiety, restlessness, muscle spasms, twitching, seeing or hearing things that are not there  · Dark urine or pale stools, yellow skin or eyes  · Extreme weakness, sweating, seizures, cold or clammy skin  · Lightheadedness, dizziness, fainting, fever, sweating     Common side effects:  · Constipation, nausea, vomiting, loss of appetite, stomach pain  · Tiredness or sleepiness  © 2017 [facility] Information is for End User's use only and may not be sold, redistributed or otherwise used for commercial purposes. DISCHARGE SUMMARY from Nurse    PATIENT INSTRUCTIONS:    After general anesthesia or intravenous sedation, for 24 hours or while taking prescription Narcotics:  Limit your activities  Do not drive and operate hazardous machinery  Do not make important personal or business decisions  Do  not drink alcoholic beverages  If you have not urinated within 8 hours after discharge, please contact your surgeon on call.     Report the following to your surgeon:  Excessive pain, swelling, redness or odor of or around the surgical area  Temperature over 100.5  Nausea and vomiting lasting longer than 4 hours or if unable to take medications  Any signs of decreased circulation or nerve impairment to extremity: change in color, persistent  numbness, tingling, coldness or increase pain  Any questions      These are general instructions for a healthy lifestyle:    No smoking/ No tobacco products/ Avoid exposure to second hand smoke  Surgeon General's Warning:  Quitting smoking now greatly reduces serious risk to your health. Obesity, smoking, and sedentary lifestyle greatly increases your risk for illness    A healthy diet, regular physical exercise & weight monitoring are important for maintaining a healthy lifestyle    You may be retaining fluid if you have a history of heart failure or if you experience any of the following symptoms:  Weight gain of 3 pounds or more overnight or 5 pounds in a week, increased swelling in our hands or feet or shortness of breath while lying flat in bed. Please call your doctor as soon as you notice any of these symptoms; do not wait until your next office visit. TO PREVENT AN INFECTION      1. [address] YOUR HANDS     To prevent infection, good handwashing is the most important thing you or your caregiver can do.  Wash your hands with soap and water or use the hand  we gave you before you touch any wounds. 2. SHOWER     Use the antibacterial soap we gave you when you take a shower.  Shower with this soap until your wounds are healed.  To reach all areas of your body, you may need someone to help you.  Dont forget to clean your belly button with every shower. 3.  USE CLEAN SHEETS     Use freshly cleaned sheets on your bed after surgery.  To keep the surgery site clean, do not allow pets to sleep with you while your wound is still healing. 4. STOP SMOKING     Stop smoking, or at least cut back on smoking     Smoking slows your healing. 5.  CONTROL YOUR BLOOD SUGAR     High blood sugars slow wound healing.  If you are diabetic, control your blood sugar levels before and after your surgery.

## 2021-10-29 NOTE — BRIEF OP NOTE
Brief Postoperative Note    Patient: Eliz Boss  YOB: 1955  MRN: [id number]    Date of Procedure: 10/29/2021     Pre-Op Diagnosis: RIGHT LEG WOUND    Post-Op Diagnosis: Same as preoperative diagnosis.       Procedure(s):  DEBRIDEMENT AND SPLIT THICKNESS SKIN GRAFT TO RIGHT LEG WOUND (URGENT) 13 x 8xm    Surgeon(s):  Beverlie Buerger, MD    Surgical Assistant: None    Anesthesia: General     Estimated Blood Loss (mL): less than 50     Complications: None    Specimens: * No specimens in log *     Implants: * No implants in log *    Drains: * No LDAs found *    Findings: see note    Electronically Signed by Darcus Simmonds, MD on 10/29/2021 at 12:31 PM

## 2021-10-29 NOTE — ANESTHESIA POSTPROCEDURE EVALUATION
Procedure(s):  DEBRIDEMENT AND SPLIT THICKNESS SKIN GRAFT TO RIGHT LEG WOUND.    general    Anesthesia Post Evaluation      Multimodal analgesia: multimodal analgesia used between 6 hours prior to anesthesia start to PACU discharge  Patient location during evaluation: PACU  Level of consciousness: sleepy but conscious  Pain management: adequate  Airway patency: patent  Anesthetic complications: no  Cardiovascular status: acceptable  Respiratory status: acceptable  Hydration status: acceptable  Comments: +Post-Anesthesia Evaluation and Assessment    Patient: Angelene Brunner MRN: [id number]  SSN: [id number]   YOB: 1955  Age: 74 y.o. Sex: male      Cardiovascular Function/Vital Signs    /64   Pulse (!) 56   Temp 36.9 °C (98.4 °F)   Resp 19   Ht 5' 11\" (1.803 m)   Wt 105.7 kg (233 lb 0.4 oz)   SpO2 94%   BMI 32.50 kg/m²     Patient is status post Procedure(s):  DEBRIDEMENT AND SPLIT THICKNESS SKIN GRAFT TO RIGHT LEG WOUND. Nausea/Vomiting: Controlled. Postoperative hydration reviewed and adequate. Pain:  Pain Scale 1: Numeric (0 - 10) (10/29/21 1315)  Pain Intensity 1: 3 (10/29/21 1315)   Managed. Neurological Status:   Neuro (WDL): Exceptions to WDL (10/29/21 1240)   At baseline. Mental Status and Level of Consciousness: Arousable. Pulmonary Status:   O2 Device: None (Room air) (10/29/21 1315)   Adequate oxygenation and airway patent. Complications related to anesthesia: None    Post-anesthesia assessment completed. No concerns. Signed By: Jaguar Kulkarni MD    10/29/2021  Post anesthesia nausea and vomiting:  controlled  Final Post Anesthesia Temperature Assessment:  Normothermia (36.0-37.5 degrees C)      INITIAL Post-op Vital signs:   Vitals Value Taken Time   /64 10/29/21 1330   Temp 36.9 °C (98.4 °F) 10/29/21 1244   Pulse 53 10/29/21 1344   Resp 16 10/29/21 1344   SpO2 98 % 10/29/21 1344   Vitals shown include unvalidated device data.

## 2021-10-29 NOTE — PERIOP NOTES
1239-Handoff Report from Operating Room to PACU    Report received from [address] [place] Rickey and Yousif Rios CRNA regarding [facility]. Surgeon(s):  Mendoza Rosario MD  And Procedure(s) (LRB):  DEBRIDEMENT AND SPLIT THICKNESS SKIN GRAFT TO RIGHT LEG WOUND (Right)  confirmed   with allergies and dressings discussed. Anesthesia type, drugs, patient history, complications, estimated blood loss, vital signs, intake and output, and last pain medication, lines, reversal medications and temperature were reviewed. 9448- Friend updated. 1405- TRANSFER - OUT REPORT:    Verbal report given to Severino RN(name) on [facility]  being transferred to phase II(unit) for routine post - op       Report consisted of patients Situation, Background, Assessment and   Recommendations(SBAR). Information from the following report(s) SBAR, Kardex, OR Summary, Procedure Summary, Intake/Output, MAR and Recent Results was reviewed with the receiving nurse. Opportunity for questions and clarification was provided.       Patient transported with:   Registered Nurse

## 2021-10-29 NOTE — ANESTHESIA PREPROCEDURE EVALUATION
Relevant Problems   No relevant active problems       Anesthetic History   No history of anesthetic complications            Review of Systems / Medical History  Patient summary reviewed, nursing notes reviewed and pertinent labs reviewed    Pulmonary        Sleep apnea: No treatment  Smoker  Asthma     Comments: Former Smoker   Neuro/Psych             Comments: Spinal stenosis, lumbar Cardiovascular    Hypertension          CAD, PAD, cardiac stents and hyperlipidemia    Exercise tolerance: >4 METS  Comments: Coronary stent x 1     GI/Hepatic/Renal     GERD    Renal disease: stones  Liver disease    Comments: FATTY LIVER Endo/Other        Obesity, arthritis and cancer     Other Findings   Comments: Hypogonadism  Hx Basal Cell Ca  Hx Squamous Cell Ca  RLE gangrene  Chronic pain           Physical Exam    Airway  Mallampati: III  TM Distance: > 6 cm  Neck ROM: normal range of motion   Mouth opening: Diminished (comment)     Cardiovascular  Regular rate and rhythm,  S1 and S2 normal,  no murmur, click, rub, or gallop             Dental    Dentition: Edentulous, Full lower dentures and Full upper dentures     Pulmonary  Breath sounds clear to auscultation               Abdominal  GI exam deferred       Other Findings            Anesthetic Plan    ASA: 3  Anesthesia type: general    Monitoring Plan: BIS      Induction: Intravenous  Anesthetic plan and risks discussed with: Patient

## 2021-10-30 LAB
BACTERIA SPEC CULT: NORMAL
BACTERIA SPEC CULT: NORMAL
SERVICE CMNT-IMP: NORMAL

## 2021-11-01 NOTE — OP NOTES
[place]  OPERATIVE REPORT    Name:  Welton Brunner  MR#:  [id number]  :  1955  ACCOUNT #:  [de-identified]  DATE OF SERVICE:  10/29/2021    PREOPERATIVE DIAGNOSIS:  Chronic nonhealing right leg ulcer of mixed etiology. POSTOPERATIVE DIAGNOSIS:  Chronic nonhealing right leg ulcer of mixed etiology. PROCEDURES PERFORMED:  1. Preparation of right leg wound for skin grafting by excision of necrotic tissue (13 x 8 cm). 2.  Split-thickness skin graft of right leg wound (13 x 8 cm). SURGEON:  Ciera Cooper MD    ASSISTANT:  None. ANESTHESIA:  General endotracheal.    COMPLICATIONS:  None. SPECIMENS REMOVED:  None. IMPLANTS:  None. ESTIMATED BLOOD LOSS:  Less than 50 mL. INDICATIONS:  This 68-year-old man presented with a month's long history of a right anterior leg wound. He was followed in the wound healing center. He got a history of peripheral arterial disease as well as hemostasis. His vascular disease had been treated as much as possible. He has been treated with multiple modalities including simple wound care, debridements, skin substitutes, and negative pressure wound therapy. The wound had developed a nice layer of granulation but had not shrunk in size. He is felt to be an acceptable candidate for debridement and split-thickness skin grafting. He is aware of the risks, including but not limited to graft failure. PROCEDURE:  After informed consent was obtained and under general anesthesia, the right leg and right thigh were prepped and draped in the usual fashion. Using a #10 blade, all nonviable tissue was sharply excised from the base of the right leg wound. The entire wound was then abraded with a metal ruler until punctate bleeding was noted throughout. The wound was then irrigated with 3 L of saline and Betadine using the pulsatile .     A split thickness skin graft has been harvested from the right anterolateral thigh using the air power dermatome set at 13:1000 of an inch. The donor site was dressed with Alginate and a clear dressing. The graft was meshed in a 1.5:1 ratio and sutured to the wound bed using skin staples. Adaptic gauze and a silver VAC sponge were then placed over the wound, followed by an adherent VAC dressing. Suction was connected and was satisfactory. He was extubated in the room and was transferred to the recovery room in good condition.         MD DAVID Brown/[id number]  D:  10/31/2021 15:46  T:  10/31/2021 22:21  JOB #:  [id number]  CC:  MD Ciera Deluca MD

## 2021-11-02 ENCOUNTER — HOSPITAL ENCOUNTER (OUTPATIENT)
Dept: WOUND CARE | Age: 66
Discharge: HOME | End: 2021-11-02
Payer: MEDICARE

## 2021-11-02 VITALS
DIASTOLIC BLOOD PRESSURE: 63 MMHG | SYSTOLIC BLOOD PRESSURE: 133 MMHG | HEART RATE: 52 BPM | RESPIRATION RATE: 16 BRPM | TEMPERATURE: 97.6 F

## 2021-11-02 PROCEDURE — 99213 OFFICE O/P EST LOW 20 MIN: CPT

## 2021-11-02 NOTE — PROGRESS NOTES
Plastics / WC    Postop debridement and STSG right leg wound  Graft looks good with 90% plus take  Donor site ok  Start xeroform to graft every other day  Alginate to donor site  Fu 1 week.

## 2021-11-02 NOTE — WOUND CARE
11/02/21 7074 Wound Thigh Right 11/02/21 Date First Assessed/Time First Assessed: 11/02/21 0903   Wound Approximate Age at First Assessment (Weeks): 1 weeks  Primary Wound Type: (c)   Location: Thigh  Wound Location Orientation: Right  Date of First Observation: 11/02/21 Dressing/Treatment Alginate Wound Length (cm) 10.7 cm Wound Width (cm) 9 cm Wound Depth (cm)  
(less than . 1) Wound Surface Area (cm^2) 96.3 cm^2 Wound Assessment Pink/red Drainage Amount Moderate Drainage Description Sanguineous Wound Odor None Chana-Wound/Incision Assessment Intact Edges Flat/open edges Wound Thickness Description Partial thickness Visit Vitals /63 (BP 1 Location: Left arm, BP Patient Position: At rest) Pulse (!) 52 Temp 97.6 °F (36.4 °C) Resp 16

## 2021-11-02 NOTE — WOUND CARE
11/02/21 7234 Wound Thigh Right 11/02/21 Date First Assessed/Time First Assessed: 11/02/21 0903   Wound Approximate Age at First Assessment (Weeks): 1 weeks  Primary Wound Type: (c)   Location: Thigh  Wound Location Orientation: Right  Date of First Observation: 11/02/21 Wound Image Wound Etiology Surgical  
Dressing Status  
(removed saturated calcium alginate) Cleansed Cleansed with saline Wound Leg lower Right # 1 Date First Assessed/Time First Assessed: 05/07/20 0835   Present on Hospital Admission: Yes  Wound Approximate Age at First Assessment (Weeks): 4 weeks  Primary Wound Type: Vascular  Location: Leg lower  Wound Location Orientation: Right  Wound Descri. .. Wound Image Dressing Status Old drainage noted 
(removed wound vac) Cleansed Cleansed with saline Wound Length (cm) 11 cm Wound Width (cm) 7.5 cm Wound Surface Area (cm^2) 82.5 cm^2 Change in Wound Size % 58.9 Wound Assessment Graft Drainage Amount Moderate Drainage Description Sanguineous Wound Odor None Chana-Wound/Incision Assessment Intact Edges (stapled) Wound Thickness Description Full thickness Visit Vitals /63 (BP 1 Location: Left arm, BP Patient Position: At rest) Pulse (!) 52 Temp 97.6 °F (36.4 °C) Resp 16

## 2021-11-02 NOTE — DISCHARGE INSTRUCTIONS
Discharge Instructions/Wound Orders  [address] [place] [address] [place], [id number]  Telephone: [phone]    NAME:  Jay Segovia  YOB: 1955  MEDICAL RECORD NUMBER:  [id number]  DATE:  11/2/2021     Wound Care Orders:    Right lower leg wound   Clean with NS, pat dry. [place] Founds Apply xeroform, ABD, RG, tape. Change every other day. Right thigh doner site   Clean at clinic, apply calcium alginate, cover with occlusive dressing. .   Do not change. Return to see MD in 1 week    Dietary:  [x] Diet as tolerated: [] Calorie Diabetic Diet:Low carb and no Sugar [] No Added Salt:[] Increase Protein: [] Other:Limit the amount of liquid you are drinking and avoid drinking in between meals   Activity:  [] Activity as tolerated:  [] Patient has no activity restrictions     [] Strict Bedrest: [] Remain off Work:     [] May return to full duty work:                                   [] Return to work with restrictions:             Return Appointment:   [x] Return Appointment: With Dr Sherran Cranker in 1 week       Electronically signed Griselda Schimke, RN on 11/2/2021 at 9:13 AM     Sallie Vega 281: Should you experience any significant changes in your wound(s) or have questions about your wound care, please contact the [address] [place] [address] at [address] [place] [address] 8:00 am - 4:30. If you need help with your wound outside these hours and cannot wait until we are again available, contact your PCP or go to the hospital emergency room. PLEASE NOTE: IF YOU ARE UNABLE TO OBTAIN WOUND SUPPLIES, CONTINUE TO USE THE SUPPLIES YOU HAVE AVAILABLE UNTIL YOU ARE ABLE TO REACH US. IT IS MOST IMPORTANT TO KEEP THE WOUND COVERED AT ALL TIMES.      Physician Signature:_______________________    Date: ___________ Time:  ____________

## 2021-11-09 ENCOUNTER — HOSPITAL ENCOUNTER (OUTPATIENT)
Dept: WOUND CARE | Age: 66
Discharge: HOME | End: 2021-11-09
Payer: MEDICARE

## 2021-11-09 VITALS
TEMPERATURE: 97.7 F | SYSTOLIC BLOOD PRESSURE: 138 MMHG | RESPIRATION RATE: 16 BRPM | DIASTOLIC BLOOD PRESSURE: 63 MMHG | HEART RATE: 49 BPM

## 2021-11-09 PROCEDURE — 99213 OFFICE O/P EST LOW 20 MIN: CPT

## 2021-11-09 NOTE — PROGRESS NOTES
Plastics / WC    Postop STSG right leg  Looks good, 90%+ take  Donor site reepithelialized  Cont xeroform  Staples removed  Fu 2 weeks.

## 2021-11-09 NOTE — WOUND CARE
11/09/21 0922   Wound Leg lower Right # 1   Date First Assessed/Time First Assessed: 05/07/20 0835   Present on Hospital Admission: Yes  Wound Approximate Age at First Assessment (Weeks): 4 weeks  Primary Wound Type: Vascular  Location: Leg lower  Wound Location Orientation: Right  Wound Descri. ..    Wound Image    Dressing Status Old drainage noted   Cleansed Cleansed with saline   Wound Length (cm) 11.3 cm   Wound Width (cm) 7.5 cm   Wound Depth (cm) 0.1 cm   Wound Surface Area (cm^2) 84.75 cm^2   Change in Wound Size % 57.78   Wound Volume (cm^3) 8.475 cm^3   Wound Healing % 79   Wound Assessment Graft   Drainage Amount Moderate   Drainage Description Serosanguinous   Wound Odor None   Chana-Wound/Incision Assessment Induration   Edges Flat/open edges   Wound Thickness Description Full thickness   Wound Thigh Right 11/02/21   Date First Assessed/Time First Assessed: 11/02/21 0903   Wound Approximate Age at First Assessment (Weeks): 1 weeks  Primary Wound Type: (c)   Location: Thigh  Wound Location Orientation: Right  Date of First Observation: 11/02/21   Wound Image    Wound Etiology Surgical   Cleansed Cleansed with saline   Wound Length (cm) 11 cm   Wound Width (cm) 9 cm   Wound Depth (cm) 0.1 cm   Wound Surface Area (cm^2) 99 cm^2   Change in Wound Size % -2.8   Wound Volume (cm^3) 9.9 cm^3   Wound Assessment Pink/red   Drainage Amount Moderate   Drainage Description Serosanguinous   Wound Odor None   Chana-Wound/Incision Assessment Intact   Edges Flat/open edges   Wound Thickness Description Partial thickness     Visit Vitals  /63   Pulse (!) 49   Temp 97.7 °F (36.5 °C)   Resp 16

## 2021-11-09 NOTE — DISCHARGE INSTRUCTIONS
Discharge Instructions/Wound Orders  [address], [address] [place] [address] [place], [id number]  Telephone: [phone]    NAME:  Ty Thomson  YOB: 1955  MEDICAL RECORD NUMBER:  [id number]  DATE:  11/9/2021  Wound Care Orders:  Right lower leg wound   Clean with NS, pat dry. Mary Carmen Joseph Apply xeroform, ABD, RG, tape. Change every other day. Right thigh doner site  Clean with soap and water, moisturize with vaseline. Return to see MD in 2 week    Dietary:  [x] Diet as tolerated: [] Calorie Diabetic Diet:Low carb and no Sugar [] No Added Salt:[x] Increase Protein: [] Other:Limit the amount of liquid you are drinking and avoid drinking in between meals   Activity:  [x] Activity as tolerated:  [] Patient has no activity restrictions     [] Strict Bedrest: [] Remain off Work:     [] May return to full duty work:                                   [] Return to work with restrictions:             Return Appointment:  [x] Return Appointment: With DR Rebecca Petit   in  2 Week(s)  [] Ordered tests:    Electronically signed Dede Pineda RN on 11/9/2021 at 9:51 AM     Sallie Vega 281: Should you experience any significant changes in your wound(s) or have questions about your wound care, please contact the [address] [place] [address] at [address] [place] [address] 8:00 am - 4:30. If you need help with your wound outside these hours and cannot wait until we are again available, contact your PCP or go to the hospital emergency room. PLEASE NOTE: IF YOU ARE UNABLE TO OBTAIN WOUND SUPPLIES, CONTINUE TO USE THE SUPPLIES YOU HAVE AVAILABLE UNTIL YOU ARE ABLE TO REACH US. IT IS MOST IMPORTANT TO KEEP THE WOUND COVERED AT ALL TIMES.      Physician Signature:_______________________    Date: ___________ Time:  ____________

## 2021-11-23 ENCOUNTER — HOSPITAL ENCOUNTER (OUTPATIENT)
Dept: WOUND CARE | Age: 66
Discharge: HOME | End: 2021-11-23
Payer: MEDICARE

## 2021-11-23 VITALS
RESPIRATION RATE: 16 BRPM | DIASTOLIC BLOOD PRESSURE: 63 MMHG | HEART RATE: 58 BPM | SYSTOLIC BLOOD PRESSURE: 134 MMHG | TEMPERATURE: 97.8 F

## 2021-11-23 PROCEDURE — 99213 OFFICE O/P EST LOW 20 MIN: CPT

## 2021-11-23 NOTE — PROGRESS NOTES
Plastics / WC    3 weeks s/p STSG right leg  Looks good, 90%+ take  Donor site reepithelialized  Almost healed  Cont xeroform, encouraged to wash with soap and water  Fu 4 weeks.

## 2021-11-23 NOTE — WOUND CARE
11/23/21 0914   [REMOVED] Wound Thigh Right 11/02/21   Final Assessment Date: 11/23/21  Date First Assessed/Time First Assessed: 11/02/21 0903   Wound Approximate Age at First Assessment (Weeks): 1 weeks  Primary Wound Type: (c)   Location: Thigh  Wound Location Orientation: Right  Date of First Observati. .. Wound Image    Wound Etiology Surgical   Dressing Status Other (Comment)  (Open to air.)   Wound Length (cm) 0 cm   Wound Width (cm) 0 cm   Wound Depth (cm) 0 cm   Wound Surface Area (cm^2) 0 cm^2   Change in Wound Size % 100   Wound Volume (cm^3) 0 cm^3   Wound Healing % 100   Wound Assessment Epithelialization  (Pink epithelial tissue.)   Drainage Amount None   Chana-Wound/Incision Assessment Intact   Wound Leg lower Right # 1   Date First Assessed/Time First Assessed: 05/07/20 0835   Present on Hospital Admission: Yes  Wound Approximate Age at First Assessment (Weeks): 4 weeks  Primary Wound Type: Vascular  Location: Leg lower  Wound Location Orientation: Right  Wound Descri. .. Wound Image    Dressing Status Old drainage noted   Cleansed Cleansed with saline   Wound Length (cm) 10 cm   Wound Width (cm) 5.8 cm   Wound Depth (cm) 0.1 cm   Wound Surface Area (cm^2) 58 cm^2   Change in Wound Size % 71.1   Wound Volume (cm^3) 5.8 cm^3   Wound Healing % 86   Wound Assessment Pink/red;  Other (Comment)  (Scattered Small open areas & friable epithelial tissue.)   Drainage Amount Small   Drainage Description Serosanguinous   Wound Odor None   Chana-Wound/Incision Assessment Intact   Edges Flat/open edges   Wound Thickness Description Full thickness     Visit Vitals  /63 (BP 1 Location: Left upper arm, BP Patient Position: Semi fowlers)   Pulse (!) 58   Temp 97.8 °F (36.6 °C)   Resp 16

## 2021-11-23 NOTE — DISCHARGE INSTRUCTIONS
Discharge Instructions for  [facility]  [address]  MOB 1, [address] [facility] [place], [id number]  Telephone: [phone]    NAME:  Yonas Otero  YOB: 1955  MEDICAL RECORD NUMBER:  [id number]  DATE:  11/23/2021     WOUND CARE ORDERS Right lower leg wound   Cleanse w/Normal Saline or mild soap and water & pat dry. Apply Xeroform (or Petroleum gauze) ,   ABD pad,& Roll Gauze. Dressing to be changed every other day. Return to clinic for  MD follow up in 4 weeks. TREATMENT ORDERS:    Elevate leg(s)  when sitting. Avoid prolonged standing in one place. Do not get dressing/wrap wet. Return Appointment:  [x] Return Appointment: With DR Sarita Mcfadden  in [address] [place] [address])     Electronically signed Dede Owusu RN on 11/23/2021 at 9:43 AM     Sallie Vega 281: Should you experience any significant changes in your wound(s) or have questions about your wound care, please contact the [facility] at [address] [place] [address] 8:00 am - 4:30. If you need help with your wound outside these hours and cannot wait until we are again available, contact your PCP or go to the hospital emergency room. PLEASE NOTE: IF YOU ARE UNABLE TO OBTAIN WOUND SUPPLIES, CONTINUE TO USE THE SUPPLIES YOU HAVE AVAILABLE UNTIL YOU ARE ABLE TO REACH US. IT IS MOST IMPORTANT TO KEEP THE WOUND COVERED AT ALL TIMES.      Physician Signature:_______________________    Date: ___________ Time:  ____________

## 2021-12-21 ENCOUNTER — HOSPITAL ENCOUNTER (OUTPATIENT)
Dept: WOUND CARE | Age: 66
Discharge: HOME | End: 2021-12-21
Payer: MEDICARE

## 2021-12-21 VITALS
TEMPERATURE: 97.4 F | RESPIRATION RATE: 16 BRPM | HEART RATE: 54 BPM | SYSTOLIC BLOOD PRESSURE: 135 MMHG | DIASTOLIC BLOOD PRESSURE: 61 MMHG

## 2021-12-21 PROCEDURE — 99213 OFFICE O/P EST LOW 20 MIN: CPT

## 2021-12-21 NOTE — WOUND CARE
12/21/21 0918   Right Leg Edema Point of Measurement   Leg circumference 38.7 cm   Ankle circumference 22 cm   Compression Therapy   (elevate the leg)   RLE Peripheral Vascular    Capillary Refill Less than/equal to 3 seconds   Color Appropriate for race   Temperature Warm   Sensation Present   Pedal Pulse Palpable;Doppler   Wound Leg lower Right # 1   Date First Assessed/Time First Assessed: 05/07/20 0835   Present on Hospital Admission: Yes  Wound Approximate Age at First Assessment (Weeks): 4 weeks  Primary Wound Type: Vascular  Location: Leg lower  Wound Location Orientation: Right  Wound Descri. .. Wound Image    Wound Etiology Venous   Dressing Status   (removed xeroform, roll gauze)   Cleansed Cleansed with saline; Soap and water   Wound Length (cm) 9 cm   Wound Width (cm) 2 cm   Wound Depth (cm) 0.1 cm   Wound Surface Area (cm^2) 18 cm^2   Change in Wound Size % 91.03   Wound Volume (cm^3) 1.8 cm^3   Wound Healing % 96   Wound Assessment Epithelialization;Pink/red;Slough   Drainage Amount Small   Drainage Description Serosanguinous   Wound Odor None   Chana-Wound/Incision Assessment Intact   Edges Flat/open edges   Wound Thickness Description Full thickness   Pain 1   Pain Scale 1 Numeric (0 - 10)   Pain Intensity 1 0   Patient Stated Pain Goal 0   Pain Reassessment 1 Yes     Visit Vitals  /61   Pulse (!) 54   Temp 97.4 °F (36.3 °C)   Resp 16

## 2021-12-21 NOTE — WOUND CARE
12/21/21 0941   Wound Leg lower Right # 1   Date First Assessed/Time First Assessed: 05/07/20 0835   Present on Hospital Admission: Yes  Wound Approximate Age at First Assessment (Weeks): 4 weeks  Primary Wound Type: Vascular  Location: Leg lower  Wound Location Orientation: Right  Wound Descri. ..    Dressing Status New dressing applied   Cleansed Cleansed with saline   Dressing/Treatment ABD pad;Roll gauze;Tape/Soft cloth adhesive tape;Xeroform

## 2021-12-21 NOTE — DISCHARGE INSTRUCTIONS
Discharge Instructions for  [facility]  [address]  MOB 1, 900 [facility] [place], [id number]  Telephone: [phone]    NAME:  Jessica Brooks  YOB: 1955  MEDICAL RECORD NUMBER:  [id number]  DATE:  12/21/2021  WOUND CARE ORDERS:  Right lower leg wound   Cleanse w/Normal Saline or mild soap and water & pat dry. Apply Xeroform (or Petroleum gauze) ,   ABD pad,& Roll Gauze. Dressing to be changed every other day. Return to clinic for  MD follow up in 4 weeks. TREATMENT ORDERS:    Elevate leg(s) above the level of the heart when sitting. Avoid prolonged standing in one place. Do no get dressing/wrap wet. Follow Diet as prescribed:   [x] Diet as tolerated: [] Calorie Diabetic Diet: Low carb and no Sugar [x] No Added Salt:  [] Increase Protein: [] Limit the amount of liquid you are drinking and avoid drinking in between meals           Return Appointment:  [x] Return Appointment: With DR Bhavna Bagley   in  4 Week(s)  [] Nurse Visit : *** days  [] Ordered tests:    Electronically signed Hermila Pimentel RN on 12/21/2021 at 9:40 Hafsa Olson 44: Should you experience any significant changes in your wound(s) or have questions about your wound care, please contact the [facility] at [address] [place] [address] 8:00 am - 4:30. If you need help with your wound outside these hours and cannot wait until we are again available, contact your PCP or go to the hospital emergency room. PLEASE NOTE: IF YOU ARE UNABLE TO OBTAIN WOUND SUPPLIES, CONTINUE TO USE THE SUPPLIES YOU HAVE AVAILABLE UNTIL YOU ARE ABLE TO REACH US. IT IS MOST IMPORTANT TO KEEP THE WOUND COVERED AT ALL TIMES.      Physician Signature:_______________________    Date: ___________ Time:  ____________

## 2021-12-21 NOTE — PROGRESS NOTES
Plastics / WC     7 weeks s/p STSG right leg  Looks good, 90%+ take  Donor site reepithelialized  Almost healed, some clean granulation around periphery  Cont xeroform, encouraged to wash with soap and water  Fu 4 weeks. Should be healed.

## 2022-01-18 ENCOUNTER — HOSPITAL ENCOUNTER (OUTPATIENT)
Dept: WOUND CARE | Age: 67
Discharge: HOME | End: 2022-01-18
Payer: MEDICARE

## 2022-01-18 VITALS
SYSTOLIC BLOOD PRESSURE: 126 MMHG | HEART RATE: 54 BPM | TEMPERATURE: 98.4 F | RESPIRATION RATE: 16 BRPM | DIASTOLIC BLOOD PRESSURE: 60 MMHG

## 2022-01-18 PROCEDURE — 99213 OFFICE O/P EST LOW 20 MIN: CPT

## 2022-01-18 NOTE — WOUND CARE
01/18/22 0818   Wound Leg lower Right # 1   Date First Assessed/Time First Assessed: 05/07/20 0835   Present on Hospital Admission: Yes  Wound Approximate Age at First Assessment (Weeks): 4 weeks  Primary Wound Type: Vascular  Location: Leg lower  Wound Location Orientation: Right  Wound Descri. ..    Wound Etiology Venous   Cleansed Cleansed with saline   Wound Length (cm) 0.3 cm   Wound Width (cm) 0.2 cm   Wound Depth (cm) 0.1 cm   Wound Surface Area (cm^2) 0.06 cm^2   Change in Wound Size % 99.97   Wound Volume (cm^3) 0.006 cm^3   Wound Healing % 100   Wound Assessment Epithelialization   Drainage Amount Scant   Drainage Description Serous   Wound Odor None   Chana-Wound/Incision Assessment Intact   Edges Flat/open edges   Wound Thickness Description Partial thickness     Visit Vitals  /60 (BP 1 Location: Left upper arm, BP Patient Position: Semi fowlers)   Pulse (!) 54   Temp 98.4 °F (36.9 °C)   Resp 16

## 2022-01-18 NOTE — DISCHARGE INSTRUCTIONS
Discharge Instructions for  [facility]  [address]  MOB 1, 900 [facility] [place], [id number]  Telephone: [phone]    NAME:  Alex Cosme  YOB: 1955  MEDICAL RECORD NUMBER:  [id number]  DATE:  1/18/2022  WOUND CARE ORDERS:  Right lower leg wound   Cleanse w/Normal Saline or mild soap and water & pat dry. Apply Xeroform (or Petroleum gauze) ,   ABD pad,& Roll Gauze. Dressing to be changed every other day until no further drainage. No further follow-up needed. TREATMENT ORDERS:    Elevate leg(s) above the level of the heart when sitting. Avoid prolonged standing in one place. Do no get dressing/wrap wet. Follow Diet as prescribed:   [x] Diet as tolerated: [] Calorie Diabetic Diet: Low carb and no Sugar [x] No Added Salt:  [x] Increase Protein: [] Limit the amount of liquid you are drinking and avoid drinking in between meals           Return Appointment:  [x] Return Appointment: No further follow-up needed. [] Nurse Visit : *** days  [] Ordered tests:    Electronically signed Tiera Eduardo RN on 1/18/2022 at 8:52 AM     Sallie Vega 281: Should you experience any significant changes in your wound(s) or have questions about your wound care, please contact the [facility] at [address] [place] [address] 8:00 am - 4:30. If you need help with your wound outside these hours and cannot wait until we are again available, contact your PCP or go to the hospital emergency room. PLEASE NOTE: IF YOU ARE UNABLE TO OBTAIN WOUND SUPPLIES, CONTINUE TO USE THE SUPPLIES YOU HAVE AVAILABLE UNTIL YOU ARE ABLE TO REACH US. IT IS MOST IMPORTANT TO KEEP THE WOUND COVERED AT ALL TIMES.      Physician Signature:_______________________    Date: ___________ Time:  ____________

## 2022-01-18 NOTE — PROGRESS NOTES
Plastics / WC     10 weeks s/p STSG right leg  Doing well  Donor site reepithelialized  Essentially healed, some clean granulation around periphery  Cont xeroform until no drainage  Has mult BCC that dermatology is excising  Fu here prn.

## 2022-03-18 PROBLEM — R60.0 LEG EDEMA, RIGHT: Status: ACTIVE | Noted: 2021-01-04

## 2022-03-19 PROBLEM — L89.891 PRESSURE INJURY OF RIGHT FOOT, STAGE 1: Status: ACTIVE | Noted: 2021-03-22

## 2022-03-19 PROBLEM — M48.061 LUMBAR STENOSIS: Status: ACTIVE | Noted: 2019-08-30

## 2022-03-19 PROBLEM — M48.061 SPINAL STENOSIS, LUMBAR: Status: ACTIVE | Noted: 2019-08-30

## 2022-03-19 PROBLEM — L97.912 NON-PRESSURE CHRONIC ULCER OF RIGHT LOWER LEG, WITH FAT LAYER EXPOSED (HCC): Status: ACTIVE | Noted: 2020-05-07

## 2022-03-20 PROBLEM — I73.9 PAD (PERIPHERAL ARTERY DISEASE) (HCC): Status: ACTIVE | Noted: 2020-05-07

## 2022-04-01 ENCOUNTER — HOSPITAL ENCOUNTER (EMERGENCY)
Age: 67
Discharge: HOME | End: 2022-04-02
Attending: EMERGENCY MEDICINE | Admitting: EMERGENCY MEDICINE
Payer: MEDICARE

## 2022-04-01 VITALS
RESPIRATION RATE: 19 BRPM | OXYGEN SATURATION: 100 % | TEMPERATURE: 97.7 F | HEART RATE: 72 BPM | DIASTOLIC BLOOD PRESSURE: 62 MMHG | BODY MASS INDEX: 34.78 KG/M2 | SYSTOLIC BLOOD PRESSURE: 146 MMHG | HEIGHT: 71 IN | WEIGHT: 248.46 LBS

## 2022-04-01 DIAGNOSIS — R04.0 EPISTAXIS: Primary | ICD-10-CM

## 2022-04-01 LAB
APTT PPP: 25.4 SEC (ref 22.1–31)
BASOPHILS # BLD: 0.1 K/UL (ref 0–0.1)
BASOPHILS NFR BLD: 1 % (ref 0–1)
DIFFERENTIAL METHOD BLD: ABNORMAL
EOSINOPHIL # BLD: 0.5 K/UL (ref 0–0.4)
EOSINOPHIL NFR BLD: 5 % (ref 0–7)
ERYTHROCYTE [DISTWIDTH] IN BLOOD BY AUTOMATED COUNT: 14.8 % (ref 11.5–14.5)
HCT VFR BLD AUTO: 39.9 % (ref 36.6–50.3)
HGB BLD-MCNC: 12.9 G/DL (ref 12.1–17)
IMM GRANULOCYTES # BLD AUTO: 0.1 K/UL (ref 0–0.04)
IMM GRANULOCYTES NFR BLD AUTO: 0 % (ref 0–0.5)
INR PPP: 1 (ref 0.9–1.1)
LYMPHOCYTES # BLD: 4.4 K/UL (ref 0.8–3.5)
LYMPHOCYTES NFR BLD: 38 % (ref 12–49)
MCH RBC QN AUTO: 29.3 PG (ref 26–34)
MCHC RBC AUTO-ENTMCNC: 32.3 G/DL (ref 30–36.5)
MCV RBC AUTO: 90.5 FL (ref 80–99)
MONOCYTES # BLD: 0.6 K/UL (ref 0–1)
MONOCYTES NFR BLD: 5 % (ref 5–13)
NEUTS SEG # BLD: 5.9 K/UL (ref 1.8–8)
NEUTS SEG NFR BLD: 51 % (ref 32–75)
NRBC # BLD: 0 K/UL (ref 0–0.01)
NRBC BLD-RTO: 0 PER 100 WBC
PLATELET # BLD AUTO: 191 K/UL (ref 150–400)
PMV BLD AUTO: 10.5 FL (ref 8.9–12.9)
PROTHROMBIN TIME: 10.8 SEC (ref 9–11.1)
RBC # BLD AUTO: 4.41 M/UL (ref 4.1–5.7)
THERAPEUTIC RANGE,PTTT: NORMAL SECS (ref 58–77)
WBC # BLD AUTO: 11.6 K/UL (ref 4.1–11.1)

## 2022-04-01 PROCEDURE — 30901 CONTROL OF NOSEBLEED: CPT

## 2022-04-01 PROCEDURE — 85730 THROMBOPLASTIN TIME PARTIAL: CPT

## 2022-04-01 PROCEDURE — 36415 COLL VENOUS BLD VENIPUNCTURE: CPT

## 2022-04-01 PROCEDURE — 85610 PROTHROMBIN TIME: CPT

## 2022-04-01 PROCEDURE — 99283 EMERGENCY DEPT VISIT LOW MDM: CPT

## 2022-04-01 PROCEDURE — 85025 COMPLETE CBC W/AUTO DIFF WBC: CPT

## 2022-04-01 RX ORDER — SILVER NITRATE 38.21; 12.74 MG/1; MG/1
1 STICK TOPICAL
Status: DISCONTINUED | OUTPATIENT
Start: 2022-04-01 | End: 2022-04-02 | Stop reason: HOSPADM

## 2022-04-02 NOTE — ED PROVIDER NOTES
EMERGENCY DEPARTMENT HISTORY AND PHYSICAL EXAM      Date: 4/1/2022  Patient Name: Mehreen Gomez    History of Presenting Illness     Chief Complaint   Patient presents with    Epistaxis     Nose bleed since yesterday on plavix and asap. Last dose yesterday. Has allergies and was blowing nose frequenctly since yesterday. History Provided By: Patient    HPI: Mehreen Gomez, 69 y.o. male presents to the ED with cc of epistaxis. 49-year-old male presents emergency department with chief complaint of epistaxis. Patient reports seasonal allergies for 3 days. Reports he has been taking over-the-counter allergy medications. Reports rhinorrhea and blowing his nose. Yesterday patient reports he expelled a \"large clot. \"  Patient reports he is on Plavix and aspirin did not take this morning. Patient reports continues to have intermittent epistaxis. Patient denies being on any blood thinners. Denies headache, chest pain, shortness of breath. Patient reports he is \"swallowing blood. \"  But denies any hematemesis. There are no other complaints, changes, or physical findings at this time. PCP: Autumn Reynoso MD    No current facility-administered medications on file prior to encounter. Current Outpatient Medications on File Prior to Encounter   Medication Sig Dispense Refill    ezetimibe (ZETIA) 10 mg tablet Take 10 mg by mouth daily.  clopidogreL (PLAVIX) 75 mg tab Take 1 Tab by mouth daily. 30 Tab 3    omeprazole (PRILOSEC) 40 mg capsule Take 40 mg by mouth daily.  rosuvastatin (CRESTOR) 40 mg tablet Take 40 mg by mouth nightly.  SAFETY-HIEN 3CC SYR 23GX1\" 3 mL 23 gauge x 1\" syrg INJECT 0.4ML INTRAMUSCULARLY ONCE EVERY 7 DAYS 100 Pen Needle 0    testosterone cypionate (DEPOTESTOTERONE CYPIONATE) 200 mg/mL injection 0.35 mL by IntraMUSCular route every seven (7) days.  (Patient not taking: Reported on 11/23/2021) 10 mL 1    Needle, Disp, 21 G 21 x 1 \" Ndle 1 mL by Does Not Apply route every seven (7) days. 12 Each 6    Syringe, Disposable, 1 mL Syrg 0.4 mL by Does Not Apply route every seven (7) days. 10 Each 3    aspirin delayed-release 81 mg tablet Take 81 mg by mouth daily.  atenolol (TENORMIN) 25 mg tablet Take 25 mg by mouth daily.  allopurinol (ZYLOPRIM) 300 mg tablet Take 450 mg by mouth daily. PATIENT TAKES A TOTAL OF 1 1/2 TABS PER DAY TO EQUAL 450 MG      lisinopril (PRINIVIL, ZESTRIL) 10 mg tablet Take 5 mg by mouth every evening.          Past History     Past Medical History:  Past Medical History:   Diagnosis Date    Anxiety     Arthritis     OSTEO    Asthma     AS A YOUNGER ADULT    CAD (coronary artery disease)     stent X1    Cancer (HCC)     BCC, SCC    Cataract     right    Chronic pain     GERD (gastroesophageal reflux disease)     HTN (hypertension)     Hyperlipidemia     Kidney stone     Liver disease 2018    FATTY LIVER    Sleep apnea     WAS TOLD, BUT NEVER TESTED       Past Surgical History:  Past Surgical History:   Procedure Laterality Date    HX COLONOSCOPY      HX ORTHOPAEDIC Left     HIP REPLACEMENT    HX ORTHOPAEDIC Right     HIP REPLACEMENT    IR INJ FORAMIN EPID LUMB ANES/STER SNGL  1/3/2020    NEUROLOGICAL PROCEDURE UNLISTED      L1 or L3 LAMINECTOMY    SD CABG, ARTERY-VEIN, THREE  2018    SD CARDIAC SURG PROCEDURE UNLIST   &     CARDIAC CATH       Family History:  Family History   Problem Relation Age of Onset    Cancer Brother         prostate     Heart Disease Brother         CABG x4     Heart Disease Father 45    High Cholesterol Father     Heart Disease Brother 48        CABG    Other Mother         DIVERTICULITIS    Heart Attack Son         AGE 44    No Known Problems Daughter     Anesth Problems Neg Hx        Social History:  Social History     Tobacco Use    Smoking status: Former Smoker     Packs/day: 0.50     Quit date: 1998     Years since quittin.4    Smokeless tobacco: Never Used   Substance Use Topics    Alcohol use: Yes     Alcohol/week: 5.0 standard drinks     Types: 5 Cans of beer per week     Comment: occasionally    Drug use: Not Currently       Allergies:  No Known Allergies      Review of Systems   Review of Systems   Constitutional: Negative for chills and fever. HENT: Positive for nosebleeds, rhinorrhea and sinus pressure. Negative for voice change. Eyes: Negative for redness. Respiratory: Negative for chest tightness. Cardiovascular: Negative for chest pain. Gastrointestinal: Negative for abdominal pain, diarrhea, nausea and vomiting. Genitourinary: Negative for hematuria. Musculoskeletal: Negative for gait problem. Skin: Negative for color change, pallor and rash. Neurological: Negative for facial asymmetry, weakness and headaches. Hematological: Does not bruise/bleed easily. Psychiatric/Behavioral: Negative for behavioral problems. All other systems reviewed and are negative. Physical Exam   Physical Exam  Vitals and nursing note reviewed. Constitutional:       Comments: 64-year-old male, resting in bed, no acute distress   HENT:      Head: Normocephalic and atraumatic. Nose: Nose normal.      Comments: Dried blood left nare. Along the nasal septum posterior and inferior, punctate area of venous oozing. Mouth/Throat:      Mouth: Mucous membranes are moist.   Eyes:      Pupils: Pupils are equal, round, and reactive to light. Cardiovascular:      Rate and Rhythm: Normal rate and regular rhythm. Pulses: Normal pulses. Heart sounds: No murmur heard. No friction rub. No gallop. Pulmonary:      Effort: Pulmonary effort is normal.      Breath sounds: Normal breath sounds. No wheezing, rhonchi or rales. Abdominal:      General: Abdomen is flat. There is no distension. Musculoskeletal:         General: Normal range of motion. Cervical back: Normal range of motion. Right lower leg: No edema. Left lower leg: No edema. Skin:     General: Skin is warm and dry. Coloration: Skin is not jaundiced or pale. Neurological:      General: No focal deficit present. Mental Status: He is alert. Psychiatric:         Mood and Affect: Mood normal.         Diagnostic Study Results     Labs -     Recent Results (from the past 12 hour(s))   CBC WITH AUTOMATED DIFF    Collection Time: 04/01/22  9:43 PM   Result Value Ref Range    WBC 11.6 (H) 4.1 - 11.1 K/uL    RBC 4.41 4.10 - 5.70 M/uL    HGB 12.9 12.1 - 17.0 g/dL    HCT 39.9 36.6 - 50.3 %    MCV 90.5 80.0 - 99.0 FL    MCH 29.3 26.0 - 34.0 PG    MCHC 32.3 30.0 - 36.5 g/dL    RDW 14.8 (H) 11.5 - 14.5 %    PLATELET 545 624 - 092 K/uL    MPV 10.5 8.9 - 12.9 FL    NRBC 0.0 0  WBC    ABSOLUTE NRBC 0.00 0.00 - 0.01 K/uL    NEUTROPHILS 51 32 - 75 %    LYMPHOCYTES 38 12 - 49 %    MONOCYTES 5 5 - 13 %    EOSINOPHILS 5 0 - 7 %    BASOPHILS 1 0 - 1 %    IMMATURE GRANULOCYTES 0 0.0 - 0.5 %    ABS. NEUTROPHILS 5.9 1.8 - 8.0 K/UL    ABS. LYMPHOCYTES 4.4 (H) 0.8 - 3.5 K/UL    ABS. MONOCYTES 0.6 0.0 - 1.0 K/UL    ABS. EOSINOPHILS 0.5 (H) 0.0 - 0.4 K/UL    ABS. BASOPHILS 0.1 0.0 - 0.1 K/UL    ABS. IMM. GRANS. 0.1 (H) 0.00 - 0.04 K/UL    DF AUTOMATED     PTT    Collection Time: 04/01/22  9:43 PM   Result Value Ref Range    aPTT 25.4 22.1 - 31.0 sec    aPTT, therapeutic range     58.0 - 77.0 SECS   PROTHROMBIN TIME + INR    Collection Time: 04/01/22  9:43 PM   Result Value Ref Range    INR 1.0 0.9 - 1.1      Prothrombin time 10.8 9.0 - 11.1 sec       Radiologic Studies -   No orders to display     CT Results  (Last 48 hours)    None        CXR Results  (Last 48 hours)    None          Medical Decision Making   I am the first provider for this patient. I reviewed the vital signs, available nursing notes, past medical history, past surgical history, family history and social history. Vital Signs-Reviewed the patient's vital signs.   Patient Vitals for the past 12 hrs:   Temp Pulse Resp BP SpO2   04/01/22 2137 97.7 °F (36.5 °C) 72 19 (!) 146/62 100 %     Records Reviewed: Nursing Notes    Provider Notes (Medical Decision Making):     78-year-old male presents with epistaxis from the left nare. Bleeding controlled at this time. Placed epinephrine and lidocaine soaked pledget in the left nare. Will attempt cautery x1. Likely discharge with ENT follow-up. Labs reviewed from triage. ED Course:   Initial assessment performed. The patients presenting problems have been discussed, and they are in agreement with the care plan formulated and outlined with them. I have encouraged them to ask questions as they arise throughout their visit. ED Course as of 04/02/22 0537   Sat Apr 02, 2022   0011 After cautery applied, no additional epistaxis. Will discharge. [MB]      ED Course User Index  [MB] Juan Pineda MD     Patient observed after cautery without rebleeding. Advised saline nasal sprays and petroleum jelly. Return precautions. PROCEDURES    Procedure Note - Epistaxis Management:   12:11 AM  Performed by: myself . Complexity: simple  After administration of lidocaine and epinephrine, the patient underwent silver nitrate chemical cautery applied to right nare(s). .  Hemostasis was achieved after placement. Estimated blood loss: none  The procedure took 1-15 minutes, and pt tolerated well. Adilene Sanabria MD      Disposition:    Discharged    DISCHARGE PLAN:  1. Discharge Medication List as of 4/2/2022 12:12 AM        2. Follow-up Information     Follow up With Specialties Details Why Lazarus Coma, MD Internal Medicine In 3 days  [address]        [address] DEPT Emergency Medicine  If symptoms worsen [address]  [facility] [address]   [address] [address]  [phone]        3. Return to ED if worse     Diagnosis     Clinical Impression:   1.  Epistaxis        Attestations:    Lex French Vonnie Lyles MD    Please note that this dictation was completed with Oony, the computer voice recognition software. Quite often unanticipated grammatical, syntax, homophones, and other interpretive errors are inadvertently transcribed by the computer software. Please disregard these errors. Please excuse any errors that have escaped final proofreading. Thank you.

## 2022-04-02 NOTE — ED NOTES
Discharge instructions reviewed with patient and spouse. Verbalizes understanding. Left ED with all belongings.

## 2022-04-02 NOTE — ED NOTES
Nosebleed since yesterday. Currently controlled. Pt takes plavix and ASA. No acute distress. Denies any pain or discomfort. Spouse at bedside. Silver Nitrate placed at bedside with ENT supplies for provider.

## 2022-04-02 NOTE — DISCHARGE INSTRUCTIONS
Please use saline and Vaseline to keep your nose moist.  Follow-up with your primary care doctor. Return for worsening symptoms.

## 2022-07-25 ENCOUNTER — OFFICE VISIT (OUTPATIENT)
Dept: URGENT CARE | Age: 67
End: 2022-07-25
Payer: MEDICARE

## 2022-07-25 VITALS
DIASTOLIC BLOOD PRESSURE: 84 MMHG | RESPIRATION RATE: 16 BRPM | HEART RATE: 71 BPM | OXYGEN SATURATION: 97 % | SYSTOLIC BLOOD PRESSURE: 152 MMHG | TEMPERATURE: 98.1 F

## 2022-07-25 DIAGNOSIS — J01.90 ACUTE BACTERIAL SINUSITIS: Primary | ICD-10-CM

## 2022-07-25 DIAGNOSIS — B96.89 ACUTE BACTERIAL SINUSITIS: Primary | ICD-10-CM

## 2022-07-25 DIAGNOSIS — J40 WHEEZY BRONCHITIS: ICD-10-CM

## 2022-07-25 PROCEDURE — G8432 DEP SCR NOT DOC, RNG: HCPCS | Performed by: NURSE PRACTITIONER

## 2022-07-25 PROCEDURE — 1123F ACP DISCUSS/DSCN MKR DOCD: CPT | Performed by: NURSE PRACTITIONER

## 2022-07-25 PROCEDURE — 3017F COLORECTAL CA SCREEN DOC REV: CPT | Performed by: NURSE PRACTITIONER

## 2022-07-25 PROCEDURE — G8417 CALC BMI ABV UP PARAM F/U: HCPCS | Performed by: NURSE PRACTITIONER

## 2022-07-25 PROCEDURE — G8427 DOCREV CUR MEDS BY ELIG CLIN: HCPCS | Performed by: NURSE PRACTITIONER

## 2022-07-25 PROCEDURE — 99203 OFFICE O/P NEW LOW 30 MIN: CPT | Performed by: NURSE PRACTITIONER

## 2022-07-25 PROCEDURE — 1101F PT FALLS ASSESS-DOCD LE1/YR: CPT | Performed by: NURSE PRACTITIONER

## 2022-07-25 PROCEDURE — G8536 NO DOC ELDER MAL SCRN: HCPCS | Performed by: NURSE PRACTITIONER

## 2022-07-25 RX ORDER — DOXYCYCLINE 100 MG/1
100 TABLET ORAL 2 TIMES DAILY
Qty: 20 TABLET | Refills: 0 | Status: SHIPPED | OUTPATIENT
Start: 2022-07-25 | End: 2022-08-04

## 2022-07-25 RX ORDER — ALBUTEROL SULFATE 90 UG/1
AEROSOL, METERED RESPIRATORY (INHALATION)
Qty: 1 EACH | Refills: 0 | Status: SHIPPED | OUTPATIENT
Start: 2022-07-25 | End: 2022-10-06

## 2022-07-25 RX ORDER — PREDNISONE 5 MG/1
TABLET ORAL
Qty: 21 TABLET | Refills: 0 | Status: SHIPPED | OUTPATIENT
Start: 2022-07-25 | End: 2022-10-06

## 2022-07-25 NOTE — PROGRESS NOTES
Subjective: (As above and below)     The patient/guardian gave verbal consent to treat. Chief Complaint   Patient presents with    Cold Symptoms     Pt c/o sinus pain/pressure and runny nose for past 3 weeks. Nirmala Hernandez is a 69 y.o. male who presents for evaluation of : sinus infection. 3 weeks of sinus pain, thick nasal discharge, nasal congestion, post nasal drip and cough that is occasionally wheezy. He has tried OTC therapies without any relief. This is identical to sinus infection in past. Requesting steroid pack and albuterol inhaler as this has worked well in the past. He trino any fever, chills, chest pain, dizziness or SOB. ROS  Review of Systems - negative except as listed above    Reviewed PmHx, RxHx, FmHx, SocHx, AllgHx and updated in chart. Family History   Problem Relation Age of Onset    Cancer Brother         prostate     Heart Disease Brother         CABG x4     Heart Disease Father 45    High Cholesterol Father     Heart Disease Brother 48        CABG    Other Mother         DIVERTICULITIS    Heart Attack Son         AGE 44    No Known Problems Daughter     Anesth Problems Neg Hx        Past Medical History:   Diagnosis Date    Anxiety     Arthritis     OSTEO    Asthma     AS A YOUNGER ADULT    CAD (coronary artery disease)     stent X1    Cancer (HCC)     BCC, SCC    Cataract     right    Chronic pain     GERD (gastroesophageal reflux disease)     HTN (hypertension)     Hyperlipidemia     Kidney stone     Liver disease 2018    FATTY LIVER    Sleep apnea     WAS TOLD, BUT NEVER TESTED      Social History     Socioeconomic History    Marital status: LEGALLY    Tobacco Use    Smoking status: Former     Packs/day: 0.50     Types: Cigarettes     Quit date: 1998     Years since quittin.7    Smokeless tobacco: Never   Substance and Sexual Activity    Alcohol use:  Yes     Alcohol/week: 5.0 standard drinks     Types: 5 Cans of beer per week     Comment: occasionally    Drug use: Not Currently          Current Outpatient Medications   Medication Sig    doxycycline (ADOXA) 100 mg tablet Take 1 Tablet by mouth two (2) times a day for 10 days. predniSONE (STERAPRED) 5 mg dose pack See administration instruction per 5mg dose pack    albuterol (PROVENTIL HFA, VENTOLIN HFA, PROAIR HFA) 90 mcg/actuation inhaler 1-2 puffs every 4-6 hours as needed for wheeze/tight cough. ezetimibe (ZETIA) 10 mg tablet Take 10 mg by mouth daily. clopidogreL (PLAVIX) 75 mg tab Take 1 Tab by mouth daily. omeprazole (PRILOSEC) 40 mg capsule Take 40 mg by mouth daily. rosuvastatin (CRESTOR) 40 mg tablet Take 40 mg by mouth nightly. SAFETY-HIEN 3CC SYR 23GX1\" 3 mL 23 gauge x 1\" syrg INJECT 0.4ML INTRAMUSCULARLY ONCE EVERY 7 DAYS    Needle, Disp, 21 G 21 x 1 \" Ndle 1 mL by Does Not Apply route every seven (7) days. Syringe, Disposable, 1 mL Syrg 0.4 mL by Does Not Apply route every seven (7) days. aspirin delayed-release 81 mg tablet Take 81 mg by mouth daily. atenolol (TENORMIN) 25 mg tablet Take 25 mg by mouth daily. allopurinol (ZYLOPRIM) 300 mg tablet Take 450 mg by mouth daily. PATIENT TAKES A TOTAL OF 1 1/2 TABS PER DAY TO EQUAL 450 MG    lisinopril (PRINIVIL, ZESTRIL) 10 mg tablet Take 5 mg by mouth every evening. No current facility-administered medications for this visit. Objective:     Vitals:    07/25/22 1848   BP: (!) 152/84   Pulse: 71   Resp: 16   Temp: 98.1 °F (36.7 °C)   SpO2: 97%       Physical Exam  General appearance - appears well hydrated and does not appear toxic, no acute distress  Eyes - EOMs intact. Non injected. No scleral icterus   Ears - no external swelling. TMs normal bilat. Nose - nasal congestion. Thick mucus in nasal passages bilat  Mouth - OP clear without swelling, exudate or lesion. Mucus membranes moist. Uvula midline.   Neck/Lymphatics - trachea midline, full AROM, no LAD of neck  Chest - Normal breathing effort no wheeze rales, rhonchi or diminishments bilaterally. Heart - RRR, no murmurs  Skin - no observable rashes or pallor  Neurologic- alert and oriented x 3  Psychiatric- normal mood, behavior and though content. Assessment/ Plan:     1. Acute bacterial sinusitis    - doxycycline (ADOXA) 100 mg tablet; Take 1 Tablet by mouth two (2) times a day for 10 days. Dispense: 20 Tablet; Refill: 0    2. Wheezy bronchitis    - predniSONE (STERAPRED) 5 mg dose pack; See administration instruction per 5mg dose pack  Dispense: 21 Tablet; Refill: 0  - albuterol (PROVENTIL HFA, VENTOLIN HFA, PROAIR HFA) 90 mcg/actuation inhaler; 1-2 puffs every 4-6 hours as needed for wheeze/tight cough. Dispense: 1 Each; Refill: 0    Will treat for bacterial sinusitis given 3 weeks of sinus symptoms with worsening  Start doxycycline  Wheezy bronchitis- will treat with prednisone 5mg dose pack as this medication has worked well in past.  Albuterol refilled for patient      Follow up: Follow up immediately for any new, worsening or changes or if symptoms are not improving over the next 5-7 days.          Francis Palmer NP

## 2022-10-06 ENCOUNTER — OFFICE VISIT (OUTPATIENT)
Dept: PRIMARY CARE CLINIC | Age: 67
End: 2022-10-06
Payer: MEDICARE

## 2022-10-06 VITALS
RESPIRATION RATE: 18 BRPM | BODY MASS INDEX: 37.27 KG/M2 | HEIGHT: 71 IN | OXYGEN SATURATION: 95 % | TEMPERATURE: 97.5 F | SYSTOLIC BLOOD PRESSURE: 113 MMHG | WEIGHT: 266.2 LBS | DIASTOLIC BLOOD PRESSURE: 62 MMHG | HEART RATE: 62 BPM

## 2022-10-06 DIAGNOSIS — I10 ESSENTIAL HYPERTENSION: ICD-10-CM

## 2022-10-06 DIAGNOSIS — I25.810 CORONARY ARTERY DISEASE INVOLVING CORONARY BYPASS GRAFT OF NATIVE HEART WITHOUT ANGINA PECTORIS: ICD-10-CM

## 2022-10-06 DIAGNOSIS — I73.9 PAD (PERIPHERAL ARTERY DISEASE) (HCC): ICD-10-CM

## 2022-10-06 DIAGNOSIS — E66.01 SEVERE OBESITY (BMI 35.0-39.9) WITH COMORBIDITY (HCC): ICD-10-CM

## 2022-10-06 DIAGNOSIS — Z23 ENCOUNTER FOR IMMUNIZATION: Primary | ICD-10-CM

## 2022-10-06 DIAGNOSIS — Z12.11 ENCOUNTER FOR SCREENING COLONOSCOPY: ICD-10-CM

## 2022-10-06 DIAGNOSIS — L97.912 NON-PRESSURE CHRONIC ULCER OF RIGHT LOWER LEG, WITH FAT LAYER EXPOSED (HCC): ICD-10-CM

## 2022-10-06 LAB
ALBUMIN SERPL-MCNC: 3.6 G/DL (ref 3.5–5)
ALBUMIN/GLOB SERPL: 1.2 {RATIO} (ref 1.1–2.2)
ALP SERPL-CCNC: 78 U/L (ref 45–117)
ALT SERPL-CCNC: 41 U/L (ref 12–78)
ANION GAP SERPL CALC-SCNC: 6 MMOL/L (ref 5–15)
AST SERPL-CCNC: 46 U/L (ref 15–37)
BASOPHILS # BLD: 0.1 K/UL (ref 0–0.1)
BASOPHILS NFR BLD: 1 % (ref 0–1)
BILIRUB SERPL-MCNC: 0.6 MG/DL (ref 0.2–1)
BUN SERPL-MCNC: 14 MG/DL (ref 6–20)
BUN/CREAT SERPL: 14 (ref 12–20)
CALCIUM SERPL-MCNC: 8.8 MG/DL (ref 8.5–10.1)
CHLORIDE SERPL-SCNC: 100 MMOL/L (ref 97–108)
CHOLEST SERPL-MCNC: 101 MG/DL
CO2 SERPL-SCNC: 30 MMOL/L (ref 21–32)
CREAT SERPL-MCNC: 1.03 MG/DL (ref 0.7–1.3)
DIFFERENTIAL METHOD BLD: ABNORMAL
EOSINOPHIL # BLD: 0.4 K/UL (ref 0–0.4)
EOSINOPHIL NFR BLD: 4 % (ref 0–7)
ERYTHROCYTE [DISTWIDTH] IN BLOOD BY AUTOMATED COUNT: 15.4 % (ref 11.5–14.5)
GLOBULIN SER CALC-MCNC: 2.9 G/DL (ref 2–4)
GLUCOSE SERPL-MCNC: 104 MG/DL (ref 65–100)
HCT VFR BLD AUTO: 41.4 % (ref 36.6–50.3)
HDLC SERPL-MCNC: 32 MG/DL
HDLC SERPL: 3.2 {RATIO} (ref 0–5)
HGB BLD-MCNC: 13.3 G/DL (ref 12.1–17)
IMM GRANULOCYTES # BLD AUTO: 0.1 K/UL (ref 0–0.04)
IMM GRANULOCYTES NFR BLD AUTO: 1 % (ref 0–0.5)
LDLC SERPL CALC-MCNC: 47 MG/DL (ref 0–100)
LYMPHOCYTES # BLD: 3.7 K/UL (ref 0.8–3.5)
LYMPHOCYTES NFR BLD: 38 % (ref 12–49)
MCH RBC QN AUTO: 30 PG (ref 26–34)
MCHC RBC AUTO-ENTMCNC: 32.1 G/DL (ref 30–36.5)
MCV RBC AUTO: 93.5 FL (ref 80–99)
MONOCYTES # BLD: 0.7 K/UL (ref 0–1)
MONOCYTES NFR BLD: 8 % (ref 5–13)
NEUTS SEG # BLD: 4.9 K/UL (ref 1.8–8)
NEUTS SEG NFR BLD: 48 % (ref 32–75)
NRBC # BLD: 0 K/UL (ref 0–0.01)
NRBC BLD-RTO: 0 PER 100 WBC
PLATELET # BLD AUTO: 203 K/UL (ref 150–400)
PMV BLD AUTO: 10.2 FL (ref 8.9–12.9)
POTASSIUM SERPL-SCNC: 4.9 MMOL/L (ref 3.5–5.1)
PROT SERPL-MCNC: 6.5 G/DL (ref 6.4–8.2)
RBC # BLD AUTO: 4.43 M/UL (ref 4.1–5.7)
SODIUM SERPL-SCNC: 136 MMOL/L (ref 136–145)
TRIGL SERPL-MCNC: 110 MG/DL (ref ?–150)
VLDLC SERPL CALC-MCNC: 22 MG/DL
WBC # BLD AUTO: 9.9 K/UL (ref 4.1–11.1)

## 2022-10-06 PROCEDURE — G0008 ADMIN INFLUENZA VIRUS VAC: HCPCS | Performed by: FAMILY MEDICINE

## 2022-10-06 PROCEDURE — 99204 OFFICE O/P NEW MOD 45 MIN: CPT | Performed by: FAMILY MEDICINE

## 2022-10-06 PROCEDURE — 90694 VACC AIIV4 NO PRSRV 0.5ML IM: CPT | Performed by: FAMILY MEDICINE

## 2022-10-06 PROCEDURE — 1123F ACP DISCUSS/DSCN MKR DOCD: CPT | Performed by: FAMILY MEDICINE

## 2022-10-06 NOTE — PROGRESS NOTES
HPI     Chief Complaint   Patient presents with   [address]     He is a 69 y.o. male who presents to establish care. Pmhx : PAD, Hypogonadism, CAD, Lumbar spinal stenosis, Asthma, Chronic pain, Fatty liver disease, HTN, HLD. Sochx : past smoker, about 15 pack years. Drinks 2-3 beer per day, sometimes on weekends he drinks more. CAD, dx in 2018, triple bypass. Follows with cardio, Dr Reji Bird. HTN, HLD. Controlled on medication. RLE chronic wound due to an injury, onset 2020. Required hospitalization due to RLE infection and bacteremia. S/p excision and graft. Basal cell cancer excision right shoulder and LLE, follows with derm. Chronic back pain. Hx lumbar stenosis. Follows with ortho. Asthma, in childhood, no persistent problems. He declines a sleep study, though he is aware this has been recommended. Establishing Care  - Chronic medical problems:  Past Medical History:   Diagnosis Date    Anxiety     Arthritis     OSTEO    Asthma     AS A YOUNGER ADULT    CAD (coronary artery disease)     stent X1    Cancer ([facility] [address]     BCC, SCC    Cataract     right    Chronic pain     GERD (gastroesophageal reflux disease)     HTN (hypertension)     Hyperlipidemia     Kidney stone 1993    Liver disease 03/2018    FATTY LIVER    Sleep apnea     WAS TOLD, BUT NEVER TESTED     - Current medications:   Current Outpatient Medications   Medication Sig    predniSONE (STERAPRED) 5 mg dose pack See administration instruction per 5mg dose pack    albuterol (PROVENTIL HFA, VENTOLIN HFA, PROAIR HFA) 90 mcg/actuation inhaler 1-2 puffs every 4-6 hours as needed for wheeze/tight cough.  ezetimibe (ZETIA) 10 mg tablet Take 10 mg by mouth daily.  clopidogreL (PLAVIX) 75 mg tab Take 1 Tab by mouth daily.  omeprazole (PRILOSEC) 40 mg capsule Take 40 mg by mouth daily.  rosuvastatin (CRESTOR) 40 mg tablet Take 40 mg by mouth nightly.     SAFETY-HIEN 3CC SYR 23GX1\" 3 mL 23 gauge x 1\" syrg INJECT 0.4ML INTRAMUSCULARLY ONCE EVERY 7 DAYS    Needle, Disp, 21 G 21 x 1 \" Ndle 1 mL by Does Not Apply route every seven (7) days.  Syringe, Disposable, 1 mL Syrg 0.4 mL by Does Not Apply route every seven (7) days.  aspirin delayed-release 81 mg tablet Take 81 mg by mouth daily.  atenolol (TENORMIN) 25 mg tablet Take 25 mg by mouth daily.  allopurinol (ZYLOPRIM) 300 mg tablet Take 450 mg by mouth daily. PATIENT TAKES A TOTAL OF 1 1/2 TABS PER DAY TO EQUAL 450 MG    lisinopril (PRINIVIL, ZESTRIL) 10 mg tablet Take 5 mg by mouth every evening. No current facility-administered medications for this visit.      - Family history:   Family History   Problem Relation Age of Onset    Cancer Brother         prostate     Heart Disease Brother         CABG x4     Heart Disease Father 45    High Cholesterol Father     Heart Disease Brother 48        CABG    Other Mother         DIVERTICULITIS    Heart Attack Son         AGE 44    No Known Problems Daughter     Anesth Problems Neg Hx      - Allergies: No Known Allergies  - Surgical history:   Past Surgical History:   Procedure Laterality Date    HX COLONOSCOPY      HX ORTHOPAEDIC Left 2005    HIP REPLACEMENT    HX ORTHOPAEDIC Right 2009    HIP REPLACEMENT    IR INJ FORAMIN EPID LUMB ANES/STER SNGL  1/3/2020    NEUROLOGICAL PROCEDURE UNLISTED  1999    L1 or L3 LAMINECTOMY    NM CABG, ARTERY-VEIN, THREE  03/2018    NM CARDIAC SURG PROCEDURE UNLIST  1998 & 2018    CARDIAC CATH     - Social history (sexually active, occupation, smoker, etoh use, etc):   Social History     Socioeconomic History    Marital status: LEGALLY      Spouse name: Not on file    Number of children: Not on file    Years of education: Not on file    Highest education level: Not on file   Occupational History    Not on file   Tobacco Use    Smoking status: Former     Packs/day: 0.50     Types: Cigarettes     Quit date: 11/8/1998     Years since quitting: 23.9    Smokeless tobacco: Never   Substance and Sexual Activity    Alcohol use: Yes     Alcohol/week: 5.0 standard drinks     Types: 5 Cans of beer per week     Comment: occasionally    Drug use: Not Currently    Sexual activity: Not on file   Other Topics Concern     Service Not Asked    Blood Transfusions Not Asked    Caffeine Concern Not Asked    Occupational Exposure Not Asked    Hobby Hazards Not Asked    Sleep Concern Not Asked    Stress Concern Not Asked    Weight Concern Not Asked    Special Diet Not Asked    Back Care Not Asked    Exercise Not Asked    Bike Helmet Not Asked   [address] Not Asked    Self-Exams Not Asked   Social History Narrative    Not on file     Social Determinants of Health     Financial Resource Strain: Not on file   Food Insecurity: Not on file   Transportation Needs: Not on file   Physical Activity: Not on file   Stress: Not on file   Social Connections: Not on file   Intimate Partner Violence: Not on file   Housing Stability: Not on file         Review of Systems  Denies fever, chills, chest pain, shortness of breath, abd pain, nausea, vomiting    Reviewed PmHx, RxHx, FmHx, SocHx, AllgHx and updated and dated in the chart. Physical Exam:  There were no vitals taken for this visit. Physical Exam  Vitals reviewed. Constitutional:       Appearance: Normal appearance. HENT:      Head: Normocephalic and atraumatic. Eyes:      Conjunctiva/sclera: Conjunctivae normal.      Pupils: Pupils are equal, round, and reactive to light. Cardiovascular:      Rate and Rhythm: Normal rate and regular rhythm. Heart sounds: Normal heart sounds. Comments: Radial pulses intact. Bilat trace LE edema. Pulmonary:      Effort: Pulmonary effort is normal. No respiratory distress. Breath sounds: No wheezing, rhonchi or rales. Skin:     General: Skin is warm and dry. Comments: LLE with bandaged wound. Rle with healed wound scar.    Neurological: General: No focal deficit present. Mental Status: He is alert and oriented to person, place, and time. Assessment / Plan     Diagnoses and all orders for this visit:    1. Encounter for immunization  -     INFLUENZA, FLUAD, (AGE 65 Y+), IM, PF, 0.5 ML    2. Encounter for screening colonoscopy  -     COLOGUARD TEST (FECAL DNA COLORECTAL CANCER SCREENING)    3. Essential hypertension  -     CBC WITH AUTOMATED DIFF; Future    4. Coronary artery disease involving coronary bypass graft of native heart without angina pectoris  -     METABOLIC PANEL, COMPREHENSIVE; Future  -     LIPID PANEL; Future  -     CBC WITH AUTOMATED DIFF; Future    5. PAD (peripheral artery disease) ([facility] [address]    6. Severe obesity (BMI 35.0-39. 9) with comorbidity ([facility] [address]    7. Non-pressure chronic ulcer of right lower leg, with fat layer exposed ([facility] [address]  Follow up with specialists as scheduled. Labs and follow up on results as indicated. Advised to cut back on alcohol use; discussed potential harms of alcohol overuse. Advised gradual low stress cardiovascular exercise as tolerated, hh diet. Follow up 6 mos or sooner prn. Obtain medical records    I have discussed the diagnosis with the patient and the intended plan as seen in the above orders.    Harshal Wilson DO

## 2022-10-06 NOTE — PROGRESS NOTES
Chief Complaint   Patient presents with    Establish Care       3 most recent PHQ Screens 10/6/2022   Little interest or pleasure in doing things Not at all   Feeling down, depressed, irritable, or hopeless Not at all   Total Score PHQ 2 0     Abuse Screening Questionnaire 10/6/2022   Do you ever feel afraid of your partner? N   Are you in a relationship with someone who physically or mentally threatens you? N   Is it safe for you to go home? Y     Visit Vitals  /62 (BP 1 Location: Left upper arm, BP Patient Position: Sitting, BP Cuff Size: Small adult)   Pulse 62   Temp 97.5 °F (36.4 °C) (Temporal)   Resp 18   Ht 5' 11\" (1.803 m)   Wt 266 lb 3.2 oz (120.7 kg)   SpO2 95%   BMI 37.13 kg/m²     1. \"Have you been to the ER, urgent care clinic since your last visit? Hospitalized since your last visit? \" no    2. \"Have you seen or consulted any other health care providers outside of the [address] [place] [address] since your last visit? \" Dermatology     3.  For patients aged 39-70: Has the patient had a colonoscopy / FIT/ Cologuard? needed

## 2022-10-07 NOTE — PROGRESS NOTES
Results have been reviewed and abnormal results noted. Please see documentation in new EMR.  See letter

## 2022-11-09 ENCOUNTER — TELEPHONE (OUTPATIENT)
Dept: PRIMARY CARE CLINIC | Age: 67
End: 2022-11-09

## 2022-11-09 DIAGNOSIS — R19.5 POSITIVE COLORECTAL CANCER SCREENING USING COLOGUARD TEST: Primary | ICD-10-CM

## 2022-11-09 NOTE — TELEPHONE ENCOUNTER
I called and discussed positive results of cologuard with the patient. Strongly advised colonoscopy to evaluate for malignancy and provided referral info. Call if unable to schedule within the next 1 month. Patient voices understanding.

## 2022-11-10 ENCOUNTER — TELEPHONE (OUTPATIENT)
Dept: PRIMARY CARE CLINIC | Age: 67
End: 2022-11-10

## 2022-11-10 NOTE — TELEPHONE ENCOUNTER
Patient returning a call from yesterday regarding a colonoscopy. Would like to speak with Joshua Alejandra or clinical staff at our earliest opportunity.

## 2022-11-11 ENCOUNTER — TELEPHONE (OUTPATIENT)
Dept: PRIMARY CARE CLINIC | Age: 67
End: 2022-11-11

## 2022-11-11 NOTE — TELEPHONE ENCOUNTER
Don Prince from [employer] called and wanted to let Ruben Lopez know that the patient has a abnormal cologaurd test reading.      Don Prince- "[employer] lab [phone]  Case [id number]

## 2023-01-20 RX ORDER — CLOPIDOGREL BISULFATE 75 MG/1
75 TABLET ORAL DAILY
Qty: 30 TABLET | Refills: 3 | Status: SHIPPED | OUTPATIENT
Start: 2023-01-20

## 2023-02-14 ENCOUNTER — TRANSCRIBE ORDER (OUTPATIENT)
Dept: SCHEDULING | Age: 68
End: 2023-02-14

## 2023-02-14 DIAGNOSIS — G89.29 CHRONIC BILATERAL LOW BACK PAIN WITHOUT SCIATICA: ICD-10-CM

## 2023-02-14 DIAGNOSIS — M54.50 LUMBAR PAIN: Primary | ICD-10-CM

## 2023-02-14 DIAGNOSIS — M54.50 CHRONIC BILATERAL LOW BACK PAIN WITHOUT SCIATICA: ICD-10-CM

## 2023-02-14 DIAGNOSIS — M54.16 LUMBAR RADICULOPATHY: ICD-10-CM

## 2023-02-20 ENCOUNTER — TELEPHONE (OUTPATIENT)
Dept: PRIMARY CARE CLINIC | Age: 68
End: 2023-02-20

## 2023-02-20 ENCOUNTER — HOSPITAL ENCOUNTER (OUTPATIENT)
Age: 68
Setting detail: OUTPATIENT SURGERY
Discharge: HOME | End: 2023-02-20
Attending: INTERNAL MEDICINE | Admitting: INTERNAL MEDICINE
Payer: MEDICARE

## 2023-02-20 ENCOUNTER — ANESTHESIA EVENT (OUTPATIENT)
Dept: ENDOSCOPY | Age: 68
End: 2023-02-20
Payer: MEDICARE

## 2023-02-20 ENCOUNTER — ANESTHESIA (OUTPATIENT)
Dept: ENDOSCOPY | Age: 68
End: 2023-02-20
Payer: MEDICARE

## 2023-02-20 VITALS
BODY MASS INDEX: 35 KG/M2 | RESPIRATION RATE: 15 BRPM | OXYGEN SATURATION: 99 % | WEIGHT: 250 LBS | HEART RATE: 53 BPM | DIASTOLIC BLOOD PRESSURE: 53 MMHG | TEMPERATURE: 97.7 F | SYSTOLIC BLOOD PRESSURE: 101 MMHG | HEIGHT: 71 IN

## 2023-02-20 PROCEDURE — 74011250636 HC RX REV CODE- 250/636: Performed by: INTERNAL MEDICINE

## 2023-02-20 PROCEDURE — 76060000032 HC ANESTHESIA 0.5 TO 1 HR: Performed by: INTERNAL MEDICINE

## 2023-02-20 PROCEDURE — 76040000007: Performed by: INTERNAL MEDICINE

## 2023-02-20 PROCEDURE — 2709999900 HC NON-CHARGEABLE SUPPLY: Performed by: INTERNAL MEDICINE

## 2023-02-20 PROCEDURE — 77030013992 HC SNR POLYP ENDOSC BSC -B: Performed by: INTERNAL MEDICINE

## 2023-02-20 PROCEDURE — 74011000250 HC RX REV CODE- 250: Performed by: ANESTHESIOLOGY

## 2023-02-20 PROCEDURE — 88305 TISSUE EXAM BY PATHOLOGIST: CPT

## 2023-02-20 PROCEDURE — 74011250636 HC RX REV CODE- 250/636: Performed by: ANESTHESIOLOGY

## 2023-02-20 RX ORDER — MIDAZOLAM HYDROCHLORIDE 1 MG/ML
.25-5 INJECTION, SOLUTION INTRAMUSCULAR; INTRAVENOUS
Status: DISCONTINUED | OUTPATIENT
Start: 2023-02-20 | End: 2023-02-20 | Stop reason: HOSPADM

## 2023-02-20 RX ORDER — NALOXONE HYDROCHLORIDE 0.4 MG/ML
0.4 INJECTION, SOLUTION INTRAMUSCULAR; INTRAVENOUS; SUBCUTANEOUS
Status: DISCONTINUED | OUTPATIENT
Start: 2023-02-20 | End: 2023-02-20 | Stop reason: HOSPADM

## 2023-02-20 RX ORDER — DEXTROMETHORPHAN/PSEUDOEPHED 2.5-7.5/.8
1.2 DROPS ORAL
Status: DISCONTINUED | OUTPATIENT
Start: 2023-02-20 | End: 2023-02-20 | Stop reason: HOSPADM

## 2023-02-20 RX ORDER — SODIUM CHLORIDE 9 MG/ML
125 INJECTION, SOLUTION INTRAVENOUS CONTINUOUS
Status: DISCONTINUED | OUTPATIENT
Start: 2023-02-20 | End: 2023-02-20 | Stop reason: HOSPADM

## 2023-02-20 RX ORDER — EPINEPHRINE 0.1 MG/ML
1 INJECTION INTRACARDIAC; INTRAVENOUS
Status: DISCONTINUED | OUTPATIENT
Start: 2023-02-20 | End: 2023-02-20 | Stop reason: HOSPADM

## 2023-02-20 RX ORDER — PROPOFOL 10 MG/ML
INJECTION, EMULSION INTRAVENOUS AS NEEDED
Status: DISCONTINUED | OUTPATIENT
Start: 2023-02-20 | End: 2023-02-20 | Stop reason: HOSPADM

## 2023-02-20 RX ORDER — SODIUM CHLORIDE 9 MG/ML
50 INJECTION, SOLUTION INTRAVENOUS CONTINUOUS
Status: DISCONTINUED | OUTPATIENT
Start: 2023-02-20 | End: 2023-02-20 | Stop reason: HOSPADM

## 2023-02-20 RX ORDER — DIPHENHYDRAMINE HYDROCHLORIDE 50 MG/ML
50 INJECTION, SOLUTION INTRAMUSCULAR; INTRAVENOUS ONCE
Status: DISCONTINUED | OUTPATIENT
Start: 2023-02-20 | End: 2023-02-20 | Stop reason: HOSPADM

## 2023-02-20 RX ORDER — FLUMAZENIL 0.1 MG/ML
0.2 INJECTION INTRAVENOUS
Status: DISCONTINUED | OUTPATIENT
Start: 2023-02-20 | End: 2023-02-20 | Stop reason: HOSPADM

## 2023-02-20 RX ORDER — ATROPINE SULFATE 0.1 MG/ML
0.5 INJECTION INTRAVENOUS
Status: DISCONTINUED | OUTPATIENT
Start: 2023-02-20 | End: 2023-02-20 | Stop reason: HOSPADM

## 2023-02-20 RX ORDER — LIDOCAINE HYDROCHLORIDE 20 MG/ML
INJECTION, SOLUTION EPIDURAL; INFILTRATION; INTRACAUDAL; PERINEURAL AS NEEDED
Status: DISCONTINUED | OUTPATIENT
Start: 2023-02-20 | End: 2023-02-20 | Stop reason: HOSPADM

## 2023-02-20 RX ADMIN — SODIUM CHLORIDE 125 ML/HR: 0.9 INJECTION, SOLUTION INTRAVENOUS at 13:59

## 2023-02-20 RX ADMIN — LIDOCAINE HYDROCHLORIDE 40 MG: 20 INJECTION, SOLUTION EPIDURAL; INFILTRATION; INTRACAUDAL; PERINEURAL at 14:13

## 2023-02-20 RX ADMIN — PROPOFOL 300 MG: 10 INJECTION, EMULSION INTRAVENOUS at 14:34

## 2023-02-20 NOTE — PERIOP NOTES
Endoscopy Case End Note:    1431:  Procedure scope was pre-cleaned, per protocol, at bedside by Benedicto Rankin. 1435:  Report received from anesthesia - Dr. Rachel Rand DO. See anesthesia flowsheet for intra-procedure vital signs and events. 1436:  Dentures and glasses returned to patient.

## 2023-02-20 NOTE — DISCHARGE INSTRUCTIONS
Luba Rahman  [id number]  1955    It was my pleasure seeing you for your procedure. You will also receive a summary report with the findings from this procedure and any further recommendations. If you had polyps removed or biopsies taken during your procedure, you will receive a separate letter from me within the next 2 weeks. If you don't receive this letter or if you have any questions, please call my office [phone]. Please take note of the post procedure instructions listed below. Best Wishes,    Dr. Benito Talley    These instructions give you information on caring for yourself after your procedure. Call your doctor if you have any problems or questions after your procedure. HOME CARE  Walk if you have belly cramping or gas. Walking will help get rid of the air and reduce the bloated feeling in your belly (abdomen). Your IV site (where you received drugs) may be tender to touch. Place warm towels on the site; keep your arm up on two pillows if you have any swelling or soreness in the area. You may shower. ACTIVITY:  Take frequent rest periods and move at a slower pace for the next 24 hours. .  You may resume your regular activity tomorrow if you are feeling back to normal.  Do not drive or ride a bicycle for at least 24 hours (because of the medicine (anesthesia) used during the test). Do not sign any important legal documents or use or operate any machinery for 24 hours  Do not take sleeping medicines/nerve drugs for 24 hours unless the doctor tells you. You can return to work/school tomorrow unless otherwise instructed. NUTRITION:  Drink plenty of fluids to keep your pee (urine) clear or pale yellow  Begin with a light meal and progress to your normal diet. Heavy or fried foods are harder to digest and may make you feel sick to your stomach (nauseated).   Once you are feeling back to normal, you may resume your normal diet as instructed by your doctor. Avoid alcoholic beverages for 24 hours or as instructed. IF YOU HAD BIOPSIES TAKEN OR POLYPS REMOVED DURING THE PROCEDURE:  For the next 7 days, avoid all non-steroidal antiinflammatory medications such as Ibuprofen, Motrin, Advil, Alleve, Arti-seltzer, Goody's powder, BC powder. If you do not have an heart condition that requires you to take a daily aspirin, you should avoid taking aspirin for 7 days. Eat a soft diet for 24 hours. Monitor your stools for any blood or dark black, tar-like, stools as this may be a sign of bleeding and if you see any blood, notify your doctor immediately. GET HELP RIGHT AWAY AND SEEK IMMEDIATE MEDICAL CARE IF:  You have more than a spotting of blood in your stool. You pass clumps of tissue (blood clots) or fill the toilet with blood. Your belly is painfully swollen or puffy (abdominal distention). You throw up (vomit). You have a fever. You have redness, pain or swelling at the IV site that last greater than two days. You have abdominal pain or discomfort that is severe or gets worse throughout the day. Post-procedure diagnosis:  polyp, rectal nodule, hemorrhoids    Post-procedure recommendations: May resume Plavix tomorrow    Await pathology.   - Repeat colonoscopy based on path  - Recommend rectal EUS to evaluate the rectal submucosal lesion - will discuss  with Dr. Christel Ovalle    Patient Education on Sedation / Analgesia Administered for Procedure      For 24 hours after general anesthesia or intravenous analgesia / sedation:  Have someone responsible help you with your care  Limit your activities  Do not drive and operate hazardous machinery  Do not make important personal, legal or business decisions  Do not drink alcoholic beverages  If you have not urinated within 8 hours after discharge, please contact your physician  Resume your medications unless otherwise instructed    For 24 hours after general anesthesia or intravenous analgesia / sedation  you may experience:  Drowsiness, dizziness, sleepiness, or confusion  Difficulty remembering or delayed reaction times  Difficulty with your balance, especially while walking, move slowly and carefully, do not make sudden position changes  Difficulty focusing or blurred vision    You may not be aware of slight changes in your behavior and/or your reaction time because of the medication used during and after your procedure. Report the following to your physician:  Excessive pain, swelling, redness or odor of or around the surgical area  Temperature over 100.5  Nausea and vomiting lasting longer than 4 hours or if unable to take medications  Any signs of decreased circulation or nerve impairment to extremity: change in color, persistent numbness, tingling, coldness or increase pain  Any questions or concerns    IF YOU REPORT TO AN EMERGENCY ROOM, DOCTOR'S OFFICE OR HOSPITAL WITHIN 24 HOURS AFTER YOUR PROCEDURE, BRING THIS SHEET AND YOUR AFTER VISIT SUMMARY WITH YOU AND GIVE IT TO THE PHYSICIAN OR NURSE ATTENDING YOU. Colon Polyps: Care Instructions  Your Care Instructions     Colon polyps are growths in the colon or the rectum. The cause of most colon polyps is not known, and most people who get them do not have any problems. But a certain kind can turn into cancer. For this reason, regular testing for colon polyps is important for people as they get older. It is also important for anyone who has an increased risk for colon cancer. Polyps are usually found through routine colon cancer screening tests. Although most colon polyps are not cancerous, they are usually removed and then tested for cancer. Screening for colon cancer saves lives because the cancer can usually be cured if it is caught early. If you have a polyp that is the type that can turn into cancer, you may need more tests to examine your entire colon.  The doctor will remove any other polyps that are found, and you will be tested more often. Follow-up care is a key part of your treatment and safety. Be sure to make and go to all appointments, and call your doctor if you are having problems. It's also a good idea to know your test results and keep a list of the medicines you take. How can you care for yourself at home? Regular exams to look for colon polyps are the best way to prevent polyps from turning into colon cancer. These can include stool tests, sigmoidoscopy, colonoscopy, and CT colonography. Talk with your doctor about a testing schedule that is right for you. To prevent polyps  There is no home treatment that can prevent colon polyps. But these steps may help lower your risk for cancer. Stay active. Being active can help you get to and stay at a healthy weight. Try to exercise on most days of the week. Walking is a good choice. Eat well. Choose a variety of vegetables, fruits, legumes (such as peas and beans), fish, poultry, and whole grains. Do not smoke. If you need help quitting, talk to your doctor about stop-smoking programs and medicines. These can increase your chances of quitting for good. If you drink alcohol, limit how much you drink. Limit alcohol to 2 drinks a day for men and 1 drink a day for women. When should you call for help? Call your doctor now or seek immediate medical care if:    You have severe belly pain. Your stools are maroon or very bloody. Watch closely for changes in your health, and be sure to contact your doctor if:    You have a fever. You have nausea or vomiting. You have a change in bowel habits (new constipation or diarrhea). Your symptoms get worse or are not improving as expected. Where can you learn more? Go to http://www.[facility].com/  Enter C571 in the search box to learn more about \"Colon Polyps: Care Instructions. \"  Current as of: June 6, 2022               Content Version: 13.4  © 6206-6611 Healthwise, [facility].    Care instructions adapted under license by [employer] (which disclaims liability or warranty for this information). If you have questions about a medical condition or this instruction, always ask your healthcare professional. [address] any warranty or liability for your use of this information. Learning About Coronavirus [phone])  Coronavirus [phone]): Overview  What is coronavirus (COVID-19)? The coronavirus disease (COVID-19) is caused by a virus. It is an illness that was first found in [place], [place], in December 2019. It has since spread worldwide. The virus can cause fever, cough, and trouble breathing. In severe cases, it can cause pneumonia and make it hard to breathe without help. It can cause death. Coronaviruses are a large group of viruses. They cause the common cold. They also cause more serious illnesses like Middle East respiratory syndrome (MERS) and severe acute respiratory syndrome (SARS). COVID-19 is caused by a novel coronavirus. That means it's a new type that has not been seen in people before. This virus spreads person-to-person through droplets from coughing and sneezing. It can also spread when you are close to someone who is infected. And it can spread when you touch something that has the virus on it, such as a doorknob or a tabletop. What can you do to protect yourself from coronavirus (COVID-19)? The best way to protect yourself from getting sick is to: Avoid areas where there is an outbreak. Avoid contact with people who may be infected. Wash your hands often with soap or alcohol-based hand sanitizers. Avoid crowds and try to stay at least 6 feet away from other people. Wash your hands often, especially after you cough or sneeze. Use soap and water, and scrub for at least 20 seconds. If soap and water aren't available, use an alcohol-based hand . Avoid touching your mouth, nose, and eyes.   What can you do to avoid spreading the virus to others? To help avoid spreading the virus to others:  Cover your mouth with a tissue when you cough or sneeze. Then throw the tissue in the trash. Use a disinfectant to clean things that you touch often. Stay home if you are sick or have been exposed to the virus. Don't go to school, work, or public areas. And don't use public transportation. If you are sick:  Leave your home only if you need to get medical care. But call the doctor's office first so they know you're coming. And wear a face mask, if you have one. If you have a face mask, wear it whenever you're around other people. It can help stop the spread of the virus when you cough or sneeze. Clean and disinfect your home every day. Use household  and disinfectant wipes or sprays. Take special care to clean things that you grab with your hands. These include doorknobs, remote controls, phones, and handles on your refrigerator and microwave. And don't forget countertops, tabletops, bathrooms, and computer keyboards. When to call for help  Call 911 anytime you think you may need emergency care. For example, call if:  You have severe trouble breathing. (You can't talk at all.)  You have constant chest pain or pressure. You are severely dizzy or lightheaded. You are confused or can't think clearly. Your face and lips have a blue color. You pass out (lose consciousness) or are very hard to wake up. Call your doctor now if you develop symptoms such as:  Shortness of breath. Fever. Cough. If you need to get care, call ahead to the doctor's office for instructions before you go. Make sure you wear a face mask, if you have one, to prevent exposing other people to the virus. Where can you get the latest information? The following health organizations are tracking and studying this virus. Their websites contain the most up-to-date information. Dennis Barrett also learn what to do if you think you may have been exposed to the virus. [place]  [facility] for Disease Control and Prevention (CDC): The CDC provides updated news about the disease and travel advice. The website also tells you how to prevent the spread of infection. www.cdc.gov  World Health Organization [facility]): WHO offers information about the virus outbreaks. WHO also has travel advice. www.who.int  Current as of: April 1, 2020               Content Version: 12.4  © 8004-2280 Healthwise[employer]. Care instructions adapted under license by your healthcare professional. If you have questions about a medical condition or this instruction, always ask your healthcare professional. [address] any warranty or liability for your use of this information.

## 2023-02-20 NOTE — ANESTHESIA POSTPROCEDURE EVALUATION
Procedure(s):  COLONOSCOPY  ENDOSCOPIC POLYPECTOMY. total IV anesthesia    Anesthesia Post Evaluation        Patient location during evaluation: PACU  Note status: Adequate. Level of consciousness: responsive to verbal stimuli and sleepy but conscious  Pain management: satisfactory to patient  Airway patency: patent  Anesthetic complications: no  Cardiovascular status: acceptable  Respiratory status: acceptable  Hydration status: acceptable  Comments: +Post-Anesthesia Evaluation and Assessment    Patient: Nell Smoker MRN: [id number]  SSN: [id number]   YOB: 1955  Age: 77 y.o. Sex: male      Cardiovascular Function/Vital Signs    BP (!) 101/53   Pulse (!) 53   Temp 36.5 °C (97.7 °F)   Resp 15   Ht 5' 11\" (1.803 m)   Wt 113.4 kg (250 lb)   SpO2 99%   BMI 34.87 kg/m²     Patient is status post Procedure(s):  COLONOSCOPY  ENDOSCOPIC POLYPECTOMY. Nausea/Vomiting: Controlled. Postoperative hydration reviewed and adequate. Pain:  Pain Scale 1: Numeric (0 - 10) (02/20/23 1457)  Pain Intensity 1: 0 (02/20/23 1457)   Managed. Neurological Status: At baseline. Mental Status and Level of Consciousness: Arousable. Pulmonary Status:   O2 Device: None (Room air) (02/20/23 1457)   Adequate oxygenation and airway patent. Complications related to anesthesia: None    Post-anesthesia assessment completed. No concerns. Signed By: Yohana Aleman DO    2/20/2023  Post anesthesia nausea and vomiting:  controlled      INITIAL Post-op Vital signs:   Vitals Value Taken Time   /53 02/20/23 1457   Temp 36.5 °C (97.7 °F) 02/20/23 1445   Pulse 56 02/20/23 1458   Resp 12 02/20/23 1458   SpO2 98 % 02/20/23 1458   Vitals shown include unvalidated device data.

## 2023-02-20 NOTE — PROCEDURES
Colonoscopy Procedure Note    Indications:   See Preoperative Diagnosis above  Referring Physician: Npuur Sylvester DO  Anesthesia/Sedation: MAC anesthesia Propofol  Endoscopist:  Dr. Ankush Ford  Assistant:  Endoscopy Technician-1: Mirela Mendiola  Endoscopy RN-1: Sheryl Painter RN  Preoperative diagnosis: SCREENING COLONOSCOPY  POSITIVE COLOGUARD  Postoperative diagnosis: polyp    Procedure in Detail:  Informed consent was obtained for the procedure, including sedation. Risks of perforation, hemorrhage, adverse drug reaction, and aspiration were discussed. The patient was placed in the left lateral decubitus position. Based on the pre-procedure assessment, including review of the patient's medical history, medications, allergies, and review of systems, he had been deemed to be an appropriate candidate for moderate sedation; he was therefore sedated with the medications listed above. The patient was monitored continuously with ECG tracing, pulse oximetry, blood pressure monitoring, and direct observations. A rectal examination was performed. The [id number] was inserted into the rectum and advanced under direct vision to the terminal ileum. The quality of the colonic preparation was good BPS 2/2/2 6. A careful inspection was made as the colonoscope was withdrawn, including a retroflexed view of the rectum; findings and interventions are described below. Appropriate photodocumentation was obtained. Findings:  RODRICK: unremarkable  Rectum:   - small internal hemorrhoids on retroflexion  - One 20-30mm submucosal nodule in proximal rectum. Snare tip with possible pillow sign. Sigmoid: one 3mm sessile polyp removed with cold snare polypectomy, retrieved, minimal bleeding.  Otherwise unremarkable  Descending Colon: normal  no mucosal lesion appreciated  Transverse Colon: normal  no mucosal lesion appreciated  Ascending Colon: normal  no mucosal lesion appreciated  Cecum: normal  no mucosal lesion appreciated  Terminal Ileum: normal  no mucosal lesion appreciated    EBL: minimal    Complications: None; patient tolerated the procedure well. Recommendations:     - Await pathology.   - Repeat colonoscopy based on path  - Recommend rectal EUS to evaluate the rectal submucosal lesion - will discuss  with Dr. Jumana Ledesma  - discussed w/ Serjio Rogers      Signed By: Sacha Hay MD                        February 20, 2023

## 2023-02-20 NOTE — ROUTINE PROCESS
Topherkennedy Antoniogordon  1955  [id number]    Situation:  Verbal report received from: Magui Meier  Procedure: Procedure(s):  COLONOSCOPY  ENDOSCOPIC POLYPECTOMY    Background:    Preoperative diagnosis: SCREENING COLONOSCOPY  POSITIVE COLOGUARD  Postoperative diagnosis: polyp, rectal nodule, hemorrhoids    :  Dr. Sandeep Jhaveri  Assistant(s): Endoscopy Technician-1: Michelle Trujillo  Endoscopy RN-1: Pillo Santoyo RN    Specimens:   ID Type Source Tests Collected by Time Destination   1 : sigmoid colon polyp Preservative Sigmoid  Mariposa Smith MD 2/20/2023 1428 Pathology     H. Pylori  no    Assessment:  Intra-procedure medications   Anesthesia gave intra-procedure sedation and medications, see anesthesia flow sheet yes    Intravenous fluids: NS@ KVO     Vital signs stable     Abdominal assessment: round and soft     Recommendation:  Discharge patient per MD order.   Family or Friend  Permission to share finding with family or friend yes

## 2023-02-20 NOTE — ANESTHESIA PREPROCEDURE EVALUATION
Relevant Problems   No relevant active problems       Anesthetic History   No history of anesthetic complications            Review of Systems / Medical History  Patient summary reviewed, nursing notes reviewed and pertinent labs reviewed    Pulmonary        Sleep apnea: No treatment  Smoker  Asthma     Comments: Former Smoker   Neuro/Psych             Comments: Spinal stenosis, lumbar Cardiovascular    Hypertension          CAD, PAD, cardiac stents and hyperlipidemia    Exercise tolerance: >4 METS  Comments: Coronary stent x 1     GI/Hepatic/Renal     GERD    Renal disease: stones  Liver disease    Comments: FATTY LIVER Endo/Other        Obesity, arthritis and cancer     Other Findings   Comments: Hypogonadism  Hx Basal Cell Ca  Hx Squamous Cell Ca  Chronic pain           Physical Exam    Airway  Mallampati: III  TM Distance: > 6 cm  Neck ROM: normal range of motion   Mouth opening: Diminished (comment)     Cardiovascular  Regular rate and rhythm,  S1 and S2 normal,  no murmur, click, rub, or gallop             Dental    Dentition: Edentulous, Full lower dentures and Full upper dentures     Pulmonary  Breath sounds clear to auscultation               Abdominal  GI exam deferred       Other Findings            Anesthetic Plan    ASA: 3  Anesthesia type: general and total IV anesthesia          Induction: Intravenous  Anesthetic plan and risks discussed with: Patient      Propofol MAC

## 2023-02-20 NOTE — H&P
[facility]  Outpatient History and Physical    Patient: Topher Burnett    Physician: Shandra Barr MD    Vital Signs: Blood pressure (!) 109/58, pulse 60, resp. rate 15, height 5' 11\" (1.803 m), weight 113.4 kg (250 lb), SpO2 98 %. Allergies:   No Active Allergies    Chief Complaint: high risk screening colonoscopy positive cologuard;  PMH of HTN, shingles, ischemic heart disease on ASA & Plavix s/p CABG       History:  Past Medical History:   Diagnosis Date    Anxiety     Arthritis     OSTEO - all joints    Asthma     AS A YOUNGER ADULT    Basal cell carcinoma 2022    leg and right shoulder    CAD (coronary artery disease)     stent X1    Cancer (HCC)     BCC, SCC    Cataract     right    Chronic pain     GERD (gastroesophageal reflux disease)     HTN (hypertension)     Hyperlipidemia     Kidney stone     Liver disease 2018    FATTY LIVER      Past Surgical History:   Procedure Laterality Date    HX COLONOSCOPY      HX ORTHOPAEDIC Left 2005    HIP REPLACEMENT    HX ORTHOPAEDIC Right 2009    HIP REPLACEMENT    HX OTHER SURGICAL Right     debridement of infection in leg    IR INJ FORAMIN EPID LUMB ANES/STER SNGL  2020    SD CORONARY ARTERY BYP W/VEIN & ARTERY GRAFT 3 VEIN  2018    SD UNLISTED NEUROLOGICAL/NEUROMUSCULAR DX PX      L1 or L3 [address] & 2018    CARDIAC CATH      Social History     Socioeconomic History    Marital status: LEGALLY    Tobacco Use    Smoking status: Former     Packs/day: 0.50     Types: Cigarettes     Quit date: 1998     Years since quittin.3    Smokeless tobacco: Never   Vaping Use    Vaping Use: Never used   Substance and Sexual Activity    Alcohol use:  Yes     Alcohol/week: 21.0 standard drinks     Types: 21 Cans of beer per week     Comment: occasionally    Drug use: Not Currently     Social Determinants of Health     Financial Resource Strain: Low Risk     Difficulty of Paying Living Expenses: Not hard at all   Food Insecurity: No Food Insecurity    Worried About Running Out of Food in the Last Year: Never true    Ran Out of Food in the Last Year: Never true      Family History   Problem Relation Age of Onset    Cancer Brother         prostate     Heart Disease Brother         CABG x4     Heart Disease Father 45    High Cholesterol Father     Heart Disease Brother 48        CABG    Other Mother         DIVERTICULITIS    Heart Attack Son         AGE 44    No Known Problems Daughter     Anesth Problems Neg Hx        Medications:   Prior to Admission medications    Medication Sig Start Date End Date Taking? Authorizing Provider   clopidogreL (PLAVIX) 75 mg tab Take 1 Tablet by mouth daily. 1/20/23  Yes Tesha Remy, DO   ezetimibe (ZETIA) 10 mg tablet Take 10 mg by mouth daily. Yes Provider, Historical   omeprazole (PRILOSEC) 40 mg capsule Take 40 mg by mouth daily. Yes Provider, Historical   rosuvastatin (CRESTOR) 40 mg tablet Take 40 mg by mouth nightly. Yes Provider, Historical   aspirin delayed-release 81 mg tablet Take 81 mg by mouth daily. Yes Provider, Historical   atenolol (TENORMIN) 25 mg tablet Take 25 mg by mouth daily. Yes Provider, Historical   allopurinol (ZYLOPRIM) 300 mg tablet Take 450 mg by mouth daily. PATIENT TAKES A TOTAL OF 1 1/2 TABS PER DAY TO EQUAL 450 MG   Yes Provider, Historical   lisinopril (PRINIVIL, ZESTRIL) 10 mg tablet Take 5 mg by mouth every evening. Yes Provider, Historical       Physical Exam:   General: alert, no distress   HEENT: Head: Normocephalic, no lesions, without obvious abnormality.    Heart: regular rate and rhythm, S1, S2 normal, no murmur, click, rub or gallop   Lungs: chest clear, no wheezing, rales, normal symmetric air entry   Abdominal: obese, soft, nontender, nondistended   Neurological: Grossly normal   Extremities: extremities normal, atraumatic, no cyanosis or edema     Plan of Care/Planned Procedure: colonoscopy    The heart, lungs and mental status were satisfactory for the administration of MAC sedation and for the procedure. Informed consent was obtained for the procedure, including sedation. Risks of perforation, hemorrhage, adverse drug reaction, and aspiration were discussed. The risks, benefits and alternatives were again reiterated to the patient to include the risk of infection, bleeding, medication reaction, aspiration, perforation which could require immediate surgery, cardiopulmonary complication, issues with anesthesia and death. The patient was counseled at length about the risks of marc Covid-19 in the krystian-operative and post-operative states including the recovery window of their procedure. The patient was made aware that marc Covid-19 after a surgical procedure may worsen their prognosis for recovering from the virus and lend to a higher morbidity and or mortality risk. The patient was given the options of postponing their procedure. All of the risks, benefits, and alternatives were discussed. The patient does  wish to proceed with the procedure.

## 2023-03-28 ENCOUNTER — OFFICE VISIT (OUTPATIENT)
Dept: PRIMARY CARE CLINIC | Age: 68
End: 2023-03-28
Payer: MEDICARE

## 2023-03-28 VITALS
OXYGEN SATURATION: 98 % | SYSTOLIC BLOOD PRESSURE: 127 MMHG | WEIGHT: 261 LBS | TEMPERATURE: 97.8 F | RESPIRATION RATE: 18 BRPM | BODY MASS INDEX: 36.54 KG/M2 | HEIGHT: 71 IN | HEART RATE: 52 BPM | DIASTOLIC BLOOD PRESSURE: 75 MMHG

## 2023-03-28 DIAGNOSIS — R59.0 RETROPERITONEAL LYMPHADENOPATHY: Primary | ICD-10-CM

## 2023-03-28 PROCEDURE — G8417 CALC BMI ABV UP PARAM F/U: HCPCS | Performed by: FAMILY MEDICINE

## 2023-03-28 PROCEDURE — G8536 NO DOC ELDER MAL SCRN: HCPCS | Performed by: FAMILY MEDICINE

## 2023-03-28 PROCEDURE — 1101F PT FALLS ASSESS-DOCD LE1/YR: CPT | Performed by: FAMILY MEDICINE

## 2023-03-28 PROCEDURE — 3017F COLORECTAL CA SCREEN DOC REV: CPT | Performed by: FAMILY MEDICINE

## 2023-03-28 PROCEDURE — G8432 DEP SCR NOT DOC, RNG: HCPCS | Performed by: FAMILY MEDICINE

## 2023-03-28 PROCEDURE — 99213 OFFICE O/P EST LOW 20 MIN: CPT | Performed by: FAMILY MEDICINE

## 2023-03-28 PROCEDURE — 1123F ACP DISCUSS/DSCN MKR DOCD: CPT | Performed by: FAMILY MEDICINE

## 2023-03-28 PROCEDURE — G8427 DOCREV CUR MEDS BY ELIG CLIN: HCPCS | Performed by: FAMILY MEDICINE

## 2023-03-28 NOTE — PROGRESS NOTES
Chief Complaint   Patient presents with    New Order     CT from abnormal MRI     Visit Vitals  /75 (BP 1 Location: Left upper arm, BP Patient Position: Sitting, BP Cuff Size: Small adult)   Pulse (!) 52   Temp 97.8 °F (36.6 °C) (Temporal)   Resp 18   Ht 5' 11\" (1.803 m)   Wt 261 lb (118.4 kg)   SpO2 98%   BMI 36.40 kg/m²

## 2023-03-28 NOTE — PROGRESS NOTES
Subjective  Maegan Altman is an 77 y.o. male who presents for follow up. Pmhx : PAD, Hypogonadism, CAD, Lumbar spinal stenosis, Asthma, Chronic pain, Fatty liver disease, HTN, HLD. CAD, dx in 2018, triple bypass. Follows with cardio, Dr Wendie Leventhal. HTN, HLD. Controlled on medication. RLE chronic wound due to an injury, onset 2020. Required hospitalization due to RLE infection and bacteremia. S/p excision and graft. Chronic back pain. Severe DDD, lumbar stenosis. Follows with ortho. Recent lumbar MRI indicating retroperitoneal and right iliac lymphadenopathy. Denies change in BM. Denies  symptoms. Denies abnormal signs of bleeding. Denies fever or weight loss. Recent positive cologuard, colonoscopy remarkable for tubular adenoma     Allergies - reviewed:   No Active Allergies      Medications - reviewed:   Current Outpatient Medications   Medication Sig    clopidogreL (PLAVIX) 75 mg tab Take 1 Tablet by mouth daily. ezetimibe (ZETIA) 10 mg tablet Take 10 mg by mouth daily. omeprazole (PRILOSEC) 40 mg capsule Take 40 mg by mouth daily. rosuvastatin (CRESTOR) 40 mg tablet Take 40 mg by mouth nightly. aspirin delayed-release 81 mg tablet Take 81 mg by mouth daily. atenolol (TENORMIN) 25 mg tablet Take 25 mg by mouth daily. allopurinol (ZYLOPRIM) 300 mg tablet Take 450 mg by mouth daily. PATIENT TAKES A TOTAL OF 1 1/2 TABS PER DAY TO EQUAL 450 MG    lisinopril (PRINIVIL, ZESTRIL) 10 mg tablet Take 5 mg by mouth every evening. No current facility-administered medications for this visit.          Past Medical History - reviewed:  Past Medical History:   Diagnosis Date    Anxiety     Arthritis     OSTEO - all joints    Asthma     AS A YOUNGER ADULT    Basal cell carcinoma 09/01/2022    leg and right shoulder    CAD (coronary artery disease)     stent X1    Cancer (HCC)     BCC, SCC    Cataract     right    Chronic pain     GERD (gastroesophageal reflux disease)     HTN (hypertension)     Hyperlipidemia     Kidney stone 1993    Liver disease 2018    FATTY LIVER         Past Surgical History - reviewed:   Past Surgical History:   Procedure Laterality Date    COLONOSCOPY N/A 2023    COLONOSCOPY performed by Alie Alfaro MD at [facility] ENDOSCOPY    HX COLONOSCOPY      HX ORTHOPAEDIC Left 2005    HIP REPLACEMENT    HX ORTHOPAEDIC Right 2009    HIP REPLACEMENT    HX OTHER SURGICAL Right     debridement of infection in leg    IR INJ FORAMIN EPID LUMB ANES/STER SNGL  2020    [place] CORONARY ARTERY BYP W/VEIN & ARTERY GRAFT 3 VEIN  2018    NV UNLISTED NEUROLOGICAL/NEUROMUSCULAR DX PX  1999    L1 or L3 LAMINECTOMY    NV UNLISTED PROCEDURE CARDIAC SURGERY   &     CARDIAC CATH         Social History - reviewed:  Social History     Socioeconomic History    Marital status: LEGALLY      Spouse name: Not on file    Number of children: Not on file    Years of education: Not on file    Highest education level: Not on file   Occupational History    Not on file   Tobacco Use    Smoking status: Former     Packs/day: 0.50     Types: Cigarettes     Quit date: 1998     Years since quittin.4    Smokeless tobacco: Never   Vaping Use    Vaping Use: Never used   Substance and Sexual Activity    Alcohol use:  Yes     Alcohol/week: 21.0 standard drinks     Types: 21 Cans of beer per week     Comment: occasionally    Drug use: Not Currently    Sexual activity: Not on file   Other Topics Concern     Service Not Asked    Blood Transfusions Not Asked    Caffeine Concern Not Asked    Occupational Exposure Not Asked    Hobby Hazards Not Asked    Sleep Concern Not Asked    Stress Concern Not Asked    Weight Concern Not Asked    Special Diet Not Asked    Back Care Not Asked    Exercise Not Asked    Bike Helmet Not Asked    Seat Belt Not Asked    Self-Exams Not Asked   Social History Narrative    Not on file     Social Determinants of Health     Financial Resource Strain: Low Risk     Difficulty of Paying Living Expenses: Not hard at all   Food Insecurity: No Food Insecurity    Worried About Running Out of Food in the Last Year: Never true    Ran Out of Food in the Last Year: Never true   Transportation Needs: Not on file   Physical Activity: Not on file   Stress: Not on file   Social Connections: Not on file   Intimate Partner Violence: Not on file   Housing Stability: Not on file         Family History - reviewed:  Family History   Problem Relation Age of Onset    Cancer Brother         prostate     Heart Disease Brother         CABG x4     Heart Disease Father 45    High Cholesterol Father     Heart Disease Brother 48        CABG    Other Mother         DIVERTICULITIS    Heart Attack Son         AGE 44    No Known Problems Daughter     Anesth Problems Neg Hx          ROS  CONSTITUTIONAL: Denies fever, chills, unintentional weight loss. CARDIOVASCULAR: Denies chest pain, orthopnea, PND. RESPIRATORY: Denies cough, dyspnea, wheezing, hemoptysis. GI: Denies abdominal pain, diarrhea, constipation, diarrhea, black or bloody stool. : denies  symptoms. Physical Exam  Visit Vitals  /75 (BP 1 Location: Left upper arm, BP Patient Position: Sitting, BP Cuff Size: Small adult)   Pulse (!) 52   Temp 97.8 °F (36.6 °C) (Temporal)   Resp 18   Ht 5' 11\" (1.803 m)   Wt 261 lb (118.4 kg)   SpO2 98%   BMI 36.40 kg/m²     Physical Exam  Vitals reviewed. Constitutional:       Appearance: Normal appearance. HENT:      Head: Normocephalic and atraumatic. Cardiovascular:      Rate and Rhythm: Normal rate and regular rhythm. Pulses: Normal pulses. Heart sounds: Normal heart sounds. Pulmonary:      Effort: Pulmonary effort is normal.      Breath sounds: Normal breath sounds. Abdominal:      Palpations: Abdomen is soft. Tenderness: There is no abdominal tenderness. Comments: Central obesity. Musculoskeletal:      Comments: Bilat LE edema. Neurological:      Mental Status: He is alert. Assessment/Plan  Diagnoses and all orders for this visit:    1. Retroperitoneal lymphadenopathy  -     CT ABDOMEN W WO CONT AND PELVIS W CONT; Future                I have discussed the diagnosis with the patient and the intended plan as seen in the above orders. Patient verbalized understanding of the plan and agrees with the plan. Informed patient to return to the office if new symptoms arise.         Melody Tineo, DO

## 2023-04-03 ENCOUNTER — HOSPITAL ENCOUNTER (OUTPATIENT)
Dept: CT IMAGING | Age: 68
End: 2023-04-03
Attending: FAMILY MEDICINE
Payer: MEDICARE

## 2023-04-03 DIAGNOSIS — R59.0 RETROPERITONEAL LYMPHADENOPATHY: ICD-10-CM

## 2023-04-03 LAB — CREAT BLD-MCNC: 0.9 MG/DL (ref 0.6–1.3)

## 2023-04-03 PROCEDURE — 74177 CT ABD & PELVIS W/CONTRAST: CPT

## 2023-04-03 PROCEDURE — 74011000250 HC RX REV CODE- 250: Performed by: FAMILY MEDICINE

## 2023-04-03 PROCEDURE — 82565 ASSAY OF CREATININE: CPT

## 2023-04-03 PROCEDURE — 74011000636 HC RX REV CODE- 636: Performed by: FAMILY MEDICINE

## 2023-04-03 RX ORDER — BARIUM SULFATE 20 MG/ML
900 SUSPENSION ORAL ONCE
Status: COMPLETED | OUTPATIENT
Start: 2023-04-03 | End: 2023-04-03

## 2023-04-03 RX ADMIN — BARIUM SULFATE 900 ML: 20 SUSPENSION ORAL at 13:06

## 2023-04-03 RX ADMIN — IOPAMIDOL 100 ML: 755 INJECTION, SOLUTION INTRAVENOUS at 13:06

## 2023-04-17 DIAGNOSIS — E29.1 HYPOGONADISM IN MALE: ICD-10-CM

## 2023-04-17 DIAGNOSIS — R59.0 RETROPERITONEAL LYMPHADENOPATHY: Primary | ICD-10-CM

## 2023-04-17 DIAGNOSIS — Z12.5 SCREENING FOR MALIGNANT NEOPLASM OF PROSTATE: ICD-10-CM

## 2023-04-17 NOTE — PROGRESS NOTES
I called pt to follow up on imaging. He reports hx of hypogonadism, stopped testosterone supplements on his own but would like to restart this. Standing labs ordered. Follow up.

## 2023-04-18 DIAGNOSIS — R74.8 ELEVATED LIVER ENZYMES: Primary | ICD-10-CM

## 2023-04-18 DIAGNOSIS — Z12.5 SCREENING FOR MALIGNANT NEOPLASM OF PROSTATE: ICD-10-CM

## 2023-04-18 DIAGNOSIS — R59.0 RETROPERITONEAL LYMPHADENOPATHY: ICD-10-CM

## 2023-04-18 DIAGNOSIS — E29.1 HYPOGONADISM IN MALE: ICD-10-CM

## 2023-04-19 LAB
ALBUMIN SERPL-MCNC: 3.8 G/DL (ref 3.5–5)
ALBUMIN/GLOB SERPL: 1.4 (ref 1.1–2.2)
ALP SERPL-CCNC: 100 U/L (ref 45–117)
ALT SERPL-CCNC: 42 U/L (ref 12–78)
ANION GAP SERPL CALC-SCNC: 7 MMOL/L (ref 5–15)
AST SERPL-CCNC: 77 U/L (ref 15–37)
BASOPHILS # BLD: 0.1 K/UL (ref 0–0.1)
BASOPHILS NFR BLD: 1 % (ref 0–1)
BILIRUB SERPL-MCNC: 0.8 MG/DL (ref 0.2–1)
BUN SERPL-MCNC: 11 MG/DL (ref 6–20)
BUN/CREAT SERPL: 12 (ref 12–20)
CALCIUM SERPL-MCNC: 8.7 MG/DL (ref 8.5–10.1)
CHLORIDE SERPL-SCNC: 98 MMOL/L (ref 97–108)
CO2 SERPL-SCNC: 30 MMOL/L (ref 21–32)
CREAT SERPL-MCNC: 0.95 MG/DL (ref 0.7–1.3)
DIFFERENTIAL METHOD BLD: ABNORMAL
EOSINOPHIL # BLD: 0.2 K/UL (ref 0–0.4)
EOSINOPHIL NFR BLD: 3 % (ref 0–7)
ERYTHROCYTE [DISTWIDTH] IN BLOOD BY AUTOMATED COUNT: 14.2 % (ref 11.5–14.5)
GLOBULIN SER CALC-MCNC: 2.8 G/DL (ref 2–4)
GLUCOSE SERPL-MCNC: 108 MG/DL (ref 65–100)
HCT VFR BLD AUTO: 42.5 % (ref 36.6–50.3)
HGB BLD-MCNC: 13.6 G/DL (ref 12.1–17)
IMM GRANULOCYTES # BLD AUTO: 0.1 K/UL (ref 0–0.04)
IMM GRANULOCYTES NFR BLD AUTO: 1 % (ref 0–0.5)
LYMPHOCYTES # BLD: 3.5 K/UL (ref 0.8–3.5)
LYMPHOCYTES NFR BLD: 41 % (ref 12–49)
MCH RBC QN AUTO: 31.6 PG (ref 26–34)
MCHC RBC AUTO-ENTMCNC: 32 G/DL (ref 30–36.5)
MCV RBC AUTO: 98.8 FL (ref 80–99)
MONOCYTES # BLD: 0.5 K/UL (ref 0–1)
MONOCYTES NFR BLD: 5 % (ref 5–13)
NEUTS SEG # BLD: 4.2 K/UL (ref 1.8–8)
NEUTS SEG NFR BLD: 49 % (ref 32–75)
NRBC # BLD: 0 K/UL (ref 0–0.01)
NRBC BLD-RTO: 0 PER 100 WBC
PLATELET # BLD AUTO: 122 K/UL (ref 150–400)
PMV BLD AUTO: 10.2 FL (ref 8.9–12.9)
POTASSIUM SERPL-SCNC: 4 MMOL/L (ref 3.5–5.1)
PROT SERPL-MCNC: 6.6 G/DL (ref 6.4–8.2)
PSA SERPL-MCNC: 0.8 NG/ML (ref 0.01–4)
RBC # BLD AUTO: 4.3 M/UL (ref 4.1–5.7)
SODIUM SERPL-SCNC: 135 MMOL/L (ref 136–145)
WBC # BLD AUTO: 8.5 K/UL (ref 4.1–11.1)

## 2023-04-20 LAB
TESTOST FREE SERPL-MCNC: 2.9 PG/ML (ref 6.6–18.1)
TESTOST SERPL-MCNC: 53 NG/DL (ref 264–916)

## 2023-04-26 PROBLEM — R59.1 LYMPHADENOPATHY: Status: ACTIVE | Noted: 2023-04-26

## 2023-04-26 PROBLEM — D69.6 THROMBOCYTOPENIA (HCC): Status: ACTIVE | Noted: 2023-04-26

## 2023-04-26 NOTE — PROGRESS NOTES
[address] Yefri Copeland  W: [phone]  F: [phone]    Reason for Evaluation:   Leah Taylor is a 77 y.o. male with PMHx of hypogonadism, CAD, lumbar stenosis, fatty liver disease, chronic RLE wound, herniated disc who is seen in consultation at the request of Dr. Negro Blanco for evaluation of lymphadenopathy. Hematology/Oncology Treatment History:   4/3/23 CT A/P: right external iliac and common iliac chain lymphadenopathy. Prominent RP nodes not enlarged by size criteria. Mildly prominent periportal and gastrohepatic ligament lymph nodes. No splenomegaly. History of Present Illness:   Leah Taylor is a pleasant 77 y.o. male who presents today for evaluation of iliac and RP lymphadenopathy. Patient seen by PCP for routine follow up. He has history of chronic back pain and had MRI performed that showed RP and R iliac lymphadenopathy. Follow up CT scan on 4/3/23 with  right external iliac and common iliac chain lymphadenopathy. Prominent RP nodes not enlarged by size criteria. Mildly prominent periportal and gastrohepatic ligament lymph nodes. No splenomegaly. Labs from 4/18/23 showed normal WBC, normal hemoglobin, and mild thrombocytopenia (122). Patient reports that years ago he had \"knot\" that showed up in his abdomen. He had scan at that time lipoma. It has remained stable for years. No other lumps/bumps. Denies fevers, chills, night sweats, early satiety, abdominal pain or fullness, unintentional weight loss, recent infections. He has gained ~30 pounds since 2020 after the leg infection. Of note, hospitalized at [facility] in 2020 for bacteremia and MRSA LE wound infection. He now follows with the wound clinic. Last seen Jan 2022. He has a history of cutaneous skin cancer. Has surgery 6/13 for surgery for herniated disc. Social History:  Lives with his partner.  Presents with his daughter who works for [employer]. Former smoker, 0.5 ppd x 25 years. Quit 1998. Drinks 4 beers per day. Family History:  Brother - prostate cancer,    Review of systems was obtained and pertinent findings reviewed above. Past medical history, social history, family history, medications, and allergies are located in the electronic medical record. Physical Exam:   Visit Vitals  /70 (BP 1 Location: Left upper arm, BP Patient Position: Sitting)   Pulse (!) 56   Temp 97.5 °F (36.4 °C) (Temporal)   Resp 18   Ht 5' 11\" (1.803 m)   Wt 254 lb 11.2 oz (115.5 kg)   SpO2 98%   BMI 35.52 kg/m²     ECOG PS: 1  General: no distress, pleasant, uses cane to ambulate  Eyes: anicteric sclerae  HENT: oropharynx clear  Neck: supple  Lymphatic: no cervical, supraclavicular, axillary, or inguinal adenopathy  Respiratory: normal respiratory effort, CTAB  CV: RRR, no peripheral edema, well healed sternal scar  Ext: warm, no edema, chronic RLE wound, skin graft on L shin   GI: soft, nontender, nondistended, no masses  Skin: no rashes, no ecchymoses, no petechiae  Neuro: grossly intact    Results:     Lab Results   Component Value Date/Time    WBC 8.5 04/18/2023 11:54 AM    HGB 13.6 04/18/2023 11:54 AM    HCT 42.5 04/18/2023 11:54 AM    PLATELET 268 (L) 44/02/4925 11:54 AM    MCV 98.8 04/18/2023 11:54 AM    ABS.  NEUTROPHILS 4.2 04/18/2023 11:54 AM     Lab Results   Component Value Date/Time    Sodium 135 (L) 04/18/2023 11:54 AM    Potassium 4.0 04/18/2023 11:54 AM    Chloride 98 04/18/2023 11:54 AM    CO2 30 04/18/2023 11:54 AM    Glucose 108 (H) 04/18/2023 11:54 AM    BUN 11 04/18/2023 11:54 AM    Creatinine 0.95 04/18/2023 11:54 AM    GFR est AA 68 09/09/2021 11:43 AM    GFR est non-AA 59 (L) 09/09/2021 11:43 AM    Calcium 8.7 04/18/2023 11:54 AM    Glucose (POC) 130 (H) 01/25/2010 09:39 AM    Creatinine (POC) 0.90 04/03/2023 01:04 PM     Lab Results   Component Value Date/Time    Bilirubin, total 0.8 04/18/2023 11:54 AM    ALT (SGPT) 42 04/18/2023 11:54 AM    Alk. phosphatase 100 04/18/2023 11:54 AM    Protein, total 6.6 04/18/2023 11:54 AM    Albumin 3.8 04/18/2023 11:54 AM    Globulin 2.8 04/18/2023 11:54 AM     Records from [facility] reviewed and summarized above. Test results above have been reviewed. Assessment:     1) R external iliac and common iliac chain and borderline RP lymphadenopathy:  Incidentally detected on MRI for chronic back pain. Confirmed on follow-up CT A/P. Also has mild prominent periportal lymphadenopathy. Has mild thrombocytopenia but no leukocytosis, anemia, or lymphocytosis on differential.   No splenomegaly and no B symptoms. Given location of his true prominent lymph nodes, this could be reactive from chronic RLE wound but will screen for other causes of lymphadenopathy and pursue PET-CT characterize further.  - CBC with differential, CMP, LDH, uric acid, smear, HIV, EBV, gammopathy eval, ferritin  - PET-CT     2) Thrombocytopenia, mild:  Unclear cause. May be related to problem #1 above. Also has history of fatty liver disease  - Similar work up as above    #) Heavy EtOH use: has been working on cutting back.   - Advised to reuce EtOH use to <2 drinks per day     Signed By: Radha Ricks MD

## 2023-04-27 ENCOUNTER — OFFICE VISIT (OUTPATIENT)
Dept: ONCOLOGY | Age: 68
End: 2023-04-27
Payer: MEDICARE

## 2023-04-27 VITALS
DIASTOLIC BLOOD PRESSURE: 70 MMHG | TEMPERATURE: 97.5 F | WEIGHT: 254.7 LBS | HEIGHT: 71 IN | HEART RATE: 56 BPM | RESPIRATION RATE: 18 BRPM | BODY MASS INDEX: 35.66 KG/M2 | OXYGEN SATURATION: 98 % | SYSTOLIC BLOOD PRESSURE: 131 MMHG

## 2023-04-27 DIAGNOSIS — R93.5 ABNORMAL FINDINGS ON DIAGNOSTIC IMAGING OF OTHER ABDOMINAL REGIONS, INCLUDING RETROPERITONEUM: ICD-10-CM

## 2023-04-27 DIAGNOSIS — D69.6 THROMBOCYTOPENIA (HCC): ICD-10-CM

## 2023-04-27 DIAGNOSIS — R59.1 LYMPHADENOPATHY: Primary | ICD-10-CM

## 2023-04-27 PROCEDURE — 99204 OFFICE O/P NEW MOD 45 MIN: CPT | Performed by: INTERNAL MEDICINE

## 2023-04-27 PROCEDURE — G0463 HOSPITAL OUTPT CLINIC VISIT: HCPCS | Performed by: INTERNAL MEDICINE

## 2023-04-27 NOTE — PROGRESS NOTES
Leah Taylor is a 77 y.o. male    1. Have you been to the ER, urgent care clinic since your last visit? Hospitalized since your last visit? No    2. Have you seen or consulted any other health care providers outside of the [address] [place] [address] since your last visit? Include any pap smears or colon screening.  No

## 2023-05-02 ENCOUNTER — TRANSCRIBE ORDERS (OUTPATIENT)
Facility: HOSPITAL | Age: 68
End: 2023-05-02

## 2023-05-02 DIAGNOSIS — R59.1 LYMPHADENOPATHY: Primary | ICD-10-CM

## 2023-05-02 DIAGNOSIS — R93.5 ABNORMAL FINDINGS ON DIAGNOSTIC IMAGING OF OTHER ABDOMINAL REGIONS, INCLUDING RETROPERITONEUM: ICD-10-CM

## 2023-05-08 DIAGNOSIS — E78.2 MIXED HYPERLIPIDEMIA: Primary | ICD-10-CM

## 2023-05-08 RX ORDER — ROSUVASTATIN CALCIUM 40 MG/1
40 TABLET, COATED ORAL DAILY
Qty: 90 TABLET | Refills: 2 | Status: SHIPPED | OUTPATIENT
Start: 2023-05-08 | End: 2023-08-06

## 2023-05-22 ENCOUNTER — HOSPITAL ENCOUNTER (OUTPATIENT)
Facility: HOSPITAL | Age: 68
Discharge: HOME | End: 2023-05-25

## 2023-05-22 ENCOUNTER — HOSPITAL ENCOUNTER (OUTPATIENT)
Facility: HOSPITAL | Age: 68
Discharge: HOME | End: 2023-05-25
Payer: MEDICARE

## 2023-05-22 VITALS — BODY MASS INDEX: 35.43 KG/M2 | WEIGHT: 254 LBS

## 2023-05-22 DIAGNOSIS — R59.1 LYMPHADENOPATHY: ICD-10-CM

## 2023-05-22 DIAGNOSIS — R93.5 ABNORMAL FINDINGS ON DIAGNOSTIC IMAGING OF OTHER ABDOMINAL REGIONS, INCLUDING RETROPERITONEUM: ICD-10-CM

## 2023-05-22 LAB
GLUCOSE BLD STRIP.AUTO-MCNC: 102 MG/DL (ref 65–117)
SERVICE CMNT-IMP: NORMAL

## 2023-05-22 PROCEDURE — 82962 GLUCOSE BLOOD TEST: CPT

## 2023-05-22 PROCEDURE — 3430000000 HC RX DIAGNOSTIC RADIOPHARMACEUTICAL: Performed by: INTERNAL MEDICINE

## 2023-05-22 PROCEDURE — A9552 F18 FDG: HCPCS | Performed by: INTERNAL MEDICINE

## 2023-05-22 PROCEDURE — 78815 PET IMAGE W/CT SKULL-THIGH: CPT

## 2023-05-22 RX ORDER — FLUDEOXYGLUCOSE F-18 500 MCI/ML
10 INJECTION INTRAVENOUS
Status: COMPLETED | OUTPATIENT
Start: 2023-05-22 | End: 2023-05-22

## 2023-05-22 RX ADMIN — FLUDEOXYGLUCOSE F-18 10 MILLICURIE: 500 INJECTION INTRAVENOUS at 13:03

## 2023-05-24 RX ORDER — OMEPRAZOLE 40 MG/1
40 CAPSULE, DELAYED RELEASE ORAL DAILY
Qty: 30 CAPSULE | Refills: 2 | Status: SHIPPED | OUTPATIENT
Start: 2023-05-24

## 2023-06-01 ENCOUNTER — TELEPHONE (OUTPATIENT)
Age: 68
End: 2023-06-01

## 2023-06-01 NOTE — TELEPHONE ENCOUNTER
Called patient left vm advising moving appointment from tomorrow to Wednesday. Advised of temp appointment date and time. Provided c/b # to office if any changes need to be made. Outpatient follow up

## 2023-06-05 ENCOUNTER — OFFICE VISIT (OUTPATIENT)
Dept: PRIMARY CARE CLINIC | Facility: CLINIC | Age: 68
End: 2023-06-05
Payer: MEDICARE

## 2023-06-05 VITALS
OXYGEN SATURATION: 98 % | RESPIRATION RATE: 16 BRPM | SYSTOLIC BLOOD PRESSURE: 144 MMHG | TEMPERATURE: 98.4 F | BODY MASS INDEX: 35.78 KG/M2 | WEIGHT: 255.6 LBS | DIASTOLIC BLOOD PRESSURE: 79 MMHG | HEART RATE: 62 BPM | HEIGHT: 71 IN

## 2023-06-05 DIAGNOSIS — L97.912 NON-PRESSURE CHRONIC ULCER OF UNSPECIFIED PART OF RIGHT LOWER LEG WITH FAT LAYER EXPOSED (HCC): ICD-10-CM

## 2023-06-05 DIAGNOSIS — E66.01 SEVERE OBESITY (BMI 35.0-39.9) WITH COMORBIDITY (HCC): ICD-10-CM

## 2023-06-05 DIAGNOSIS — E78.2 MIXED HYPERLIPIDEMIA: Primary | ICD-10-CM

## 2023-06-05 DIAGNOSIS — I10 ESSENTIAL (PRIMARY) HYPERTENSION: ICD-10-CM

## 2023-06-05 DIAGNOSIS — I73.9 PERIPHERAL VASCULAR DISEASE, UNSPECIFIED (HCC): ICD-10-CM

## 2023-06-05 DIAGNOSIS — I25.810 ATHEROSCLEROSIS OF CORONARY ARTERY BYPASS GRAFT OF NATIVE HEART WITHOUT ANGINA PECTORIS: ICD-10-CM

## 2023-06-05 PROCEDURE — 99214 OFFICE O/P EST MOD 30 MIN: CPT | Performed by: FAMILY MEDICINE

## 2023-06-05 PROCEDURE — G8419 CALC BMI OUT NRM PARAM NOF/U: HCPCS | Performed by: FAMILY MEDICINE

## 2023-06-05 PROCEDURE — 3017F COLORECTAL CA SCREEN DOC REV: CPT | Performed by: FAMILY MEDICINE

## 2023-06-05 PROCEDURE — 3078F DIAST BP <80 MM HG: CPT | Performed by: FAMILY MEDICINE

## 2023-06-05 PROCEDURE — 1123F ACP DISCUSS/DSCN MKR DOCD: CPT | Performed by: FAMILY MEDICINE

## 2023-06-05 PROCEDURE — 4004F PT TOBACCO SCREEN RCVD TLK: CPT | Performed by: FAMILY MEDICINE

## 2023-06-05 PROCEDURE — G8427 DOCREV CUR MEDS BY ELIG CLIN: HCPCS | Performed by: FAMILY MEDICINE

## 2023-06-05 PROCEDURE — 3077F SYST BP >= 140 MM HG: CPT | Performed by: FAMILY MEDICINE

## 2023-06-05 SDOH — ECONOMIC STABILITY: HOUSING INSECURITY
IN THE LAST 12 MONTHS, WAS THERE A TIME WHEN YOU DID NOT HAVE A STEADY PLACE TO SLEEP OR SLEPT IN A SHELTER (INCLUDING NOW)?: NO

## 2023-06-05 SDOH — ECONOMIC STABILITY: FOOD INSECURITY: WITHIN THE PAST 12 MONTHS, YOU WORRIED THAT YOUR FOOD WOULD RUN OUT BEFORE YOU GOT MONEY TO BUY MORE.: NEVER TRUE

## 2023-06-05 SDOH — ECONOMIC STABILITY: INCOME INSECURITY: HOW HARD IS IT FOR YOU TO PAY FOR THE VERY BASICS LIKE FOOD, HOUSING, MEDICAL CARE, AND HEATING?: NOT HARD AT ALL

## 2023-06-05 SDOH — ECONOMIC STABILITY: FOOD INSECURITY: WITHIN THE PAST 12 MONTHS, THE FOOD YOU BOUGHT JUST DIDN'T LAST AND YOU DIDN'T HAVE MONEY TO GET MORE.: NEVER TRUE

## 2023-06-05 NOTE — PROGRESS NOTES
Preoperative Evaluation    Date of Exam: 2023    Malena Santiago is a 77 y.o. male (:1955) who presents for preoperative evaluation. Pmhx : CAD, PAD, HTN, HLD, Hypogonadism, Lumbar spinal stenosis, Asthma, Chronic pain, Fatty liver disease. CAD, dx in 2018, triple bypass. Follows with cardio, Dr Raymond Shelton. Denies recent chest pain or dyspnea. HTN, HLD. Controlled on medication. RLE chronic wound due to an injury, onset . Required hospitalization due to RLE infection and bacteremia. S/p excision and graft. Chronic back pain. Severe DDD, lumbar stenosis. Follows with ortho. Recent lumbar MRI indicating retroperitoneal and right iliac lymphadenopathy. Workup per hematology unremarkable; recent PET scan unremarkable. Procedure/Surgery: ALIF L3-S1 posterior L2 laminectomy decompression with percutaneous fusion L2-S1    Date of Procedure/Surgery: 23    Surgeon: Dr Rubio Tapia: [facility]    Primary Physician: Mian Garcia DO    Latex Allergy: no      Anesthesia Complications: None    History of abnormal bleeding : None    He is able to climb a flight of stairs without chest pain or severe SOB      Allergies- reviewed:   No Known Allergies      Medications- reviewed:   Current Outpatient Medications   Medication Sig    omeprazole (PRILOSEC) 40 MG delayed release capsule Take 1 capsule by mouth daily    rosuvastatin (CRESTOR) 40 MG tablet Take 1 tablet by mouth daily    allopurinol (ZYLOPRIM) 300 MG tablet Take 450 mg by mouth daily    aspirin 81 MG EC tablet Take 81 mg by mouth daily    atenolol (TENORMIN) 25 MG tablet Take 25 mg by mouth daily    clopidogrel (PLAVIX) 75 MG tablet Take 75 mg by mouth daily    ezetimibe (ZETIA) 10 MG tablet Take 10 mg by mouth daily    lisinopril (PRINIVIL;ZESTRIL) 10 MG tablet Take 5 mg by mouth every evening     No current facility-administered medications for this visit.          Past

## 2023-06-05 NOTE — PROGRESS NOTES
Chief Complaint   Patient presents with    Pre-op Exam     06/13/23 spinal fusion     Vitals:    06/05/23 1045   BP: (!) 144/79   Pulse: 62   Resp: 16   Temp: 98.4 °F (36.9 °C)   SpO2: 98%

## 2023-06-06 PROBLEM — R59.0: Status: ACTIVE | Noted: 2023-06-06

## 2023-06-06 PROBLEM — D69.6 THROMBOCYTOPENIA (HCC): Status: RESOLVED | Noted: 2023-04-26 | Resolved: 2023-06-06

## 2023-06-07 ENCOUNTER — OFFICE VISIT (OUTPATIENT)
Age: 68
End: 2023-06-07
Payer: MEDICARE

## 2023-06-07 VITALS
OXYGEN SATURATION: 93 % | TEMPERATURE: 98.2 F | RESPIRATION RATE: 20 BRPM | SYSTOLIC BLOOD PRESSURE: 101 MMHG | WEIGHT: 255.5 LBS | HEART RATE: 62 BPM | HEIGHT: 71 IN | DIASTOLIC BLOOD PRESSURE: 61 MMHG | BODY MASS INDEX: 35.77 KG/M2

## 2023-06-07 DIAGNOSIS — D69.6 THROMBOCYTOPENIA (HCC): ICD-10-CM

## 2023-06-07 DIAGNOSIS — R59.0 EXTERNAL ILIAC LYMPHADENOPATHY: Primary | ICD-10-CM

## 2023-06-07 DIAGNOSIS — F10.90 HEAVY ALCOHOL USE: ICD-10-CM

## 2023-06-07 PROCEDURE — 99213 OFFICE O/P EST LOW 20 MIN: CPT | Performed by: INTERNAL MEDICINE

## 2023-06-07 PROCEDURE — 1123F ACP DISCUSS/DSCN MKR DOCD: CPT | Performed by: INTERNAL MEDICINE

## 2023-06-07 PROCEDURE — 1036F TOBACCO NON-USER: CPT | Performed by: INTERNAL MEDICINE

## 2023-06-07 PROCEDURE — 3017F COLORECTAL CA SCREEN DOC REV: CPT | Performed by: INTERNAL MEDICINE

## 2023-06-07 PROCEDURE — G8417 CALC BMI ABV UP PARAM F/U: HCPCS | Performed by: INTERNAL MEDICINE

## 2023-06-07 PROCEDURE — G8427 DOCREV CUR MEDS BY ELIG CLIN: HCPCS | Performed by: INTERNAL MEDICINE

## 2023-06-07 NOTE — PROGRESS NOTES
Howard Bingham is a 77 y.o. male  Chief Complaint   Patient presents with    Follow-up     R external iliac and common iliac chain and borderline RP lymphadenopathy    Thrombocytopenia     1. Have you been to the ER, urgent care clinic since your last visit? Hospitalized since your last visit? No    2. Have you seen or consulted any other health care providers outside of the [address] [place] [address] since your last visit? Include any pap smears or colon screening.  Yes Dr. Yariel Austin at [facility] - cardiology and back surgery 6/13/23 with Dr. Swathi Sanchez and cardiac surgeon Dr. Ana Luisa Stephenson will sit in on surgery
By: Kierra Jovel MD    June 7, 2023        Interval History:    4/3/23 CT A/P: right external iliac and common iliac chain lymphadenopathy. Prominent RP nodes not enlarged by size criteria. Mildly prominent periportal and gastrohepatic ligament lymph nodes. No splenomegaly. 5/2/23 PET-CT: no foci of abnormal hypermetabolism. Pelvic/RP LAD is not FDG avid. This does not exclude low-grade prostate cancer or other neoplasia. 5/2/23 labs: WBC 7.9, H/H 12.9/40/9, , platelets 374, normal differential.  EBV IgG positive, IgM negative. ESR 22 (nl 0-20), LDH wnl, ferritin 120, HIV non-reactive, HCV non-reactive, LFTs wnl (AST minimally elevated)  5/2/23 smear: mildly macrocytic red blood cells without evidence of anemia or increase in schistocytes. White blood cells and platelets unremarkable in number and morphology. White blood cells show an unremarkable differential comprised of mature granulocytes, small mature lymphocytes, reactive lymphocytes, monocytes, and eosinophils. Definitive atypical lymphocytes and blasts are not seen. Of note, patient was hospitalized at [facility] in 2020 for bacteremia and MRSA LE wound infection. He  has chronic wound in [facility] and was followed at wound clinic, now discharged from their care. Today, he reports he feels the same. Denies LUTS symptoms, new lumps/bumps, fevers, chills, night sweats. Medications reviewed in the EMR. Allergies   Allergen Reactions    Erythromycin Other (See Comments)     GI upset      Social History: Lives with his partner. Presents with his daughter who works for [employer]. Former smoker, 0.5 ppd x 25 years. Quit 1998. Drinks 4 beers per day. Family History:  Brother - prostate cancer    Review of Systems: A 6-point review of systems was obtained, negative except as reviewed in the HPI.     Physical Exam:   /61 (Site: Left Upper Arm, Position: Sitting)   Pulse 62   Temp 98.2 °F (36.8 °C) (Temporal)   Resp

## 2023-06-26 ENCOUNTER — TELEPHONE (OUTPATIENT)
Dept: PRIMARY CARE CLINIC | Facility: CLINIC | Age: 68
End: 2023-06-26

## 2023-06-26 NOTE — TELEPHONE ENCOUNTER
Rowan Alpha with [facility] called stating that the patient will go to a facility for Rehab and wanted to know if Dr. Jarrett Edmond will follow the patient. Please call Cam Wolf at #[phone]. Cam Wolf said if you receive her voicemail because she's on the other line, she would like if you could leave a msg.

## 2023-06-27 NOTE — TELEPHONE ENCOUNTER
Jeovanny Dobson called back, said the patient will be discharged from his facility on 6/29 and will need a doctor willing to follow for home care. Jeovanny Dobson would like a call back to discuss further with clinical staff.

## 2023-06-28 NOTE — TELEPHONE ENCOUNTER
Spoke with Roya Richards and relayed that Dr Hailey Love will discuss his care when he schedules an appointment to see her. She said she would let the patient know.

## 2023-06-29 ENCOUNTER — TELEPHONE (OUTPATIENT)
Dept: PRIMARY CARE CLINIC | Facility: CLINIC | Age: 68
End: 2023-06-29

## 2023-07-03 DIAGNOSIS — I10 ESSENTIAL (PRIMARY) HYPERTENSION: Primary | ICD-10-CM

## 2023-07-05 RX ORDER — LISINOPRIL 10 MG/1
5 TABLET ORAL EVERY EVENING
Qty: 90 TABLET | Refills: 1 | Status: SHIPPED | OUTPATIENT
Start: 2023-07-05

## 2023-07-06 RX ORDER — ALLOPURINOL 300 MG/1
450 TABLET ORAL DAILY
Qty: 135 TABLET | Refills: 1 | Status: SHIPPED | OUTPATIENT
Start: 2023-07-06

## 2023-07-06 NOTE — TELEPHONE ENCOUNTER
Patient needs a refill on Allopurinol sent to [facility]    Requesting 90 days on all of his meds. He pays out of pocket for medicine and it's cheaper to get them for three months.

## 2023-07-11 ENCOUNTER — OFFICE VISIT (OUTPATIENT)
Dept: PRIMARY CARE CLINIC | Facility: CLINIC | Age: 68
End: 2023-07-11
Payer: MEDICARE

## 2023-07-11 VITALS
TEMPERATURE: 97.3 F | RESPIRATION RATE: 17 BRPM | SYSTOLIC BLOOD PRESSURE: 103 MMHG | HEIGHT: 71 IN | DIASTOLIC BLOOD PRESSURE: 61 MMHG | OXYGEN SATURATION: 99 % | HEART RATE: 53 BPM | BODY MASS INDEX: 33.6 KG/M2 | WEIGHT: 240 LBS

## 2023-07-11 DIAGNOSIS — I10 ESSENTIAL (PRIMARY) HYPERTENSION: Primary | ICD-10-CM

## 2023-07-11 DIAGNOSIS — I25.810 ATHEROSCLEROSIS OF CORONARY ARTERY BYPASS GRAFT OF NATIVE HEART WITHOUT ANGINA PECTORIS: ICD-10-CM

## 2023-07-11 DIAGNOSIS — E78.2 MIXED HYPERLIPIDEMIA: ICD-10-CM

## 2023-07-11 PROCEDURE — 3078F DIAST BP <80 MM HG: CPT | Performed by: FAMILY MEDICINE

## 2023-07-11 PROCEDURE — G8427 DOCREV CUR MEDS BY ELIG CLIN: HCPCS | Performed by: FAMILY MEDICINE

## 2023-07-11 PROCEDURE — G8417 CALC BMI ABV UP PARAM F/U: HCPCS | Performed by: FAMILY MEDICINE

## 2023-07-11 PROCEDURE — 1123F ACP DISCUSS/DSCN MKR DOCD: CPT | Performed by: FAMILY MEDICINE

## 2023-07-11 PROCEDURE — 1036F TOBACCO NON-USER: CPT | Performed by: FAMILY MEDICINE

## 2023-07-11 PROCEDURE — 3074F SYST BP LT 130 MM HG: CPT | Performed by: FAMILY MEDICINE

## 2023-07-11 PROCEDURE — 99214 OFFICE O/P EST MOD 30 MIN: CPT | Performed by: FAMILY MEDICINE

## 2023-07-11 PROCEDURE — 3017F COLORECTAL CA SCREEN DOC REV: CPT | Performed by: FAMILY MEDICINE

## 2023-07-11 ASSESSMENT — PATIENT HEALTH QUESTIONNAIRE - PHQ9
SUM OF ALL RESPONSES TO PHQ QUESTIONS 1-9: 0
2. FEELING DOWN, DEPRESSED OR HOPELESS: 0
SUM OF ALL RESPONSES TO PHQ9 QUESTIONS 1 & 2: 0
SUM OF ALL RESPONSES TO PHQ QUESTIONS 1-9: 0
1. LITTLE INTEREST OR PLEASURE IN DOING THINGS: 0

## 2023-07-11 NOTE — PROGRESS NOTES
1. \"Have you been to the ER, urgent care clinic since your last visit? Hospitalized since your last visit? \" [facility]    2. \"Have you seen or consulted any other health care providers outside of the [address] [place] [address] since your last visit? \" Yes [facility]      3. For patients aged 43-73: Has the patient had a colonoscopy / FIT/ Cologuard? Yes - no Care Gap present      If the patient is female:    4. For patients aged 43-66: Has the patient had a mammogram within the past 2 years? NA - based on age or sex      11. For patients aged 21-65: Has the patient had a pap smear?  NA - based on age or sex
exercise per PT and ortho. He may consider weight watchers. Cont current care. Follow up with cardio as scheduled. I have discussed the diagnosis with the patient and the intended plan as seen in the above orders. Patient verbalized understanding of the plan and agrees with the plan. I have discussed medication side effects and warnings with the patient as well. Informed patient to return to the office if new symptoms arise.         Lali Alfonso, DO

## 2023-07-17 RX ORDER — OMEPRAZOLE 40 MG/1
40 CAPSULE, DELAYED RELEASE ORAL DAILY
Qty: 30 CAPSULE | Refills: 2 | OUTPATIENT
Start: 2023-07-17

## 2023-07-17 RX ORDER — ESOMEPRAZOLE MAGNESIUM 40 MG/1
40 CAPSULE, DELAYED RELEASE ORAL DAILY
Qty: 30 CAPSULE | Refills: 3 | Status: SHIPPED | OUTPATIENT
Start: 2023-07-17

## 2023-07-25 NOTE — ROUTINE PROCESS
Bedside and Verbal shift change report given to Joshua Ellsworth (oncoming nurse) by Tim Larson RN (offgoing nurse). Report included the following information SBAR. negative...

## 2023-07-27 RX ORDER — CLOPIDOGREL BISULFATE 75 MG/1
75 TABLET ORAL DAILY
Qty: 90 TABLET | Refills: 1 | OUTPATIENT
Start: 2023-07-27

## 2023-07-27 NOTE — TELEPHONE ENCOUNTER
Patient called in and requested a refill and stated it needs to be a 90 day supply.     Lov 7/1/2023  Last refill 1/20/23

## 2023-08-03 DIAGNOSIS — I73.9 PERIPHERAL VASCULAR DISEASE, UNSPECIFIED (HCC): Primary | ICD-10-CM

## 2023-08-03 RX ORDER — CLOPIDOGREL BISULFATE 75 MG/1
75 TABLET ORAL DAILY
Qty: 30 TABLET | OUTPATIENT
Start: 2023-08-03

## 2023-08-03 NOTE — TELEPHONE ENCOUNTER
Addiction Outpatient Weekly Clinical Staffing     Andrea Collado was staffed on 5/5/2021 . Andrea Collado was staffed on recovery strengths, barriers and treatment progress.     Staff present: Jeff Hartman, [facility], Jeny Gomez [facility], Deep Lopez [facility], JAZLYN Funes, [facility], Era Dominguez [facility], Dillan Chavez [facility] and Kathia Carter [facility]     Date: 5/5/2021 Time: 3:24 PM    Staff Signature: JAZLYN Funes, [facility]         Patient said he called and requested a refill of Clopidogrel Bisulfate last week and it hasn't been sent to the pharmacy. I told him it was denied but wasn't sure why.  Patient asked for the nurse or Dr. Gurpreet Rod to call him back

## 2023-08-03 NOTE — TELEPHONE ENCOUNTER
Last visit: 7/11/23    Last refill: 1/20/23      Pt wants med to be 90 day supply since he pays out of pocket.

## 2023-08-09 RX ORDER — CLOPIDOGREL BISULFATE 75 MG/1
75 TABLET ORAL DAILY
Qty: 90 TABLET | Refills: 1 | Status: SHIPPED | OUTPATIENT
Start: 2023-08-09

## 2023-08-09 NOTE — TELEPHONE ENCOUNTER
Patient has called in upset about the refill request. Elan Josue has prescribed it and is out of medication now. Looked back in the old system it was prescribed back on January 2023.

## 2023-09-08 ENCOUNTER — TELEPHONE (OUTPATIENT)
Dept: PRIMARY CARE CLINIC | Facility: CLINIC | Age: 68
End: 2023-09-08

## 2023-09-08 RX ORDER — OMEPRAZOLE 40 MG/1
40 CAPSULE, DELAYED RELEASE ORAL DAILY
Qty: 30 CAPSULE | Refills: 2 | OUTPATIENT
Start: 2023-09-08

## 2023-09-08 RX ORDER — ESOMEPRAZOLE MAGNESIUM 40 MG/1
40 CAPSULE, DELAYED RELEASE ORAL DAILY
Qty: 90 CAPSULE | Refills: 1 | Status: SHIPPED | OUTPATIENT
Start: 2023-09-08

## 2023-09-08 NOTE — TELEPHONE ENCOUNTER
Patient requested a refill of     Esomeprazole 40 mg  90 day prescription    [address]   [address]    [address]  [phone]    Patient said he is totally out

## 2023-10-20 LAB — HBA1C MFR BLD HPLC: 6.1 %

## 2023-10-24 ENCOUNTER — OFFICE VISIT (OUTPATIENT)
Dept: PRIMARY CARE CLINIC | Facility: CLINIC | Age: 68
End: 2023-10-24
Payer: MEDICARE

## 2023-10-24 VITALS
HEART RATE: 61 BPM | OXYGEN SATURATION: 98 % | BODY MASS INDEX: 35.28 KG/M2 | TEMPERATURE: 97.1 F | HEIGHT: 71 IN | WEIGHT: 252 LBS | SYSTOLIC BLOOD PRESSURE: 112 MMHG | DIASTOLIC BLOOD PRESSURE: 68 MMHG

## 2023-10-24 DIAGNOSIS — I10 ESSENTIAL (PRIMARY) HYPERTENSION: ICD-10-CM

## 2023-10-24 DIAGNOSIS — Z23 NEED FOR IMMUNIZATION AGAINST INFLUENZA: Primary | ICD-10-CM

## 2023-10-24 DIAGNOSIS — L98.9 SKIN LESION OF HAND: ICD-10-CM

## 2023-10-24 DIAGNOSIS — I25.810 ATHEROSCLEROSIS OF CORONARY ARTERY BYPASS GRAFT OF NATIVE HEART WITHOUT ANGINA PECTORIS: ICD-10-CM

## 2023-10-24 PROCEDURE — G8417 CALC BMI ABV UP PARAM F/U: HCPCS | Performed by: FAMILY MEDICINE

## 2023-10-24 PROCEDURE — 90694 VACC AIIV4 NO PRSRV 0.5ML IM: CPT | Performed by: FAMILY MEDICINE

## 2023-10-24 PROCEDURE — G0008 ADMIN INFLUENZA VIRUS VAC: HCPCS | Performed by: FAMILY MEDICINE

## 2023-10-24 PROCEDURE — 1036F TOBACCO NON-USER: CPT | Performed by: FAMILY MEDICINE

## 2023-10-24 PROCEDURE — G8427 DOCREV CUR MEDS BY ELIG CLIN: HCPCS | Performed by: FAMILY MEDICINE

## 2023-10-24 PROCEDURE — 99214 OFFICE O/P EST MOD 30 MIN: CPT | Performed by: FAMILY MEDICINE

## 2023-10-24 PROCEDURE — 3078F DIAST BP <80 MM HG: CPT | Performed by: FAMILY MEDICINE

## 2023-10-24 PROCEDURE — 1123F ACP DISCUSS/DSCN MKR DOCD: CPT | Performed by: FAMILY MEDICINE

## 2023-10-24 PROCEDURE — G8484 FLU IMMUNIZE NO ADMIN: HCPCS | Performed by: FAMILY MEDICINE

## 2023-10-24 PROCEDURE — 3017F COLORECTAL CA SCREEN DOC REV: CPT | Performed by: FAMILY MEDICINE

## 2023-10-24 PROCEDURE — 3074F SYST BP LT 130 MM HG: CPT | Performed by: FAMILY MEDICINE

## 2023-10-24 RX ORDER — ROSUVASTATIN CALCIUM 40 MG/1
40 TABLET, COATED ORAL EVERY EVENING
COMMUNITY

## 2023-10-24 ASSESSMENT — PATIENT HEALTH QUESTIONNAIRE - PHQ9
1. LITTLE INTEREST OR PLEASURE IN DOING THINGS: 0
SUM OF ALL RESPONSES TO PHQ QUESTIONS 1-9: 0
2. FEELING DOWN, DEPRESSED OR HOPELESS: 0
SUM OF ALL RESPONSES TO PHQ QUESTIONS 1-9: 0
SUM OF ALL RESPONSES TO PHQ9 QUESTIONS 1 & 2: 0

## 2023-10-24 NOTE — PROGRESS NOTES
Subjective  Nima Ascencio is an 77 y.o. male who presents for follow up. Pmhx : CAD, PAD, HTN, HLD, Hypogonadism, Lumbar spinal stenosis, Asthma, Chronic pain, Fatty liver disease. CAD, dx in 2018, triple bypass. Follows with cardio, Dr Ricardo Ko. Denies recent chest pain or dyspnea. HTN, HLD. Controlled on medication. Recent lumbar MRI indicating retroperitoneal and right iliac lymphadenopathy. Workup per hematology unremarkable; PET scan unremarkable. Severe DDD, lumbar stenosis. status post ALIF L5-S1 with posterior percutaneous fusion L5-S1 and laminectomy L2-L3 performed on 06/14/2023. Peripheral neuropathy. He's recently been evaluated by neuro. Started on lyrica (he thinks, but cannot remember name of medication definitively). Chronic skin lesions left dorsal hand. Allergies - reviewed: Allergies   Allergen Reactions    Erythromycin Other (See Comments)     GI upset         Medications - reviewed:   Current Outpatient Medications   Medication Sig    esomeprazole (NEXIUM) 40 MG delayed release capsule Take 1 capsule by mouth daily    clopidogrel (PLAVIX) 75 MG tablet Take 1 tablet by mouth daily    allopurinol (ZYLOPRIM) 300 MG tablet Take 1.5 tablets by mouth daily    lisinopril (PRINIVIL;ZESTRIL) 10 MG tablet Take 0.5 tablets by mouth every evening    rosuvastatin (CRESTOR) 40 MG tablet Take 1 tablet by mouth daily    aspirin 81 MG EC tablet Take 1 tablet by mouth daily    atenolol (TENORMIN) 25 MG tablet Take 1 tablet by mouth daily    ezetimibe (ZETIA) 10 MG tablet Take 1 tablet by mouth daily     No current facility-administered medications for this visit. ROS  CONSTITUTIONAL: Denies fever, chills, unintentional weight loss. CARDIOVASCULAR: Denies chest pain, orthopnea, PND. RESPIRATORY: Denies dyspnea, wheezing, hemoptysis. GI: Denies abdominal pain, diarrhea, constipation, black or bloody stool.          Physical Exam  /68 (Site: Left Upper

## 2023-10-27 ENCOUNTER — TELEPHONE (OUTPATIENT)
Dept: PRIMARY CARE CLINIC | Facility: CLINIC | Age: 68
End: 2023-10-27

## 2023-10-27 NOTE — TELEPHONE ENCOUNTER
Patient calling want to start b12 supplement since his is low. He would like something to take either pill or liquid because he doesn't want to drive back and forth for the shot. Please call patient to discuss.

## 2023-10-30 NOTE — TELEPHONE ENCOUNTER
Spoke to patient he states B12 was low with Dr. Hillary Sandhoff. He said copies were reviewed at his last visit in our office.

## 2023-12-10 ENCOUNTER — OFFICE VISIT (OUTPATIENT)
Age: 68
End: 2023-12-10

## 2023-12-10 VITALS
WEIGHT: 261 LBS | DIASTOLIC BLOOD PRESSURE: 76 MMHG | BODY MASS INDEX: 36.4 KG/M2 | OXYGEN SATURATION: 99 % | HEART RATE: 60 BPM | TEMPERATURE: 98.6 F | SYSTOLIC BLOOD PRESSURE: 128 MMHG | RESPIRATION RATE: 18 BRPM

## 2023-12-10 DIAGNOSIS — T14.8XXA NONHEALING NONSURGICAL WOUND: Primary | ICD-10-CM

## 2023-12-10 RX ORDER — SULFAMETHOXAZOLE AND TRIMETHOPRIM 800; 160 MG/1; MG/1
1 TABLET ORAL 2 TIMES DAILY
Qty: 14 TABLET | Refills: 0 | Status: SHIPPED | OUTPATIENT
Start: 2023-12-10 | End: 2023-12-17

## 2023-12-10 NOTE — PROGRESS NOTES
Chief Complaint   Patient presents with    Wound Check     State right leg wound needs to be evaluated. Sx consist of sore and weeping drainage. Sx for 2 weeks. States he has been using encrinite gel on it. Wound Check  Treated in ED: chronic with recurrent flare up. Previous treatment included wound cleansing or irrigation (used alcohol yesterday). His temperature was unmeasured prior to arrival. There has been clear discharge from the wound. There is new redness present. There is new swelling present. There is new pain present. Past Medical History:   Diagnosis Date    Anxiety     Arthritis     OSTEO - all joints    Asthma     AS A YOUNGER ADULT    Basal cell carcinoma 2022    leg and right shoulder    CAD (coronary artery disease)     stent X1    Cancer (HCC)     BCC, SCC    Cataract     right    Chronic pain     GERD (gastroesophageal reflux disease)     HTN (hypertension)     Hyperlipidemia     Kidney stone     Liver disease 2018    FATTY LIVER    Thrombocytopenia ([address]) 2023       Past Surgical History:   Procedure Laterality Date    CABG, ARTERY-VEIN, THREE  2018    COLONOSCOPY N/A 2023    COLONOSCOPY performed by Maricel Campbell MD at [facility] ENDOSCOPY    COLONOSCOPY      IR LUMBAR TRANSFORAMINAL EPIDURAL SINGLE  2020    NEUROLOGICAL SURGERY  1999    L1 or L3 LAMINECTOMY    ORTHOPEDIC SURGERY Left 2005    HIP REPLACEMENT    ORTHOPEDIC SURGERY Right 2009    HIP REPLACEMENT    OTHER SURGICAL HISTORY Right     debridement of infection in leg    [place] [address] 2018    CARDIAC CATH    SPINAL FUSION  2023       Social History     Tobacco Use    Smoking status: Former     Packs/day: .5     Types: Cigarettes     Quit date: 1998     Years since quittin.1    Smokeless tobacco: Never   Substance Use Topics    Alcohol use:  Yes     Alcohol/week: 21.0 standard drinks of alcohol    Drug use: Not Currently        Allergies   Allergen

## 2023-12-10 NOTE — PATIENT INSTRUCTIONS
Go back to follow with Wound clinic     Do not use alcohol/ hydrogen peroxide   Clean with saline and apply non adhesive dressing

## 2023-12-20 PROBLEM — L89.893 PRESSURE INJURY OF LEFT FOOT, STAGE 3 (HCC): Status: ACTIVE | Noted: 2023-12-20

## 2023-12-20 PROBLEM — R60.0 BILATERAL LEG EDEMA: Status: ACTIVE | Noted: 2021-01-04

## 2023-12-26 ENCOUNTER — HOSPITAL ENCOUNTER (INPATIENT)
Facility: HOSPITAL | Age: 68
LOS: 1 days | Discharge: HOME | DRG: 641 | End: 2023-12-30
Attending: STUDENT IN AN ORGANIZED HEALTH CARE EDUCATION/TRAINING PROGRAM | Admitting: STUDENT IN AN ORGANIZED HEALTH CARE EDUCATION/TRAINING PROGRAM
Payer: MEDICARE

## 2023-12-26 ENCOUNTER — APPOINTMENT (OUTPATIENT)
Facility: HOSPITAL | Age: 68
DRG: 641 | End: 2023-12-26
Payer: MEDICARE

## 2023-12-26 ENCOUNTER — CLINICAL DOCUMENTATION (OUTPATIENT)
Facility: HOSPITAL | Age: 68
End: 2023-12-26

## 2023-12-26 DIAGNOSIS — R42 DIZZINESS: Primary | ICD-10-CM

## 2023-12-26 DIAGNOSIS — E86.0 DEHYDRATION: ICD-10-CM

## 2023-12-26 LAB
ALBUMIN SERPL-MCNC: 3.7 G/DL (ref 3.5–5)
ALBUMIN/GLOB SERPL: 1.2 (ref 1.1–2.2)
ALP SERPL-CCNC: 82 U/L (ref 45–117)
ALT SERPL-CCNC: 41 U/L (ref 12–78)
ANION GAP SERPL CALC-SCNC: 8 MMOL/L (ref 5–15)
AST SERPL-CCNC: 51 U/L (ref 15–37)
BASOPHILS # BLD: 0.1 K/UL (ref 0–0.1)
BASOPHILS NFR BLD: 1 % (ref 0–1)
BILIRUB SERPL-MCNC: 0.6 MG/DL (ref 0.2–1)
BUN SERPL-MCNC: 10 MG/DL (ref 6–20)
BUN/CREAT SERPL: 11 (ref 12–20)
CALCIUM SERPL-MCNC: 8.5 MG/DL (ref 8.5–10.1)
CHLORIDE SERPL-SCNC: 102 MMOL/L (ref 97–108)
CO2 SERPL-SCNC: 26 MMOL/L (ref 21–32)
CREAT SERPL-MCNC: 0.9 MG/DL (ref 0.7–1.3)
DIFFERENTIAL METHOD BLD: ABNORMAL
EOSINOPHIL # BLD: 0.3 K/UL (ref 0–0.4)
EOSINOPHIL NFR BLD: 3 % (ref 0–7)
ERYTHROCYTE [DISTWIDTH] IN BLOOD BY AUTOMATED COUNT: 15.2 % (ref 11.5–14.5)
FLUAV AG NPH QL IA: NEGATIVE
FLUBV AG NOSE QL IA: NEGATIVE
GLOBULIN SER CALC-MCNC: 3 G/DL (ref 2–4)
GLUCOSE SERPL-MCNC: 138 MG/DL (ref 65–100)
HCT VFR BLD AUTO: 40.8 % (ref 36.6–50.3)
HGB BLD-MCNC: 13.6 G/DL (ref 12.1–17)
IMM GRANULOCYTES # BLD AUTO: 0 K/UL (ref 0–0.04)
IMM GRANULOCYTES NFR BLD AUTO: 0 % (ref 0–0.5)
LYMPHOCYTES # BLD: 4.1 K/UL (ref 0.8–3.5)
LYMPHOCYTES NFR BLD: 45 % (ref 12–49)
MAGNESIUM SERPL-MCNC: 1.8 MG/DL (ref 1.6–2.4)
MCH RBC QN AUTO: 29.2 PG (ref 26–34)
MCHC RBC AUTO-ENTMCNC: 33.3 G/DL (ref 30–36.5)
MCV RBC AUTO: 87.7 FL (ref 80–99)
MONOCYTES # BLD: 0.5 K/UL (ref 0–1)
MONOCYTES NFR BLD: 5 % (ref 5–13)
NEUTS SEG # BLD: 4.2 K/UL (ref 1.8–8)
NEUTS SEG NFR BLD: 46 % (ref 32–75)
NRBC # BLD: 0 K/UL (ref 0–0.01)
NRBC BLD-RTO: 0 PER 100 WBC
PLATELET # BLD AUTO: 156 K/UL (ref 150–400)
PMV BLD AUTO: 10.2 FL (ref 8.9–12.9)
POTASSIUM SERPL-SCNC: 3.9 MMOL/L (ref 3.5–5.1)
PROT SERPL-MCNC: 6.7 G/DL (ref 6.4–8.2)
RBC # BLD AUTO: 4.65 M/UL (ref 4.1–5.7)
SARS-COV-2 RDRP RESP QL NAA+PROBE: NOT DETECTED
SODIUM SERPL-SCNC: 136 MMOL/L (ref 136–145)
SOURCE: NORMAL
TROPONIN I SERPL HS-MCNC: 10 NG/L (ref 0–76)
TROPONIN I SERPL HS-MCNC: 24 NG/L (ref 0–76)
WBC # BLD AUTO: 9.1 K/UL (ref 4.1–11.1)

## 2023-12-26 PROCEDURE — 80053 COMPREHEN METABOLIC PANEL: CPT

## 2023-12-26 PROCEDURE — 96374 THER/PROPH/DIAG INJ IV PUSH: CPT

## 2023-12-26 PROCEDURE — 84484 ASSAY OF TROPONIN QUANT: CPT

## 2023-12-26 PROCEDURE — 99285 EMERGENCY DEPT VISIT HI MDM: CPT

## 2023-12-26 PROCEDURE — 70450 CT HEAD/BRAIN W/O DYE: CPT

## 2023-12-26 PROCEDURE — 87635 SARS-COV-2 COVID-19 AMP PRB: CPT

## 2023-12-26 PROCEDURE — 96375 TX/PRO/DX INJ NEW DRUG ADDON: CPT

## 2023-12-26 PROCEDURE — 85025 COMPLETE CBC W/AUTO DIFF WBC: CPT

## 2023-12-26 PROCEDURE — 6370000000 HC RX 637 (ALT 250 FOR IP): Performed by: STUDENT IN AN ORGANIZED HEALTH CARE EDUCATION/TRAINING PROGRAM

## 2023-12-26 PROCEDURE — 96361 HYDRATE IV INFUSION ADD-ON: CPT

## 2023-12-26 PROCEDURE — 87804 INFLUENZA ASSAY W/OPTIC: CPT

## 2023-12-26 PROCEDURE — 6360000002 HC RX W HCPCS: Performed by: STUDENT IN AN ORGANIZED HEALTH CARE EDUCATION/TRAINING PROGRAM

## 2023-12-26 PROCEDURE — 2580000003 HC RX 258: Performed by: STUDENT IN AN ORGANIZED HEALTH CARE EDUCATION/TRAINING PROGRAM

## 2023-12-26 PROCEDURE — 93005 ELECTROCARDIOGRAM TRACING: CPT | Performed by: STUDENT IN AN ORGANIZED HEALTH CARE EDUCATION/TRAINING PROGRAM

## 2023-12-26 PROCEDURE — 71045 X-RAY EXAM CHEST 1 VIEW: CPT

## 2023-12-26 PROCEDURE — 36415 COLL VENOUS BLD VENIPUNCTURE: CPT

## 2023-12-26 PROCEDURE — 83735 ASSAY OF MAGNESIUM: CPT

## 2023-12-26 RX ORDER — ONDANSETRON 2 MG/ML
4 INJECTION INTRAMUSCULAR; INTRAVENOUS ONCE
Status: COMPLETED | OUTPATIENT
Start: 2023-12-26 | End: 2023-12-26

## 2023-12-26 RX ORDER — MECLIZINE HCL 12.5 MG/1
25 TABLET ORAL
Status: COMPLETED | OUTPATIENT
Start: 2023-12-26 | End: 2023-12-26

## 2023-12-26 RX ORDER — ATENOLOL 25 MG/1
25 TABLET ORAL
Status: DISPENSED | OUTPATIENT
Start: 2023-12-26 | End: 2023-12-27

## 2023-12-26 RX ORDER — 0.9 % SODIUM CHLORIDE 0.9 %
1000 INTRAVENOUS SOLUTION INTRAVENOUS ONCE
Status: COMPLETED | OUTPATIENT
Start: 2023-12-26 | End: 2023-12-27

## 2023-12-26 RX ORDER — LISINOPRIL 5 MG/1
10 TABLET ORAL
Status: COMPLETED | OUTPATIENT
Start: 2023-12-26 | End: 2023-12-26

## 2023-12-26 RX ADMIN — SODIUM CHLORIDE 1000 ML: 9 INJECTION, SOLUTION INTRAVENOUS at 20:32

## 2023-12-26 RX ADMIN — LISINOPRIL 10 MG: 5 TABLET ORAL at 22:23

## 2023-12-26 RX ADMIN — ONDANSETRON 4 MG: 2 INJECTION INTRAMUSCULAR; INTRAVENOUS at 20:33

## 2023-12-26 RX ADMIN — MECLIZINE 25 MG: 12.5 TABLET ORAL at 20:36

## 2023-12-26 ASSESSMENT — PAIN - FUNCTIONAL ASSESSMENT: PAIN_FUNCTIONAL_ASSESSMENT: 0-10

## 2023-12-26 ASSESSMENT — PAIN SCALES - GENERAL: PAINLEVEL_OUTOF10: 0

## 2023-12-26 NOTE — PROGRESS NOTES
Wound care    Wound culture of 12/20/2023 grew skin gali. No antibiotics were prescribed.     Jose Briscoe MD

## 2023-12-27 ENCOUNTER — APPOINTMENT (OUTPATIENT)
Facility: HOSPITAL | Age: 68
DRG: 641 | End: 2023-12-27
Payer: MEDICARE

## 2023-12-27 ENCOUNTER — APPOINTMENT (OUTPATIENT)
Facility: HOSPITAL | Age: 68
DRG: 641 | End: 2023-12-27
Attending: INTERNAL MEDICINE
Payer: MEDICARE

## 2023-12-27 PROBLEM — I65.23 BILATERAL CAROTID ARTERY STENOSIS: Status: ACTIVE | Noted: 2023-12-27

## 2023-12-27 PROBLEM — R42 DIZZINESS: Status: ACTIVE | Noted: 2023-12-27

## 2023-12-27 LAB
ECHO BSA: 2.41 M2
EKG ATRIAL RATE: 68 BPM
EKG DIAGNOSIS: NORMAL
EKG P AXIS: 20 DEGREES
EKG P-R INTERVAL: 156 MS
EKG Q-T INTERVAL: 444 MS
EKG QRS DURATION: 134 MS
EKG QTC CALCULATION (BAZETT): 472 MS
EKG R AXIS: -60 DEGREES
EKG T AXIS: 30 DEGREES
EKG VENTRICULAR RATE: 68 BPM
VAS LEFT CCA DIST EDV: 34.9 CM/S
VAS LEFT CCA DIST PSV: 122.7 CM/S
VAS LEFT CCA PROX EDV: 23.9 CM/S
VAS LEFT CCA PROX PSV: 103 CM/S
VAS LEFT ECA EDV: 0 CM/S
VAS LEFT ECA PSV: 76.6 CM/S
VAS LEFT ICA DIST EDV: 26.1 CM/S
VAS LEFT ICA DIST PSV: 74.4 CM/S
VAS LEFT ICA MID EDV: 19.5 CM/S
VAS LEFT ICA MID PSV: 76.6 CM/S
VAS LEFT ICA PROX EDV: 26.1 CM/S
VAS LEFT ICA PROX PSV: 87.6 CM/S
VAS LEFT ICA/CCA PSV: 0.7 NO UNITS
VAS LEFT SUBCLAVIAN PROX EDV: 0 CM/S
VAS LEFT SUBCLAVIAN PROX PSV: 175.4 CM/S
VAS LEFT VERTEBRAL EDV: 10.8 CM/S
VAS LEFT VERTEBRAL PSV: 37.1 CM/S
VAS RIGHT CCA DIST EDV: 17.3 CM/S
VAS RIGHT CCA DIST PSV: 67.8 CM/S
VAS RIGHT CCA PROX EDV: 12.9 CM/S
VAS RIGHT CCA PROX PSV: 76.6 CM/S
VAS RIGHT ECA EDV: 0 CM/S
VAS RIGHT ECA PSV: 167.8 CM/S
VAS RIGHT ICA DIST EDV: 25.5 CM/S
VAS RIGHT ICA DIST PSV: 135.4 CM/S
VAS RIGHT ICA MID EDV: 28.8 CM/S
VAS RIGHT ICA MID PSV: 112.8 CM/S
VAS RIGHT ICA PROX EDV: 25.5 CM/S
VAS RIGHT ICA PROX PSV: 183.9 CM/S
VAS RIGHT ICA/CCA PSV: 2.7 NO UNITS
VAS RIGHT SUBCLAVIAN PROX EDV: 0 CM/S
VAS RIGHT SUBCLAVIAN PROX PSV: 155.6 CM/S
VAS RIGHT VERTEBRAL EDV: 0 CM/S
VAS RIGHT VERTEBRAL PSV: 29.4 CM/S

## 2023-12-27 PROCEDURE — 70551 MRI BRAIN STEM W/O DYE: CPT

## 2023-12-27 PROCEDURE — 93880 EXTRACRANIAL BILAT STUDY: CPT

## 2023-12-27 PROCEDURE — 6360000002 HC RX W HCPCS: Performed by: STUDENT IN AN ORGANIZED HEALTH CARE EDUCATION/TRAINING PROGRAM

## 2023-12-27 PROCEDURE — 96372 THER/PROPH/DIAG INJ SC/IM: CPT

## 2023-12-27 PROCEDURE — 2580000003 HC RX 258: Performed by: STUDENT IN AN ORGANIZED HEALTH CARE EDUCATION/TRAINING PROGRAM

## 2023-12-27 PROCEDURE — 6370000000 HC RX 637 (ALT 250 FOR IP): Performed by: INTERNAL MEDICINE

## 2023-12-27 PROCEDURE — 96361 HYDRATE IV INFUSION ADD-ON: CPT

## 2023-12-27 PROCEDURE — G0378 HOSPITAL OBSERVATION PER HR: HCPCS

## 2023-12-27 PROCEDURE — 99222 1ST HOSP IP/OBS MODERATE 55: CPT | Performed by: INTERNAL MEDICINE

## 2023-12-27 RX ORDER — SODIUM CHLORIDE, SODIUM LACTATE, POTASSIUM CHLORIDE, CALCIUM CHLORIDE 600; 310; 30; 20 MG/100ML; MG/100ML; MG/100ML; MG/100ML
INJECTION, SOLUTION INTRAVENOUS CONTINUOUS
Status: DISCONTINUED | OUTPATIENT
Start: 2023-12-27 | End: 2023-12-30 | Stop reason: HOSPADM

## 2023-12-27 RX ORDER — POTASSIUM CHLORIDE 7.45 MG/ML
10 INJECTION INTRAVENOUS PRN
Status: DISCONTINUED | OUTPATIENT
Start: 2023-12-27 | End: 2023-12-30 | Stop reason: HOSPADM

## 2023-12-27 RX ORDER — SODIUM CHLORIDE 0.9 % (FLUSH) 0.9 %
5-40 SYRINGE (ML) INJECTION EVERY 12 HOURS SCHEDULED
Status: DISCONTINUED | OUTPATIENT
Start: 2023-12-27 | End: 2023-12-30 | Stop reason: HOSPADM

## 2023-12-27 RX ORDER — SODIUM CHLORIDE 0.9 % (FLUSH) 0.9 %
5-40 SYRINGE (ML) INJECTION PRN
Status: DISCONTINUED | OUTPATIENT
Start: 2023-12-27 | End: 2023-12-30 | Stop reason: HOSPADM

## 2023-12-27 RX ORDER — SODIUM CHLORIDE 9 MG/ML
INJECTION, SOLUTION INTRAVENOUS PRN
Status: DISCONTINUED | OUTPATIENT
Start: 2023-12-27 | End: 2023-12-30 | Stop reason: HOSPADM

## 2023-12-27 RX ORDER — EZETIMIBE 10 MG/1
10 TABLET ORAL DAILY
Status: DISCONTINUED | OUTPATIENT
Start: 2023-12-27 | End: 2023-12-30 | Stop reason: HOSPADM

## 2023-12-27 RX ORDER — ENOXAPARIN SODIUM 100 MG/ML
30 INJECTION SUBCUTANEOUS 2 TIMES DAILY
Status: DISCONTINUED | OUTPATIENT
Start: 2023-12-27 | End: 2023-12-30 | Stop reason: HOSPADM

## 2023-12-27 RX ORDER — ASPIRIN 81 MG/1
81 TABLET ORAL DAILY
Status: DISCONTINUED | OUTPATIENT
Start: 2023-12-27 | End: 2023-12-30 | Stop reason: HOSPADM

## 2023-12-27 RX ORDER — MAGNESIUM SULFATE IN WATER 40 MG/ML
2000 INJECTION, SOLUTION INTRAVENOUS PRN
Status: DISCONTINUED | OUTPATIENT
Start: 2023-12-27 | End: 2023-12-30 | Stop reason: HOSPADM

## 2023-12-27 RX ORDER — ONDANSETRON 2 MG/ML
4 INJECTION INTRAMUSCULAR; INTRAVENOUS EVERY 6 HOURS PRN
Status: DISCONTINUED | OUTPATIENT
Start: 2023-12-27 | End: 2023-12-30 | Stop reason: HOSPADM

## 2023-12-27 RX ORDER — ROSUVASTATIN CALCIUM 20 MG/1
40 TABLET, COATED ORAL EVERY EVENING
Status: DISCONTINUED | OUTPATIENT
Start: 2023-12-27 | End: 2023-12-30 | Stop reason: HOSPADM

## 2023-12-27 RX ORDER — POLYETHYLENE GLYCOL 3350 17 G/17G
17 POWDER, FOR SOLUTION ORAL DAILY PRN
Status: DISCONTINUED | OUTPATIENT
Start: 2023-12-27 | End: 2023-12-30 | Stop reason: HOSPADM

## 2023-12-27 RX ORDER — PANTOPRAZOLE SODIUM 40 MG/1
40 TABLET, DELAYED RELEASE ORAL
Status: DISCONTINUED | OUTPATIENT
Start: 2023-12-28 | End: 2023-12-30 | Stop reason: HOSPADM

## 2023-12-27 RX ORDER — CLOPIDOGREL BISULFATE 75 MG/1
75 TABLET ORAL DAILY
Status: DISCONTINUED | OUTPATIENT
Start: 2023-12-27 | End: 2023-12-30 | Stop reason: HOSPADM

## 2023-12-27 RX ORDER — ALLOPURINOL 300 MG/1
450 TABLET ORAL DAILY
Status: DISCONTINUED | OUTPATIENT
Start: 2023-12-27 | End: 2023-12-30 | Stop reason: HOSPADM

## 2023-12-27 RX ORDER — ONDANSETRON 4 MG/1
4 TABLET, ORALLY DISINTEGRATING ORAL EVERY 8 HOURS PRN
Status: DISCONTINUED | OUTPATIENT
Start: 2023-12-27 | End: 2023-12-30 | Stop reason: HOSPADM

## 2023-12-27 RX ORDER — ENOXAPARIN SODIUM 100 MG/ML
30 INJECTION SUBCUTANEOUS 2 TIMES DAILY
Status: DISCONTINUED | OUTPATIENT
Start: 2023-12-27 | End: 2023-12-27

## 2023-12-27 RX ORDER — CHOLECALCIFEROL (VITAMIN D3) 125 MCG
1000 CAPSULE ORAL DAILY
Status: DISCONTINUED | OUTPATIENT
Start: 2023-12-27 | End: 2023-12-30 | Stop reason: HOSPADM

## 2023-12-27 RX ORDER — ACETAMINOPHEN 325 MG/1
650 TABLET ORAL EVERY 6 HOURS PRN
Status: DISCONTINUED | OUTPATIENT
Start: 2023-12-27 | End: 2023-12-30 | Stop reason: HOSPADM

## 2023-12-27 RX ORDER — POTASSIUM CHLORIDE 20 MEQ/1
40 TABLET, EXTENDED RELEASE ORAL PRN
Status: DISCONTINUED | OUTPATIENT
Start: 2023-12-27 | End: 2023-12-30 | Stop reason: HOSPADM

## 2023-12-27 RX ORDER — ACETAMINOPHEN 650 MG/1
650 SUPPOSITORY RECTAL EVERY 6 HOURS PRN
Status: DISCONTINUED | OUTPATIENT
Start: 2023-12-27 | End: 2023-12-30 | Stop reason: HOSPADM

## 2023-12-27 RX ORDER — ATENOLOL 25 MG/1
25 TABLET ORAL DAILY
Status: DISCONTINUED | OUTPATIENT
Start: 2023-12-27 | End: 2023-12-30 | Stop reason: HOSPADM

## 2023-12-27 RX ORDER — MECLIZINE HCL 12.5 MG/1
25 TABLET ORAL EVERY 6 HOURS SCHEDULED
Status: DISCONTINUED | OUTPATIENT
Start: 2023-12-27 | End: 2023-12-30 | Stop reason: HOSPADM

## 2023-12-27 RX ORDER — LISINOPRIL 5 MG/1
5 TABLET ORAL EVERY EVENING
Status: DISCONTINUED | OUTPATIENT
Start: 2023-12-27 | End: 2023-12-30 | Stop reason: HOSPADM

## 2023-12-27 RX ADMIN — ASPIRIN 81 MG: 81 TABLET, COATED ORAL at 16:40

## 2023-12-27 RX ADMIN — SODIUM CHLORIDE, POTASSIUM CHLORIDE, SODIUM LACTATE AND CALCIUM CHLORIDE: 600; 310; 30; 20 INJECTION, SOLUTION INTRAVENOUS at 05:13

## 2023-12-27 RX ADMIN — CLOPIDOGREL BISULFATE 75 MG: 75 TABLET ORAL at 16:40

## 2023-12-27 RX ADMIN — SODIUM CHLORIDE, PRESERVATIVE FREE 10 ML: 5 INJECTION INTRAVENOUS at 20:28

## 2023-12-27 RX ADMIN — ENOXAPARIN SODIUM 30 MG: 100 INJECTION SUBCUTANEOUS at 16:41

## 2023-12-27 RX ADMIN — SODIUM CHLORIDE, POTASSIUM CHLORIDE, SODIUM LACTATE AND CALCIUM CHLORIDE: 600; 310; 30; 20 INJECTION, SOLUTION INTRAVENOUS at 18:51

## 2023-12-27 RX ADMIN — MECLIZINE 25 MG: 12.5 TABLET ORAL at 18:03

## 2023-12-27 RX ADMIN — EZETIMIBE 10 MG: 10 TABLET ORAL at 16:40

## 2023-12-27 ASSESSMENT — PAIN SCALES - GENERAL: PAINLEVEL_OUTOF10: 0

## 2023-12-27 NOTE — ED NOTES
Patient stable GCS15  Vitally stable, no complaints of pain   Lying comfortably in bed, ambulatory with assist (patient is dizzy)   IV cannula intact, no induration noted. medications given, needs attended  Wife at bedside. Call bell at bedside.  [facility] including [facility]

## 2023-12-27 NOTE — CONSULTS
Date of Consultation:  December 27, 2023    Referring Physician: MD Niurka    Reason for Consultation: Presyncope    Chief Complaint   Patient presents with    Nausea     Pt arrives to ED via EMS coming from home. Pt c/o generalized malaise and nausea x 1 day. Pt also c/o dizziness      Dizziness       History of Present Illness:   Vipul Castro is a 68 y.o. male with history of CAD status post stent on aspirin, hypertension, hyperlipidemia presenting with acute onset of lightheadedness, nausea, vomiting accompanied by poor nutritional and hydration intake and loss of appetite.    Patient states that for the last few days he has not been eating well or drinking well.  He states that he is just not had a great appetite.  Overnight he attempted to get up but felt very lightheaded as though he was going to pass out.  He had some room spinning sensation however it did not seem to be triggered by movement.  He did develop some nausea and dry heaving.    He states he had similar symptoms a while back when he had not been drinking enough water.    He denies any history of alcohol use or drug use.  He was a former smoker has quit for more than 20 years.  He is on aspirin at home.    He does feel that his symptoms are back to baseline at this time.    Past Medical History:   Diagnosis Date    Anxiety     Arthritis     OSTEO - all joints    Asthma     AS A YOUNGER ADULT    Basal cell carcinoma 09/01/2022    leg and right shoulder    CAD (coronary artery disease)     stent X1    Cancer (HCC)     BCC, SCC    Cataract     right    Chronic pain     GERD (gastroesophageal reflux disease)     HTN (hypertension)     Hyperlipidemia     Kidney stone 1993    Liver disease 03/2018    FATTY LIVER    Thrombocytopenia (HCC) 4/26/2023        Past Surgical History:   Procedure Laterality Date    CABG, ARTERY-VEIN, THREE  03/2018    COLONOSCOPY N/A 2/20/2023    COLONOSCOPY performed by Dre Estrada MD at [facility] ENDOSCOPY     COLONOSCOPY      IR LUMBAR TRANSFORAMINAL EPIDURAL SINGLE  2020    NEUROLOGICAL SURGERY      L1 or L3 LAMINECTOMY    ORTHOPEDIC SURGERY Left 2005    HIP REPLACEMENT    ORTHOPEDIC SURGERY Right 2009    HIP REPLACEMENT    OTHER SURGICAL HISTORY Right     debridement of infection in leg    [facility] UNLISTED PROCEDURE CARDIAC SURGERY   & 2018    CARDIAC CATH    SPINAL FUSION  2023        Family History   Problem Relation Age of Onset    High Cholesterol Father     Heart Disease Father 45    Anesth Problems Neg Hx     No Known Problems Daughter     Heart Attack Son         AGE 44    Other Mother         DIVERTICULITIS    Heart Disease Brother         CABG x4     Cancer Brother         prostate     Heart Disease Brother 48        CABG        Social History     Tobacco Use    Smoking status: Former     Current packs/day: 0.00     Types: Cigarettes     Quit date: 1998     Years since quittin.1    Smokeless tobacco: Never   Substance Use Topics    Alcohol use: Yes     Alcohol/week: 21.0 standard drinks of alcohol        Allergies   Allergen Reactions    Erythromycin Other (See Comments)     GI upset        Prior to Admission medications    Medication Sig Start Date End Date Taking?  Authorizing Provider   vitamin B-12 (CYANOCOBALAMIN) 1000 MCG tablet Take 1 tablet by mouth daily    ProviderAurelia MD   rosuvastatin (CRESTOR) 40 MG tablet Take 1 tablet by mouth every evening    ProviderAurelia MD   esomeprazole (NEXIUM) 40 MG delayed release capsule Take 1 capsule by mouth daily 23   Daniel Hernandez DO   clopidogrel (PLAVIX) 75 MG tablet Take 1 tablet by mouth daily 23   Daniel Hernandez DO   allopurinol (ZYLOPRIM) 300 MG tablet Take 1.5 tablets by mouth daily 23   Daniel Hernandez DO   lisinopril (PRINIVIL;ZESTRIL) 10 MG tablet Take 0.5 tablets by mouth every evening  Patient taking differently: Take 1 tablet by mouth every evening 23   Daniel Hernandez DO   rosuvastatin

## 2023-12-27 NOTE — CARE COORDINATION
Care Management Initial Assessment       RUR: N/A - OBS   Readmission? No  1st IM letter given? Yes - 12/27/23  1st  letter given: No      Initial note: CM reviewed chart. CM completed assessment with pt at bedside. Pt's daughter and brother at bedside as well. CM introduced self, role of CM, verified demographics, and discussed transition of care planning. Pt resides with his partner, Jackeline Moreno. Pt is independent with ADLs at baseline, does own a cane. Pt's partner assists with pt's wound care needs, pt also seen regularly at [facility] outpatient wound care. Pt's family to transport home. No care coordination needs identified. Care management will continue to be available to assist as transition of care planning needs arise.  Full assessment below:       12/27/23 1506   Service Assessment   Patient Orientation Alert and Oriented   Cognition Alert   History Provided By Patient   Primary Caregiver Self   Accompanied By/Relationship Daughter and brother at bedside   Support Systems Spouse/Significant Other;Children;Family Members  (Pt's domestic partner, Jackeline Moreno, daughter, and brother are support system)   [address]: Legal Next of [address] [place] [address]   PCP Verified by CM Yes   Last Visit to PCP Within last 3 months   Prior Functional Level Independent in ADLs/IADLs   Current Functional Level Independent in ADLs/IADLs   Can patient return to prior living arrangement Yes   Ability to make needs known: Good   Family able to assist with home care needs: Yes   Financial Resources Medicare   Social/Functional History   Type of [address] Help From Family   Discharge Planning   Current Services Prior To Admission Other (Comment)  (Outpatient wound care at [facility])   Potential Assistance Needed N/A   Patient expects to be discharged to: [facility]   Mode of Transport at Discharge Other (see comment)  (Family - partner, daughter, or brother/ at

## 2023-12-27 NOTE — ED PROVIDER NOTES
[facility] EMERGENCY DEPT  EMERGENCY DEPARTMENT HISTORY AND PHYSICAL EXAM      Date: 12/26/2023  Patient Name: Lin Tolbert  MRN: [id number]  [address] [facility] 1955  Date of evaluation: 12/26/2023  Provider: Jasmina Nina DO   Note Started: 8:43 PM EST 12/26/23    HISTORY OF PRESENT ILLNESS     Chief Complaint   Patient presents with    Nausea     Pt arrives to ED via EMS coming from home. Pt c/o generalized malaise and nausea x 1 day. Pt also c/o dizziness      Dizziness       History Provided By: Patient    HPI: Lin Tolbert is a 77 y.o. male with past medical history as reviewed below who presents emergency department complaining of lightheadedness that began this morning. Patient states that he woke up this morning, attempted to walk with his cane to the bathroom and had a sudden onset of this lightness like he was going pass out. Denies any actual syncopal episodes. Notes of associated nausea, and generalized myalgias. Denies any fever, vomiting, chest pain, shortness of breath, palpitations, diarrhea, urinary symptoms or any other symptoms complaints.   Patient states that he has felt similarly when he was dehydrated in the past.    PAST MEDICAL HISTORY   Past Medical History:  Past Medical History:   Diagnosis Date    Anxiety     Arthritis     OSTEO - all joints    Asthma     AS A YOUNGER ADULT    Basal cell carcinoma 09/01/2022    leg and right shoulder    CAD (coronary artery disease)     stent X1    Cancer (HCC)     BCC, SCC    Cataract     right    Chronic pain     GERD (gastroesophageal reflux disease)     HTN (hypertension)     Hyperlipidemia     Kidney stone 1993    Liver disease 03/2018    FATTY LIVER    Thrombocytopenia ([address]) 4/26/2023       Past Surgical History:  Past Surgical History:   Procedure Laterality Date    CABG, ARTERY-VEIN, THREE  03/2018    COLONOSCOPY N/A 2/20/2023    COLONOSCOPY performed by Tremaine Sanchez MD at [facility] ENDOSCOPY    COLONOSCOPY      IR LUMBAR TRANSFORAMINAL Value Ref Range    Magnesium 1.8 1.6 - 2.4 mg/dL   COVID-19, Rapid    Collection Time: 12/26/23  4:12 PM    Specimen: Nasopharyngeal   Result Value Ref Range    Source Nasopharyngeal      SARS-CoV-2, Rapid Not detected NOTD     Rapid influenza A/B antigens    Collection Time: 12/26/23  4:12 PM    Specimen: Nasopharyngeal   Result Value Ref Range    Influenza A Ag Negative NEG      Influenza B Ag Negative NEG     Troponin    Collection Time: 12/26/23  8:41 PM   Result Value Ref Range    Troponin, High Sensitivity 24 0 - 76 ng/L       EKG:.EKG interpreted by me. Shows Normal Sinus Rhythm with a HR of 67.  No STEMI. EKG interpreted by me.  Shows Normal Sinus Rhythm.  No ST elevations or depressions concerning for ischemia. Normal intervals. Normal axis. No TWIs.     Dr. Chris Sun    Radiologic Studies:  Non-plain film images such as CT, Ultrasound and MRI are read by the radiologist. Plain radiographic images are visualized and preliminarily interpreted by the ED Provider with the following findings: Xray Interpreted by me.  Shows no obvious consolidation    Interpretation per the Radiologist below, if available at the time of this note:  CT HEAD WO CONTRAST   Final Result   1. No evidence of acute infarct or intracranial hemorrhage.   2. Mild periventricular white matter disease is likely secondary to chronic   small vessel ischemic changes.          XR CHEST PORTABLE   Final Result   No evidence of acute cardiopulmonary process.          MRI BRAIN WO CONTRAST    (Results Pending)        EMERGENCY DEPARTMENT COURSE and DIFFERENTIAL DIAGNOSIS/MDM   CC/HPI Summary, DDx, ED Course, and Reassessment:     68-year-old male with history of multiple comorbidities who presents to the emergency department due to lightheadedness since yesterday morning.  Notes of associated diffuse myalgias and nausea.  States he has felt similar when he was dehydrated in the past.  Denies any actual syncope, head injury or loss conscious.

## 2023-12-27 NOTE — H&P
8.5 - 10.1 MG/DL    Total Bilirubin 0.6 0.2 - 1.0 MG/DL    ALT 41 12 - 78 U/L    AST 51 (H) 15 - 37 U/L    Alk Phosphatase 82 45 - 117 U/L    Total Protein 6.7 6.4 - 8.2 g/dL    Albumin 3.7 3.5 - 5.0 g/dL    Globulin 3.0 2.0 - 4.0 g/dL    Albumin/Globulin Ratio 1.2 1.1 - 2.2     CBC with Auto Differential    Collection Time: 12/26/23  4:12 PM   Result Value Ref Range    WBC 9.1 4.1 - 11.1 K/uL    RBC 4.65 4.10 - 5.70 M/uL    Hemoglobin 13.6 12.1 - 17.0 g/dL    Hematocrit 40.8 36.6 - 50.3 %    MCV 87.7 80.0 - 99.0 FL    MCH 29.2 26.0 - 34.0 PG    MCHC 33.3 30.0 - 36.5 g/dL    RDW 15.2 (H) 11.5 - 14.5 %    Platelets 152 662 - 966 K/uL    MPV 10.2 8.9 - 12.9 FL    Nucleated RBCs 0.0 0  WBC    nRBC 0.00 0.00 - 0.01 K/uL    Neutrophils % 46 32 - 75 %    Lymphocytes % 45 12 - 49 %    Monocytes % 5 5 - 13 %    Eosinophils % 3 0 - 7 %    Basophils % 1 0 - 1 %    Immature Granulocytes 0 0.0 - 0.5 %    Neutrophils Absolute 4.2 1.8 - 8.0 K/UL    Lymphocytes Absolute 4.1 (H) 0.8 - 3.5 K/UL    Monocytes Absolute 0.5 0.0 - 1.0 K/UL    Eosinophils Absolute 0.3 0.0 - 0.4 K/UL    Basophils Absolute 0.1 0.0 - 0.1 K/UL    Absolute Immature Granulocyte 0.0 0.00 - 0.04 K/UL    Differential Type AUTOMATED     Troponin    Collection Time: 12/26/23  4:12 PM   Result Value Ref Range    Troponin, High Sensitivity 10 0 - 76 ng/L   Magnesium    Collection Time: 12/26/23  4:12 PM   Result Value Ref Range    Magnesium 1.8 1.6 - 2.4 mg/dL   COVID-19, Rapid    Collection Time: 12/26/23  4:12 PM    Specimen: Nasopharyngeal   Result Value Ref Range    Source Nasopharyngeal      SARS-CoV-2, Rapid Not detected NOTD     Rapid influenza A/B antigens    Collection Time: 12/26/23  4:12 PM    Specimen: Nasopharyngeal   Result Value Ref Range    Influenza A Ag Negative NEG      Influenza B Ag Negative NEG     Troponin    Collection Time: 12/26/23  8:41 PM   Result Value Ref Range    Troponin, High Sensitivity 24 0 - 76 ng/L         CT HEAD WO CONTRAST    Result Date: 12/26/2023  Indication:  Dizziness Comparison: None Findings: 5 mm axial images were obtained from the skull base through the vertex. CT dose reduction was achieved through the use of a standardized protocol tailored for this examination and automatic exposure control for dose modulation. The ventricles and cortical sulci are prominent, compatible with age related volume loss. There is no evidence of intracranial hemorrhage, mass, mass effect, or acute infarct. There is periventricular white matter disease. No extra-axial fluid collections are seen. The visualized paranasal sinuses and mastoid air cells are clear. The orbital structures are unremarkable. No osseous abnormalities are seen.     1. No evidence of acute infarct or intracranial hemorrhage. 2. Mild periventricular white matter disease is likely secondary to chronic small vessel ischemic changes.      XR CHEST PORTABLE    Result Date: 12/26/2023  INDICATION:  Dyspnea COMPARISON: January 2010 FINDINGS: Single AP portable view of the chest obtained at 2029 demonstrates a stable cardiomediastinal silhouette. Study is apical lordotic. Median sternotomy wires are noted. The lungs are clear bilaterally. No osseous abnormalities are seen.     No evidence of acute cardiopulmonary process.       _______________________________________________________________________    TOTAL TIME:  76 Minutes    Critical Care Provided     Minutes non procedure based    Signed: Emilia Shah MD    Procedures: see electronic medical records for all procedures/Xrays and details which were not copied into this note but were reviewed prior to creation of Plan.

## 2023-12-28 ENCOUNTER — APPOINTMENT (OUTPATIENT)
Facility: HOSPITAL | Age: 68
DRG: 641 | End: 2023-12-28
Attending: INTERNAL MEDICINE
Payer: MEDICARE

## 2023-12-28 LAB
ANION GAP SERPL CALC-SCNC: 5 MMOL/L (ref 5–15)
BASOPHILS # BLD: 0.1 K/UL (ref 0–0.1)
BASOPHILS NFR BLD: 1 % (ref 0–1)
BUN SERPL-MCNC: 11 MG/DL (ref 6–20)
BUN/CREAT SERPL: 11 (ref 12–20)
CALCIUM SERPL-MCNC: 9.5 MG/DL (ref 8.5–10.1)
CHLORIDE SERPL-SCNC: 102 MMOL/L (ref 97–108)
CO2 SERPL-SCNC: 30 MMOL/L (ref 21–32)
CREAT SERPL-MCNC: 1.03 MG/DL (ref 0.7–1.3)
DIFFERENTIAL METHOD BLD: ABNORMAL
ECHO AO ROOT DIAM: 4 CM
ECHO AO ROOT INDEX: 1.71 CM/M2
ECHO AV AREA PEAK VELOCITY: 2.4 CM2
ECHO AV AREA PEAK VELOCITY: 2.4 CM2
ECHO AV AREA PEAK VELOCITY: 2.5 CM2
ECHO AV AREA PEAK VELOCITY: 2.5 CM2
ECHO AV AREA VTI: 2.3 CM2
ECHO AV AREA/BSA VTI: 1 CM2/M2
ECHO AV CUSP MM: 2.2 CM
ECHO AV MEAN GRADIENT: 4 MMHG
ECHO AV MEAN VELOCITY: 1 M/S
ECHO AV PEAK GRADIENT: 8 MMHG
ECHO AV PEAK GRADIENT: 8 MMHG
ECHO AV PEAK VELOCITY: 1.4 M/S
ECHO AV PEAK VELOCITY: 1.4 M/S
ECHO AV VTI: 30.6 CM
ECHO BSA: 2.41 M2
ECHO LA VOL A-L A2C: 54 ML (ref 18–58)
ECHO LA VOL A-L A4C: 78 ML (ref 18–58)
ECHO LA VOL MOD A2C: 51 ML (ref 18–58)
ECHO LA VOL MOD A4C: 74 ML (ref 18–58)
ECHO LA VOLUME AREA LENGTH: 68 ML
ECHO LA VOLUME INDEX A-L A2C: 23 ML/M2 (ref 16–34)
ECHO LA VOLUME INDEX A-L A4C: 33 ML/M2 (ref 16–34)
ECHO LA VOLUME INDEX AREA LENGTH: 29 ML/M2 (ref 16–34)
ECHO LA VOLUME INDEX MOD A2C: 22 ML/M2 (ref 16–34)
ECHO LA VOLUME INDEX MOD A4C: 32 ML/M2 (ref 16–34)
ECHO LV E' LATERAL VELOCITY: 12 CM/S
ECHO LV E' SEPTAL VELOCITY: 8 CM/S
ECHO LV EDV A2C: 70 ML
ECHO LV EDV A4C: 113 ML
ECHO LV EDV BP: 89 ML (ref 67–155)
ECHO LV EDV INDEX A4C: 48 ML/M2
ECHO LV EDV INDEX BP: 38 ML/M2
ECHO LV EDV NDEX A2C: 30 ML/M2
ECHO LV EJECTION FRACTION A2C: 56 %
ECHO LV EJECTION FRACTION A4C: 76 %
ECHO LV EJECTION FRACTION BIPLANE: 66 % (ref 55–100)
ECHO LV ESV A2C: 31 ML
ECHO LV ESV A4C: 27 ML
ECHO LV ESV BP: 30 ML (ref 22–58)
ECHO LV ESV INDEX A2C: 13 ML/M2
ECHO LV ESV INDEX A4C: 12 ML/M2
ECHO LV ESV INDEX BP: 13 ML/M2
ECHO LVOT AREA: 3.5 CM2
ECHO LVOT AV VTI INDEX: 0.65
ECHO LVOT DIAM: 2.1 CM
ECHO LVOT MEAN GRADIENT: 2 MMHG
ECHO LVOT PEAK GRADIENT: 4 MMHG
ECHO LVOT PEAK GRADIENT: 4 MMHG
ECHO LVOT PEAK VELOCITY: 1 M/S
ECHO LVOT PEAK VELOCITY: 1 M/S
ECHO LVOT STROKE VOLUME INDEX: 29.6 ML/M2
ECHO LVOT SV: 69.2 ML
ECHO LVOT VTI: 20 CM
ECHO MV A VELOCITY: 0.8 M/S
ECHO MV AREA VTI: 2.2 CM2
ECHO MV E DECELERATION TIME (DT): 271.4 MS
ECHO MV E VELOCITY: 0.76 M/S
ECHO MV E/A RATIO: 0.95
ECHO MV E/E' LATERAL: 6.33
ECHO MV E/E' RATIO (AVERAGED): 7.92
ECHO MV LVOT VTI INDEX: 1.6
ECHO MV MAX VELOCITY: 0.9 M/S
ECHO MV MEAN GRADIENT: 2 MMHG
ECHO MV MEAN VELOCITY: 0.6 M/S
ECHO MV PEAK GRADIENT: 3 MMHG
ECHO MV VTI: 31.9 CM
ECHO PV MAX VELOCITY: 0.9 M/S
ECHO PV PEAK GRADIENT: 3 MMHG
ECHO RV FREE WALL PEAK S': 103 CM/S
ECHO RV INTERNAL DIMENSION: 3.6 CM
ECHO RVOT PEAK GRADIENT: 2 MMHG
ECHO RVOT PEAK VELOCITY: 0.7 M/S
EOSINOPHIL # BLD: 0.3 K/UL (ref 0–0.4)
EOSINOPHIL NFR BLD: 4 % (ref 0–7)
ERYTHROCYTE [DISTWIDTH] IN BLOOD BY AUTOMATED COUNT: 15 % (ref 11.5–14.5)
GLUCOSE SERPL-MCNC: 140 MG/DL (ref 65–100)
HCT VFR BLD AUTO: 37.1 % (ref 36.6–50.3)
HGB BLD-MCNC: 12.2 G/DL (ref 12.1–17)
IMM GRANULOCYTES # BLD AUTO: 0 K/UL (ref 0–0.04)
IMM GRANULOCYTES NFR BLD AUTO: 0 % (ref 0–0.5)
LYMPHOCYTES # BLD: 2.9 K/UL (ref 0.8–3.5)
LYMPHOCYTES NFR BLD: 42 % (ref 12–49)
MAGNESIUM SERPL-MCNC: 2 MG/DL (ref 1.6–2.4)
MCH RBC QN AUTO: 29.5 PG (ref 26–34)
MCHC RBC AUTO-ENTMCNC: 32.9 G/DL (ref 30–36.5)
MCV RBC AUTO: 89.6 FL (ref 80–99)
MONOCYTES # BLD: 0.5 K/UL (ref 0–1)
MONOCYTES NFR BLD: 7 % (ref 5–13)
NEUTS SEG # BLD: 3.1 K/UL (ref 1.8–8)
NEUTS SEG NFR BLD: 46 % (ref 32–75)
NRBC # BLD: 0 K/UL (ref 0–0.01)
NRBC BLD-RTO: 0 PER 100 WBC
PHOSPHATE SERPL-MCNC: 4.4 MG/DL (ref 2.6–4.7)
PLATELET # BLD AUTO: 114 K/UL (ref 150–400)
PMV BLD AUTO: 10.3 FL (ref 8.9–12.9)
POTASSIUM SERPL-SCNC: 4.1 MMOL/L (ref 3.5–5.1)
RBC # BLD AUTO: 4.14 M/UL (ref 4.1–5.7)
SODIUM SERPL-SCNC: 137 MMOL/L (ref 136–145)
WBC # BLD AUTO: 6.8 K/UL (ref 4.1–11.1)

## 2023-12-28 PROCEDURE — G0378 HOSPITAL OBSERVATION PER HR: HCPCS

## 2023-12-28 PROCEDURE — 85025 COMPLETE CBC W/AUTO DIFF WBC: CPT

## 2023-12-28 PROCEDURE — 97161 PT EVAL LOW COMPLEX 20 MIN: CPT

## 2023-12-28 PROCEDURE — 80048 BASIC METABOLIC PNL TOTAL CA: CPT

## 2023-12-28 PROCEDURE — 6360000002 HC RX W HCPCS: Performed by: STUDENT IN AN ORGANIZED HEALTH CARE EDUCATION/TRAINING PROGRAM

## 2023-12-28 PROCEDURE — 96361 HYDRATE IV INFUSION ADD-ON: CPT

## 2023-12-28 PROCEDURE — 36415 COLL VENOUS BLD VENIPUNCTURE: CPT

## 2023-12-28 PROCEDURE — 97535 SELF CARE MNGMENT TRAINING: CPT

## 2023-12-28 PROCEDURE — 96372 THER/PROPH/DIAG INJ SC/IM: CPT

## 2023-12-28 PROCEDURE — 6370000000 HC RX 637 (ALT 250 FOR IP): Performed by: INTERNAL MEDICINE

## 2023-12-28 PROCEDURE — 83735 ASSAY OF MAGNESIUM: CPT

## 2023-12-28 PROCEDURE — 84100 ASSAY OF PHOSPHORUS: CPT

## 2023-12-28 PROCEDURE — 97165 OT EVAL LOW COMPLEX 30 MIN: CPT

## 2023-12-28 PROCEDURE — 2580000003 HC RX 258: Performed by: STUDENT IN AN ORGANIZED HEALTH CARE EDUCATION/TRAINING PROGRAM

## 2023-12-28 PROCEDURE — 97530 THERAPEUTIC ACTIVITIES: CPT

## 2023-12-28 PROCEDURE — 6360000004 HC RX CONTRAST MEDICATION: Performed by: STUDENT IN AN ORGANIZED HEALTH CARE EDUCATION/TRAINING PROGRAM

## 2023-12-28 PROCEDURE — C8929 TTE W OR WO FOL WCON,DOPPLER: HCPCS

## 2023-12-28 RX ORDER — 0.9 % SODIUM CHLORIDE 0.9 %
500 INTRAVENOUS SOLUTION INTRAVENOUS ONCE
Status: COMPLETED | OUTPATIENT
Start: 2023-12-28 | End: 2023-12-28

## 2023-12-28 RX ADMIN — PANTOPRAZOLE SODIUM 40 MG: 40 TABLET, DELAYED RELEASE ORAL at 08:05

## 2023-12-28 RX ADMIN — ENOXAPARIN SODIUM 30 MG: 100 INJECTION SUBCUTANEOUS at 21:37

## 2023-12-28 RX ADMIN — MECLIZINE 25 MG: 12.5 TABLET ORAL at 00:18

## 2023-12-28 RX ADMIN — SODIUM CHLORIDE, PRESERVATIVE FREE 10 ML: 5 INJECTION INTRAVENOUS at 21:37

## 2023-12-28 RX ADMIN — ALLOPURINOL 450 MG: 300 TABLET ORAL at 08:05

## 2023-12-28 RX ADMIN — MECLIZINE 25 MG: 12.5 TABLET ORAL at 18:31

## 2023-12-28 RX ADMIN — ROSUVASTATIN CALCIUM 40 MG: 20 TABLET, FILM COATED ORAL at 18:31

## 2023-12-28 RX ADMIN — SODIUM CHLORIDE, POTASSIUM CHLORIDE, SODIUM LACTATE AND CALCIUM CHLORIDE: 600; 310; 30; 20 INJECTION, SOLUTION INTRAVENOUS at 05:28

## 2023-12-28 RX ADMIN — MECLIZINE 25 MG: 12.5 TABLET ORAL at 13:36

## 2023-12-28 RX ADMIN — EZETIMIBE 10 MG: 10 TABLET ORAL at 08:05

## 2023-12-28 RX ADMIN — SODIUM CHLORIDE, PRESERVATIVE FREE 10 ML: 5 INJECTION INTRAVENOUS at 08:07

## 2023-12-28 RX ADMIN — SODIUM CHLORIDE 500 ML: 9 INJECTION, SOLUTION INTRAVENOUS at 14:33

## 2023-12-28 RX ADMIN — SODIUM CHLORIDE, POTASSIUM CHLORIDE, SODIUM LACTATE AND CALCIUM CHLORIDE: 600; 310; 30; 20 INJECTION, SOLUTION INTRAVENOUS at 15:43

## 2023-12-28 RX ADMIN — ASPIRIN 81 MG: 81 TABLET, COATED ORAL at 08:04

## 2023-12-28 RX ADMIN — ENOXAPARIN SODIUM 30 MG: 100 INJECTION SUBCUTANEOUS at 08:06

## 2023-12-28 RX ADMIN — PERFLUTREN 1.5 ML: 6.52 INJECTION, SUSPENSION INTRAVENOUS at 12:08

## 2023-12-28 RX ADMIN — ROSUVASTATIN CALCIUM 40 MG: 20 TABLET, FILM COATED ORAL at 00:17

## 2023-12-28 RX ADMIN — CLOPIDOGREL BISULFATE 75 MG: 75 TABLET ORAL at 08:05

## 2023-12-28 RX ADMIN — CYANOCOBALAMIN TAB 500 MCG 1000 MCG: 500 TAB at 08:05

## 2023-12-28 NOTE — PLAN OF CARE
Problem: Occupational Therapy - Adult  Goal: By Discharge: Performs self-care activities at highest level of function for planned discharge setting. See evaluation for individualized goals. Description: FUNCTIONAL STATUS PRIOR TO ADMISSION:    Receives Help From: Family, ADL Assistance: Needs assistance (needs assist B shoes and socks PTA \"cant bend over or lift them up\"),  ,  ,  ,  , Toileting: Independent,  , Ambulation Assistance: Independent, Transfer Assistance: Independent, Active : Yes     HOME SUPPORT: Patient lived with so who assisted with wound care and shoes/socks. Occupational Therapy Goals:  Initiated 12/28/2023  1. Patient will perform grooming in standing VSS with Modified Kidder within 7 day(s). 2.  Patient will perform upper body dressing with Modified Kidder within 7 day(s). 3.  Patient will perform lower body dressing with Modified Kidder with AE within 7 day(s). 4.  Patient will perform toilet transfers with Modified Kidder  within 7 day(s). 5.  Patient will perform all aspects of toileting with Modified Kidder within 7 day(s). 6.  Patient will participate in upper extremity therapeutic exercise/activities with Modified Kidder for 5 minutes within 7 day(s). 7.  Patient will utilize energy conservation, fall prevention, edema management techniques during functional activities with verbal cues within 7 day(s). Outcome: Progressing   OCCUPATIONAL THERAPY EVALUATION    Patient: Vanessa Elaine (64 y.o. male)  Date: 12/28/2023  Primary Diagnosis: Dehydration [E86.0]  Dizziness [R42]         Precautions: Contact Precautions, General Precautions, Fall Risk (R foot drop; R LE wounds; MRSA)                  ASSESSMENT :  The patient is limited by decreased functional mobility, independence in ADLs, high-level IADLs, ROM, strength, sensation, activity tolerance, safety awareness, coordination, balance.  Admitted with nausea, headache, not feeling nurses are the usual sources, but direct observation and common sense are also important. However direct testing is not needed.  5. Usually the patient's performance over the preceding 24-48 hours is important, but occasionally longer periods will be relevant.  6. Middle categories imply that the patient supplies over 50 per cent of the effort.  7. Use of aids to be independent is allowed.    Score Interpretation (from Gutierrez )    Independent   60-79 Minimally independent   40-59 Partially dependent   20-39 Very dependent   <20 Totally dependent     -Héctor James, Barthel, D.W. (1965). Functional evaluation: the Barthel Index. Md State Med J (142.  -SUKUMAR Whitley, SANTINO Plascencia (). The Barthel activities of daily living index: self-reporting versus actual performance in the old (> or = 75 years). Journal of American Geriatric Society 45(7), 832-836.   -VIKAS Pollock, MIRIAM Felix., Keo, J.A., Garrido, A.J. (1999). Measuring the change in disability after inpatient rehabilitation; comparison of the responsiveness of the Barthel Index and Functional [place] Measure. Journal of Neurology, Neurosurgery, and Psychiatry, 66(4), 480-484.  -Arthur Erazo, N.J.A, MIGUELITO Ballesteros.SABINO, & Zheng, M.A. (2004) Assessment of post-stroke quality of life in cost-effectiveness studies: The usefulness of the Barthel Index and the EuroQoL-5D. Quality of Life Research, 13, 427-43                                                                                                                                                                                                                                 Pain Ratin/10   Pain Intervention(s):   pain is at a level acceptable to the patient    Activity Tolerance:   Fair , requires rest breaks, and SpO2 stable on room air; had spell of HTN in ER; 120's SBP this session    After treatment:   Patient left in no apparent distress sitting up in chair, Call bell

## 2023-12-28 NOTE — WOUND CARE
Wound care consult for the right lower leg wound that was present on admission:  Chart reviewed and patient assessed. Pt. Is being cared for at the outpatient wound center by their team and Dr. Savanna Castañeda for \"a long time\". Pt. Has been pleased by the wound progress he is making with them. Pt. Is 77years old and he is admitted for neurological symptoms with dizziness / possible vertigo. Pt. Has peripheral neuropathy and fatty liver disease. He has had back surgery this past year and still has some pain with certain movements. Assessment: the wound on the right lower leg is pink with granulation and surrounding by heavily scarred skin. Treatment today and orders written for the same:  Cleanse the wound on the right leg with NS and wipe with gauze. Apply the Therahoney sheet to the wound folded in half and then apply a large foam dressing and then his compression tubigrip that he owns. Date and time the dressing with a piece of tape. Change the dressing Every other day while hospitalized.

## 2023-12-28 NOTE — PLAN OF CARE
Problem: Physical Therapy - Adult  Goal: By Discharge: Performs mobility at highest level of function for planned discharge setting.  See evaluation for individualized goals.  Description: FUNCTIONAL STATUS PRIOR TO ADMISSION: Patient was modified independent using a single point cane for functional mobility.    HOME SUPPORT PRIOR TO ADMISSION: The patient lived with  who assists with ADLs and wound care.    Physical Therapy Goals  Initiated 12/28/2023  1.  Patient will move from supine to sit and sit to supine in bed with modified independence within 7 day(s).    2.  Patient will perform sit to stand with modified independence within 7 day(s).  3.  Patient will transfer from bed to chair and chair to bed with modified independence using the least restrictive device within 7 day(s).  4.  Patient will ambulate with modified independence for 100 feet with the least restrictive device within 7 day(s).   5.  Patient will ascend/descend 3 stairs with B handrail(s) with modified independence within 7 day(s).   Outcome: Progressing     PHYSICAL THERAPY EVALUATION    Patient: Vipul Castro (68 y.o. male)  Date: 12/28/2023  Primary Diagnosis: Dehydration [E86.0]  Dizziness [R42]       Precautions: Contact Precautions, General Precautions, Fall Risk (R foot drop; R LE wounds; MRSA)                    ASSESSMENT :   DEFICITS/IMPAIRMENTS:   The patient is limited by decreased functional mobility, strength, body mechanics, activity tolerance, endurance, safety awareness, balance, posture. Pt is a 69 y/o male who was admitted to the hospital due to nausea. Pt is being treated for dehydration. Patient educated on the purpose and benefits of skilled PT and goals to be addressed with pt verbalizing good understanding.  Pt received laying in bed and agreeable to activity. Pt directed in supine to sit with CGA and use of bed rail. Pt reports he uses headboard at home for bed mobility. RW adjusted to appropriate height. Pt  [id number]. PMID: 17398012. [address] [facility],[address], [place] GUTIERREZ, Alexander Diaz, Rishi PERDUE. Activity Measure for Post-Acute Care \"6-Clicks\" Basic Mobility Scores Predict Discharge Destination After Acute Care Hospitalization in Select Patient Groups: A Retrospective, Observational Study. Arch Rehabil Res Clin Transl. 2022 Jul 16;4(3):938914. doi: 10.1016/j.arrct. 8874.173604. PMID: 11554065; PMCID: [id number]. 4. Amanda Pal Ni P. AM-PAC Short Forms Manual 4.0. Revised 2/2020.                                                                                                                                                                                                                                Pain Rating:  Reports stinging pain intermittently in R lower leg  Pain Intervention(s):   rest    Activity Tolerance:   Fair     After treatment:   Patient left in no apparent distress sitting up in chair, Call bell within reach, and Bed/ chair alarm activated    COMMUNICATION/EDUCATION:   The patient's plan of care was discussed with: occupational therapist and registered nurse    Patient Education  Education Given To: Patient  Education Provided: Role of Therapy;Plan of Care;Transfer Training  Education Method: Verbal  Barriers to Learning: None  Education Outcome: Verbalized understanding    Thank you for this referral.  Abbey Kraft, PT  Minutes: 29      Physical Therapy Evaluation Charge Determination   History Examination Presentation Decision-Making   LOW Complexity : Zero comorbidities / personal factors that will impact the outcome / POC LOW Complexity : 1-2 Standardized tests and measures addressing body structure, function, activity limitation and / or participation in recreation  LOW Complexity : Stable, uncomplicated  AM-PAC  LOW    Based on the above components, the patient evaluation is determined to be of the following complexity level: Low

## 2023-12-29 PROBLEM — I95.1 ORTHOSTATIC HYPOTENSION: Status: ACTIVE | Noted: 2023-12-29

## 2023-12-29 LAB
ANION GAP SERPL CALC-SCNC: 9 MMOL/L (ref 5–15)
BASOPHILS # BLD: NORMAL K/UL (ref 0–0.1)
BASOPHILS NFR BLD: NORMAL % (ref 0–1)
BUN SERPL-MCNC: 6 MG/DL (ref 6–20)
BUN/CREAT SERPL: 11 (ref 12–20)
CALCIUM SERPL-MCNC: 7.6 MG/DL (ref 8.5–10.1)
CHLORIDE SERPL-SCNC: 106 MMOL/L (ref 97–108)
CO2 SERPL-SCNC: 22 MMOL/L (ref 21–32)
CREAT SERPL-MCNC: 0.53 MG/DL (ref 0.7–1.3)
DIFFERENTIAL METHOD BLD: NORMAL
EOSINOPHIL # BLD: NORMAL K/UL (ref 0–0.4)
EOSINOPHIL NFR BLD: NORMAL % (ref 0–7)
ERYTHROCYTE [DISTWIDTH] IN BLOOD BY AUTOMATED COUNT: 14.7 % (ref 11.5–14.5)
ERYTHROCYTE [DISTWIDTH] IN BLOOD BY AUTOMATED COUNT: NORMAL % (ref 11.5–14.5)
GLUCOSE SERPL-MCNC: 94 MG/DL (ref 65–100)
HCT VFR BLD AUTO: 36.3 % (ref 36.6–50.3)
HCT VFR BLD AUTO: NORMAL % (ref 36.6–50.3)
HGB BLD-MCNC: 12 G/DL (ref 12.1–17)
HGB BLD-MCNC: NORMAL G/DL (ref 12.1–17)
IMM GRANULOCYTES # BLD AUTO: NORMAL K/UL (ref 0–0.04)
IMM GRANULOCYTES NFR BLD AUTO: NORMAL % (ref 0–0.5)
LYMPHOCYTES # BLD: NORMAL K/UL (ref 0.8–3.5)
LYMPHOCYTES NFR BLD: NORMAL % (ref 12–49)
MAGNESIUM SERPL-MCNC: 1.2 MG/DL (ref 1.6–2.4)
MCH RBC QN AUTO: 29.6 PG (ref 26–34)
MCH RBC QN AUTO: NORMAL PG (ref 26–34)
MCHC RBC AUTO-ENTMCNC: 33.1 G/DL (ref 30–36.5)
MCHC RBC AUTO-ENTMCNC: NORMAL G/DL (ref 30–36.5)
MCV RBC AUTO: 89.4 FL (ref 80–99)
MCV RBC AUTO: NORMAL FL (ref 80–99)
MONOCYTES # BLD: NORMAL K/UL (ref 0–1)
MONOCYTES NFR BLD: NORMAL % (ref 5–13)
NEUTS SEG # BLD: NORMAL K/UL (ref 1.8–8)
NEUTS SEG NFR BLD: NORMAL % (ref 32–75)
NRBC # BLD: 0 K/UL (ref 0–0.01)
NRBC # BLD: NORMAL K/UL (ref 0–0.01)
NRBC BLD-RTO: 0 PER 100 WBC
NRBC BLD-RTO: NORMAL PER 100 WBC
PHOSPHATE SERPL-MCNC: 3 MG/DL (ref 2.6–4.7)
PLATELET # BLD AUTO: 106 K/UL (ref 150–400)
PLATELET # BLD AUTO: NORMAL K/UL (ref 150–400)
PMV BLD AUTO: 10.8 FL (ref 8.9–12.9)
PMV BLD AUTO: NORMAL FL (ref 8.9–12.9)
POTASSIUM SERPL-SCNC: 3.9 MMOL/L (ref 3.5–5.1)
RBC # BLD AUTO: 4.06 M/UL (ref 4.1–5.7)
RBC # BLD AUTO: NORMAL M/UL (ref 4.1–5.7)
RBC MORPH BLD: NORMAL
RBC MORPH BLD: NORMAL
SODIUM SERPL-SCNC: 137 MMOL/L (ref 136–145)
WBC # BLD AUTO: 6.4 K/UL (ref 4.1–11.1)
WBC # BLD AUTO: NORMAL K/UL (ref 4.1–11.1)

## 2023-12-29 PROCEDURE — G0378 HOSPITAL OBSERVATION PER HR: HCPCS

## 2023-12-29 PROCEDURE — 97116 GAIT TRAINING THERAPY: CPT

## 2023-12-29 PROCEDURE — 6360000002 HC RX W HCPCS: Performed by: STUDENT IN AN ORGANIZED HEALTH CARE EDUCATION/TRAINING PROGRAM

## 2023-12-29 PROCEDURE — 96365 THER/PROPH/DIAG IV INF INIT: CPT

## 2023-12-29 PROCEDURE — 2580000003 HC RX 258: Performed by: STUDENT IN AN ORGANIZED HEALTH CARE EDUCATION/TRAINING PROGRAM

## 2023-12-29 PROCEDURE — 6370000000 HC RX 637 (ALT 250 FOR IP): Performed by: INTERNAL MEDICINE

## 2023-12-29 PROCEDURE — 96372 THER/PROPH/DIAG INJ SC/IM: CPT

## 2023-12-29 PROCEDURE — 1100000000 HC RM PRIVATE

## 2023-12-29 PROCEDURE — 85027 COMPLETE CBC AUTOMATED: CPT

## 2023-12-29 PROCEDURE — 80048 BASIC METABOLIC PNL TOTAL CA: CPT

## 2023-12-29 PROCEDURE — 83735 ASSAY OF MAGNESIUM: CPT

## 2023-12-29 PROCEDURE — 36415 COLL VENOUS BLD VENIPUNCTURE: CPT

## 2023-12-29 PROCEDURE — 2500000003 HC RX 250 WO HCPCS: Performed by: STUDENT IN AN ORGANIZED HEALTH CARE EDUCATION/TRAINING PROGRAM

## 2023-12-29 PROCEDURE — 84100 ASSAY OF PHOSPHORUS: CPT

## 2023-12-29 RX ORDER — MAGNESIUM SULFATE HEPTAHYDRATE 40 MG/ML
2000 INJECTION, SOLUTION INTRAVENOUS ONCE
Status: COMPLETED | OUTPATIENT
Start: 2023-12-29 | End: 2023-12-29

## 2023-12-29 RX ADMIN — MECLIZINE 25 MG: 12.5 TABLET ORAL at 05:52

## 2023-12-29 RX ADMIN — SODIUM CHLORIDE, POTASSIUM CHLORIDE, SODIUM LACTATE AND CALCIUM CHLORIDE: 600; 310; 30; 20 INJECTION, SOLUTION INTRAVENOUS at 18:25

## 2023-12-29 RX ADMIN — MAGNESIUM SULFATE HEPTAHYDRATE 2000 MG: 40 INJECTION, SOLUTION INTRAVENOUS at 09:44

## 2023-12-29 RX ADMIN — SODIUM CHLORIDE, PRESERVATIVE FREE 10 ML: 5 INJECTION INTRAVENOUS at 09:47

## 2023-12-29 RX ADMIN — ASPIRIN 81 MG: 81 TABLET, COATED ORAL at 09:38

## 2023-12-29 RX ADMIN — ENOXAPARIN SODIUM 30 MG: 100 INJECTION SUBCUTANEOUS at 20:32

## 2023-12-29 RX ADMIN — MECLIZINE 25 MG: 12.5 TABLET ORAL at 14:37

## 2023-12-29 RX ADMIN — ROSUVASTATIN CALCIUM 40 MG: 20 TABLET, FILM COATED ORAL at 18:19

## 2023-12-29 RX ADMIN — ALLOPURINOL 450 MG: 300 TABLET ORAL at 09:48

## 2023-12-29 RX ADMIN — MECLIZINE 25 MG: 12.5 TABLET ORAL at 18:19

## 2023-12-29 RX ADMIN — CYANOCOBALAMIN TAB 500 MCG 1000 MCG: 500 TAB at 09:38

## 2023-12-29 RX ADMIN — CLOPIDOGREL BISULFATE 75 MG: 75 TABLET ORAL at 09:39

## 2023-12-29 RX ADMIN — MECLIZINE 25 MG: 12.5 TABLET ORAL at 00:26

## 2023-12-29 RX ADMIN — EZETIMIBE 10 MG: 10 TABLET ORAL at 09:39

## 2023-12-29 RX ADMIN — ENOXAPARIN SODIUM 30 MG: 100 INJECTION SUBCUTANEOUS at 09:39

## 2023-12-29 RX ADMIN — PANTOPRAZOLE SODIUM 40 MG: 40 TABLET, DELAYED RELEASE ORAL at 05:52

## 2023-12-29 RX ADMIN — SODIUM CHLORIDE, POTASSIUM CHLORIDE, SODIUM LACTATE AND CALCIUM CHLORIDE: 600; 310; 30; 20 INJECTION, SOLUTION INTRAVENOUS at 00:29

## 2023-12-29 RX ADMIN — SODIUM CHLORIDE, PRESERVATIVE FREE 10 ML: 5 INJECTION INTRAVENOUS at 20:32

## 2023-12-29 NOTE — FLOWSHEET NOTE
12/29/23 1402   Vital Signs   Orthostatic B/P and Pulse?  Yes   Blood Pressure Lying 137/68   Pulse Lying 53 PER MINUTE   Blood Pressure Sitting 146/67   Pulse Sitting 56 PER MINUTE   Blood Pressure Standing 143/67   Pulse Standing 61 PER MINUTE         orthostatics

## 2023-12-29 NOTE — CONSULTS
Consult    NAME: Leidy Ruano   :  1955   MRN:  [id number]     Date/Time:  2023 10:56 AM    Patient PCP: Nahid Gabriel, DO  ________________________________________________________________________     Assessment:     Dizziness  Orthostatic hypotension    Background problems:  1. CAD s/p PCI/stent of left circumflex  with mild disease in LAD and RCA; Jyotsna scan Cardiolite stress showed small zone of apical ischemia possibly consistent with RCA ischemia, cardiac cath showed severe multivessel CAD s/p 3V CABG using LIMA to LAD, SVG to OM1 and PDA on 3/2/18. 2. Strong family history for coronary disease. 3. Previous heavy smoker, quit in .   4. Hyperlipidemia. 5. Hyperuricemia and gout. 6. Esophageal reflux. 7. Degenerative arthritis including previous lumbar disc problems. 8. RLE soft tissue infection/cellulitis/myositis complicated by septic shock and rhabdomyolysis 2020.  9. Right leg ischemia with tissue loss s/p right femoral endarterectomy with bovine patch and angioplasty of his right SFA with extensive debridement of right lower leg 2020 by Dr. Ria Jaramillo. 10. Chronic RBBB. 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 Dr. Abhinav Tran. Has an appt 24. Plan:   Echo with EF 55-60% and mild RV dysfxn. Moderate GARY stenosis  Mild LICA stenosis    Brain MRI unremarkable    Mild RV dysfxn can be followed up as an OUTPATIENT with his usual Cardiologist on 24. He needs a sleep study. Continue home meds  Will be available as needed    Thank you for this consult and allowing me to take part in this patients care. Please call with questions. [x]        High complexity decision making was performed        Subjective:   CHIEF COMPLAINT: LH    HISTORY OF PRESENT ILLNESS:     Libertad Escalante is a 77 y.o.  male who \"presents emergency department complaining of lightheadedness that began this yesterday morning.   Patient states that he woke up this

## 2023-12-29 NOTE — PLAN OF CARE
Problem: Physical Therapy - Adult  Goal: By Discharge: Performs mobility at highest level of function for planned discharge setting. See evaluation for individualized goals. Description: FUNCTIONAL STATUS PRIOR TO ADMISSION: Patient was modified independent using a single point cane for functional mobility. HOME SUPPORT PRIOR TO ADMISSION: The patient lived with  who assists with ADLs and wound care. Physical Therapy Goals  Initiated 12/28/2023  1. Patient will move from supine to sit and sit to supine in bed with modified independence within 7 day(s). 2.  Patient will perform sit to stand with modified independence within 7 day(s). 3.  Patient will transfer from bed to chair and chair to bed with modified independence using the least restrictive device within 7 day(s). 4.  Patient will ambulate with modified independence for 100 feet with the least restrictive device within 7 day(s). 5.  Patient will ascend/descend 3 stairs with B handrail(s) with modified independence within 7 day(s). Outcome: Progressing   PHYSICAL THERAPY TREATMENT    Patient: Digna Matias (68 y.o. male)  Date: 12/29/2023  Diagnosis: Dehydration [E86.0]  Dizziness [R42]  Orthostatic hypotension [I95.1] Dizziness      Precautions: Contact Precautions, General Precautions, Fall Risk (R foot drop; R LE wounds; MRSA)                    ASSESSMENT:  Patient continues to benefit from skilled PT services and is slowly progressing towards goals. He ambulates with CGA increased distance with use of RW. VC are provided for safe hand placement for sit<>stand. Patient demonstrates no overt LOB and does not trip over R drop foot. He demonstrates R steppage gait to clear the LE. PLAN:  Patient continues to benefit from skilled intervention to address the above impairments. Continue treatment per established plan of care.     Recommendation for discharge: (in order for the patient to meet his/her long term goals):

## 2023-12-30 VITALS
HEIGHT: 71 IN | DIASTOLIC BLOOD PRESSURE: 77 MMHG | HEART RATE: 58 BPM | SYSTOLIC BLOOD PRESSURE: 136 MMHG | BODY MASS INDEX: 35.7 KG/M2 | OXYGEN SATURATION: 94 % | TEMPERATURE: 98.2 F | WEIGHT: 255 LBS | RESPIRATION RATE: 16 BRPM

## 2023-12-30 LAB
ANION GAP SERPL CALC-SCNC: 5 MMOL/L (ref 5–15)
BASOPHILS # BLD: 0 K/UL (ref 0–0.1)
BASOPHILS NFR BLD: 1 % (ref 0–1)
BUN SERPL-MCNC: 7 MG/DL (ref 6–20)
BUN/CREAT SERPL: 8 (ref 12–20)
CALCIUM SERPL-MCNC: 8.7 MG/DL (ref 8.5–10.1)
CHLORIDE SERPL-SCNC: 105 MMOL/L (ref 97–108)
CO2 SERPL-SCNC: 28 MMOL/L (ref 21–32)
CREAT SERPL-MCNC: 0.86 MG/DL (ref 0.7–1.3)
DIFFERENTIAL METHOD BLD: ABNORMAL
EOSINOPHIL # BLD: 0.4 K/UL (ref 0–0.4)
EOSINOPHIL NFR BLD: 6 % (ref 0–7)
ERYTHROCYTE [DISTWIDTH] IN BLOOD BY AUTOMATED COUNT: 14.7 % (ref 11.5–14.5)
GLUCOSE SERPL-MCNC: 130 MG/DL (ref 65–100)
HCT VFR BLD AUTO: 35.5 % (ref 36.6–50.3)
HGB BLD-MCNC: 11.6 G/DL (ref 12.1–17)
IMM GRANULOCYTES # BLD AUTO: 0 K/UL (ref 0–0.04)
IMM GRANULOCYTES NFR BLD AUTO: 0 % (ref 0–0.5)
LYMPHOCYTES # BLD: 2.7 K/UL (ref 0.8–3.5)
LYMPHOCYTES NFR BLD: 43 % (ref 12–49)
MAGNESIUM SERPL-MCNC: 2.1 MG/DL (ref 1.6–2.4)
MCH RBC QN AUTO: 29.4 PG (ref 26–34)
MCHC RBC AUTO-ENTMCNC: 32.7 G/DL (ref 30–36.5)
MCV RBC AUTO: 90.1 FL (ref 80–99)
MONOCYTES # BLD: 0.4 K/UL (ref 0–1)
MONOCYTES NFR BLD: 6 % (ref 5–13)
NEUTS SEG # BLD: 2.9 K/UL (ref 1.8–8)
NEUTS SEG NFR BLD: 45 % (ref 32–75)
NRBC # BLD: 0 K/UL (ref 0–0.01)
NRBC BLD-RTO: 0 PER 100 WBC
PHOSPHATE SERPL-MCNC: 4.6 MG/DL (ref 2.6–4.7)
PLATELET # BLD AUTO: 114 K/UL (ref 150–400)
PMV BLD AUTO: 10.3 FL (ref 8.9–12.9)
POTASSIUM SERPL-SCNC: 3.9 MMOL/L (ref 3.5–5.1)
RBC # BLD AUTO: 3.94 M/UL (ref 4.1–5.7)
SODIUM SERPL-SCNC: 138 MMOL/L (ref 136–145)
WBC # BLD AUTO: 6.4 K/UL (ref 4.1–11.1)

## 2023-12-30 PROCEDURE — 83735 ASSAY OF MAGNESIUM: CPT

## 2023-12-30 PROCEDURE — 6370000000 HC RX 637 (ALT 250 FOR IP): Performed by: INTERNAL MEDICINE

## 2023-12-30 PROCEDURE — 84100 ASSAY OF PHOSPHORUS: CPT

## 2023-12-30 PROCEDURE — 6360000002 HC RX W HCPCS: Performed by: STUDENT IN AN ORGANIZED HEALTH CARE EDUCATION/TRAINING PROGRAM

## 2023-12-30 PROCEDURE — 36415 COLL VENOUS BLD VENIPUNCTURE: CPT

## 2023-12-30 PROCEDURE — 85025 COMPLETE CBC W/AUTO DIFF WBC: CPT

## 2023-12-30 PROCEDURE — 2580000003 HC RX 258: Performed by: STUDENT IN AN ORGANIZED HEALTH CARE EDUCATION/TRAINING PROGRAM

## 2023-12-30 PROCEDURE — 80048 BASIC METABOLIC PNL TOTAL CA: CPT

## 2023-12-30 RX ADMIN — SODIUM CHLORIDE, PRESERVATIVE FREE 10 ML: 5 INJECTION INTRAVENOUS at 09:52

## 2023-12-30 RX ADMIN — CLOPIDOGREL BISULFATE 75 MG: 75 TABLET ORAL at 09:52

## 2023-12-30 RX ADMIN — MECLIZINE 25 MG: 12.5 TABLET ORAL at 05:03

## 2023-12-30 RX ADMIN — ASPIRIN 81 MG: 81 TABLET, COATED ORAL at 09:51

## 2023-12-30 RX ADMIN — PANTOPRAZOLE SODIUM 40 MG: 40 TABLET, DELAYED RELEASE ORAL at 09:52

## 2023-12-30 RX ADMIN — SODIUM CHLORIDE, POTASSIUM CHLORIDE, SODIUM LACTATE AND CALCIUM CHLORIDE: 600; 310; 30; 20 INJECTION, SOLUTION INTRAVENOUS at 05:02

## 2023-12-30 RX ADMIN — EZETIMIBE 10 MG: 10 TABLET ORAL at 09:51

## 2023-12-30 RX ADMIN — ALLOPURINOL 450 MG: 300 TABLET ORAL at 09:51

## 2023-12-30 RX ADMIN — CYANOCOBALAMIN TAB 500 MCG 1000 MCG: 500 TAB at 09:52

## 2023-12-30 RX ADMIN — MECLIZINE 25 MG: 12.5 TABLET ORAL at 12:32

## 2023-12-30 RX ADMIN — MECLIZINE 25 MG: 12.5 TABLET ORAL at 00:05

## 2023-12-30 RX ADMIN — ENOXAPARIN SODIUM 30 MG: 100 INJECTION SUBCUTANEOUS at 09:53

## 2023-12-30 NOTE — DISCHARGE INSTRUCTIONS
You were treated for dizziness and found to be dehydrated. Your symptoms improved with fluids. Followup with your primary care doctor. Discuss your blood pressure and ask them to check a complete blood count to look at your hemoglobin and platelets. Please take the medications as listed in this paperwork.

## 2023-12-30 NOTE — PLAN OF CARE
Problem: Discharge Planning  Goal: Discharge to home or other facility with appropriate resources  Outcome: Adequate for Discharge  Flowsheets (Taken 12/30/2023 4093)  Discharge to home or other facility with appropriate resources: Identify barriers to discharge with patient and caregiver     Problem: Pain  Goal: Verbalizes/displays adequate comfort level or baseline comfort level  Outcome: Adequate for Discharge     Problem: Safety - Adult  Goal: Free from fall injury  Outcome: Adequate for Discharge  Flowsheets (Taken 12/30/2023 7107)  Free From Fall Injury: Instruct family/caregiver on patient safety     Problem: Skin/Tissue Integrity  Goal: Absence of new skin breakdown  Outcome: Adequate for Discharge

## 2023-12-30 NOTE — CARE COORDINATION
Pt is clear from CM standpoint from d/c.    Pt's family will transport. Transition of Care Plan:    RUR:  14%  Prior Level of Functioning: Independent   Disposition: [facility]   If SNF or IPR: Date FOC offered:   Date FOC received:   Accepting facility:   Date authorization started with reference number:   Date authorization received and expires: Follow up appointments: PCP   DME needed: No DME needed   Transportation at discharge: Pt's family   IM/IMM Medicare/ letter given: Given on 12/30/23  Is patient a  and connected with VA? no   If yes, was [employer] transfer form completed and VA notified? no  Caregiver Contact:   Discharge Caregiver contacted prior to discharge? Pt was contacted   Care Conference needed? No  Barriers to discharge: None         12/30/23 0936   Services At/After Discharge   Transition of Care Consult (CM Consult) [address] [place] [address] Resource Information Provided? No   Mode of Transport at Discharge Self   Confirm Follow Up Transport Self   Condition of Participation: Discharge Planning   The Plan for Transition of Care is related to the following treatment goals: Home with home health services- At [address] [place] [address]   The Patient and/or Patient Representative was provided with a Choice of Provider? Patient   The Patient and/Or Patient Representative agree with the Discharge Plan? Yes   Freedom of Choice list was provided with basic dialogue that supports the patient's individualized plan of care/goals, treatment preferences, and shares the quality data associated with the providers?   Yes     Nava Yu

## 2023-12-30 NOTE — DISCHARGE SUMMARY
Discharge Summary    Name: Leidy Ruano  [id number]  YOB: 1955 (Age: 77 y.o.)   Date of Admission: 12/26/2023  Date of Discharge: 12/30/2023  Attending Physician: Dr. Aline Jimenez    Discharge Diagnosis:     Dizziness-   Dehydration  Possible vertigo   Orthostatic hypotension  CAD  GERD  Anxiety   Arthritis   Hypertension   Hyperlipidemia   Thrombocytopenia   Fatty liver   Back surgery in 2023 -rt foot drop   Peripheral neuropathy   Chronic wound in left leg following with PMD     Consultations:  IP CONSULT TO NEUROLOGY  IP WOUND CARE NURSE CONSULT TO EVAL  IP CONSULT TO CARDIOLOGY  IP CONSULT TO CASE MANAGEMENT      Brief Admission History/Reason for Admission Per Risa Mujica MD:     Leidy Ruano is a 77 y.o. male with past medical history as reviewed below who presents emergency department complaining of lightheadedness that began this yesterday morning. Patient states that he woke up this morning, attempted to walk with his cane to the bathroom and had a sudden onset of this lightness like he was going pass out. Denies any actual syncopal episodes. Notes of associated nausea, and generalized myalgias. Denies any fever, vomiting, chest pain, shortness of breath, palpitations, diarrhea, urinary symptoms or any other symptoms complaints. Patient states that he has felt similarly when he was dehydrated in the past.   We were asked to admit for work up and evaluation of the above problems.      Brief Hospital Course by Main Problems:     Dizziness- improved  Dehydration -decreased per oral intake since few days   Possible vertigo - improved  Orthostatic hypotension  Tinnitus absent, hearing not impaired   CT head -chronic microvascular changes   cerebral signs absent except ,gait assessment pending   Less likely cardiac syncope   Orthostatic hypotension to rule out   Troponin negative  EKG noted for bifascicular block ,triple cardiac bypass in 2018  - Ordered

## 2024-01-02 ENCOUNTER — TELEPHONE (OUTPATIENT)
Dept: PRIMARY CARE CLINIC | Facility: CLINIC | Age: 69
End: 2024-01-02

## 2024-01-02 RX ORDER — ALLOPURINOL 300 MG/1
TABLET ORAL
Qty: 45 TABLET | Refills: 0 | Status: SHIPPED | OUTPATIENT
Start: 2024-01-02

## 2024-01-02 NOTE — TELEPHONE ENCOUNTER
Bertha from [facility] is calling to get verbal order for Physical Therapy. Please call [phone] ext [phone]. Patient [facility] 12/26/23

## 2024-01-02 NOTE — PROGRESS NOTES
0700 Bedside shift report received from [address]. Report included the following information Nurse Handoff Report, MAR, Telemetry status, Recent Results, and plan of care. This RN verbalized understanding of plan of care with opportunity for clarification and questions. Care of patient assumed at this time. Dual skin check performed at this time. 1900 Bedside shift report given to [address]. Report included the following information Nurse Handoff Report, MAR, Telemetry status, Recent Results, and plan of care. Oncoming RN verbalized understanding of plan of care with opportunity for clarification and questions. Dual skin check performed at this time.
End of Shift Note    Bedside shift change report given to Theodora Villegas (oncoming nurse) by Tresa Alicea RN (offgoing nurse). Report included the following information SBAR, Kardex, and MAR    Shift worked:  7pm to 7am     Shift summary and any significant changes:     Patient tolerated care fairly throughout shift. Hourly rounding completed. Meds given and education provided regarding all meds. Patient up to the side of the bed. Legs elevated. No complaints of pain. Labs completed.         Tresa Alicea RN
Hospitalist Progress Note    NAME:   Harman Steawrt   : 1955   MRN: [id number]     Date/Time: 2023 4:38 PM  Patient PCP: Valeria Isaac DO    Estimated discharge date:   Barriers: orthostasis improvement      Assessment / Plan:    Dizziness- improved  Dehydration -decreased per oral intake since few days   Possible vertigo - improved  Orthostatic hypotension  Tinnitus absent, hearing not impaired   CT head -chronic microvascular changes   cerebral signs absent except ,gait assessment pending   Less likely cardiac syncope   Orthostatic hypotension to rule out   Troponin negative  EKG noted for bifascicular block ,triple cardiac bypass in 2018  - Ordered orthostatic vitals   -Tele monitoring   -MRI brain no acute findings  -Neurology consult  - carotid duplex showing moderate stenosis in R, mild in L  - echo showing normal LVEF, mildly dilated RV with mildly reduced systolic function  - positive orthostatics , continue IV hydration, michael hose  - PT reccs HHPT     CAD  GERD  Anxiety   Arthritis   Hypertension   Hyperlipidemia   Thrombocytopenia   Fatty liver   Back surgery in  -rt foot drop   Peripheral neuropathy   Chronic wound in left leg following with PMD   -Continue with home medications        Medical Decision Making:   I personally reviewed labs: cbc bmp  I personally reviewed imaging: echo, MRI brain  I personally reviewed EKG:   Toxic drug monitoring: monitor renal function with saline infusion  Discussed case with: RN CM        Code Status: Full   DVT Prophylaxis: Lovenox   Baseline:     Subjective:     Chief Complaint / Reason for Physician Visit  \"Followup dizziness\". Discussed with RN events overnight. Dizziness resolved, feels ok today. Objective:     VITALS:   Last 24hrs VS reviewed since prior progress note.  Most recent are:  Patient Vitals for the past 24 hrs:   BP Temp Temp src Pulse Resp SpO2 Height Weight   23 1610 128/65 -- Oral 56 16 97 % -- --
Hospitalist Progress Note    NAME:   Lance Stuart   : 1955   MRN: [id number]     Date/Time: 2023 8:31 PM  Patient PCP: Candace Gonzalez DO    Estimated discharge date:   Barriers: securing home health      Assessment / Plan:    Dizziness- improved  Dehydration -decreased per oral intake since few days   Possible vertigo - improved  Orthostatic hypotension  Tinnitus absent, hearing not impaired   CT head -chronic microvascular changes   cerebral signs absent except ,gait assessment pending   Less likely cardiac syncope   Orthostatic hypotension to rule out   Troponin negative  EKG noted for bifascicular block ,triple cardiac bypass in   - Ordered orthostatic vitals   -Tele monitoring   -MRI brain no acute findings  -Neurology consult  - carotid duplex showing moderate stenosis in R, mild in L  - echo showing normal LVEF, mildly dilated RV with mildly reduced systolic function  - Cardiology consulted, appreciate assistance  - positive orthostatics , continue IV hydration, michael hose  - orthostatics improved  - PT reccs HHPT, will discuss with CM tomorrow  - needs outpatient sleep study     CAD  GERD  Anxiety   Arthritis   Hypertension   Hyperlipidemia   Thrombocytopenia   Fatty liver   Back surgery in  -rt foot drop   Peripheral neuropathy   Chronic wound in left leg following with PMD   -Continue with home medications        Medical Decision Making:   I personally reviewed labs: cbc bmp  I personally reviewed imaging:   I personally reviewed EKG:   Toxic drug monitoring:  monitor hgb for anemia and bleeding from lovenox toxicity  Discussed case with: RN CM        Code Status: Full   DVT Prophylaxis: Lovenox   Baseline:     Subjective:     Chief Complaint / Reason for Physician Visit  \"Followup dizziness\". Discussed with RN events overnight. No complaints today, wants to set up HHPT with CM tomorrow. Objective:     VITALS:   Last 24hrs VS reviewed since prior progress note.
TRANSFER - IN REPORT:    Verbal report received from [id number] [facility], RN on [facility]  being received from ED for routine progression of patient care      Report consisted of patient's Situation, Background, Assessment and   Recommendations(SBAR). Information from the following report(s) ED SBAR was reviewed with the receiving nurse. Opportunity for questions and clarification was provided. Assessment completed upon patient's arrival to unit and care assumed. 1850 Patient arrives to unit at this time. W.C- NS tonio- xeroform, gauze, ABD, x2 compression      1900   Bedside shift report given to Bert Norton RN. Report included the following information Nurse Handoff Report, MAR, Telemetry status, Recent Results, and plan of care. Oncoming RN verbalized understanding of plan of care with opportunity for clarification and questions. Dual skin check performed at this time.
Reviewer    PROVIDER RESPONSE TEXT:    This patient has dizziness due to dehydration.    Query created by: GREYSON MATUTE on 1/2/2024 10:37 AM      Electronically signed by:  David L Mendel MD 1/2/2024 6:43 PM

## 2024-01-03 ENCOUNTER — HOSPITAL ENCOUNTER (OUTPATIENT)
Facility: HOSPITAL | Age: 69
Discharge: HOME | End: 2024-01-03
Attending: SURGERY
Payer: MEDICARE

## 2024-01-03 VITALS
HEART RATE: 73 BPM | SYSTOLIC BLOOD PRESSURE: 164 MMHG | TEMPERATURE: 97.5 F | DIASTOLIC BLOOD PRESSURE: 76 MMHG | RESPIRATION RATE: 16 BRPM

## 2024-01-03 DIAGNOSIS — L97.912 NON-PRESSURE CHRONIC ULCER OF RIGHT LOWER LEG, WITH FAT LAYER EXPOSED (HCC): Primary | ICD-10-CM

## 2024-01-03 PROCEDURE — 99213 OFFICE O/P EST LOW 20 MIN: CPT | Performed by: SURGERY

## 2024-01-03 PROCEDURE — 99213 OFFICE O/P EST LOW 20 MIN: CPT

## 2024-01-03 RX ORDER — LIDOCAINE HYDROCHLORIDE 40 MG/ML
SOLUTION TOPICAL ONCE
OUTPATIENT
Start: 2024-01-03 | End: 2024-01-03

## 2024-01-03 RX ORDER — IBUPROFEN 200 MG
TABLET ORAL ONCE
OUTPATIENT
Start: 2024-01-03 | End: 2024-01-03

## 2024-01-03 RX ORDER — TRIAMCINOLONE ACETONIDE 1 MG/G
OINTMENT TOPICAL ONCE
OUTPATIENT
Start: 2024-01-03 | End: 2024-01-03

## 2024-01-03 RX ORDER — LIDOCAINE 40 MG/G
CREAM TOPICAL ONCE
OUTPATIENT
Start: 2024-01-03 | End: 2024-01-03

## 2024-01-03 RX ORDER — LIDOCAINE HYDROCHLORIDE 20 MG/ML
JELLY TOPICAL ONCE
OUTPATIENT
Start: 2024-01-03 | End: 2024-01-03

## 2024-01-03 RX ORDER — GENTAMICIN SULFATE 1 MG/G
OINTMENT TOPICAL ONCE
OUTPATIENT
Start: 2024-01-03 | End: 2024-01-03

## 2024-01-03 RX ORDER — LIDOCAINE 50 MG/G
OINTMENT TOPICAL ONCE
OUTPATIENT
Start: 2024-01-03 | End: 2024-01-03

## 2024-01-03 RX ORDER — GINSENG 100 MG
CAPSULE ORAL ONCE
OUTPATIENT
Start: 2024-01-03 | End: 2024-01-03

## 2024-01-03 RX ORDER — BETAMETHASONE DIPROPIONATE 0.5 MG/G
CREAM TOPICAL ONCE
OUTPATIENT
Start: 2024-01-03 | End: 2024-01-03

## 2024-01-03 RX ORDER — CLOBETASOL PROPIONATE 0.5 MG/G
OINTMENT TOPICAL ONCE
OUTPATIENT
Start: 2024-01-03 | End: 2024-01-03

## 2024-01-03 RX ORDER — SODIUM CHLOR/HYPOCHLOROUS ACID 0.033 %
SOLUTION, IRRIGATION IRRIGATION ONCE
OUTPATIENT
Start: 2024-01-03 | End: 2024-01-03

## 2024-01-03 RX ORDER — BACITRACIN ZINC AND POLYMYXIN B SULFATE 500; 1000 [USP'U]/G; [USP'U]/G
OINTMENT TOPICAL ONCE
OUTPATIENT
Start: 2024-01-03 | End: 2024-01-03

## 2024-01-03 NOTE — FLOWSHEET NOTE
01/03/24 1434   Wound 12/20/23 Foot Left;Lateral   Date First Assessed/Time First Assessed: 12/20/23 1322   Primary Wound Type: Pressure Injury  Location: Foot  Wound Location Orientation: Left;Lateral   Dressing Status New dressing applied   Dressing/Treatment Xeroform;Gauze dressing/dressing sponge;Tape/Soft cloth adhesive tape   Wound 12/20/23 Leg Right;Lower   Date First Assessed/Time First Assessed: 12/20/23 1322   Primary Wound Type: Venous Ulcer  Location: Leg  Wound Location Orientation: Right;Lower   Dressing Status New dressing applied   Dressing/Treatment Xeroform;ABD;Roll gauze;Tape/Soft cloth adhesive tape  (double tubi)

## 2024-01-03 NOTE — DISCHARGE INSTRUCTIONS
Discharge Instructions [facility]  [address]  [address]  [place] [address]  Telephone: [phone]     FAX [phone]    NAME:  Vipul Castro  YOB: 1955  MEDICAL RECORD NUMBER:  [id number]  DATE:  1/3/2024  WOUND CARE ORDERS:  Right lower leg and left lateral foot :Cleanse with soap and water , apply primary dressing Xeroform cover with secondary dressing   ABD, Gauze, Roll Gauze, and Tape Right lower leg and Border gauze on left lateral foot. .  Apply Double tubi   Pt./pcg/HH nurse to change (freq) Every other day  and as needed for compromise.F/U in 3 week.     TREATMENT ORDERS:    Elevate leg(s) above the level of the heart when sitting.   Avoid prolonged standing in one place.  Do no get dressing/wrap wet.  Follow Diet as prescribed:   [x] Diet as tolerated: [] Calorie Diabetic Diet: Low carb and no Sugar [x] No Added Salt:  [] Increase Protein: [] Limit the amount of liquid you are drinking and avoid drinking in between meals     Return Appointment:  [x] Return Appointment: With Dr. Calvo  in  3 Week(s)     Electronically signed MARCIANO OLIVARES RN on 1/3/2024 at 2:16 PM     Wound Care Center Information: Should you experience any significant changes in your wound(s) or have questions about your wound care, please contact the [facility] at MONDAY - FRIDAY 8:00 am - 4:30.  If you need help with your wound outside these hours and cannot wait until we are again available, contact your PCP or go to the hospital emergency room.   PLEASE NOTE: IF YOU ARE UNABLE TO OBTAIN WOUND SUPPLIES, CONTINUE TO USE THE SUPPLIES YOU HAVE AVAILABLE UNTIL YOU ARE ABLE TO REACH US. IT IS MOST IMPORTANT TO KEEP THE WOUND COVERED AT ALL TIMES.     Physician Signature:_______________________    Date: ___________ Time:  ____________

## 2024-01-03 NOTE — TELEPHONE ENCOUNTER
Spoke to [facility] and gave verbal for PT.They will fax order over.   Erythromycin Pregnancy And Lactation Text: This medication is Pregnancy Category B and is considered safe during pregnancy. It is also excreted in breast milk. Tazorac Pregnancy And Lactation Text: This medication is not safe during pregnancy. It is unknown if this medication is excreted in breast milk. Spironolactone Counseling: Patient advised regarding risks of diarrhea, abdominal pain, hyperkalemia, birth defects (for female patients), liver toxicity and renal toxicity. The patient may need blood work to monitor liver and kidney function and potassium levels while on therapy. The patient verbalized understanding of the proper use and possible adverse effects of spironolactone.  All of the patient's questions and concerns were addressed. Use Enhanced Medication Counseling?: No Tetracycline Counseling: Patient counseled regarding possible photosensitivity and increased risk for sunburn.  Patient instructed to avoid sunlight, if possible.  When exposed to sunlight, patients should wear protective clothing, sunglasses, and sunscreen.  The patient was instructed to call the office immediately if the following severe adverse effects occur:  hearing changes, easy bruising/bleeding, severe headache, or vision changes.  The patient verbalized understanding of the proper use and possible adverse effects of tetracycline.  All of the patient's questions and concerns were addressed. Patient understands to avoid pregnancy while on therapy due to potential birth defects. High Dose Vitamin A Pregnancy And Lactation Text: High dose vitamin A therapy is contraindicated during pregnancy and breast feeding. Benzoyl Peroxide Counseling: Patient counseled that medicine may cause skin irritation and bleach clothing.  In the event of skin irritation, the patient was advised to reduce the amount of the drug applied or use it less frequently.   The patient verbalized understanding of the proper use and possible adverse effects of benzoyl peroxide.  All of the patient's questions and concerns were addressed. Topical Sulfur Applications Pregnancy And Lactation Text: This medication is Pregnancy Category C and has an unknown safety profile during pregnancy. It is unknown if this topical medication is excreted in breast milk. Topical Clindamycin Pregnancy And Lactation Text: This medication is Pregnancy Category B and is considered safe during pregnancy. It is unknown if it is excreted in breast milk. Benzoyl Peroxide Pregnancy And Lactation Text: This medication is Pregnancy Category C. It is unknown if benzoyl peroxide is excreted in breast milk. Minocycline Pregnancy And Lactation Text: This medication is Pregnancy Category D and not consider safe during pregnancy. It is also excreted in breast milk. Spironolactone Pregnancy And Lactation Text: This medication can cause feminization of the male fetus and should be avoided during pregnancy. The active metabolite is also found in breast milk. Dapsone Counseling: I discussed with the patient the risks of dapsone including but not limited to hemolytic anemia, agranulocytosis, rashes, methemoglobinemia, kidney failure, peripheral neuropathy, headaches, GI upset, and liver toxicity.  Patients who start dapsone require monitoring including baseline LFTs and weekly CBCs for the first month, then every month thereafter.  The patient verbalized understanding of the proper use and possible adverse effects of dapsone.  All of the patient's questions and concerns were addressed. Erythromycin Counseling:  I discussed with the patient the risks of erythromycin including but not limited to GI upset, allergic reaction, drug rash, diarrhea, increase in liver enzymes, and yeast infections. Birth Control Pills Counseling: Birth Control Pill Counseling: I discussed with the patient the potential side effects of OCPs including but not limited to increased risk of stroke, heart attack, thrombophlebitis, deep venous thrombosis, hepatic adenomas, breast changes, GI upset, headaches, and depression.  The patient verbalized understanding of the proper use and possible adverse effects of OCPs. All of the patient's questions and concerns were addressed. Topical Clindamycin Counseling: Patient counseled that this medication may cause skin irritation or allergic reactions.  In the event of skin irritation, the patient was advised to reduce the amount of the drug applied or use it less frequently.   The patient verbalized understanding of the proper use and possible adverse effects of clindamycin.  All of the patient's questions and concerns were addressed. Dapsone Pregnancy And Lactation Text: This medication is Pregnancy Category C and is not considered safe during pregnancy or breast feeding. Minocycline Counseling: Patient advised regarding possible photosensitivity and discoloration of the teeth, skin, lips, tongue and gums.  Patient instructed to avoid sunlight, if possible.  When exposed to sunlight, patients should wear protective clothing, sunglasses, and sunscreen.  The patient was instructed to call the office immediately if the following severe adverse effects occur:  hearing changes, easy bruising/bleeding, severe headache, or vision changes.  The patient verbalized understanding of the proper use and possible adverse effects of minocycline.  All of the patient's questions and concerns were addressed. Isotretinoin Counseling: Patient should get monthly blood tests, not donate blood, not drive at night if vision affected, not share medication, and not undergo elective surgery for 6 months after tx completed. Side effects reviewed, pt to contact office should one occur. Doxycycline Counseling:  Patient counseled regarding possible photosensitivity and increased risk for sunburn.  Patient instructed to avoid sunlight, if possible.  When exposed to sunlight, patients should wear protective clothing, sunglasses, and sunscreen.  The patient was instructed to call the office immediately if the following severe adverse effects occur:  hearing changes, easy bruising/bleeding, severe headache, or vision changes.  The patient verbalized understanding of the proper use and possible adverse effects of doxycycline.  All of the patient's questions and concerns were addressed. Isotretinoin Pregnancy And Lactation Text: This medication is Pregnancy Category X and is considered extremely dangerous during pregnancy. It is unknown if it is excreted in breast milk. Detail Level: Zone Birth Control Pills Pregnancy And Lactation Text: This medication should be avoided if pregnant and for the first 30 days post-partum. Azithromycin Pregnancy And Lactation Text: This medication is considered safe during pregnancy and is also secreted in breast milk. Azithromycin Counseling:  I discussed with the patient the risks of azithromycin including but not limited to GI upset, allergic reaction, drug rash, diarrhea, and yeast infections. Bactrim Counseling:  I discussed with the patient the risks of sulfa antibiotics including but not limited to GI upset, allergic reaction, drug rash, diarrhea, dizziness, photosensitivity, and yeast infections.  Rarely, more serious reactions can occur including but not limited to aplastic anemia, agranulocytosis, methemoglobinemia, blood dyscrasias, liver or kidney failure, lung infiltrates or desquamative/blistering drug rashes. Tazorac Counseling:  Patient advised that medication is irritating and drying.  Patient may need to apply sparingly and wash off after an hour before eventually leaving it on overnight.  The patient verbalized understanding of the proper use and possible adverse effects of tazorac.  All of the patient's questions and concerns were addressed. High Dose Vitamin A Counseling: Side effects reviewed, pt to contact office should one occur. Doxycycline Pregnancy And Lactation Text: This medication is Pregnancy Category D and not consider safe during pregnancy. It is also excreted in breast milk but is considered safe for shorter treatment courses. Sarecycline Counseling: Patient advised regarding possible photosensitivity and discoloration of the teeth, skin, lips, tongue and gums.  Patient instructed to avoid sunlight, if possible.  When exposed to sunlight, patients should wear protective clothing, sunglasses, and sunscreen.  The patient was instructed to call the office immediately if the following severe adverse effects occur:  hearing changes, easy bruising/bleeding, severe headache, or vision changes.  The patient verbalized understanding of the proper use and possible adverse effects of sarecycline.  All of the patient's questions and concerns were addressed. Bactrim Pregnancy And Lactation Text: This medication is Pregnancy Category D and is known to cause fetal risk.  It is also excreted in breast milk. Topical Retinoid Pregnancy And Lactation Text: This medication is Pregnancy Category C. It is unknown if this medication is excreted in breast milk. Topical Sulfur Applications Counseling: Topical Sulfur Counseling: Patient counseled that this medication may cause skin irritation or allergic reactions.  In the event of skin irritation, the patient was advised to reduce the amount of the drug applied or use it less frequently.   The patient verbalized understanding of the proper use and possible adverse effects of topical sulfur application.  All of the patient's questions and concerns were addressed. Topical Retinoid counseling:  Patient advised to apply a pea-sized amount only at bedtime and wait 30 minutes after washing their face before applying.  If too drying, patient may add a non-comedogenic moisturizer. The patient verbalized understanding of the proper use and possible adverse effects of retinoids.  All of the patient's questions and concerns were addressed.

## 2024-01-03 NOTE — FLOWSHEET NOTE
01/03/24 1406   Wound 12/20/23 Foot Left;Lateral   Date First Assessed/Time First Assessed: 12/20/23 1322   Primary Wound Type: Pressure Injury  Location: Foot  Wound Location Orientation: Left;Lateral   Wound Image    Dressing Status Old drainage noted   Wound Cleansed Cleansed with saline   Wound Length (cm) 0.2 cm   Wound Width (cm) 0.2 cm   Wound Depth (cm) 0.1 cm   Wound Surface Area (cm^2) 0.04 cm^2   Change in Wound Size % (l*w) 92.59   Wound Volume (cm^3) 0.004 cm^3   Wound Healing % 93   Wound Assessment Slough   Drainage Amount Small (< 25%)   Drainage Description Serous   Odor None   Danae-wound Assessment Hyperkeratosis (callous)   Margins Flat/open edges   Wound Thickness Description not for Pressure Injury Full thickness   Wound 12/20/23 Leg Right;Lower   Date First Assessed/Time First Assessed: 12/20/23 1322   Primary Wound Type: Venous Ulcer  Location: Leg  Wound Location Orientation: Right;Lower   Wound Image    Dressing Status Old drainage noted   Wound Cleansed Cleansed with saline   Wound Length (cm) 2.8 cm   Wound Width (cm) 1 cm   Wound Depth (cm) 0.1 cm   Wound Surface Area (cm^2) 2.8 cm^2   Change in Wound Size % (l*w) 91.11   Wound Volume (cm^3) 0.28 cm^3   Wound Healing % 91   Wound Assessment Slough;Pink/red   Drainage Amount Moderate (25-50%)   Drainage Description Serous   Odor None   Danae-wound Assessment Fragile   Margins Flat/open edges   Wound Thickness Description not for Pressure Injury Full thickness   Pain Assessment   Pain Assessment None - Denies Pain     BP (!) 164/76   Pulse 73   Temp 97.5 °F (36.4 °C) (Temporal)   Resp 16

## 2024-01-03 NOTE — PROGRESS NOTES
HISTORY OF PRESENT ILLNESS:  The patient is a 65-year-old man who was seen atto the wound care center regarding ulceration on right lower leg and ulceration on lateral left foot.     The patient was seen at the wound care center for these issues on 12/20/2023.     The patient had previously been seen at the wound care center regarding a wound of the right lower leg and left ankle.  The patient was first seen at the [facility] for that issue on 5/7/2020.  Ankle  wound healed relatively quickly.  The patient was found to have significant peripheral artery disease in the right lower extremity and required angioplasty and a right femoral endarterectomy.  He eventually had healing of his right lower leg wound after skin grafting.     The patient reports chronic numbness of both feet.  He has right drop foot.     The patient has history of lumbar laminectomy and diskectomy on 09/04/2019.  He reports related to his disk disease, he has right side footdrop.  He reported history of neurogenic claudication.  The patient was scheduled to have further back surgery within the month, but that has been canceled related to COVID-19.     The patient reports that he walks with a cane.  He denies shortness of breath at rest or with exertion.  He has no anginal chest pain.     The patient had angiography by Dr Martir Goldsmith on 5/17/2020 with finding of severe multilevel arterial occlusive disease in legs.    The patient on 5/29/2020 had open right femoral endarterectomy and patch angioplasty and also balloon angioplasty of right superficial femoral artery.  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  Dr Goldsmith also debrided the dry eschars on the leg wounds.     The patient had BATSHEVA bilateral lower extremities at [facility] on 6/24/2021.  This showed:                   Right BATSHEVA 0.70 at DP, TBI0.42.                 Left BATSHEVA 0.67 at DP, TBI 0.32.     As of 12/20/2023, the patient reports that noninvasive testing at

## 2024-01-24 ENCOUNTER — HOSPITAL ENCOUNTER (OUTPATIENT)
Facility: HOSPITAL | Age: 69
Discharge: HOME | End: 2024-01-24
Attending: SURGERY
Payer: MEDICARE

## 2024-01-24 VITALS
RESPIRATION RATE: 18 BRPM | HEART RATE: 76 BPM | DIASTOLIC BLOOD PRESSURE: 65 MMHG | TEMPERATURE: 97.6 F | SYSTOLIC BLOOD PRESSURE: 145 MMHG

## 2024-01-24 DIAGNOSIS — L97.912 NON-PRESSURE CHRONIC ULCER OF RIGHT LOWER LEG, WITH FAT LAYER EXPOSED (HCC): Primary | ICD-10-CM

## 2024-01-24 PROBLEM — L89.893 PRESSURE INJURY OF LEFT FOOT, STAGE 3 (HCC): Status: RESOLVED | Noted: 2023-12-20 | Resolved: 2024-01-24

## 2024-01-24 PROBLEM — L89.891 PRESSURE INJURY OF RIGHT FOOT, STAGE 1: Status: RESOLVED | Noted: 2021-03-22 | Resolved: 2024-01-24

## 2024-01-24 PROCEDURE — 99213 OFFICE O/P EST LOW 20 MIN: CPT | Performed by: SURGERY

## 2024-01-24 PROCEDURE — 99212 OFFICE O/P EST SF 10 MIN: CPT

## 2024-01-24 RX ORDER — GINSENG 100 MG
CAPSULE ORAL ONCE
Status: CANCELLED | OUTPATIENT
Start: 2024-01-24 | End: 2024-01-24

## 2024-01-24 RX ORDER — LIDOCAINE HYDROCHLORIDE 20 MG/ML
JELLY TOPICAL ONCE
Status: CANCELLED | OUTPATIENT
Start: 2024-01-24 | End: 2024-01-24

## 2024-01-24 RX ORDER — LIDOCAINE 40 MG/G
CREAM TOPICAL ONCE
Status: CANCELLED | OUTPATIENT
Start: 2024-01-24 | End: 2024-01-24

## 2024-01-24 RX ORDER — LIDOCAINE HYDROCHLORIDE 40 MG/ML
SOLUTION TOPICAL ONCE
Status: CANCELLED | OUTPATIENT
Start: 2024-01-24 | End: 2024-01-24

## 2024-01-24 RX ORDER — SODIUM CHLOR/HYPOCHLOROUS ACID 0.033 %
SOLUTION, IRRIGATION IRRIGATION ONCE
Status: CANCELLED | OUTPATIENT
Start: 2024-01-24 | End: 2024-01-24

## 2024-01-24 RX ORDER — TRIAMCINOLONE ACETONIDE 1 MG/G
OINTMENT TOPICAL ONCE
Status: CANCELLED | OUTPATIENT
Start: 2024-01-24 | End: 2024-01-24

## 2024-01-24 RX ORDER — LIDOCAINE 50 MG/G
OINTMENT TOPICAL ONCE
Status: CANCELLED | OUTPATIENT
Start: 2024-01-24 | End: 2024-01-24

## 2024-01-24 RX ORDER — BACITRACIN ZINC AND POLYMYXIN B SULFATE 500; 1000 [USP'U]/G; [USP'U]/G
OINTMENT TOPICAL ONCE
Status: CANCELLED | OUTPATIENT
Start: 2024-01-24 | End: 2024-01-24

## 2024-01-24 RX ORDER — IBUPROFEN 200 MG
TABLET ORAL ONCE
Status: CANCELLED | OUTPATIENT
Start: 2024-01-24 | End: 2024-01-24

## 2024-01-24 RX ORDER — CLOBETASOL PROPIONATE 0.5 MG/G
OINTMENT TOPICAL ONCE
Status: CANCELLED | OUTPATIENT
Start: 2024-01-24 | End: 2024-01-24

## 2024-01-24 RX ORDER — GENTAMICIN SULFATE 1 MG/G
OINTMENT TOPICAL ONCE
Status: CANCELLED | OUTPATIENT
Start: 2024-01-24 | End: 2024-01-24

## 2024-01-24 RX ORDER — BETAMETHASONE DIPROPIONATE 0.5 MG/G
CREAM TOPICAL ONCE
Status: CANCELLED | OUTPATIENT
Start: 2024-01-24 | End: 2024-01-24

## 2024-01-24 NOTE — PROGRESS NOTES
HISTORY OF PRESENT ILLNESS:  The patient is a 65-year-old man who was seen at the wound care center regarding ulceration on right lower leg and ulceration on lateral left foot.     The patient was seen at the wound care center for these issues on 12/20/2023.     The patient had previously been seen at the wound care center regarding a wound of the right lower leg and left ankle.  The patient was first seen at the [facility] for that issue on 5/7/2020.  Ankle  wound healed relatively quickly.  The patient was found to have significant peripheral artery disease in the right lower extremity and required angioplasty and a right femoral endarterectomy.  He eventually had healing of his right lower leg wound after skin grafting.     The patient reports chronic numbness of both feet.  He has right drop foot.     The patient has history of lumbar laminectomy and diskectomy on 09/04/2019.  He reports related to his disk disease, he has right side footdrop.  He reported history of neurogenic claudication.       The patient reports that he walks with a cane.  He denies shortness of breath at rest or with exertion.  He has no anginal chest pain.     The patient had angiography by Dr Martir Goldsmith on 5/17/2020 with finding of severe multilevel arterial occlusive disease in legs.    The patient on 5/29/2020 had open right femoral endarterectomy and patch angioplasty and also balloon angioplasty of right superficial femoral artery.  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  Dr Goldsmith also debrided the dry eschars on the leg wounds.     The patient had BATSHEVA bilateral lower extremities at [facility] on 6/24/2021.  This showed:                   Right BATSHEVA 0.70 at DP, TBI0.42.                 Left BATSHEVA 0.67 at DP, TBI 0.32.     As of 12/20/2023, the patient reports that noninvasive testing at Dr. Goldsmith's office showed 0.60 ankle arm index on right and left sides.     The patient has a history of coronary artery

## 2024-01-24 NOTE — FLOWSHEET NOTE
01/24/24 1344   Right Leg Edema Point of Measurement   Compression Therapy Tubular elastic support bandage   Wound 12/20/23 Leg Right;Lower   Date First Assessed/Time First Assessed: 12/20/23 1322   Primary Wound Type: Venous Ulcer  Location: Leg  Wound Location Orientation: Right;Lower   Wound Image    Wound Etiology Venous   Dressing Status Intact   Wound Cleansed Cleansed with saline   Wound Length (cm) 0.1 cm   Wound Width (cm) 0.1 cm   Wound Depth (cm) 0.1 cm   Wound Surface Area (cm^2) 0.01 cm^2   Change in Wound Size % (l*w) 99.97   Wound Volume (cm^3) 0.001 cm^3   Wound Healing % 100   Wound Assessment Epithelialization   Drainage Amount None (dry)   Odor None   Pain Assessment   Pain Assessment None - Denies Pain

## 2024-01-24 NOTE — DISCHARGE INSTRUCTIONS
Discharge Instructions [facility]                      [address]                         [address], [address]                   [place] [address]  Telephone: [phone]     FAX [phone]    NAME:  Vipul Castro  YOB: 1955  MEDICAL RECORD NUMBER:  [id number]  DATE:  1/24/2024  WOUND CARE ORDERS:  Right leg wound :Cleanse with soap and water.  Apply Compression stockings 15-20 mmHg. No follow up at this time.   Home Health Agency: none  TREATMENT ORDERS:    Elevate leg(s) above the level of the heart when sitting.   Avoid prolonged standing in one place.  Do no get dressing/wrap wet.  Follow Diet as prescribed:   [] Diet as tolerated: [] Calorie Diabetic Diet: Low carb and no Sugar [] No Added Salt:  [] Increase Protein: [] Limit the amount of liquid you are drinking and avoid drinking in between meals     Return Appointment:  [x] Return Appointment: With Dr. Calvo in  no follow up   [] Nurse Visit : *** days  [] Ordered tests:    Electronically signed Carmen Goetz RN on 1/24/2024 at 2:03 PM     Wound Care Center Information: Should you experience any significant changes in your wound(s) or have questions about your wound care, please contact the [facility] at MONDAY - FRIDAY 8:00 am - 4:30.  If you need help with your wound outside these hours and cannot wait until we are again available, contact your PCP or go to the hospital emergency room.   PLEASE NOTE: IF YOU ARE UNABLE TO OBTAIN WOUND SUPPLIES, CONTINUE TO USE THE SUPPLIES YOU HAVE AVAILABLE UNTIL YOU ARE ABLE TO REACH US. IT IS MOST IMPORTANT TO KEEP THE WOUND COVERED AT ALL TIMES.     Physician Signature:_______________________    Date: ___________ Time:  ____________

## 2024-02-02 RX ORDER — ALLOPURINOL 300 MG/1
TABLET ORAL
Qty: 135 TABLET | Refills: 0 | Status: SHIPPED | OUTPATIENT
Start: 2024-02-02

## 2024-02-06 RX ORDER — CLOPIDOGREL BISULFATE 75 MG/1
75 TABLET ORAL DAILY
Qty: 90 TABLET | Refills: 1 | Status: SHIPPED | OUTPATIENT
Start: 2024-02-06

## 2024-02-16 ENCOUNTER — OFFICE VISIT (OUTPATIENT)
Dept: PRIMARY CARE CLINIC | Facility: CLINIC | Age: 69
End: 2024-02-16

## 2024-02-16 VITALS
HEART RATE: 72 BPM | DIASTOLIC BLOOD PRESSURE: 77 MMHG | BODY MASS INDEX: 35.84 KG/M2 | WEIGHT: 256 LBS | HEIGHT: 71 IN | TEMPERATURE: 96.8 F | OXYGEN SATURATION: 100 % | RESPIRATION RATE: 16 BRPM | SYSTOLIC BLOOD PRESSURE: 130 MMHG

## 2024-02-16 DIAGNOSIS — J45.20 MILD INTERMITTENT ASTHMA WITHOUT COMPLICATION: ICD-10-CM

## 2024-02-16 DIAGNOSIS — I25.810 ATHEROSCLEROSIS OF CORONARY ARTERY BYPASS GRAFT OF NATIVE HEART WITHOUT ANGINA PECTORIS: ICD-10-CM

## 2024-02-16 DIAGNOSIS — I10 ESSENTIAL (PRIMARY) HYPERTENSION: ICD-10-CM

## 2024-02-16 DIAGNOSIS — M48.061 SPINAL STENOSIS OF LUMBAR REGION WITHOUT NEUROGENIC CLAUDICATION: Primary | ICD-10-CM

## 2024-02-16 DIAGNOSIS — E66.01 SEVERE OBESITY (BMI 35.0-39.9) WITH COMORBIDITY (HCC): ICD-10-CM

## 2024-02-16 DIAGNOSIS — I73.9 PAD (PERIPHERAL ARTERY DISEASE) (HCC): ICD-10-CM

## 2024-02-16 DIAGNOSIS — Z01.818 PREOPERATIVE EXAMINATION: ICD-10-CM

## 2024-02-16 RX ORDER — LISINOPRIL 5 MG/1
5 TABLET ORAL DAILY
COMMUNITY

## 2024-02-16 NOTE — PROGRESS NOTES
Subjective  Vipul Castro is an 69 y.o. male who presents for preop.   Pmhx : CAD, PAD, HTN, HLD, Hypogonadism, Lumbar spinal stenosis, Asthma, Chronic pain, Fatty liver disease. Peripheral neuropathy and right foot drop..     CAD, dx in 2018, triple bypass. Follows with cardio, Dr Leonardo Villalba.   REILLY, moderate stenosis in R, mild in L.  PAD s/p angioplasty and R femoral endartectoy.     HTN, HLD. On lisinopril and Rosuvastatin.     Severe DDD, lumbar stenosis. status post ALIF L5-S1 with posterior percutaneous fusion L5-S1 and laminectomy L2-L3 06/2023.     Chronic wound in left leg requiring skin grafting. Following with wound care.    Admission 12/26 - 12/30 due to dizziness, dehydration.     Planned of skin lesion removal on 02/27/24.   Plastic surgeon Dr Carr.  General anesthesia.     He is able to walk 1 block without significant dyspnea or chest pain.  Denies signs of bleeding.   Denies recent chest pain or dyspnea. Denies orthopnea.    Upcoming appnt with [facility].     Medications and allergies reviewed.       Allergies - reviewed:   Allergies   Allergen Reactions    Erythromycin Other (See Comments)     GI upset         Medications - reviewed:   Current Outpatient Medications   Medication Sig    lisinopril (PRINIVIL;ZESTRIL) 5 MG tablet Take 1 tablet by mouth daily    clopidogrel (PLAVIX) 75 MG tablet TAKE 1 TABLET BY MOUTH DAILY    allopurinol (ZYLOPRIM) 300 MG tablet TAKE 1 AND 1/2 TABLET BY MOUTH DAILY    vitamin B-12 (CYANOCOBALAMIN) 1000 MCG tablet Take 1 tablet by mouth daily    rosuvastatin (CRESTOR) 40 MG tablet Take 1 tablet by mouth every evening    esomeprazole (NEXIUM) 40 MG delayed release capsule Take 1 capsule by mouth daily    aspirin 81 MG EC tablet Take 1 tablet by mouth daily    ezetimibe (ZETIA) 10 MG tablet Take 1 tablet by mouth daily     No current facility-administered medications for this visit.         Past Medical History - reviewed:  Past Medical History:  normal

## 2024-02-16 NOTE — PROGRESS NOTES
\"Have you been to the ER, urgent care clinic since your last visit?  Hospitalized since your last visit?\"    NO    “Have you seen or consulted any other health care providers outside of [facility] since your last visit?”    NO

## 2024-03-11 NOTE — TELEPHONE ENCOUNTER
PCP: Sierra Goyal DO    Last Visit 2/16/2024   Future Appointments   Date Time Provider Department Center   4/22/2024 11:15 AM Sierra Goyal DO [facility]       Requested Prescriptions     Pending Prescriptions Disp Refills    rosuvastatin (CRESTOR) 40 MG tablet 90 tablet 0     Sig: Take 1 tablet by mouth every evening         Other Comments: Last Refill   05/08/23

## 2024-03-12 RX ORDER — ROSUVASTATIN CALCIUM 40 MG/1
40 TABLET, COATED ORAL EVERY EVENING
Qty: 90 TABLET | Refills: 0 | Status: SHIPPED | OUTPATIENT
Start: 2024-03-12

## 2024-05-06 DIAGNOSIS — R60.0 BILATERAL LEG EDEMA: Primary | ICD-10-CM

## 2024-05-06 DIAGNOSIS — M10.9 GOUT, UNSPECIFIED CAUSE, UNSPECIFIED CHRONICITY, UNSPECIFIED SITE: ICD-10-CM

## 2024-05-06 RX ORDER — ALLOPURINOL 300 MG/1
TABLET ORAL
Qty: 45 TABLET | Refills: 0 | Status: SHIPPED
Start: 2024-05-06

## 2024-05-13 DIAGNOSIS — M10.9 GOUT, UNSPECIFIED CAUSE, UNSPECIFIED CHRONICITY, UNSPECIFIED SITE: ICD-10-CM

## 2024-05-13 RX ORDER — ALLOPURINOL 300 MG/1
TABLET ORAL
Qty: 45 TABLET | Refills: 1 | Status: SHIPPED | OUTPATIENT
Start: 2024-05-13 | End: 2024-05-14 | Stop reason: SDUPTHER

## 2024-05-13 NOTE — TELEPHONE ENCOUNTER
Patient is checking on his allopurinol.  I can see where it was sent in but can't tell where.  He would like a call back to confirm where it was sent.

## 2024-05-13 NOTE — TELEPHONE ENCOUNTER
PCP: Sierra Goyal DO    Last Visit 2/16/2024   Future Appointments   Date Time Provider Department Center   6/10/2024 12:15 PM Sierra Goyal DO [facility]       Requested Prescriptions      No prescriptions requested or ordered in this encounter         Other Comments: Last Refill Patient is checking on his allopurinol.  I can see where it was sent in but can't tell where.  It says not e-prescribed, but was it printed off?     Please advise

## 2024-05-14 RX ORDER — ALLOPURINOL 300 MG/1
TABLET ORAL
Qty: 45 TABLET | Refills: 1 | Status: SHIPPED | OUTPATIENT
Start: 2024-05-14

## 2024-06-11 NOTE — TELEPHONE ENCOUNTER
PCP: Sierra Goyal DO    Last Visit 2/16/2024   No future appointments.    Requested Prescriptions     Pending Prescriptions Disp Refills    esomeprazole (NEXIUM) 40 MG delayed release capsule [Pharmacy Med Name: ESOMEPRAZOLE MAG DR 40 MG CAP] 30 capsule      Sig: TAKE 1 CAPSULE BY MOUTH DAILY         Other Comments: Last Refill   09/08/23

## 2024-06-12 RX ORDER — ESOMEPRAZOLE MAGNESIUM 40 MG/1
40 CAPSULE, DELAYED RELEASE ORAL DAILY
Qty: 90 CAPSULE | Refills: 0 | Status: SHIPPED | OUTPATIENT
Start: 2024-06-12

## 2024-08-08 NOTE — TELEPHONE ENCOUNTER
PCP: Sierra Goyal DO    Last Visit 2/16/2024   No future appointments.    Requested Prescriptions     Pending Prescriptions Disp Refills    clopidogrel (PLAVIX) 75 MG tablet [Pharmacy Med Name: CLOPIDOGREL 75 MG TABLET] 90 tablet 1     Sig: TAKE 1 TABLET BY MOUTH DAILY         Other Comments: Last Refill   02/06/24

## 2024-08-09 RX ORDER — CLOPIDOGREL BISULFATE 75 MG/1
75 TABLET ORAL DAILY
Qty: 90 TABLET | Refills: 1 | Status: SHIPPED | OUTPATIENT
Start: 2024-08-09

## 2024-09-18 RX ORDER — ESOMEPRAZOLE MAGNESIUM 40 MG/1
40 CAPSULE, DELAYED RELEASE ORAL DAILY
Qty: 90 CAPSULE | Refills: 0 | Status: SHIPPED | OUTPATIENT
Start: 2024-09-18

## 2024-10-22 ENCOUNTER — HOSPITAL ENCOUNTER (EMERGENCY)
Facility: HOSPITAL | Age: 69
Discharge: HOME | End: 2024-10-22
Attending: STUDENT IN AN ORGANIZED HEALTH CARE EDUCATION/TRAINING PROGRAM
Payer: MEDICARE

## 2024-10-22 VITALS
RESPIRATION RATE: 18 BRPM | WEIGHT: 260 LBS | OXYGEN SATURATION: 98 % | SYSTOLIC BLOOD PRESSURE: 122 MMHG | DIASTOLIC BLOOD PRESSURE: 75 MMHG | HEART RATE: 76 BPM | BODY MASS INDEX: 36.28 KG/M2 | TEMPERATURE: 98.1 F

## 2024-10-22 DIAGNOSIS — S31.119A LACERATION OF ABDOMINAL WALL, INITIAL ENCOUNTER: Primary | ICD-10-CM

## 2024-10-22 PROCEDURE — 99282 EMERGENCY DEPT VISIT SF MDM: CPT

## 2024-10-22 ASSESSMENT — ENCOUNTER SYMPTOMS
RHINORRHEA: 0
NAUSEA: 0
ABDOMINAL PAIN: 0
VOMITING: 0
DIARRHEA: 0
BACK PAIN: 1
SHORTNESS OF BREATH: 0
CHEST TIGHTNESS: 0

## 2024-10-22 ASSESSMENT — LIFESTYLE VARIABLES
HOW OFTEN DO YOU HAVE A DRINK CONTAINING ALCOHOL: NEVER
HOW MANY STANDARD DRINKS CONTAINING ALCOHOL DO YOU HAVE ON A TYPICAL DAY: PATIENT DOES NOT DRINK

## 2024-10-22 ASSESSMENT — PAIN - FUNCTIONAL ASSESSMENT: PAIN_FUNCTIONAL_ASSESSMENT: NONE - DENIES PAIN

## 2024-10-22 NOTE — DISCHARGE INSTRUCTIONS
You were seen at [facility] for bleeding from a small laceration on your abdomen.  Bleeding was controlled with a hemostatic bandage in the emergency department.  Please hold 1 dose of your blood thinner (clopidogrel).  Please do not remove this bandage for 24 hours and avoid getting it wet.  If your bleeding worsens you become lightheaded lose consciousness or have other concerning medical symptoms please return to care.  Otherwise we will follow-up with your primary care provider and your wound care providers as needed.

## 2024-10-22 NOTE — ED PROVIDER NOTES
negatives as per HPI.    PAST HISTORY     Past Medical History:  Past Medical History:   Diagnosis Date    Anxiety     Arthritis     OSTEO - all joints    Asthma     AS A YOUNGER ADULT    Basal cell carcinoma 2022    leg and right shoulder    CAD (coronary artery disease)     stent X1    Cancer (HCC)     BCC, SCC    Cataract     right    Chronic pain     GERD (gastroesophageal reflux disease)     HTN (hypertension)     Hyperlipidemia     Kidney stone     Liver disease 2018    FATTY LIVER    Thrombocytopenia (HCC) 2023         Past Surgical History:  Past Surgical History:   Procedure Laterality Date    CABG, ARTERY-VEIN, THREE  2018    COLONOSCOPY N/A 2023    COLONOSCOPY performed by Dre Estrada MD at [facility] ENDOSCOPY    COLONOSCOPY      IR LUMBAR TRANSFORAMINAL EPIDURAL SINGLE  2020    NEUROLOGICAL SURGERY      L1 or L3 LAMINECTOMY    ORTHOPEDIC SURGERY Left 2005    HIP REPLACEMENT    ORTHOPEDIC SURGERY Right 2009    HIP REPLACEMENT    OTHER SURGICAL HISTORY Right     debridement of infection in leg    NM UNLISTED PROCEDURE CARDIAC SURGERY   &     CARDIAC CATH    SPINAL FUSION  2023       Family History:  Family History   Problem Relation Age of Onset    High Cholesterol Father     Heart Disease Father 38    Anesth Problems Neg Hx     No Known Problems Daughter     Heart Attack Son         AGE 39    Other Mother         DIVERTICULITIS    Heart Disease Brother         CABG x4     Cancer Brother         prostate     Heart Disease Brother 50        CABG       Social History:  Social History     Tobacco Use    Smoking status: Former     Current packs/day: 0.00     Types: Cigarettes     Quit date: 1998     Years since quittin.9    Smokeless tobacco: Never   Vaping Use    Vaping status: Never Used   Substance Use Topics    Alcohol use: Yes     Alcohol/week: 21.0 standard drinks of alcohol    Drug use: Not Currently       Allergies:  Allergies   Allergen

## (undated) DEVICE — TOWEL SURG W17XL27IN STD BLU COT NONFENESTRATED PREWASHED

## (undated) DEVICE — SOLUTION IV 250ML 0.9% SOD CHL CLR INJ FLX BG CONT PRT CLSR

## (undated) DEVICE — KIT NEG PRSS DSG M W4.92XH1.3XL7.09IN SIL POLYUR FOAM

## (undated) DEVICE — CONTAINER SPEC 20 ML LID NEUT BUFF FORMALIN 10 % POLYPR STS

## (undated) DEVICE — 3M™ IOBAN™ 2 ANTIMICROBIAL INCISE DRAPE 6648EZ: Brand: IOBAN™ 2

## (undated) DEVICE — (D)PREP SKN CHLRAPRP APPL 26ML -- CONVERT TO ITEM 371833

## (undated) DEVICE — SPONGE GZ W4XL4IN COT RADPQ HIGHLY ABSRB

## (undated) DEVICE — SUTURE PROL SZ 5-0 L24IN NONABSORBABLE BLU RB-2 L13IN 1/2 8554H

## (undated) DEVICE — PROBE VASC 8MHZ WTRPRF

## (undated) DEVICE — SUTURE PERMAHAND SZ 3-0 L30IN NONABSORBABLE BLK SILK BRAID A304H

## (undated) DEVICE — SPONGE GZ W4XL4IN COT 12 PLY TYP VII WVN C FLD DSGN

## (undated) DEVICE — STRAINER URIN CALC RNL MSH -- CONVERT TO ITEM 357634

## (undated) DEVICE — 4-PORT MANIFOLD: Brand: NEPTUNE 2

## (undated) DEVICE — COVER,TABLE,HEAVY DUTY,77"X90",STRL: Brand: MEDLINE

## (undated) DEVICE — SUTURE PROL SZ 5-0 L30IN NONABSORBABLE BLU L13MM RB-2 1/2 8710H

## (undated) DEVICE — LOOP,VESSEL,MAXI,BLUE,2/PK,STERILE: Brand: MEDLINE

## (undated) DEVICE — NEEDLE HYPO 18GA L1.5IN PNK S STL HUB POLYPR SHLD REG BVL

## (undated) DEVICE — TOWEL 4 PLY TISS 19X30 SUE WHT

## (undated) DEVICE — SUTURE VCRL 2-0 L27IN ABSRB UD CP-2 L26MM 1/2 CIR REV CUT J869H

## (undated) DEVICE — SOLUTION IRRIG 1000ML 0.9% SOD CHL USP POUR PLAS BTL

## (undated) DEVICE — SYR 3ML LL TIP 1/10ML GRAD --

## (undated) DEVICE — SPONGE LAP 18X18IN STRL -- 5/PK

## (undated) DEVICE — AGENT HEMSTAT W4XL4IN OXIDIZED REGENERATED CELOS ABSRB SFT

## (undated) DEVICE — DRAPE,EXTREMITY,89X128,STERILE: Brand: MEDLINE

## (undated) DEVICE — NEONATAL-ADULT SPO2 SENSOR: Brand: NELLCOR

## (undated) DEVICE — EXTREMITY-MRMC: Brand: MEDLINE INDUSTRIES, INC.

## (undated) DEVICE — HYPODERMIC SAFETY NEEDLE: Brand: MAGELLAN

## (undated) DEVICE — STERILE POLYISOPRENE POWDER-FREE SURGICAL GLOVES: Brand: PROTEXIS

## (undated) DEVICE — DRAPE,REIN 53X77,STERILE: Brand: MEDLINE

## (undated) DEVICE — LABEL MED CARD MRMC STRL

## (undated) DEVICE — INTRODUCER SHTH 7FR CANN L5.5CM DIL TIP 35MM ORNG TUNGSTEN

## (undated) DEVICE — ZIP 16 SURGICAL SKIN CLOSURE DEVICE: Brand: ZIP 16 SURGICAL SKIN CLOSURE DEVICE

## (undated) DEVICE — DERMATOME BLADES: Brand: DERMATOME

## (undated) DEVICE — SUTURE ABSRB BRAID COAT UD OS-6 NO 1 27IN VCRL J535H

## (undated) DEVICE — BONE WAX WHITE: Brand: BONE WAX WHITE

## (undated) DEVICE — SYRINGE MED 10ML LUERLOCK TIP W/O SFTY DISP

## (undated) DEVICE — PTA BALLOON DILATATION CATHETER: Brand: MUSTANG™

## (undated) DEVICE — SILVER-COATED ANTIMICROBIAL DRESSING: Brand: ACTICOAT FLEX3 2" X 2"

## (undated) DEVICE — GLOVE ORANGE PI 7   MSG9070

## (undated) DEVICE — DRESSING, ALGINATE, MAXORB II, 4"X4": Brand: MEDLINE INDUSTRIES, INC.

## (undated) DEVICE — NON-REM POLYHESIVE PATIENT RETURN ELECTRODE: Brand: VALLEYLAB

## (undated) DEVICE — STRAP,POSITIONING,KNEE/BODY,FOAM,4X60": Brand: MEDLINE

## (undated) DEVICE — BASIN EMSIS 16OZ GRAPHITE PLAS KID SHP MOLD GRAD FOR ORAL

## (undated) DEVICE — SNARE ENDOSCP M L240CM W27MM SHTH DIA2.4MM CHN 2.8MM OVL

## (undated) DEVICE — SOLUTION IV 500ML 0.9% SOD CHL FLX CONT

## (undated) DEVICE — FLOSEAL HEMOSTATIC MATRIX, 10 ML: Brand: FLOSEAL

## (undated) DEVICE — PREP KIT PEEL PTCH POVIDONE IOD

## (undated) DEVICE — HANDPIECE SET WITH COAXIAL HIGH FLOW TIP AND SUCTION TUBE: Brand: INTERPULSE

## (undated) DEVICE — CATH SUPP SNGL 0.035INX90CM -- TRAILBLAZER - ORDER BY PK/5

## (undated) DEVICE — STERILE POLYISOPRENE POWDER-FREE SURGICAL GLOVES WITH EMOLLIENT COATING: Brand: PROTEXIS

## (undated) DEVICE — PREP SKN PREVAIL 40ML APPL --

## (undated) DEVICE — TRAP,MUCUS SPECIMEN, 80CC: Brand: MEDLINE

## (undated) DEVICE — SUTURE VCRL SZ 3-0 L27IN ABSRB UD L26MM SH 1/2 CIR J416H

## (undated) DEVICE — DRAPE XR C ARM 41X74IN LF --

## (undated) DEVICE — SOLIDIFIER FLD 2OZ 1500CC N DISINF IN BTL DISP SAFESORB

## (undated) DEVICE — SYRINGE ANGIO CNTRST DEL 20 CC POLYCARB LIGHT GRN MEDALLION

## (undated) DEVICE — DRSG GZ OIL EMUL CURAD 3X8 --

## (undated) DEVICE — Z DUPLICATE USE 2198007 DERMACARRIER MSH II 8.0X3.0IN

## (undated) DEVICE — GOWN,NON-REINFORCED,XXL: Brand: MEDLINE

## (undated) DEVICE — GARMENT,MEDLINE,DVT,INT,CALF,MED, GEN2: Brand: MEDLINE

## (undated) DEVICE — DRAIN KT WND 10FR RND 400ML --

## (undated) DEVICE — INFECTION CONTROL KIT SYS

## (undated) DEVICE — Device

## (undated) DEVICE — (D)GDWIRE GLDEWIRE 0.035INX180

## (undated) DEVICE — BLADE ASSEMB CLP HAIR FINE --

## (undated) DEVICE — 1200 GUARD II KIT W/5MM TUBE W/O VAC TUBE: Brand: GUARDIAN

## (undated) DEVICE — TRAY PREP DRY W/ PREM GLV 2 APPL 6 SPNG 2 UNDPD 1 OVERWRAP

## (undated) DEVICE — RADIFOCUS GLIDEWIRE: Brand: GLIDEWIRE

## (undated) DEVICE — SUTURE PROL SZ 6-0 L24IN NONABSORBABLE BLU L9.3MM BV-1 VISI 8305H

## (undated) DEVICE — KIT,1200CC CANISTER,3/16"X6' TUBING: Brand: MEDLINE INDUSTRIES, INC.

## (undated) DEVICE — DRESSING PETRO W3XL8IN N ADH OIL EMUL GZ CURAD

## (undated) DEVICE — BIPOLAR IRRIGATOR INTEGRATED TUBING AND BIPOLAR CORD SET, DISPOSABLE

## (undated) DEVICE — ELECTRODE,RADIOTRANSLUCENT,FOAM,5PK: Brand: MEDLINE

## (undated) DEVICE — SUTURE ABSORBABLE BRAIDED 2-0 CT-1 27 IN UD VICRYL J259H

## (undated) DEVICE — SYR 20ML LL STRL LF --

## (undated) DEVICE — GOWN,SIRUS,NONRNF,SETINSLV,XL,20/CS: Brand: MEDLINE

## (undated) DEVICE — BANDAGE COMPR M W6INXL10YD WHT BGE VELC E MTRX HK AND LOOP

## (undated) DEVICE — REM POLYHESIVE ADULT PATIENT RETURN ELECTRODE: Brand: VALLEYLAB

## (undated) DEVICE — LAMINECTOMY RICHMOND-LF: Brand: MEDLINE INDUSTRIES, INC.

## (undated) DEVICE — SUT ETHLN 4-0 18IN PS2 BLK --

## (undated) DEVICE — INFLATION DEVICE: Brand: ENCORE™ 26

## (undated) DEVICE — STAPLER SKIN H3.9MM WIRE DIA0.58MM CRWN 6.9MM 35 STPL ROT

## (undated) DEVICE — SUT PROL 6-0 24IN BV1 DA BLU --

## (undated) DEVICE — DRESSING NEG PRSS M W18XH3.3XL12.5CM BLK POLYUR FOAM WND

## (undated) DEVICE — INTENDED FOR TISSUE SEPARATION, AND OTHER PROCEDURES THAT REQUIRE A SHARP SURGICAL BLADE TO PUNCTURE OR CUT.: Brand: BARD-PARKER ® CARBON RIB-BACK BLADES

## (undated) DEVICE — 1.5 TO 1 DERMACARRIER: Brand: MESHGRAFTTM  II TISSUE EXPANSION SYSTEM

## (undated) DEVICE — STOCKINETTE,IMPERVIOUS,12X48,STERILE: Brand: MEDLINE

## (undated) DEVICE — Z DISCONTINUED PER MEDLINE LINE GAS SAMPLING O2/CO2 LNG AD 13 FT NSL W/ TBNG FILTERLINE

## (undated) DEVICE — POSITIONER HD REST FOAM CMFRT TCH

## (undated) DEVICE — SUTURE PERMAHAND SZ 2-0 L30IN NONABSORBABLE BLK SILK W/O A305H

## (undated) DEVICE — CATH IV AUTOGRD BC PNK 20GA 25 -- INSYTE

## (undated) DEVICE — COVER,MAYO STAND,STERILE: Brand: MEDLINE

## (undated) DEVICE — HANDLE LT SNAP ON ULT DURABLE LENS FOR TRUMPF ALC DISPOSABLE

## (undated) DEVICE — SET ADMIN 16ML TBNG L100IN 2 Y INJ SITE IV PIGGY BK DISP (ORDER IN MULIPLES OF 48)

## (undated) DEVICE — 450 ML BOTTLE OF 0.05% CHLORHEXIDINE GLUCONATE IN 99.95% STERILE WATER FOR IRRIGATION, USP AND APPLICATOR.: Brand: IRRISEPT ANTIMICROBIAL WOUND LAVAGE

## (undated) DEVICE — BASIC SINGLE BASIN 1-LF: Brand: MEDLINE INDUSTRIES, INC.